# Patient Record
Sex: FEMALE | Race: WHITE | Employment: UNEMPLOYED | URBAN - METROPOLITAN AREA
[De-identification: names, ages, dates, MRNs, and addresses within clinical notes are randomized per-mention and may not be internally consistent; named-entity substitution may affect disease eponyms.]

---

## 2017-05-19 ENCOUNTER — OFFICE VISIT (OUTPATIENT)
Dept: INTERNAL MEDICINE CLINIC | Facility: CLINIC | Age: 57
End: 2017-05-19

## 2017-05-19 VITALS
SYSTOLIC BLOOD PRESSURE: 112 MMHG | WEIGHT: 95.38 LBS | BODY MASS INDEX: 19.23 KG/M2 | TEMPERATURE: 97 F | HEIGHT: 59 IN | DIASTOLIC BLOOD PRESSURE: 70 MMHG | RESPIRATION RATE: 18 BRPM | HEART RATE: 96 BPM

## 2017-05-19 DIAGNOSIS — Z79.4 TYPE 2 DIABETES MELLITUS WITH OTHER OPHTHALMIC COMPLICATION, WITH LONG-TERM CURRENT USE OF INSULIN (HCC): Primary | ICD-10-CM

## 2017-05-19 DIAGNOSIS — M79.602 LEFT ARM PAIN: ICD-10-CM

## 2017-05-19 DIAGNOSIS — E11.39 TYPE 2 DIABETES MELLITUS WITH OTHER OPHTHALMIC COMPLICATION, WITH LONG-TERM CURRENT USE OF INSULIN (HCC): Primary | ICD-10-CM

## 2017-05-19 PROCEDURE — 99212 OFFICE O/P EST SF 10 MIN: CPT | Performed by: INTERNAL MEDICINE

## 2017-05-19 PROCEDURE — 99203 OFFICE O/P NEW LOW 30 MIN: CPT | Performed by: INTERNAL MEDICINE

## 2017-05-19 RX ORDER — MONTELUKAST SODIUM 10 MG/1
10 TABLET ORAL NIGHTLY
COMMUNITY
Start: 2015-03-31 | End: 2018-02-02

## 2017-05-19 RX ORDER — VENLAFAXINE HYDROCHLORIDE 75 MG/1
CAPSULE, EXTENDED RELEASE ORAL
Refills: 3 | COMMUNITY
Start: 2017-03-24 | End: 2018-08-24

## 2017-05-19 RX ORDER — METFORMIN HYDROCHLORIDE 500 MG/1
TABLET, EXTENDED RELEASE ORAL
COMMUNITY
End: 2017-05-19 | Stop reason: ALTCHOICE

## 2017-05-19 RX ORDER — INSULIN GLARGINE 100 [IU]/ML
62 INJECTION, SOLUTION SUBCUTANEOUS DAILY
Refills: 0 | COMMUNITY
Start: 2017-05-01 | End: 2017-08-15

## 2017-05-19 RX ORDER — ERGOCALCIFEROL 1.25 MG/1
50000 CAPSULE ORAL
COMMUNITY
Start: 2016-05-26 | End: 2017-05-19

## 2017-05-19 RX ORDER — PANTOPRAZOLE SODIUM 40 MG/1
40 TABLET, DELAYED RELEASE ORAL
COMMUNITY
End: 2018-02-02

## 2017-05-19 RX ORDER — ALPRAZOLAM 0.5 MG/1
0.5 TABLET ORAL NIGHTLY PRN
COMMUNITY
End: 2017-10-14

## 2017-05-19 RX ORDER — DESVENLAFAXINE 50 MG/1
50 TABLET, EXTENDED RELEASE ORAL
COMMUNITY
End: 2017-05-19

## 2017-05-19 RX ORDER — LISINOPRIL 10 MG/1
TABLET ORAL
COMMUNITY
End: 2017-05-19 | Stop reason: ALTCHOICE

## 2017-05-19 RX ORDER — INSULIN ASPART 100 [IU]/ML
INJECTION, SOLUTION INTRAVENOUS; SUBCUTANEOUS
Refills: 11 | COMMUNITY
Start: 2017-05-03 | End: 2017-08-18

## 2017-05-19 RX ORDER — LIRAGLUTIDE 6 MG/ML
INJECTION SUBCUTANEOUS
Refills: 1 | COMMUNITY
Start: 2017-04-24 | End: 2017-05-19

## 2017-05-19 RX ORDER — PEN NEEDLE, DIABETIC 31 GX5/16"
NEEDLE, DISPOSABLE MISCELLANEOUS
COMMUNITY
Start: 2017-05-13 | End: 2018-04-09

## 2017-05-19 RX ORDER — LISINOPRIL 2.5 MG/1
2.5 TABLET ORAL
COMMUNITY
End: 2017-05-19 | Stop reason: ALTCHOICE

## 2017-05-19 RX ORDER — ALPRAZOLAM 0.5 MG/1
TABLET ORAL
Refills: 5 | COMMUNITY
Start: 2017-05-06 | End: 2017-05-19

## 2017-05-19 RX ORDER — LOSARTAN POTASSIUM 25 MG/1
TABLET ORAL
Refills: 2 | COMMUNITY
Start: 2017-05-02 | End: 2018-02-20

## 2017-05-19 RX ORDER — ESTERIFIED ESTROGEN AND METHYLTESTOSTERONE .625; 1.25 MG/1; MG/1
TABLET ORAL
COMMUNITY
End: 2017-05-19 | Stop reason: ALTCHOICE

## 2017-05-19 NOTE — PROGRESS NOTES
HPI:    Patient ID: Carlos Larose is a 55year old female. HPI  Patient here for her first office visit. Recently changed insurance.   Initially the visit was to be a physical.  However given is the first visit and other things that we need to talk about Negative for cough and shortness of breath. Cardiovascular: Negative for chest pain and palpitations. Gastrointestinal: Positive for diarrhea and constipation. Negative for nausea, vomiting and abdominal pain.    Genitourinary: Negative for dysuria, he breath sounds normal. She has no wheezes. She has no rales. Abdominal: Soft. Bowel sounds are normal. She exhibits no mass. There is no tenderness. Musculoskeletal: She exhibits no tenderness. Lymphadenopathy:     She has no cervical adenopathy.    Ne

## 2017-05-29 PROBLEM — Z79.4 TYPE 2 DIABETES MELLITUS WITH OPHTHALMIC COMPLICATION, WITH LONG-TERM CURRENT USE OF INSULIN (HCC): Status: ACTIVE | Noted: 2017-05-29

## 2017-05-29 PROBLEM — E11.39 TYPE 2 DIABETES MELLITUS WITH OPHTHALMIC COMPLICATION, WITH LONG-TERM CURRENT USE OF INSULIN (HCC): Status: ACTIVE | Noted: 2017-05-29

## 2017-06-01 ENCOUNTER — APPOINTMENT (OUTPATIENT)
Dept: PHYSICAL THERAPY | Age: 57
End: 2017-06-01
Attending: INTERNAL MEDICINE
Payer: COMMERCIAL

## 2017-06-06 ENCOUNTER — APPOINTMENT (OUTPATIENT)
Dept: PHYSICAL THERAPY | Age: 57
End: 2017-06-06
Attending: INTERNAL MEDICINE
Payer: COMMERCIAL

## 2017-06-08 ENCOUNTER — APPOINTMENT (OUTPATIENT)
Dept: PHYSICAL THERAPY | Age: 57
End: 2017-06-08
Attending: INTERNAL MEDICINE
Payer: COMMERCIAL

## 2017-06-15 ENCOUNTER — APPOINTMENT (OUTPATIENT)
Dept: PHYSICAL THERAPY | Age: 57
End: 2017-06-15
Attending: INTERNAL MEDICINE
Payer: COMMERCIAL

## 2017-06-17 ENCOUNTER — APPOINTMENT (OUTPATIENT)
Dept: LAB | Age: 57
End: 2017-06-17
Attending: INTERNAL MEDICINE
Payer: COMMERCIAL

## 2017-06-17 DIAGNOSIS — Z79.4 TYPE 2 DIABETES MELLITUS WITH OTHER OPHTHALMIC COMPLICATION, WITH LONG-TERM CURRENT USE OF INSULIN (HCC): ICD-10-CM

## 2017-06-17 DIAGNOSIS — E11.39 TYPE 2 DIABETES MELLITUS WITH OTHER OPHTHALMIC COMPLICATION, WITH LONG-TERM CURRENT USE OF INSULIN (HCC): ICD-10-CM

## 2017-06-17 PROCEDURE — 80053 COMPREHEN METABOLIC PANEL: CPT

## 2017-06-17 PROCEDURE — 83036 HEMOGLOBIN GLYCOSYLATED A1C: CPT

## 2017-06-17 PROCEDURE — 80061 LIPID PANEL: CPT

## 2017-06-17 PROCEDURE — 36415 COLL VENOUS BLD VENIPUNCTURE: CPT

## 2017-06-17 PROCEDURE — 84443 ASSAY THYROID STIM HORMONE: CPT

## 2017-06-20 ENCOUNTER — APPOINTMENT (OUTPATIENT)
Dept: PHYSICAL THERAPY | Age: 57
End: 2017-06-20
Attending: INTERNAL MEDICINE
Payer: COMMERCIAL

## 2017-06-21 ENCOUNTER — TELEPHONE (OUTPATIENT)
Dept: INTERNAL MEDICINE CLINIC | Facility: CLINIC | Age: 57
End: 2017-06-21

## 2017-06-22 ENCOUNTER — APPOINTMENT (OUTPATIENT)
Dept: PHYSICAL THERAPY | Age: 57
End: 2017-06-22
Attending: INTERNAL MEDICINE
Payer: COMMERCIAL

## 2017-06-24 NOTE — TELEPHONE ENCOUNTER
Results sent out to patient this morning via my chart with my interpretation. Okay to call and go over the results.

## 2017-06-24 NOTE — TELEPHONE ENCOUNTER
Please note. Thank you. Pt contacted (Name and  verified) and MD result below relayed to pt.   Pt instructed to drink at least 6-8 8 oz glasses of water daily, increase fiber intake with vegetables, oatmeal and beans, avoid fatty, fried foods, and try t are somewhat elevated.  Please continue the same medications and try to work a little harder on diet and exercise. The TSH thyroid blood level is normal. The thyroid gland is functioning normally.      Please contact the office if you wish to discuss th

## 2017-06-27 ENCOUNTER — APPOINTMENT (OUTPATIENT)
Dept: PHYSICAL THERAPY | Age: 57
End: 2017-06-27
Attending: INTERNAL MEDICINE
Payer: COMMERCIAL

## 2017-07-21 NOTE — TELEPHONE ENCOUNTER
Please advise on refill request, failed protocol due to A1C>7.5      Diabetes Medications  Protocol Criteria:  · Appointment scheduled in the past 6 months or the next 3 months  · A1C < 7.5 in the past 6 months  · Creatinine in the past 12 months  · Creati

## 2017-07-21 NOTE — TELEPHONE ENCOUNTER
Pt requesting refill. Liraglutide (VICTOZA) 18 MG/3ML Subcutaneous Solution Pen-injector Inject 1.8 mg into the skin daily.   Disp:  Rfl:

## 2017-08-07 ENCOUNTER — TELEPHONE (OUTPATIENT)
Dept: INTERNAL MEDICINE CLINIC | Facility: CLINIC | Age: 57
End: 2017-08-07

## 2017-08-07 DIAGNOSIS — Z12.31 ENCOUNTER FOR SCREENING MAMMOGRAM FOR BREAST CANCER: Primary | ICD-10-CM

## 2017-08-07 NOTE — TELEPHONE ENCOUNTER
Contact patient. Order generated for mammogram.  I am not sure where these blood sugar readings are at this time.

## 2017-08-07 NOTE — TELEPHONE ENCOUNTER
From: Meliton Mccall   Sent: 8/2/2017   1:55 PM   To: [facility] Customer Response Pool   Subject: Sent Dr. Bryson Shah BS numbers yesterday 8/1          Topic: Selection      Dear Dr. Bryson Shah,       Please send me a message about my BS numbers if you got it. Marija Mccollum

## 2017-08-09 NOTE — TELEPHONE ENCOUNTER
Pt calling and stts she faxed over her BS readings. I gave the pt the correct fax number. Pt would like to speak with a nurse.

## 2017-08-09 NOTE — TELEPHONE ENCOUNTER
PATIENT CONTACTED STATES WILL BRING IN NEW BLOOD SUGAR READINGS NEXT OFFICE VISIT.  PATIENT DOES NOT RECALL WHAT FAX NUMBER SHE FAXED PREVIOUS READINGS TO.

## 2017-08-11 NOTE — TELEPHONE ENCOUNTER
Please contact the patient. I have reviewed the home blood sugar report. In general, the numbers look very good. Morning readings are typically between 111 and 154 with most of them being under 140.   Readings later in the day are typically well controll

## 2017-08-14 NOTE — TELEPHONE ENCOUNTER
Patient called and stated need a new request for the following medication  Patient is currently out of medication    Requesting a call back from nurse-    Current Outpatient Prescriptions:                          LANTUS SOLOSTAR 100 UNIT/ML Subcutaneous S

## 2017-08-15 RX ORDER — INSULIN GLARGINE 100 [IU]/ML
62 INJECTION, SOLUTION SUBCUTANEOUS DAILY
Qty: 5 PEN | Refills: 5 | Status: SHIPPED | OUTPATIENT
Start: 2017-08-15 | End: 2017-08-18

## 2017-08-15 NOTE — TELEPHONE ENCOUNTER
Insulin never refilled by provider. Routed for review. Per pt out of medication.  Please advise    Diabetes Medications  Protocol Criteria:  · Appointment scheduled in the past 6 months or the next 3 months  · A1C < 7.5 in the past 6 months  · Creatinine in

## 2017-08-17 NOTE — TELEPHONE ENCOUNTER
PATIENT CONTACTED AND INFORMED OF MD NOTE REGARDING BLOOD GLUCOSE RESULTS STATES HAS SOME ADDITIONAL QUESTIONS AND WOULD LIKE TO DISCUSS AT TOMORROW OFFICE VISIT WITH MD.

## 2017-08-18 ENCOUNTER — OFFICE VISIT (OUTPATIENT)
Dept: INTERNAL MEDICINE CLINIC | Facility: CLINIC | Age: 57
End: 2017-08-18

## 2017-08-18 VITALS
HEIGHT: 59 IN | DIASTOLIC BLOOD PRESSURE: 72 MMHG | WEIGHT: 97 LBS | SYSTOLIC BLOOD PRESSURE: 120 MMHG | HEART RATE: 100 BPM | TEMPERATURE: 97 F | BODY MASS INDEX: 19.56 KG/M2 | RESPIRATION RATE: 18 BRPM

## 2017-08-18 DIAGNOSIS — Z79.4 TYPE 2 DIABETES MELLITUS WITH OTHER OPHTHALMIC COMPLICATION, WITH LONG-TERM CURRENT USE OF INSULIN (HCC): Primary | ICD-10-CM

## 2017-08-18 DIAGNOSIS — E11.39 TYPE 2 DIABETES MELLITUS WITH OTHER OPHTHALMIC COMPLICATION, WITH LONG-TERM CURRENT USE OF INSULIN (HCC): Primary | ICD-10-CM

## 2017-08-18 DIAGNOSIS — H91.92 DECREASED HEARING OF LEFT EAR: ICD-10-CM

## 2017-08-18 DIAGNOSIS — R21 RASH: ICD-10-CM

## 2017-08-18 PROCEDURE — 99212 OFFICE O/P EST SF 10 MIN: CPT | Performed by: INTERNAL MEDICINE

## 2017-08-18 PROCEDURE — 99213 OFFICE O/P EST LOW 20 MIN: CPT | Performed by: INTERNAL MEDICINE

## 2017-08-18 RX ORDER — PANTOPRAZOLE SODIUM 40 MG/1
40 TABLET, DELAYED RELEASE ORAL
COMMUNITY
End: 2017-08-18

## 2017-08-18 RX ORDER — INSULIN ASPART 100 [IU]/ML
INJECTION, SOLUTION INTRAVENOUS; SUBCUTANEOUS
Qty: 5 PEN | Refills: 5 | Status: SHIPPED | OUTPATIENT
Start: 2017-08-18 | End: 2017-08-23

## 2017-08-18 RX ORDER — DESVENLAFAXINE 50 MG/1
50 TABLET, EXTENDED RELEASE ORAL
COMMUNITY
End: 2018-02-02

## 2017-08-18 RX ORDER — INSULIN ASPART 100 [IU]/ML
INJECTION, SOLUTION INTRAVENOUS; SUBCUTANEOUS
Qty: 5 PEN | Refills: 5 | Status: SHIPPED | OUTPATIENT
Start: 2017-08-18 | End: 2017-08-18

## 2017-08-18 RX ORDER — BLOOD-GLUCOSE METER
KIT MISCELLANEOUS
Refills: 0 | COMMUNITY
Start: 2017-06-02

## 2017-08-18 RX ORDER — BLOOD-GLUCOSE METER
KIT MISCELLANEOUS
Refills: 12 | COMMUNITY
Start: 2017-08-07 | End: 2017-08-18

## 2017-08-18 RX ORDER — LANCETS 28 GAUGE
EACH MISCELLANEOUS
Refills: 12 | COMMUNITY
Start: 2017-06-25

## 2017-08-18 RX ORDER — INSULIN GLARGINE 100 [IU]/ML
62 INJECTION, SOLUTION SUBCUTANEOUS DAILY
Qty: 5 PEN | Refills: 5 | Status: SHIPPED | OUTPATIENT
Start: 2017-08-18 | End: 2017-08-23

## 2017-08-18 RX ORDER — ERGOCALCIFEROL 1.25 MG/1
50000 CAPSULE ORAL
COMMUNITY
Start: 2016-05-26 | End: 2018-02-02

## 2017-08-18 RX ORDER — ALPRAZOLAM 0.5 MG/1
0.5 TABLET ORAL
COMMUNITY
End: 2017-08-18

## 2017-08-18 RX ORDER — TRETINOIN 0.2 MG/G
CREAM TOPICAL
Refills: 2 | COMMUNITY
Start: 2017-08-08 | End: 2020-07-19

## 2017-08-18 RX ORDER — BLOOD-GLUCOSE METER
KIT MISCELLANEOUS
Qty: 100 STRIP | Refills: 12 | Status: SHIPPED | OUTPATIENT
Start: 2017-08-18 | End: 2018-08-22

## 2017-08-18 RX ORDER — INSULIN GLARGINE 100 [IU]/ML
62 INJECTION, SOLUTION SUBCUTANEOUS DAILY
Qty: 5 PEN | Refills: 5 | Status: SHIPPED | OUTPATIENT
Start: 2017-08-18 | End: 2017-08-18

## 2017-08-18 RX ORDER — BLOOD-GLUCOSE METER
KIT MISCELLANEOUS
Qty: 100 STRIP | Refills: 12 | Status: SHIPPED | OUTPATIENT
Start: 2017-08-18 | End: 2017-08-18

## 2017-08-18 NOTE — PROGRESS NOTES
HPI:    Patient ID: Aston Krishnamurthy is a 55year old female. HPI  Patient is here for follow-up on issues as below. Last seen here in May. She had left arm pain at that time. It is better now. Blood work showed A1c of 7.7. Otherwise unremarkable.   She menstrual problem and sexual dysfunction. Musculoskeletal: Negative for joint pain. Skin: Positive for rash. Neurological: Negative for weakness, numbness and headaches. Hematological: Does not bruise/bleed easily.    Psychiatric/Behavioral: Priscilla Scottish ophthalmic complication, with long-term current use of insulin ([facility] [address]  (primary encounter diagnosis)  Plan: ENDOCRINOLOGY - INTERNAL         Patient with ongoing type 2 diabetes with borderline control.   This is despite being on very high doses of insulin a

## 2017-08-23 ENCOUNTER — OFFICE VISIT (OUTPATIENT)
Dept: ENDOCRINOLOGY CLINIC | Facility: CLINIC | Age: 57
End: 2017-08-23

## 2017-08-23 VITALS
HEART RATE: 89 BPM | DIASTOLIC BLOOD PRESSURE: 68 MMHG | SYSTOLIC BLOOD PRESSURE: 112 MMHG | WEIGHT: 98.81 LBS | BODY MASS INDEX: 20.74 KG/M2 | HEIGHT: 58 IN

## 2017-08-23 DIAGNOSIS — E11.65 UNCONTROLLED TYPE 2 DIABETES MELLITUS WITH HYPERGLYCEMIA, WITH LONG-TERM CURRENT USE OF INSULIN (HCC): Primary | ICD-10-CM

## 2017-08-23 DIAGNOSIS — Z79.4 UNCONTROLLED TYPE 2 DIABETES MELLITUS WITH HYPERGLYCEMIA, WITH LONG-TERM CURRENT USE OF INSULIN (HCC): Primary | ICD-10-CM

## 2017-08-23 LAB
CARTRIDGE LOT#: ABNORMAL NUMERIC
GLUCOSE BLOOD: 139
HEMOGLOBIN A1C: 7.8 % (ref 4.3–5.6)
TEST STRIP LOT #: NORMAL NUMERIC

## 2017-08-23 PROCEDURE — 36416 COLLJ CAPILLARY BLOOD SPEC: CPT | Performed by: INTERNAL MEDICINE

## 2017-08-23 PROCEDURE — 99212 OFFICE O/P EST SF 10 MIN: CPT | Performed by: INTERNAL MEDICINE

## 2017-08-23 PROCEDURE — 82962 GLUCOSE BLOOD TEST: CPT | Performed by: INTERNAL MEDICINE

## 2017-08-23 PROCEDURE — 83036 HEMOGLOBIN GLYCOSYLATED A1C: CPT | Performed by: INTERNAL MEDICINE

## 2017-08-23 PROCEDURE — 99244 OFF/OP CNSLTJ NEW/EST MOD 40: CPT | Performed by: INTERNAL MEDICINE

## 2017-08-23 NOTE — H&P
New Patient Visit - Diabetes    CONSULT - Reason for Visit:  Diabetes management. Requesting Physician: Farzad Lara MD    CHIEF COMPLAINT:  Patient presents with:  Diabetes: type 2, dx 20 years ago, checking BG 6 times per day. LDEE 4 months ago.  I skin daily.  (Patient taking differently: Inject 68 Units into the skin daily.  ) Disp: 5 pen Rfl: 5   FREESTYLE LITE TEST In Vitro Strip TEST BLOOD SUGAR QID Disp: 100 strip Rfl: 12   Venlafaxine HCl ER 75 MG Oral Capsule SR 24 Hr TK ONE C PO  D Disp:  Rfl Onset   • Diabetes Father 54     Type 1/Type 2       ASSESSMENTS:        REVIEW OF SYSTEMS:  Constitutional: Negative for: weight change, fever, fatigue, cold/heat intolerance  Eyes: Negative for:  Visual changes, proptosis, blurring, floaters, poor night interested in insulin pump. She thinks that her insulin needs have gone up over time. She thinks lantus is not working for her. We had a long discussion about her insulin regimen.  She states she is very tired of doing multiple shots and multiple accuc

## 2017-08-25 ENCOUNTER — TELEPHONE (OUTPATIENT)
Dept: ENDOCRINOLOGY CLINIC | Facility: CLINIC | Age: 57
End: 2017-08-25

## 2017-08-25 NOTE — TELEPHONE ENCOUNTER
Returned call to Markel. Understands to decrease night time dose to 45 units and continue with 50 units for Breakfast and lunch. She will call in 1 week with BG readings or sooner if any BG readings <70.

## 2017-08-25 NOTE — TELEPHONE ENCOUNTER
Pt calling with blood sugar results, no need to call.   Fasting  Date Breakfast        After Breakfast Before Dinner Night  8/24 198  276  198  396  8/25 95  445  250 (after lunch)

## 2017-08-26 ENCOUNTER — HOSPITAL ENCOUNTER (OUTPATIENT)
Age: 57
Discharge: HOME | End: 2017-08-26
Attending: EMERGENCY MEDICINE
Payer: COMMERCIAL

## 2017-08-26 ENCOUNTER — HOSPITAL ENCOUNTER (OUTPATIENT)
Dept: MAMMOGRAPHY | Age: 57
Discharge: HOME | End: 2017-08-26
Attending: INTERNAL MEDICINE
Payer: COMMERCIAL

## 2017-08-26 VITALS
HEART RATE: 103 BPM | HEIGHT: 58 IN | DIASTOLIC BLOOD PRESSURE: 59 MMHG | BODY MASS INDEX: 20.36 KG/M2 | WEIGHT: 97 LBS | RESPIRATION RATE: 20 BRPM | OXYGEN SATURATION: 96 % | TEMPERATURE: 97 F | SYSTOLIC BLOOD PRESSURE: 106 MMHG

## 2017-08-26 DIAGNOSIS — L03.116 CELLULITIS OF LEFT LOWER EXTREMITY: Primary | ICD-10-CM

## 2017-08-26 DIAGNOSIS — Z12.31 ENCOUNTER FOR SCREENING MAMMOGRAM FOR BREAST CANCER: ICD-10-CM

## 2017-08-26 PROCEDURE — 99203 OFFICE O/P NEW LOW 30 MIN: CPT

## 2017-08-26 PROCEDURE — 77067 SCR MAMMO BI INCL CAD: CPT | Performed by: INTERNAL MEDICINE

## 2017-08-26 PROCEDURE — 99213 OFFICE O/P EST LOW 20 MIN: CPT

## 2017-08-26 RX ORDER — CEPHALEXIN 500 MG/1
500 CAPSULE ORAL 3 TIMES DAILY
Qty: 30 CAPSULE | Refills: 0 | Status: SHIPPED | OUTPATIENT
Start: 2017-08-26 | End: 2017-09-05

## 2017-08-26 NOTE — ED PROVIDER NOTES
Patient Seen in: [address]    History   Patient presents with:  Rash Skin Problem (integumentary)    Stated Complaint: Rash on left leg    HPI    The patient is a 78-year-old female who has a history of type 1 diabetes Liraglutide (VICTOZA) 18 MG/3ML Subcutaneous Solution Pen-injector,  Inject 1.8 mg into the skin daily.    FREESTYLE LITE TEST In Vitro Strip,  TEST BLOOD SUGAR QID   Venlafaxine HCl ER 75 MG Oral Capsule SR 24 Hr,  TK ONE C PO  D   Losartan Potassium 25 MG Reviewed - No data to display    ============================================================  ED Course  ------------------------------------------------------------  MDM     What initially looks like an inflammatory reaction is not responding to a suffic

## 2017-08-26 NOTE — ED INITIAL ASSESSMENT (HPI)
Pt here with complaints of a skin rash that developed on her left lower leg for about 2 weeks , pt states that she went to the dr and was prescribed a cream and has been taken it for 5 days and no relief , left lower leg is reddened, warm to touch and swol

## 2017-08-31 ENCOUNTER — TELEPHONE (OUTPATIENT)
Dept: ENDOCRINOLOGY CLINIC | Facility: CLINIC | Age: 57
End: 2017-08-31

## 2017-08-31 ENCOUNTER — PATIENT MESSAGE (OUTPATIENT)
Dept: ENDOCRINOLOGY CLINIC | Facility: CLINIC | Age: 57
End: 2017-08-31

## 2017-08-31 NOTE — TELEPHONE ENCOUNTER
Patient will need refill for her U-500 before her next appt. She sent sugars in separate email, she will need refill with updated dosing instructions.

## 2017-08-31 NOTE — TELEPHONE ENCOUNTER
From: Cristina Sellers  To: Poncho Gallardo MD  Sent: 8/31/2017 3:22 PM CDT  Subject: Prescription Question    Hi Dr. Montserrat Eric,    So raj said I would need another coupon for 3 weeks.  I am short a month and i will be out of medicine that you gave me

## 2017-09-01 NOTE — TELEPHONE ENCOUNTER
Sorry, I think there were two emails  1. Can give her insulin order to last till 10/2017 apt. 2. Also, she should check before BF/ lunch and dinner and one some days at bedtime.    Should increase insulin as follows  50/50/45 ( please confirm) --> 50 / 55/

## 2017-09-01 NOTE — TELEPHONE ENCOUNTER
Regarding: Prescription Question  Contact: [phone]  ----- Message from Dottie Pederson RN sent at 8/31/2017 12:45 PM CDT -----       ----- Message from Annette Thompson to Alvaro Stuart MD sent at 8/31/2017 12:15 PM -----   Hello Dr. [facility],  [facility]

## 2017-09-01 NOTE — TELEPHONE ENCOUNTER
Spoke with Raya Giron provided dosing instructions below. Understands to make changes. Discussed she is unable to continue to use one time free voucher for U500 as it can only be used once.  She says vials are covered she would really prefer not to use them tho

## 2017-09-01 NOTE — TELEPHONE ENCOUNTER
We can set up a prescription to last till next apt  How are her BG doing.  I think she is on 50/50/45 Thx.

## 2017-09-01 NOTE — TELEPHONE ENCOUNTER
Regarding: RE: Prescription Question  Contact: [phone]  ----- Message from Maddie Beard RN sent at 8/31/2017  3:28 PM CDT -----       ----- Message sent from Ruthy Colin RN to Lis Jamison at 8/31/2017  3:27 PM -----   Mary Ellen Ramon,    [facility] fo

## 2017-09-01 NOTE — TELEPHONE ENCOUNTER
Darberry. Informed her that Dr. Vinicio Osei would like to switch her to U-500 vials which are covered by her insurance. Booked RN appt in [place] for [employer] Rx will be given at the appt. She will call to book sooner appt if needed.      She will emai

## 2017-09-01 NOTE — TELEPHONE ENCOUNTER
Called patient. LMTCB. Patient also mentioned that she needs a coupon for U-500. Will ask if she has already used the U-500 voucher, if so she cannot use it again. Also she said that the U-500 is not covered. Will ask her if a PA is needed.  If so will star

## 2017-09-06 NOTE — TELEPHONE ENCOUNTER
See TE from 8/31/17. Patient will be coming in next week for U-500 vial and syringe training and will receive a refill at that time. She indicated that she does not need a refill until then.

## 2017-09-07 ENCOUNTER — PATIENT MESSAGE (OUTPATIENT)
Dept: INTERNAL MEDICINE CLINIC | Facility: CLINIC | Age: 57
End: 2017-09-07

## 2017-09-11 ENCOUNTER — TELEPHONE (OUTPATIENT)
Dept: ENDOCRINOLOGY CLINIC | Facility: CLINIC | Age: 57
End: 2017-09-11

## 2017-09-11 NOTE — TELEPHONE ENCOUNTER
Pharmacy calling wants to confirm rx indicate pen vs a vile, pharm wants to make sure this is correct? Pls call at:[phone],thanks.     Current Outpatient Prescriptions:   •  Insulin Regular Human, Conc, (HUMULIN R U-500, CONCENTRATED,) 500 UNIT/ML Subc

## 2017-09-11 NOTE — TELEPHONE ENCOUNTER
Returned call to the pharmacy. Informed them that Rx is correct. Called patient. Informed her that Dr. Buster Montoya would like her to keep her nurse appt scheduled on Wednesday.  She verbalized her understanding

## 2017-09-11 NOTE — TELEPHONE ENCOUNTER
OK I can let the patient know. Orders pending for U-500 vials and the specific U-500 insulin syringes. Please review dosing instructions.

## 2017-09-11 NOTE — TELEPHONE ENCOUNTER
LOV 8/23/17. F/U 10/4/17. Called patient. We had done a PA for the U-500 kwikpens but the insurnace will only cover vials. She has a nurse visit scheduled this Wednesday in [place] to train on U-500 vials and syringes.  Patient states that she acutually ran

## 2017-09-11 NOTE — TELEPHONE ENCOUNTER
Pt requesting refills for Humalin U-500 vials & Syringes. Pls call - states pt has not had medication since last night. Thank you.

## 2017-09-11 NOTE — TELEPHONE ENCOUNTER
That should be fine. May be she can train with the pharmacy and then come for review of technique.    Thx.

## 2017-09-12 ENCOUNTER — PATIENT MESSAGE (OUTPATIENT)
Dept: ENDOCRINOLOGY CLINIC | Facility: CLINIC | Age: 57
End: 2017-09-12

## 2017-09-12 NOTE — TELEPHONE ENCOUNTER
From: Murali Aviles  To: David Ferrara MD  Sent: 9/7/2017 2:33 PM CDT  Subject: Prescription Question    Dr. Shiva Proctor,      Would you be able to send a prescription of Latisse to my pharmacy?     Let me know thank you  Yue Bravo

## 2017-09-12 NOTE — TELEPHONE ENCOUNTER
Called [facility] pharmacy help desk. They do not accept PAs over the phone. They will fax a PA form to the office. Will await form.

## 2017-09-12 NOTE — TELEPHONE ENCOUNTER
From: Cy Santillan  To: Matt Dobson MD  Sent: 9/12/2017 8:25 AM CDT  Subject: Prescription Question    Dr. Isacc Nix,    So they had the vial's (medicine) ready yesterday with no syringes. They ordered them yesterday should be here today.  I don't u

## 2017-09-13 ENCOUNTER — NURSE ONLY (OUTPATIENT)
Dept: ENDOCRINOLOGY CLINIC | Facility: CLINIC | Age: 57
End: 2017-09-13

## 2017-09-13 DIAGNOSIS — Z79.4 UNCONTROLLED TYPE 2 DIABETES MELLITUS WITH HYPERGLYCEMIA, WITH LONG-TERM CURRENT USE OF INSULIN (HCC): Primary | ICD-10-CM

## 2017-09-13 DIAGNOSIS — E11.65 UNCONTROLLED TYPE 2 DIABETES MELLITUS WITH HYPERGLYCEMIA, WITH LONG-TERM CURRENT USE OF INSULIN (HCC): Primary | ICD-10-CM

## 2017-09-13 PROCEDURE — 99212 OFFICE O/P EST SF 10 MIN: CPT | Performed by: INTERNAL MEDICINE

## 2017-09-13 NOTE — PROGRESS NOTES
Patient presented to the clinic for Humulin U-500 vial and syringe training. Patient has picked up U-500 insulin and cash paid for U-500 insulin syringes.  Instructed patient how to store insulin vials in the fridge or that it is good at room temperature fo

## 2017-09-14 NOTE — TELEPHONE ENCOUNTER
JSK please advise, pt seeking a new Rx for Latisse,  Called pt and she stts    Eyelashes are short, thinned out especially with her getting older. Pt denies having taken this Rx in the past and also denies a medical dx for Rx.  Stts her friend is taking it

## 2017-09-19 ENCOUNTER — PATIENT MESSAGE (OUTPATIENT)
Dept: INTERNAL MEDICINE CLINIC | Facility: CLINIC | Age: 57
End: 2017-09-19

## 2017-09-19 ENCOUNTER — TELEPHONE (OUTPATIENT)
Dept: OTHER | Age: 57
End: 2017-09-19

## 2017-09-19 DIAGNOSIS — R21 RASH: Primary | ICD-10-CM

## 2017-09-19 NOTE — TELEPHONE ENCOUNTER
lmtcb [facility] message sent.   Needs further triage:      ----- Message -----     From: Lang Vargas Sent: 9/19/2017 11:00 AM CDT       To: Avani Reese MD  Subject: Non-Urgent Medical Question    I saw Dr. James Cancino about a month ago and I had a ski

## 2017-09-19 NOTE — TELEPHONE ENCOUNTER
From: Glenn Alvarez  To: Moncho Fung MD  Sent: 9/19/2017 11:00 AM CDT  Subject: Non-Urgent Medical Question    I saw Dr. Christo Patrick about a month ago and I had a skin condition called cellulitis.  He gave me some cream. I went to the clinic [facility]

## 2017-09-20 ENCOUNTER — HOSPITAL ENCOUNTER (OUTPATIENT)
Age: 57
Discharge: HOME | End: 2017-09-20
Attending: FAMILY MEDICINE
Payer: COMMERCIAL

## 2017-09-20 ENCOUNTER — PATIENT MESSAGE (OUTPATIENT)
Dept: INTERNAL MEDICINE CLINIC | Facility: CLINIC | Age: 57
End: 2017-09-20

## 2017-09-20 VITALS
SYSTOLIC BLOOD PRESSURE: 105 MMHG | RESPIRATION RATE: 18 BRPM | OXYGEN SATURATION: 99 % | TEMPERATURE: 97 F | HEART RATE: 84 BPM | DIASTOLIC BLOOD PRESSURE: 58 MMHG

## 2017-09-20 DIAGNOSIS — L03.116 CELLULITIS OF LEFT LOWER EXTREMITY: Primary | ICD-10-CM

## 2017-09-20 PROCEDURE — 99214 OFFICE O/P EST MOD 30 MIN: CPT

## 2017-09-20 PROCEDURE — 99213 OFFICE O/P EST LOW 20 MIN: CPT

## 2017-09-20 RX ORDER — CEPHALEXIN 500 MG/1
500 CAPSULE ORAL 3 TIMES DAILY
Qty: 30 CAPSULE | Refills: 0 | Status: SHIPPED | OUTPATIENT
Start: 2017-09-20 | End: 2017-09-30

## 2017-09-20 RX ORDER — INSULIN HUMAN 500 [IU]/ML
INJECTION, SOLUTION SUBCUTANEOUS
Qty: 6 ML | Refills: 0 | Status: SHIPPED | OUTPATIENT
Start: 2017-09-20 | End: 2018-02-02

## 2017-09-20 NOTE — TELEPHONE ENCOUNTER
This prescription cannot be sent via email. Please call it in or fax it into the pharmacy. Then let the patient know that we have sent it to the pharmacy so she can pick it up.

## 2017-09-20 NOTE — TELEPHONE ENCOUNTER
Patient going to [facility] today. Patient stating she completed the Keflex antibiotic one week ago and her cellulitis was better for a while but states it started coming back 2 days ago.  Patient states her left ankle up to mid calf has again bec

## 2017-09-20 NOTE — ED INITIAL ASSESSMENT (HPI)
PT here to IC with c/o left leg \"cellulitis\" , pt states she was here several weeks ago and was diagnosed with cellulitis and got better, but now pt states it has come back. Resp easy and regular.  Now with slight redness to left lower leg, c/o itching,

## 2017-09-20 NOTE — ED PROVIDER NOTES
Patient Seen in: [address]    History   No chief complaint on file.     Stated Complaint: lt leg pain    HPI    64-year-old female with past medical history of diabetes presents with left lower extremity ankle pain and s Cardiovascular: Normal rate, regular rhythm, normal heart sounds and intact distal pulses. Pulmonary/Chest: Effort normal and breath sounds normal.   Abdominal: Soft.  Bowel sounds are normal.   Musculoskeletal:        Left ankle: Normal.   Neurologica

## 2017-09-21 RX ORDER — BIMATOPROST 3 UG/ML
SOLUTION TOPICAL
Qty: 1 BOTTLE | Refills: 5 | Status: SHIPPED | OUTPATIENT
Start: 2017-09-21 | End: 2018-07-10

## 2017-09-21 NOTE — TELEPHONE ENCOUNTER
From: Julieth Brar  To: Alexia Muller MD  Sent: 9/20/2017 9:03 PM CDT  Subject: Visit Follow-up Question    To Dr. Shirley Rai,    I called this morning for a follow-up for skin condition. When I saw you last you gave me cream to put on my leg.  Then I kori

## 2017-09-21 NOTE — TELEPHONE ENCOUNTER
From: Liborio Sousa  To: Tami Knox MD  Sent: 9/20/2017  9:03 PM CDT  Subject: Visit Follow-up Question    To Dr. Fani Holbrook,    I called this morning for a follow-up for skin condition.  When I saw you last you gave me cream to put on my leg. Fer Anderson I

## 2017-09-22 NOTE — TELEPHONE ENCOUNTER
Call pt. Referral signed and sent to [facility].  She can make appt with Derm doctor

## 2017-09-23 NOTE — TELEPHONE ENCOUNTER
Managed Care, please advise. JSK signed referral, is there anything else that needs to be done before referral can be authorized? It says pending review right now. Pt requesting a call back with a status on Monday.

## 2017-09-25 NOTE — TELEPHONE ENCOUNTER
Pt's health plan does not require referrals for in network providers. If pt is being seen at [address] no referrals are needed. Thank you, [facility].

## 2017-10-04 ENCOUNTER — OFFICE VISIT (OUTPATIENT)
Dept: ENDOCRINOLOGY CLINIC | Facility: CLINIC | Age: 57
End: 2017-10-04

## 2017-10-04 VITALS
SYSTOLIC BLOOD PRESSURE: 115 MMHG | HEIGHT: 58 IN | DIASTOLIC BLOOD PRESSURE: 80 MMHG | HEART RATE: 93 BPM | BODY MASS INDEX: 20.57 KG/M2 | WEIGHT: 98 LBS

## 2017-10-04 DIAGNOSIS — E78.5 DYSLIPIDEMIA: ICD-10-CM

## 2017-10-04 DIAGNOSIS — E11.8 TYPE 2 DIABETES MELLITUS WITH COMPLICATION, WITHOUT LONG-TERM CURRENT USE OF INSULIN (HCC): Primary | ICD-10-CM

## 2017-10-04 PROCEDURE — 99214 OFFICE O/P EST MOD 30 MIN: CPT | Performed by: INTERNAL MEDICINE

## 2017-10-04 PROCEDURE — 99212 OFFICE O/P EST SF 10 MIN: CPT | Performed by: INTERNAL MEDICINE

## 2017-10-04 NOTE — PROGRESS NOTES
Return Office Visit     CHIEF COMPLAINT:  Patient presents with:  Diabetes       HISTORY OF PRESENT ILLNESS:  Demetrius Reyez is a 65year old female who presents for follow up for for DM.      DM HISTORY  Diagnosed: Around age 29        HISTORY OF DIABETES g Rfl: 2   Liraglutide (VICTOZA) 18 MG/3ML Subcutaneous Solution Pen-injector Inject 1.8 mg into the skin daily.  Disp: 3 mL Rfl: 6   FREESTYLE LITE TEST In Vitro Strip TEST BLOOD SUGAR QID Disp: 100 strip Rfl: 12   Venlafaxine HCl ER 75 MG Oral Capsule SR Negative for: polyuria, polydypsia  Skin: Negative for: rash, blister, cellulitis,       PHYSICAL EXAM:    10/04/17  0930   BP: 115/80   Pulse: 93   Weight: 98 lb (44.5 kg)   Height: 4' 10\" (1.473 m)     BMI: Body mass index is 20.48 kg/m².      Sheron Kerns style changes: Diet: low carbohydrate diet discussed, Exercise: should target a weight loss of 7% and increase exercise to at least 150min a week.  g). Hypoglycemia recognition and management discussed     2. Patient’s BP is normal today  3. Dyslipidemia.

## 2017-10-14 RX ORDER — ALPRAZOLAM 0.5 MG/1
TABLET ORAL
Qty: 60 TABLET | Refills: 0 | OUTPATIENT
Start: 2017-10-14

## 2017-10-14 RX ORDER — ALPRAZOLAM 0.5 MG/1
0.5 TABLET ORAL NIGHTLY PRN
Qty: 30 TABLET | Refills: 2 | OUTPATIENT
Start: 2017-10-14 | End: 2018-01-03

## 2017-10-14 NOTE — TELEPHONE ENCOUNTER
LMTCB please transfer to triage. Thanks  CSS if pt calls back she needs a appt. Who has been prescribing this medication?

## 2017-10-14 NOTE — TELEPHONE ENCOUNTER
Pt returning call and states previous PCP was prescribing Alprazolam 0.5 mg, one tablet nightly as needed. Was being prescribed for anxiety and sleep. States discussed with JK in the past and she had refills from previous PCP until now.      Please advise (

## 2017-10-16 ENCOUNTER — TELEPHONE (OUTPATIENT)
Dept: ENDOCRINOLOGY CLINIC | Facility: CLINIC | Age: 57
End: 2017-10-16

## 2017-10-16 ENCOUNTER — PATIENT MESSAGE (OUTPATIENT)
Dept: INTERNAL MEDICINE CLINIC | Facility: CLINIC | Age: 57
End: 2017-10-16

## 2017-10-16 NOTE — TELEPHONE ENCOUNTER
Medication is refilled by PCP not Dr. Nena Corea but looks like refill was sent recently on 10/14. Routed to PCP staff to address.

## 2017-10-18 NOTE — TELEPHONE ENCOUNTER
From: Gokul Mcnair  To: Maulik Akins MD  Sent: 10/16/2017 2:43 PM CDT  Subject: Prescription Question    I have a prescription Xanax from my last Dr. for a very long time. Please renew the aprazalame. I got a message that you renewed then denied it.

## 2017-10-29 ENCOUNTER — HOSPITAL ENCOUNTER (OUTPATIENT)
Age: 57
Discharge: HOME | End: 2017-10-29
Attending: EMERGENCY MEDICINE
Payer: COMMERCIAL

## 2017-10-29 VITALS
BODY MASS INDEX: 19.94 KG/M2 | SYSTOLIC BLOOD PRESSURE: 109 MMHG | WEIGHT: 95 LBS | HEART RATE: 102 BPM | TEMPERATURE: 98 F | DIASTOLIC BLOOD PRESSURE: 55 MMHG | HEIGHT: 58 IN | OXYGEN SATURATION: 99 % | RESPIRATION RATE: 20 BRPM

## 2017-10-29 DIAGNOSIS — J06.9 VIRAL UPPER RESPIRATORY TRACT INFECTION WITH COUGH: ICD-10-CM

## 2017-10-29 DIAGNOSIS — J20.8 ACUTE VIRAL BRONCHITIS: Primary | ICD-10-CM

## 2017-10-29 PROCEDURE — 99212 OFFICE O/P EST SF 10 MIN: CPT

## 2017-10-29 NOTE — ED INITIAL ASSESSMENT (HPI)
Per pt having cough congestion and intermittent chills for 3 days denies fevers.  Pt denies any pain

## 2017-10-29 NOTE — ED PROVIDER NOTES
Patient Seen in: [address]    History   Patient presents with:  Cough/URI    Stated Complaint: cough/congestion    HPI    The patient is a 60-year-old female with a history of type 2 diabetes who presents with complaint Conjunctiva noninjected, no exudate  Ears: Tympanic membranes clear bilaterally, pinnae nontender, no swelling or discharge in the canals  Nose: No significant mucoid or purulent discharge  Sinuses: Nontender  Pharynx: No erythema or exudate, uvula midline

## 2017-11-07 ENCOUNTER — TELEPHONE (OUTPATIENT)
Dept: ENDOCRINOLOGY CLINIC | Facility: CLINIC | Age: 57
End: 2017-11-07

## 2017-11-07 NOTE — TELEPHONE ENCOUNTER
Fax received for [facility] pharmacy. PA has been approved for 365 days. Scanned to chart.

## 2017-11-07 NOTE — TELEPHONE ENCOUNTER
Fax received from [facility] that as of 1/1/18 insurance will not cover Humulin R U-500. Will submit PA. PA submitted to [facility] solutions requesting continued coverage. Will await response.

## 2017-11-10 ENCOUNTER — OFFICE VISIT (OUTPATIENT)
Dept: INTERNAL MEDICINE CLINIC | Facility: CLINIC | Age: 57
End: 2017-11-10

## 2017-11-10 VITALS
HEIGHT: 58 IN | WEIGHT: 101 LBS | SYSTOLIC BLOOD PRESSURE: 111 MMHG | BODY MASS INDEX: 21.2 KG/M2 | TEMPERATURE: 97 F | HEART RATE: 83 BPM | DIASTOLIC BLOOD PRESSURE: 71 MMHG

## 2017-11-10 DIAGNOSIS — J20.9 ACUTE BRONCHITIS, UNSPECIFIED ORGANISM: Primary | ICD-10-CM

## 2017-11-10 PROCEDURE — 99213 OFFICE O/P EST LOW 20 MIN: CPT | Performed by: INTERNAL MEDICINE

## 2017-11-10 RX ORDER — AMOXICILLIN 500 MG/1
500 CAPSULE ORAL 3 TIMES DAILY
Qty: 30 CAPSULE | Refills: 0 | Status: SHIPPED | OUTPATIENT
Start: 2017-11-10 | End: 2017-11-20

## 2017-11-10 RX ORDER — HYDROCODONE POLISTIREX AND CHLORPHENIRAMINE POLISTIREX 10; 8 MG/5ML; MG/5ML
SUSPENSION, EXTENDED RELEASE ORAL
Refills: 0 | COMMUNITY
Start: 2017-10-29 | End: 2018-02-02 | Stop reason: ALTCHOICE

## 2017-11-10 NOTE — PATIENT INSTRUCTIONS
What Is Acute Bronchitis? Acute bronchitis is when the airways in your lungs (bronchial tubes) become red and swollen (inflamed). It is usually caused by a viral infection. But it can also occur because of a bacteria or allergen.  Symptoms include a coug · A chest X-ray. This is done if your healthcare provider thinks you have pneumonia. · Tests to check for an underlying condition. Other tests may be done to check for things such as allergies, asthma, or COPD (chronic obstructive pulmonary disease).  You · Take the medicines as directed. For instance, some medicines should be taken with food. · Ask about side effects. Ask your provider or pharmacist what side effects are common, and what to do about them.   Follow-up care  You should see your provider douglas

## 2017-11-10 NOTE — PROGRESS NOTES
Lis Jamison is a 65year old female. Patient presents with:  URI: f/u URI       HPI:   Here for follow up of URI , she was seen IC 2 weeks ago. She is having running nose, nasal congestion , feels chest is tight, cough with thick brown phlegm.  Denies c Disp:  Rfl:    Desvenlafaxine Succinate ER 50 MG Oral Tablet 24 Hr Take 50 mg by mouth. Disp:  Rfl:    triamcinolone acetonide 0.1 % External Cream Apply topically 2 (two) times daily as needed.  Disp: 45 g Rfl: 2   Liraglutide (VICTOZA) 18 MG/3ML Subcutane developed, well nourished, NAD. SKIN: no rashes, no suspicious lesions. HEENT: AT/NC, ear canal clear bilaterally, tympanic membrane clear bilaterally, nasal mucosa normal, posterior pharynx erythematous and red. Tonsils not enlarged. No exudates.   NECK

## 2017-11-13 ENCOUNTER — PATIENT MESSAGE (OUTPATIENT)
Dept: INTERNAL MEDICINE CLINIC | Facility: CLINIC | Age: 57
End: 2017-11-13

## 2017-11-14 NOTE — TELEPHONE ENCOUNTER
Bhavani Cage RN 11/13/2017 1:03 PM CST        ----- Message -----  From: Charley Stone  Sent: 11/13/2017 8:14 AM  To: Mira Byrne Lpn/Cma  Subject: Visit Follow-up Question     Hi Dr. Jennifer Coleman,    I saw you on Saturday and I mentioned the couph syrup I ne

## 2017-11-20 ENCOUNTER — PATIENT MESSAGE (OUTPATIENT)
Dept: INTERNAL MEDICINE CLINIC | Facility: CLINIC | Age: 57
End: 2017-11-20

## 2017-11-20 NOTE — TELEPHONE ENCOUNTER
From: Charley Stone  To: Janine Lao MD  Sent: 11/20/2017 8:08 AM CST  Subject: Lary Coleman    One of the nurses called me on Tuesday and asked if I wanted some couph sryrup. She said she will give it to my pharmacy.  She nev

## 2017-11-20 NOTE — TELEPHONE ENCOUNTER
Chlorpheniramine-Codeine 2-8 MG/5ML Oral Liquid called into the [address] [place] [address] in [place].

## 2017-12-06 RX ORDER — INSULIN HUMAN 500 [IU]/ML
INJECTION, SOLUTION SUBCUTANEOUS
Qty: 20 ML | Refills: 2 | Status: SHIPPED | OUTPATIENT
Start: 2017-12-06 | End: 2018-03-06

## 2017-12-08 ENCOUNTER — OFFICE VISIT (OUTPATIENT)
Dept: ENDOCRINOLOGY CLINIC | Facility: CLINIC | Age: 57
End: 2017-12-08

## 2017-12-08 VITALS
HEIGHT: 58 IN | SYSTOLIC BLOOD PRESSURE: 115 MMHG | WEIGHT: 100.38 LBS | DIASTOLIC BLOOD PRESSURE: 67 MMHG | HEART RATE: 91 BPM | BODY MASS INDEX: 21.07 KG/M2

## 2017-12-08 DIAGNOSIS — Z79.4 TYPE 2 DIABETES MELLITUS WITH HYPERGLYCEMIA, WITH LONG-TERM CURRENT USE OF INSULIN (HCC): Primary | ICD-10-CM

## 2017-12-08 DIAGNOSIS — E11.65 TYPE 2 DIABETES MELLITUS WITH HYPERGLYCEMIA, WITH LONG-TERM CURRENT USE OF INSULIN (HCC): Primary | ICD-10-CM

## 2017-12-08 DIAGNOSIS — E78.5 DYSLIPIDEMIA: ICD-10-CM

## 2017-12-08 PROCEDURE — 83036 HEMOGLOBIN GLYCOSYLATED A1C: CPT | Performed by: INTERNAL MEDICINE

## 2017-12-08 PROCEDURE — 36416 COLLJ CAPILLARY BLOOD SPEC: CPT | Performed by: INTERNAL MEDICINE

## 2017-12-08 PROCEDURE — 82962 GLUCOSE BLOOD TEST: CPT | Performed by: INTERNAL MEDICINE

## 2017-12-08 PROCEDURE — 99214 OFFICE O/P EST MOD 30 MIN: CPT | Performed by: INTERNAL MEDICINE

## 2017-12-08 PROCEDURE — 99212 OFFICE O/P EST SF 10 MIN: CPT | Performed by: INTERNAL MEDICINE

## 2017-12-08 NOTE — PROGRESS NOTES
Return Office Visit     CHIEF COMPLAINT:  Patient presents with:  Diabetes       HISTORY OF PRESENT ILLNESS:  Jyothi Santacruz is a 65year old female who presents for follow up for for DM.      DM HISTORY  Diagnosed: Around age 29        HISTORY OF DIABETES 1 TAB PO  DAILY Disp:  Rfl: 2   BD PEN NEEDLE MINI U/F 31G X 5 MM Does not apply Misc  Disp:  Rfl:    Pantoprazole Sodium (PROTONIX) 40 MG Oral Tab EC Take 40 mg by mouth.  Disp:  Rfl:    Insulin Pen Needle (BD PEN NEEDLE LION U/F) 32G X 4 MM Does not apply extremity edema  Endocrine: Negative for: polyuria, polydypsia  Skin: Negative for: rash, blister, cellulitis,       PHYSICAL EXAM:    12/08/17  1121   BP: 115/67   Pulse: 91   Weight: 100 lb 6.4 oz (45.5 kg)   Height: 4' 10\" (1.473 m)     BMI: Body mass e). BG log maintainence explained in great detail, to get log and glucometer on next visit. f). Life style changes: Diet: low carbohydrate diet discussed, Exercise: should target a weight loss of 7% and increase exercise to at least 150min a week. g).

## 2017-12-16 RX ORDER — SYRINGE,INSUL U-500,NDL,0.5ML 31GX15/64"
SYRINGE, EMPTY DISPOSABLE MISCELLANEOUS
Qty: 300 EACH | Refills: 0 | Status: SHIPPED | OUTPATIENT
Start: 2017-12-16 | End: 2018-03-12

## 2017-12-18 ENCOUNTER — PATIENT MESSAGE (OUTPATIENT)
Dept: ENDOCRINOLOGY CLINIC | Facility: CLINIC | Age: 57
End: 2017-12-18

## 2017-12-18 NOTE — TELEPHONE ENCOUNTER
From: Sung oBnner  To: Lee Brito MD  Sent: 12/18/2017 3:48 PM CST  Subject: Non-Urgent Miguel [address]fon[address] Dr.    The number is not posted on any of the sites on My Chart. They must of forgot. There is no A1C listed. Just about 6 numbers.

## 2018-01-04 ENCOUNTER — PATIENT MESSAGE (OUTPATIENT)
Dept: ENDOCRINOLOGY CLINIC | Facility: CLINIC | Age: 58
End: 2018-01-04

## 2018-01-04 NOTE — TELEPHONE ENCOUNTER
From: Demetrius Reyez  To: Mable Gipson MD  Sent: 1/4/2018 9:47 AM CST  Subject: Non-Urgent Medical Question    Would I be able to book the apt in [place] [address] and if I can what days and times are you available?     Thank you  Maulik Russ

## 2018-01-05 ENCOUNTER — TELEPHONE (OUTPATIENT)
Dept: INTERNAL MEDICINE CLINIC | Facility: CLINIC | Age: 58
End: 2018-01-05

## 2018-01-05 RX ORDER — ALPRAZOLAM 0.5 MG/1
TABLET ORAL
Qty: 30 TABLET | Refills: 0 | OUTPATIENT
Start: 2018-01-05 | End: 2018-01-30

## 2018-01-05 NOTE — TELEPHONE ENCOUNTER
LOV: 11/10/17  Last Rx:10/14/17      Please advise in regards to refill request. Thank You    Please respond to pool: [facility] L[facility]/SILVIANO

## 2018-01-06 ENCOUNTER — TELEPHONE (OUTPATIENT)
Dept: ENDOCRINOLOGY CLINIC | Facility: CLINIC | Age: 58
End: 2018-01-06

## 2018-01-06 NOTE — TELEPHONE ENCOUNTER
Received call from patient who is requesting an update on her alprazolam. Call placed to pharmacy and spoke with Zenaida Salas the pharmacist and rx for alprazolam dated 1/5/18 was called into the pharmacy. Patient made aware.

## 2018-01-08 NOTE — TELEPHONE ENCOUNTER
[facility] [place] calling for mutual pt regarding rx:Victoza was denied, will forward a courtesy fax with indications why, thanks.

## 2018-01-09 NOTE — TELEPHONE ENCOUNTER
PA for Victoza 18 mg/3 ml subcutaneous solution pen injectors completed with EPS via CMM response time 24-72 hours KEY [id number].

## 2018-01-10 NOTE — TELEPHONE ENCOUNTER
Fax received from [address] [place], [place] [address] Pa for victoza has been denied. They are requesting an A1C result for further review. A1C faxed. Will await response.

## 2018-01-12 NOTE — TELEPHONE ENCOUNTER
[facility]. PA has been approved 1/9/18-1/9/19. Approval number: [id number]. Sent email to [address] her PA approved.

## 2018-01-16 NOTE — TELEPHONE ENCOUNTER
[facility] stating Lulla Barthel is approved for 365 days starting 1/10/18

## 2018-01-17 ENCOUNTER — NURSE ONLY (OUTPATIENT)
Dept: ENDOCRINOLOGY CLINIC | Facility: CLINIC | Age: 58
End: 2018-01-17

## 2018-01-17 ENCOUNTER — APPOINTMENT (OUTPATIENT)
Dept: LAB | Age: 58
End: 2018-01-17
Attending: INTERNAL MEDICINE
Payer: COMMERCIAL

## 2018-01-17 DIAGNOSIS — E11.65 UNCONTROLLED TYPE 2 DIABETES MELLITUS WITH HYPERGLYCEMIA, WITH LONG-TERM CURRENT USE OF INSULIN (HCC): Primary | ICD-10-CM

## 2018-01-17 DIAGNOSIS — E11.8 TYPE 2 DIABETES MELLITUS WITH COMPLICATION, WITHOUT LONG-TERM CURRENT USE OF INSULIN (HCC): ICD-10-CM

## 2018-01-17 DIAGNOSIS — Z79.4 UNCONTROLLED TYPE 2 DIABETES MELLITUS WITH HYPERGLYCEMIA, WITH LONG-TERM CURRENT USE OF INSULIN (HCC): Primary | ICD-10-CM

## 2018-01-17 LAB
CREAT UR-MCNC: 119.8 MG/DL
MICROALBUMIN UR-MCNC: 0.4 MG/DL (ref 0–1.8)
MICROALBUMIN/CREAT UR: 3.3 MG/G{CREAT} (ref 0–20)

## 2018-01-17 PROCEDURE — 95250 CONT GLUC MNTR PHYS/QHP EQP: CPT | Performed by: INTERNAL MEDICINE

## 2018-01-17 PROCEDURE — 82043 UR ALBUMIN QUANTITATIVE: CPT

## 2018-01-17 PROCEDURE — 82570 ASSAY OF URINE CREATININE: CPT

## 2018-01-17 PROCEDURE — 99212 OFFICE O/P EST SF 10 MIN: CPT | Performed by: INTERNAL MEDICINE

## 2018-01-17 PROCEDURE — 99211 OFF/OP EST MAY X REQ PHY/QHP: CPT | Performed by: INTERNAL MEDICINE

## 2018-01-17 NOTE — PROGRESS NOTES
Patient presents to clinic today for Ipro placement.  Consent form signed and process explained to patient including care of Ipro and need to check BG levels four times a day including 1 hour after placement and 2 hours post placement and then 4 times per d

## 2018-01-24 ENCOUNTER — OFFICE VISIT (OUTPATIENT)
Dept: ENDOCRINOLOGY CLINIC | Facility: CLINIC | Age: 58
End: 2018-01-24

## 2018-01-24 VITALS — SYSTOLIC BLOOD PRESSURE: 106 MMHG | HEART RATE: 103 BPM | DIASTOLIC BLOOD PRESSURE: 58 MMHG

## 2018-01-24 DIAGNOSIS — Z79.4 TYPE 2 DIABETES MELLITUS WITH HYPERGLYCEMIA, WITH LONG-TERM CURRENT USE OF INSULIN (HCC): Primary | ICD-10-CM

## 2018-01-24 DIAGNOSIS — E78.5 DYSLIPIDEMIA: ICD-10-CM

## 2018-01-24 DIAGNOSIS — E11.65 TYPE 2 DIABETES MELLITUS WITH HYPERGLYCEMIA, WITH LONG-TERM CURRENT USE OF INSULIN (HCC): Primary | ICD-10-CM

## 2018-01-24 PROCEDURE — 95251 CONT GLUC MNTR ANALYSIS I&R: CPT | Performed by: INTERNAL MEDICINE

## 2018-01-24 PROCEDURE — 99212 OFFICE O/P EST SF 10 MIN: CPT | Performed by: INTERNAL MEDICINE

## 2018-01-24 PROCEDURE — 99214 OFFICE O/P EST MOD 30 MIN: CPT | Performed by: INTERNAL MEDICINE

## 2018-01-24 NOTE — PROGRESS NOTES
Return Office Visit     CHIEF COMPLAINT:    DM  Dyslipidemia     HISTORY OF PRESENT ILLNESS:  Marisa Morales is a 65year old female who presents for follow up for for DM.      DM HISTORY  Diagnosed: Around age 29        HISTORY OF DIABETES COMPLICATIONS: units Oral Cap Take 50,000 Units by mouth. Disp:  Rfl:    Desvenlafaxine Succinate ER 50 MG Oral Tablet 24 Hr Take 50 mg by mouth. Disp:  Rfl:    triamcinolone acetonide 0.1 % External Cream Apply topically 2 (two) times daily as needed.  Disp: 45 g Rfl: 2 cellulitis,       PHYSICAL EXAM:    01/24/18  0921 01/24/18  1013   BP: 155/81 106/58   BP Location: Right arm    Patient Position: Sitting    Cuff Size: adult    Pulse: 103           CONSTITUTIONAL:  awake, alert, cooperative, no apparent distress, and ap victoza 1.8 mg daily  No personal or family history of MEN syndrome  Renal function is normal  Patient counselled regarding side effects including injection site reactions, nausea, vomiting, diarrhea, pancreatitis, gastroparesis and rare side effect savita

## 2018-01-29 NOTE — TELEPHONE ENCOUNTER
From: Radhika Macario  Sent: 1/29/2018 8:23 AM CST  Subject: Medication Renewal Request    Radhika Macario would like a refill of the following medications:     ALPRAZOLAM 0.5 MG Oral Tab Will MD Ophelia]    Preferred pharmacy: [address], [phone], [phone]

## 2018-01-31 RX ORDER — ALPRAZOLAM 0.5 MG/1
TABLET ORAL
Qty: 30 TABLET | Refills: 0 | OUTPATIENT
Start: 2018-01-31

## 2018-01-31 RX ORDER — ALPRAZOLAM 0.5 MG/1
0.5 TABLET ORAL
Qty: 30 TABLET | Refills: 0 | OUTPATIENT
Start: 2018-01-31

## 2018-02-01 RX ORDER — ALPRAZOLAM 0.5 MG/1
0.5 TABLET ORAL
Qty: 30 TABLET | Refills: 0 | Status: CANCELLED
Start: 2018-02-01

## 2018-02-01 RX ORDER — ALPRAZOLAM 0.5 MG/1
TABLET ORAL
Qty: 30 TABLET | Refills: 0
Start: 2018-02-01 | End: 2018-02-26

## 2018-02-01 NOTE — TELEPHONE ENCOUNTER
Refill Protocol Appointment Criteria  LR 1/5/18 please advise  · Appointment scheduled in the past 6 months or in the next 3 months  Recent Outpatient Visits            1 week ago Type 2 diabetes mellitus with hyperglycemia, with long-term current use of i

## 2018-02-02 ENCOUNTER — OFFICE VISIT (OUTPATIENT)
Dept: INTERNAL MEDICINE CLINIC | Facility: CLINIC | Age: 58
End: 2018-02-02

## 2018-02-02 ENCOUNTER — HOSPITAL ENCOUNTER (OUTPATIENT)
Dept: GENERAL RADIOLOGY | Age: 58
Discharge: HOME | End: 2018-02-02
Attending: INTERNAL MEDICINE
Payer: COMMERCIAL

## 2018-02-02 VITALS
BODY MASS INDEX: 21.41 KG/M2 | HEART RATE: 108 BPM | TEMPERATURE: 98 F | WEIGHT: 102 LBS | SYSTOLIC BLOOD PRESSURE: 140 MMHG | DIASTOLIC BLOOD PRESSURE: 68 MMHG | HEIGHT: 58 IN

## 2018-02-02 DIAGNOSIS — Z76.89 ENCOUNTER TO ESTABLISH CARE: Primary | ICD-10-CM

## 2018-02-02 DIAGNOSIS — M54.41 ACUTE RIGHT-SIDED LOW BACK PAIN WITH RIGHT-SIDED SCIATICA: ICD-10-CM

## 2018-02-02 DIAGNOSIS — E11.39 TYPE 2 DIABETES MELLITUS WITH OTHER OPHTHALMIC COMPLICATION, WITH LONG-TERM CURRENT USE OF INSULIN (HCC): ICD-10-CM

## 2018-02-02 DIAGNOSIS — Z79.4 TYPE 2 DIABETES MELLITUS WITH OTHER OPHTHALMIC COMPLICATION, WITH LONG-TERM CURRENT USE OF INSULIN (HCC): ICD-10-CM

## 2018-02-02 PROBLEM — E11.9 DIABETES MELLITUS (HCC): Status: ACTIVE | Noted: 2018-02-02

## 2018-02-02 PROCEDURE — 72110 X-RAY EXAM L-2 SPINE 4/>VWS: CPT | Performed by: INTERNAL MEDICINE

## 2018-02-02 PROCEDURE — 99214 OFFICE O/P EST MOD 30 MIN: CPT | Performed by: INTERNAL MEDICINE

## 2018-02-02 PROCEDURE — 99212 OFFICE O/P EST SF 10 MIN: CPT | Performed by: INTERNAL MEDICINE

## 2018-02-02 RX ORDER — ALPRAZOLAM 0.5 MG/1
0.5 TABLET ORAL
Qty: 30 TABLET | Refills: 0 | OUTPATIENT
Start: 2018-02-02

## 2018-02-02 RX ORDER — TRAMADOL HYDROCHLORIDE 50 MG/1
50 TABLET ORAL EVERY 6 HOURS PRN
Qty: 100 TABLET | Refills: 1 | Status: SHIPPED
Start: 2018-02-02 | End: 2018-07-04

## 2018-02-02 NOTE — PROGRESS NOTES
HPI:    Patient ID: Sung Bonner is a 65year old female. Pain   This is a new problem. The current episode started 1 to 4 weeks ago. The problem occurs 2 to 4 times per day. The problem has been unchanged. Associated symptoms include myalgias.  Pertin constipation, diarrhea, nausea and vomiting. Endocrine: Negative for cold intolerance and heat intolerance. Genitourinary: Negative for difficulty urinating, dysuria, flank pain, frequency, genital sores, hematuria and urgency.    Musculoskeletal: Posit 12   BD PEN NEEDLE MINI U/F 31G X 5 MM Does not apply Misc  Disp:  Rfl:    Insulin Pen Needle (BD PEN NEEDLE LION U/F) 32G X 4 MM Does not apply Misc  Disp:  Rfl:      Allergies:No Known Allergies   PHYSICAL EXAM:   Physical Exam   Constitutional: She is o

## 2018-02-22 RX ORDER — LOSARTAN POTASSIUM 25 MG/1
TABLET ORAL
Qty: 90 TABLET | Refills: 0 | Status: SHIPPED | OUTPATIENT
Start: 2018-02-22 | End: 2018-06-05

## 2018-02-27 RX ORDER — ALPRAZOLAM 0.5 MG/1
TABLET ORAL
Qty: 30 TABLET | Refills: 5 | Status: SHIPPED
Start: 2018-02-27 | End: 2018-08-05

## 2018-02-27 NOTE — TELEPHONE ENCOUNTER
From: Lizz Vick  Sent: 2/26/2018 2:25 PM CST  Subject: Medication Renewal Request    Lizz Vick would like a refill of the following medications:     ALPRAZolam 0.5 MG Oral Tab Gian Miranda MD]   Patient Comment: New DrCharu is Dr. Leatha Stone

## 2018-02-27 NOTE — TELEPHONE ENCOUNTER
rx needs to be phoned if approved. Last refilled on 2/1/18 #30 0RF  Cole Griffin is not PCP pt established care with Dr. Navneet Best.    ·   Recent Outpatient Visits            3 weeks ago Encounter to establish care    [address] [place]

## 2018-03-06 RX ORDER — INSULIN HUMAN 500 [IU]/ML
INJECTION, SOLUTION SUBCUTANEOUS
Qty: 20 ML | Refills: 1 | Status: SHIPPED | OUTPATIENT
Start: 2018-03-06 | End: 2018-07-26

## 2018-03-12 ENCOUNTER — TELEPHONE (OUTPATIENT)
Dept: ENDOCRINOLOGY CLINIC | Facility: CLINIC | Age: 58
End: 2018-03-12

## 2018-03-13 RX ORDER — SYRINGE,INSUL U-500,NDL,0.5ML 31GX15/64"
SYRINGE, EMPTY DISPOSABLE MISCELLANEOUS
Qty: 100 EACH | Refills: 3 | Status: SHIPPED | OUTPATIENT
Start: 2018-03-13 | End: 2018-07-05

## 2018-03-13 NOTE — TELEPHONE ENCOUNTER
Called patient and informed her of below. Attempted to book appt in [facility] office in April but currently schedule is closed. I informed patient we will contact her once it opens up again.

## 2018-03-23 ENCOUNTER — TELEPHONE (OUTPATIENT)
Dept: INTERNAL MEDICINE CLINIC | Facility: CLINIC | Age: 58
End: 2018-03-23

## 2018-03-23 RX ORDER — METHYLPREDNISOLONE 4 MG/1
TABLET ORAL
Qty: 1 KIT | Refills: 0 | Status: SHIPPED | OUTPATIENT
Start: 2018-03-23 | End: 2018-03-30 | Stop reason: ALTCHOICE

## 2018-03-23 NOTE — TELEPHONE ENCOUNTER
Patient states that she is having a lot of pain in right leg, thought about going to the ER last night, what should she do? Would you prescribe something stronger than she currently takes for pain? She has an appointment to see you 3/30. Please call.

## 2018-03-30 ENCOUNTER — OFFICE VISIT (OUTPATIENT)
Dept: INTERNAL MEDICINE CLINIC | Facility: CLINIC | Age: 58
End: 2018-03-30

## 2018-03-30 VITALS
SYSTOLIC BLOOD PRESSURE: 120 MMHG | DIASTOLIC BLOOD PRESSURE: 60 MMHG | WEIGHT: 103 LBS | HEART RATE: 100 BPM | HEIGHT: 58 IN | BODY MASS INDEX: 21.62 KG/M2 | TEMPERATURE: 98 F

## 2018-03-30 DIAGNOSIS — M54.41 ACUTE RIGHT-SIDED LOW BACK PAIN WITH RIGHT-SIDED SCIATICA: Primary | ICD-10-CM

## 2018-03-30 DIAGNOSIS — K64.1 GRADE II HEMORRHOIDS: ICD-10-CM

## 2018-03-30 PROCEDURE — 99212 OFFICE O/P EST SF 10 MIN: CPT | Performed by: INTERNAL MEDICINE

## 2018-03-30 PROCEDURE — 99214 OFFICE O/P EST MOD 30 MIN: CPT | Performed by: INTERNAL MEDICINE

## 2018-03-30 NOTE — PROGRESS NOTES
HPI:    Patient ID: Jamie Shah is a 65year old female. Back Pain   This is a chronic problem. The current episode started more than 1 month ago. The problem occurs constantly. The problem is unchanged. The pain is present in the lumbar spine.  Ingrid Becerril diarrhea and nausea. Endocrine: Negative for cold intolerance and heat intolerance. Genitourinary: Negative for bladder incontinence, difficulty urinating, dysuria, flank pain, frequency, genital sores, hematuria and urgency.    Musculoskeletal: Positiv Monitoring Suppl (FREESTYLE LITE) Does not apply Device TEST QID  UTD Disp:  Rfl: 0   triamcinolone acetonide 0.1 % External Cream Apply topically 2 (two) times daily as needed.  Disp: 45 g Rfl: 2   FREESTYLE LITE TEST In Vitro Strip TEST BLOOD SUGAR QID [facility] hemorrhoids  Hemorrhoids , proctocream, high fiber diet. Sitz bathes. No orders of the defined types were placed in this encounter.       Meds This Visit:  Signed Prescriptions Disp Refills    hydrocortisone (PROCTOSOL HC) 2.5 % Rectal Cream 28.35 g

## 2018-03-30 NOTE — ASSESSMENT & PLAN NOTE
Right sided low back pain and right leg pain for about 4 months now, medrol helped, but now it is back again , xray showed dextroscoliosis. No lesions. On Tramadol and tylenol, will send for physical therapy , if no better MRI.

## 2018-04-08 ENCOUNTER — PATIENT MESSAGE (OUTPATIENT)
Dept: ENDOCRINOLOGY CLINIC | Facility: CLINIC | Age: 58
End: 2018-04-08

## 2018-04-09 RX ORDER — PEN NEEDLE, DIABETIC 31 GX5/16"
NEEDLE, DISPOSABLE MISCELLANEOUS
Qty: 100 EACH | Refills: 0 | Status: SHIPPED | OUTPATIENT
Start: 2018-04-09 | End: 2020-06-02

## 2018-04-09 NOTE — TELEPHONE ENCOUNTER
From: Fina Sethi  To: Eliecer Eric MD  Sent: 4/8/2018 8:51 PM CDT  Subject: Prescription Question    Hi     [employer] sent you a refill request on short pen needles needed for my victoza. I had a large amount and didn't need any.  So I will tell

## 2018-04-11 ENCOUNTER — OFFICE VISIT (OUTPATIENT)
Dept: ENDOCRINOLOGY CLINIC | Facility: CLINIC | Age: 58
End: 2018-04-11

## 2018-04-11 VITALS
WEIGHT: 103 LBS | HEIGHT: 58 IN | BODY MASS INDEX: 21.62 KG/M2 | HEART RATE: 98 BPM | SYSTOLIC BLOOD PRESSURE: 122 MMHG | DIASTOLIC BLOOD PRESSURE: 75 MMHG

## 2018-04-11 DIAGNOSIS — E11.65 UNCONTROLLED TYPE 2 DIABETES MELLITUS WITH HYPERGLYCEMIA, WITH LONG-TERM CURRENT USE OF INSULIN (HCC): Primary | ICD-10-CM

## 2018-04-11 DIAGNOSIS — E78.5 DYSLIPIDEMIA: ICD-10-CM

## 2018-04-11 DIAGNOSIS — Z79.4 UNCONTROLLED TYPE 2 DIABETES MELLITUS WITH HYPERGLYCEMIA, WITH LONG-TERM CURRENT USE OF INSULIN (HCC): Primary | ICD-10-CM

## 2018-04-11 PROCEDURE — 99212 OFFICE O/P EST SF 10 MIN: CPT | Performed by: INTERNAL MEDICINE

## 2018-04-11 PROCEDURE — 83036 HEMOGLOBIN GLYCOSYLATED A1C: CPT | Performed by: INTERNAL MEDICINE

## 2018-04-11 PROCEDURE — 99214 OFFICE O/P EST MOD 30 MIN: CPT | Performed by: INTERNAL MEDICINE

## 2018-04-11 PROCEDURE — 36416 COLLJ CAPILLARY BLOOD SPEC: CPT | Performed by: INTERNAL MEDICINE

## 2018-04-11 PROCEDURE — 82962 GLUCOSE BLOOD TEST: CPT | Performed by: INTERNAL MEDICINE

## 2018-04-11 NOTE — PROGRESS NOTES
Return Office Visit     CHIEF COMPLAINT:    DM  Dyslipidemia     HISTORY OF PRESENT ILLNESS:  Cristina Gaspar is a 65year old female who presents for follow up for for DM.      DM HISTORY  Diagnosed: Around age 29        HISTORY OF DIABETES COMPLICATIONS: Bimatoprost (LATISSE) 0.03 % External Solution Place one drop on applicator/ apply evenly along the skin of the upper eyelid at base of eyelashes once daily at bedtime Disp: 1 Bottle Rfl: 5   RENOVA 0.02 % External Cream ZAHRAA 1 APPLICATION AA ON FACE IN T Patient Position: Sitting   Cuff Size: adult   Pulse: 98   Weight: 103 lb (46.7 kg)   Height: 4' 10\" (1.473 m)          CONSTITUTIONAL:  awake, alert, cooperative, no apparent distress, and appears stated age  PSYCH: normal affect  EYES:  No proptosis, importance of annual eye exams   d). Foot exam: Daily feet exam explained, Monofilament sensation normal.   e). BG log maintainence explained in great detail, to get log and glucometer on next visit. f).  Life style changes: Diet: low carbohydrate diet dis

## 2018-05-11 ENCOUNTER — APPOINTMENT (OUTPATIENT)
Dept: LAB | Age: 58
End: 2018-05-11
Attending: INTERNAL MEDICINE
Payer: COMMERCIAL

## 2018-05-11 DIAGNOSIS — E11.65 UNCONTROLLED TYPE 2 DIABETES MELLITUS WITH HYPERGLYCEMIA, WITH LONG-TERM CURRENT USE OF INSULIN (HCC): ICD-10-CM

## 2018-05-11 DIAGNOSIS — E78.5 DYSLIPIDEMIA: ICD-10-CM

## 2018-05-11 DIAGNOSIS — E11.8 TYPE 2 DIABETES MELLITUS WITH COMPLICATION, WITHOUT LONG-TERM CURRENT USE OF INSULIN (HCC): ICD-10-CM

## 2018-05-11 DIAGNOSIS — Z79.4 UNCONTROLLED TYPE 2 DIABETES MELLITUS WITH HYPERGLYCEMIA, WITH LONG-TERM CURRENT USE OF INSULIN (HCC): ICD-10-CM

## 2018-05-11 PROCEDURE — 82570 ASSAY OF URINE CREATININE: CPT

## 2018-05-11 PROCEDURE — 36415 COLL VENOUS BLD VENIPUNCTURE: CPT

## 2018-05-11 PROCEDURE — 82043 UR ALBUMIN QUANTITATIVE: CPT

## 2018-05-11 PROCEDURE — 80053 COMPREHEN METABOLIC PANEL: CPT

## 2018-05-11 PROCEDURE — 80061 LIPID PANEL: CPT

## 2018-05-13 ENCOUNTER — TELEPHONE (OUTPATIENT)
Dept: ENDOCRINOLOGY CLINIC | Facility: CLINIC | Age: 58
End: 2018-05-13

## 2018-05-13 DIAGNOSIS — E78.5 HYPERLIPIDEMIA, UNSPECIFIED HYPERLIPIDEMIA TYPE: Primary | ICD-10-CM

## 2018-05-13 NOTE — TELEPHONE ENCOUNTER
Kidney function normal  LDL cholesterol ( the one that effects the heart) is normal. However, TG cholesterol levels are higher than before. They were and are 350 now. Once they go over 500, it can cause pancreatitis, inflammation of the pancreas.  Please d

## 2018-05-14 NOTE — TELEPHONE ENCOUNTER
Spoke with Carol Sanchez. She understands to repeat Lipid test now and she understands to fast for the test. Lab test already in system.  Pt states she will go have lab test and she will fast.

## 2018-05-14 NOTE — TELEPHONE ENCOUNTER
Spoke with Melo Page and informed her of results below. FYI She did state she was not fasting for labs and went in the afternoon. She will work on [employer], handout mailed. Understands to repeat in 3 months.

## 2018-05-18 ENCOUNTER — APPOINTMENT (OUTPATIENT)
Dept: LAB | Age: 58
End: 2018-05-18
Attending: INTERNAL MEDICINE
Payer: COMMERCIAL

## 2018-05-18 ENCOUNTER — TELEPHONE (OUTPATIENT)
Dept: ENDOCRINOLOGY CLINIC | Facility: CLINIC | Age: 58
End: 2018-05-18

## 2018-05-18 DIAGNOSIS — E78.5 HYPERLIPIDEMIA, UNSPECIFIED HYPERLIPIDEMIA TYPE: ICD-10-CM

## 2018-05-18 PROCEDURE — 80061 LIPID PANEL: CPT

## 2018-05-18 PROCEDURE — 36415 COLL VENOUS BLD VENIPUNCTURE: CPT

## 2018-06-07 RX ORDER — LOSARTAN POTASSIUM 25 MG/1
TABLET ORAL
Qty: 90 TABLET | Refills: 0 | Status: SHIPPED | OUTPATIENT
Start: 2018-06-07 | End: 2018-09-01

## 2018-07-04 ENCOUNTER — TELEPHONE (OUTPATIENT)
Dept: ENDOCRINOLOGY CLINIC | Facility: CLINIC | Age: 58
End: 2018-07-04

## 2018-07-05 RX ORDER — SYRINGE,INSUL U-500,NDL,0.5ML 31GX15/64"
SYRINGE, EMPTY DISPOSABLE MISCELLANEOUS
Qty: 100 EACH | Refills: 0 | Status: SHIPPED | OUTPATIENT
Start: 2018-07-05 | End: 2018-11-28

## 2018-07-05 NOTE — TELEPHONE ENCOUNTER
From: Tawnya Cortes  Sent: 7/4/2018 12:05 PM CDT  Subject: Medication Renewal Request    Tawnya Cortes would like a refill of the following medications:     TraMADol HCl 50 MG Oral Tab Elena Ribera MD]    Preferred pharmacy: [place] [phone]

## 2018-07-05 NOTE — TELEPHONE ENCOUNTER
From: Cy Santillan  Sent: 7/4/2018 12:05 PM CDT  Subject: Medication Renewal Request    Cy Santillan would like a refill of the following medications:     BD INSULIN SYRINGE U-500 31G X 6MM 0.5 ML Does not apply Misc Jing Minor MD]    Preferred

## 2018-07-06 RX ORDER — TRAMADOL HYDROCHLORIDE 50 MG/1
50 TABLET ORAL EVERY 6 HOURS PRN
Qty: 100 TABLET | Refills: 1 | Status: SHIPPED
Start: 2018-07-06 | End: 2018-12-29

## 2018-07-11 RX ORDER — BIMATOPROST 3 UG/ML
SOLUTION TOPICAL
Qty: 5 ML | Refills: 0 | Status: SHIPPED | OUTPATIENT
Start: 2018-07-11 | End: 2019-04-26

## 2018-07-13 ENCOUNTER — OFFICE VISIT (OUTPATIENT)
Dept: INTERNAL MEDICINE CLINIC | Facility: CLINIC | Age: 58
End: 2018-07-13

## 2018-07-13 VITALS
HEART RATE: 104 BPM | WEIGHT: 102.81 LBS | DIASTOLIC BLOOD PRESSURE: 60 MMHG | TEMPERATURE: 98 F | SYSTOLIC BLOOD PRESSURE: 132 MMHG | HEIGHT: 58 IN | BODY MASS INDEX: 21.58 KG/M2

## 2018-07-13 DIAGNOSIS — Z12.39 BREAST SCREENING: ICD-10-CM

## 2018-07-13 DIAGNOSIS — G62.9 NEUROPATHY: ICD-10-CM

## 2018-07-13 DIAGNOSIS — E11.39 TYPE 2 DIABETES MELLITUS WITH OTHER OPHTHALMIC COMPLICATION, WITH LONG-TERM CURRENT USE OF INSULIN (HCC): Primary | ICD-10-CM

## 2018-07-13 DIAGNOSIS — Z79.4 TYPE 2 DIABETES MELLITUS WITH OTHER OPHTHALMIC COMPLICATION, WITH LONG-TERM CURRENT USE OF INSULIN (HCC): Primary | ICD-10-CM

## 2018-07-13 DIAGNOSIS — M54.41 ACUTE RIGHT-SIDED LOW BACK PAIN WITH RIGHT-SIDED SCIATICA: ICD-10-CM

## 2018-07-13 PROCEDURE — 99214 OFFICE O/P EST MOD 30 MIN: CPT | Performed by: INTERNAL MEDICINE

## 2018-07-13 PROCEDURE — 99212 OFFICE O/P EST SF 10 MIN: CPT | Performed by: INTERNAL MEDICINE

## 2018-07-13 RX ORDER — IBUPROFEN 600 MG/1
600 TABLET ORAL EVERY 6 HOURS PRN
Qty: 60 TABLET | Refills: 11 | Status: SHIPPED | OUTPATIENT
Start: 2018-07-13 | End: 2021-03-21

## 2018-07-13 RX ORDER — IBUPROFEN 200 MG
200 TABLET ORAL EVERY 6 HOURS PRN
COMMUNITY
End: 2019-01-11

## 2018-07-13 NOTE — PROGRESS NOTES
HPI:    Patient ID: Charley Stone is a 65year old female. Back Pain   This is a chronic problem. The current episode started more than 1 month ago. The problem occurs constantly. The problem is unchanged. The pain is present in the lumbar spine.  The q immunocompromised state. Neurological: Positive for numbness. Negative for dizziness, syncope, facial asymmetry, weakness, light-headedness and headaches.             Current Outpatient Prescriptions:  ibuprofen (ADVIL) 200 MG Oral Tab Take 200 mg by mout apply Misc TEST BLOOD SUGAR QID Disp:  Rfl: 12   Blood Glucose Monitoring Suppl (FREESTYLE LITE) Does not apply Device TEST QID  UTD Disp:  Rfl: 0   triamcinolone acetonide 0.1 % External Cream Apply topically 2 (two) times daily as needed.  Disp: 45 g Rfl: with right-sided sciatica  MRI, LS spine,  Pain management. No orders of the defined types were placed in this encounter.       Meds This Visit:  Signed Prescriptions Disp Refills    ibuprofen 600 MG Oral Tab 60 tablet 11      Sig: Take 1 tablet (60

## 2018-07-13 NOTE — ASSESSMENT & PLAN NOTE
Patient has right sided sciatica, numbness in the toes, MRI ordered, Pain management ordered  She also complains about numbness of the 3rd middle finger for the last 6 months. She wishes to pursue a diagnosis.

## 2018-07-16 ENCOUNTER — TELEPHONE (OUTPATIENT)
Dept: FAMILY MEDICINE CLINIC | Facility: CLINIC | Age: 58
End: 2018-07-16

## 2018-07-16 NOTE — TELEPHONE ENCOUNTER
Emanuel Vargas    Despite the all the clinical information concerning patient's back pain. Her insurance is requesting a peer review for the MRI Cervical spine.  Please see the list below for the peer request. To conduct the peer review, please call OZZIE at

## 2018-07-26 RX ORDER — INSULIN HUMAN 500 [IU]/ML
INJECTION, SOLUTION SUBCUTANEOUS
Qty: 20 ML | Refills: 0 | Status: SHIPPED | OUTPATIENT
Start: 2018-07-26 | End: 2018-09-04

## 2018-07-30 ENCOUNTER — HOSPITAL ENCOUNTER (OUTPATIENT)
Dept: MRI IMAGING | Facility: HOSPITAL | Age: 58
Discharge: HOME | End: 2018-07-30
Attending: INTERNAL MEDICINE
Payer: COMMERCIAL

## 2018-07-30 DIAGNOSIS — M54.41 ACUTE RIGHT-SIDED LOW BACK PAIN WITH RIGHT-SIDED SCIATICA: ICD-10-CM

## 2018-07-30 PROCEDURE — 72148 MRI LUMBAR SPINE W/O DYE: CPT | Performed by: INTERNAL MEDICINE

## 2018-08-06 RX ORDER — ALPRAZOLAM 0.5 MG/1
TABLET ORAL
Qty: 30 TABLET | Refills: 5 | Status: SHIPPED
Start: 2018-08-06 | End: 2019-02-03

## 2018-08-08 ENCOUNTER — OFFICE VISIT (OUTPATIENT)
Dept: ENDOCRINOLOGY CLINIC | Facility: CLINIC | Age: 58
End: 2018-08-08
Payer: COMMERCIAL

## 2018-08-08 VITALS
DIASTOLIC BLOOD PRESSURE: 58 MMHG | BODY MASS INDEX: 20 KG/M2 | HEART RATE: 81 BPM | SYSTOLIC BLOOD PRESSURE: 98 MMHG | WEIGHT: 98 LBS

## 2018-08-08 DIAGNOSIS — E11.65 TYPE 2 DIABETES MELLITUS WITH HYPERGLYCEMIA, UNSPECIFIED WHETHER LONG TERM INSULIN USE (HCC): Primary | ICD-10-CM

## 2018-08-08 DIAGNOSIS — E78.5 DYSLIPIDEMIA: ICD-10-CM

## 2018-08-08 LAB
CARTRIDGE LOT#: ABNORMAL NUMERIC
GLUCOSE BLOOD: 180
HEMOGLOBIN A1C: 7.6 % (ref 4.3–5.6)
TEST STRIP LOT #: NORMAL NUMERIC

## 2018-08-08 PROCEDURE — 82962 GLUCOSE BLOOD TEST: CPT | Performed by: INTERNAL MEDICINE

## 2018-08-08 PROCEDURE — 36416 COLLJ CAPILLARY BLOOD SPEC: CPT | Performed by: INTERNAL MEDICINE

## 2018-08-08 PROCEDURE — 83036 HEMOGLOBIN GLYCOSYLATED A1C: CPT | Performed by: INTERNAL MEDICINE

## 2018-08-08 PROCEDURE — 99212 OFFICE O/P EST SF 10 MIN: CPT | Performed by: INTERNAL MEDICINE

## 2018-08-08 PROCEDURE — 99214 OFFICE O/P EST MOD 30 MIN: CPT | Performed by: INTERNAL MEDICINE

## 2018-08-08 RX ORDER — ACARBOSE 50 MG/1
50 TABLET ORAL
Qty: 30 TABLET | Refills: 0 | Status: SHIPPED | OUTPATIENT
Start: 2018-08-08 | End: 2018-09-02

## 2018-08-08 RX ORDER — PANTOPRAZOLE SODIUM 40 MG/1
40 TABLET, DELAYED RELEASE ORAL
COMMUNITY
End: 2018-09-04

## 2018-08-08 RX ORDER — LISINOPRIL 2.5 MG/1
2.5 TABLET ORAL DAILY
COMMUNITY
End: 2019-03-16 | Stop reason: ALTCHOICE

## 2018-08-08 NOTE — PROGRESS NOTES
Return Office Visit     CHIEF COMPLAINT:    DM  Dyslipidemia     HISTORY OF PRESENT ILLNESS:  Amna Jasmine is a 65year old female who presents for follow up for for DM.      DM HISTORY  Diagnosed: Around age 29        HISTORY OF DIABETES COMPLICATIONS: every 6 (six) hours as needed for Pain.  Disp: 100 tablet Rfl: 1   LOSARTAN POTASSIUM 25 MG Oral Tab TAKE 1 TABLET BY MOUTH EVERY DAY Disp: 90 tablet Rfl: 0   RENOVA 0.02 % External Cream ZAHRAA 1 APPLICATION AA ON FACE IN THE EVENING Disp:  Rfl: 2   Blood Glu cough  Cardiovascular: Negative for:  chest pain, palpitations, orthopnea  GI: Negative for:  abdominal pain, nausea, vomiting, diarrhea, constipation, bleeding  Neurology: Negative for: headache, numbness, weakness  Genito-Urinary: Negative for: dysuria, discussed. Discussed decreasing the carb content for dinner. We have tried going up on her dinner time insulin. When we do that her sugars drop in the middle of the night. Hence, discussed that she should work on a low carb dinner.  We will try acarbos

## 2018-08-09 ENCOUNTER — TELEPHONE (OUTPATIENT)
Dept: ENDOCRINOLOGY CLINIC | Facility: CLINIC | Age: 58
End: 2018-08-09

## 2018-08-22 ENCOUNTER — TELEPHONE (OUTPATIENT)
Dept: ENDOCRINOLOGY CLINIC | Facility: CLINIC | Age: 58
End: 2018-08-22

## 2018-08-22 RX ORDER — BLOOD-GLUCOSE METER
KIT MISCELLANEOUS
Qty: 400 STRIP | Refills: 0 | Status: SHIPPED | OUTPATIENT
Start: 2018-08-22 | End: 2018-12-01

## 2018-08-22 NOTE — TELEPHONE ENCOUNTER
Current Outpatient Prescriptions:  FREESTYLE LITE TEST In Vitro Strip TEST BLOOD SUGAR QID Disp: 100 strip Rfl: 12     Refill

## 2018-08-24 RX ORDER — VENLAFAXINE HYDROCHLORIDE 75 MG/1
CAPSULE, EXTENDED RELEASE ORAL
Qty: 90 CAPSULE | Refills: 3 | Status: SHIPPED | OUTPATIENT
Start: 2018-08-24 | End: 2019-09-05

## 2018-08-28 ENCOUNTER — HOSPITAL ENCOUNTER (OUTPATIENT)
Dept: MAMMOGRAPHY | Age: 58
Discharge: HOME | End: 2018-08-28
Attending: INTERNAL MEDICINE
Payer: COMMERCIAL

## 2018-08-28 DIAGNOSIS — Z12.39 BREAST SCREENING: ICD-10-CM

## 2018-08-28 PROCEDURE — 77067 SCR MAMMO BI INCL CAD: CPT | Performed by: INTERNAL MEDICINE

## 2018-08-28 PROCEDURE — 77063 BREAST TOMOSYNTHESIS BI: CPT | Performed by: INTERNAL MEDICINE

## 2018-08-31 ENCOUNTER — OFFICE VISIT (OUTPATIENT)
Dept: INTERNAL MEDICINE CLINIC | Facility: CLINIC | Age: 58
End: 2018-08-31
Payer: COMMERCIAL

## 2018-08-31 VITALS
HEIGHT: 58 IN | DIASTOLIC BLOOD PRESSURE: 50 MMHG | BODY MASS INDEX: 20.78 KG/M2 | TEMPERATURE: 98 F | HEART RATE: 96 BPM | RESPIRATION RATE: 12 BRPM | SYSTOLIC BLOOD PRESSURE: 98 MMHG | WEIGHT: 99 LBS

## 2018-08-31 DIAGNOSIS — Z12.39 BREAST SCREENING: ICD-10-CM

## 2018-08-31 DIAGNOSIS — Z78.0 MENOPAUSE: ICD-10-CM

## 2018-08-31 DIAGNOSIS — Z00.00 ANNUAL PHYSICAL EXAM: Primary | ICD-10-CM

## 2018-08-31 PROCEDURE — 99396 PREV VISIT EST AGE 40-64: CPT | Performed by: INTERNAL MEDICINE

## 2018-08-31 NOTE — ASSESSMENT & PLAN NOTE
Labs up to date,   Sees javier  Had physical therapy for her back, improved , needs home exercises, printed. She still has pain but it is better  Complains of fatigue, will consider increasing effexor when she needs her next fill and decreasing xanax dose.

## 2018-08-31 NOTE — PROGRESS NOTES
HPI:    Patient ID: Lis Jamison is a 65year old female. Back Pain   This is a chronic problem. The current episode started more than 1 year ago. The problem occurs 2 to 4 times per day. The problem has been gradually improving since onset.  The pain intolerance. Genitourinary: Negative for bladder incontinence, difficulty urinating, dysuria, flank pain, frequency, genital sores, hematuria and urgency. Musculoskeletal: Positive for back pain. Negative for neck pain and neck stiffness.    Allergic/Im not apply Misc USE THREE TIMES DAILY AS DIRECTED Disp: 100 each Rfl: 0   LOSARTAN POTASSIUM 25 MG Oral Tab TAKE 1 TABLET BY MOUTH EVERY DAY Disp: 90 tablet Rfl: 0   BD PEN NEEDLE MINI U/F 31G X 5 MM Does not apply Misc Use pen needles to inject victoza onc Judgment and thought content normal.              ASSESSMENT/PLAN:   Menopause  Bone density ordered    Breast screening  Had mammo , benign, repeat one year.      Annual physical exam  Labs up to date,   Sees javier  Had physical therapy for her back, improv

## 2018-09-01 NOTE — TELEPHONE ENCOUNTER
Hypertensive Medications  Protocol Criteria:  · Appointment scheduled in the past 6 months or in the next 3 months  · BMP or CMP in the past 12 months  · Creatinine result < 2  Recent Outpatient Visits            Yesterday Annual physical exam    [place]

## 2018-09-02 DIAGNOSIS — E11.65 TYPE 2 DIABETES MELLITUS WITH HYPERGLYCEMIA, UNSPECIFIED WHETHER LONG TERM INSULIN USE (HCC): ICD-10-CM

## 2018-09-04 RX ORDER — ACARBOSE 50 MG/1
TABLET ORAL
Qty: 90 TABLET | Refills: 0 | Status: SHIPPED | OUTPATIENT
Start: 2018-09-04 | End: 2018-11-14

## 2018-09-04 RX ORDER — PANTOPRAZOLE SODIUM 40 MG/1
40 TABLET, DELAYED RELEASE ORAL
Qty: 90 TABLET | Refills: 1 | Status: SHIPPED | OUTPATIENT
Start: 2018-09-04 | End: 2019-03-02

## 2018-09-04 RX ORDER — INSULIN HUMAN 500 [IU]/ML
INJECTION, SOLUTION SUBCUTANEOUS
Qty: 20 ML | Refills: 2 | Status: SHIPPED | OUTPATIENT
Start: 2018-09-04 | End: 2018-10-22

## 2018-09-04 RX ORDER — LOSARTAN POTASSIUM 25 MG/1
TABLET ORAL
Qty: 90 TABLET | Refills: 0 | Status: SHIPPED | OUTPATIENT
Start: 2018-09-04 | End: 2018-11-28

## 2018-09-11 ENCOUNTER — PATIENT MESSAGE (OUTPATIENT)
Dept: INTERNAL MEDICINE CLINIC | Facility: CLINIC | Age: 58
End: 2018-09-11

## 2018-09-12 ENCOUNTER — HOSPITAL (OUTPATIENT)
Dept: OTHER | Age: 58
End: 2018-09-12
Attending: EMERGENCY MEDICINE

## 2018-09-13 NOTE — TELEPHONE ENCOUNTER
From: Aminta Thompson  To: Marcelo Patel MD  Sent: 9/11/2018 9:04 PM CDT  Subject: Other    Hi Dr. Riley Gallegos,    I have severe pain in the buttocks shooting down my leg. I cannot even walk. I don't know what to do.  Is there any way you can give me something

## 2018-09-14 RX ORDER — HYDROCODONE BITARTRATE AND ACETAMINOPHEN 5; 325 MG/1; MG/1
1 TABLET ORAL EVERY 6 HOURS PRN
Qty: 60 TABLET | Refills: 0 | Status: SHIPPED | OUTPATIENT
Start: 2018-09-14 | End: 2018-12-26

## 2018-09-17 ENCOUNTER — TELEPHONE (OUTPATIENT)
Dept: INTERNAL MEDICINE CLINIC | Facility: CLINIC | Age: 58
End: 2018-09-17

## 2018-09-17 NOTE — TELEPHONE ENCOUNTER
Per Ashley, Hydrocodone/Acetaminophen 5-325 tab needs a prior auth. Pls go to key. [employer] and click \"Enter a Key\".  Enter the pt's last name, date of birth and the key:    Key: [id number]  Patient Last Name: Earl Stallworth  : 1960    Complete the fo

## 2018-09-20 RX ORDER — INSULIN HUMAN 500 [IU]/ML
INJECTION, SOLUTION SUBCUTANEOUS
Qty: 40 ML | Refills: 0 | Status: SHIPPED | OUTPATIENT
Start: 2018-09-20 | End: 2018-10-10

## 2018-09-27 ENCOUNTER — TELEPHONE (OUTPATIENT)
Dept: INTERNAL MEDICINE CLINIC | Facility: CLINIC | Age: 58
End: 2018-09-27

## 2018-09-27 DIAGNOSIS — R20.0 NUMBNESS OF FINGER: Primary | ICD-10-CM

## 2018-10-09 ENCOUNTER — PATIENT MESSAGE (OUTPATIENT)
Dept: ENDOCRINOLOGY CLINIC | Facility: CLINIC | Age: 58
End: 2018-10-09

## 2018-10-09 NOTE — TELEPHONE ENCOUNTER
We can definitely do th flu shot  I will have to check about the pneumonia shot tmrw .  We can check with the OP family doctors tmrw   Thanks

## 2018-10-09 NOTE — TELEPHONE ENCOUNTER
From: Bear Leo  To: Subhash Garcia MD  Sent: 10/9/2018 10:39 AM CDT  Subject: Prescription Question    Hi Dr. Rachel Bradley,    I need a pre-authorization for the syringes. [facility] should of faxed it to you. Please check or Dr. Blake Montelongo.  Also I will

## 2018-10-10 ENCOUNTER — LAB ENCOUNTER (OUTPATIENT)
Dept: LAB | Age: 58
End: 2018-10-10
Attending: INTERNAL MEDICINE
Payer: COMMERCIAL

## 2018-10-10 ENCOUNTER — OFFICE VISIT (OUTPATIENT)
Dept: ENDOCRINOLOGY CLINIC | Facility: CLINIC | Age: 58
End: 2018-10-10
Payer: COMMERCIAL

## 2018-10-10 VITALS
SYSTOLIC BLOOD PRESSURE: 121 MMHG | WEIGHT: 102.81 LBS | HEIGHT: 58 IN | BODY MASS INDEX: 21.58 KG/M2 | DIASTOLIC BLOOD PRESSURE: 72 MMHG | HEART RATE: 98 BPM

## 2018-10-10 DIAGNOSIS — E78.5 DYSLIPIDEMIA: ICD-10-CM

## 2018-10-10 DIAGNOSIS — E55.9 VITAMIN D DEFICIENCY: ICD-10-CM

## 2018-10-10 DIAGNOSIS — E11.65 UNCONTROLLED TYPE 2 DIABETES MELLITUS WITH HYPERGLYCEMIA (HCC): Primary | ICD-10-CM

## 2018-10-10 PROCEDURE — 36415 COLL VENOUS BLD VENIPUNCTURE: CPT

## 2018-10-10 PROCEDURE — 90670 PCV13 VACCINE IM: CPT | Performed by: INTERNAL MEDICINE

## 2018-10-10 PROCEDURE — 90686 IIV4 VACC NO PRSV 0.5 ML IM: CPT | Performed by: INTERNAL MEDICINE

## 2018-10-10 PROCEDURE — 99212 OFFICE O/P EST SF 10 MIN: CPT | Performed by: INTERNAL MEDICINE

## 2018-10-10 PROCEDURE — 99214 OFFICE O/P EST MOD 30 MIN: CPT | Performed by: INTERNAL MEDICINE

## 2018-10-10 PROCEDURE — 83036 HEMOGLOBIN GLYCOSYLATED A1C: CPT | Performed by: INTERNAL MEDICINE

## 2018-10-10 PROCEDURE — 36416 COLLJ CAPILLARY BLOOD SPEC: CPT | Performed by: INTERNAL MEDICINE

## 2018-10-10 PROCEDURE — 82962 GLUCOSE BLOOD TEST: CPT | Performed by: INTERNAL MEDICINE

## 2018-10-10 PROCEDURE — 90472 IMMUNIZATION ADMIN EACH ADD: CPT | Performed by: INTERNAL MEDICINE

## 2018-10-10 PROCEDURE — 82306 VITAMIN D 25 HYDROXY: CPT

## 2018-10-10 PROCEDURE — 90471 IMMUNIZATION ADMIN: CPT | Performed by: INTERNAL MEDICINE

## 2018-10-10 NOTE — PROGRESS NOTES
Return Office Visit     CHIEF COMPLAINT:    DM  Dyslipidemia     HISTORY OF PRESENT ILLNESS:  Teddy Ch is a 63year old female who presents for follow up for for DM.      DM HISTORY  Diagnosed: Around age 29        HISTORY OF DIABETES COMPLICATIONS: capsule Rfl: 3   FREESTYLE LITE TEST In Vitro Strip TEST BLOOD SUGAR QID Disp: 400 strip Rfl: 0   ALPRAZOLAM 0.5 MG Oral Tab TAKE 1 TABLET BY MOUTH EVERY EVENING Disp: 30 tablet Rfl: 5   ibuprofen 600 MG Oral Tab Take 1 tablet (600 mg total) by mouth every REVIEW OF SYSTEMS:  Constitutional: Negative for: weight change, fever, fatigue, cold/heat intolerance  Eyes: Negative for:  Visual changes, proptosis, blurring  ENT: Negative for:  dysphagia, neck swelling, dysphonia  Respiratory: Negative for:  dys her variability is mostly due to erratic eating and snacking. I had along discussion about regular eating. Discussed that is she has to snack, it should be consistent snacking. Discussed effect of steroids on BG.  She should call if she gets them aga

## 2018-10-11 NOTE — PROGRESS NOTES
Vitamin D Lab Results low normal. Patient notified by my chart instructed to take otc supplements to maintain vitamin D levels.

## 2018-10-17 ENCOUNTER — PROCEDURE VISIT (OUTPATIENT)
Dept: NEUROLOGY | Facility: CLINIC | Age: 58
End: 2018-10-17
Payer: COMMERCIAL

## 2018-10-17 VITALS — BODY MASS INDEX: 21 KG/M2 | HEIGHT: 58 IN

## 2018-10-17 DIAGNOSIS — R20.0 BILATERAL HAND NUMBNESS: Primary | ICD-10-CM

## 2018-10-17 PROCEDURE — 95886 MUSC TEST DONE W/N TEST COMP: CPT | Performed by: PHYSICAL MEDICINE & REHABILITATION

## 2018-10-17 PROCEDURE — 95910 NRV CNDJ TEST 7-8 STUDIES: CPT | Performed by: PHYSICAL MEDICINE & REHABILITATION

## 2018-10-17 NOTE — PROCEDURES
[place] [address] [address]  [facility]  Phone: [phone]  Fax: [phone]    ELECTRODIAGNOSTIC REPORT          Patient: Noe Fleming Hand Dominance: Right handed  Patient ID: [id number] Referring Dr: Dr. Sabino Villa Triceps brachii, L. Pronator teres. The study was abnormal in 1 muscle(s), with the following distribution:  • The L. Abductor pollicis brevis had abnormal spontaneous activity. Conclusion:     1) This is an abnormal electrodiagnostic study.   2) The 18.0 36.2 Wrist - Dig V 14 51   L Ulnar - Digit  V      Wrist Dig V 3.18 4.06 8.8 20.6 Wrist - Dig V 14 44       EMG Summary Table     Spontaneous MUAP Recruitment   Muscle Nerve Roots IA Fib PSW Fasc H.F. Amp Dur. PPP Pattern   L.  Deltoid Axillary C5-C6 N

## 2018-10-22 RX ORDER — INSULIN HUMAN 500 [IU]/ML
INJECTION, SOLUTION SUBCUTANEOUS
Qty: 40 ML | Refills: 0 | Status: SHIPPED | OUTPATIENT
Start: 2018-10-22 | End: 2018-12-31

## 2018-10-23 RX ORDER — LIRAGLUTIDE 6 MG/ML
INJECTION SUBCUTANEOUS
Qty: 18 ML | Refills: 5 | Status: SHIPPED | OUTPATIENT
Start: 2018-10-23 | End: 2019-03-16

## 2018-10-25 ENCOUNTER — OFFICE VISIT (OUTPATIENT)
Dept: NEUROLOGY | Facility: CLINIC | Age: 58
End: 2018-10-25
Payer: COMMERCIAL

## 2018-10-25 VITALS
HEART RATE: 106 BPM | HEIGHT: 58 IN | SYSTOLIC BLOOD PRESSURE: 108 MMHG | WEIGHT: 100 LBS | DIASTOLIC BLOOD PRESSURE: 58 MMHG | BODY MASS INDEX: 20.99 KG/M2

## 2018-10-25 DIAGNOSIS — M79.18 RIGHT BUTTOCK PAIN: Primary | ICD-10-CM

## 2018-10-25 PROCEDURE — 99213 OFFICE O/P EST LOW 20 MIN: CPT | Performed by: PHYSICAL MEDICINE & REHABILITATION

## 2018-10-25 RX ORDER — CYCLOBENZAPRINE HCL 10 MG
5 TABLET ORAL NIGHTLY PRN
Qty: 30 TABLET | Refills: 0 | Status: SHIPPED | OUTPATIENT
Start: 2018-10-25 | End: 2019-05-18 | Stop reason: ALTCHOICE

## 2018-10-25 NOTE — PATIENT INSTRUCTIONS
Order placed for physical therapy; I will give you information for physical therapy in [facility]; if they do not accept your insurance you can come here.      If physical therapy is not accepted by your insurance for some reason we will proceed with right pi

## 2018-10-25 NOTE — PROGRESS NOTES
HPI:    Patient ID: Teddy Ch is a 63year old female. This 28-year-old female with history of diabetes was initially seen by me for EMG of the bilateral upper extremities.   Today she presents with right buttock pain radiating down the right leg wi Oral Tab EC Take 1 tablet (40 mg total) by mouth every morning before breakfast. Disp: 90 tablet Rfl: 1   Venlafaxine HCl ER 75 MG Oral Capsule SR 24 Hr TK ONE C PO  D Disp: 90 capsule Rfl: 3   FREESTYLE LITE TEST In Vitro Strip TEST BLOOD SUGAR QID Disp: palpation of the bilateral lumbar paraspinals were the sacroiliac joints. Provocative tests: Negative supine SLR bilaterally. Negative seated slump test. Negative JUAN and FADIR bilaterally.    Neurological:   5/5 lower extremities bilaterally    1/4 qu

## 2018-11-06 ENCOUNTER — TELEPHONE (OUTPATIENT)
Dept: PHYSICAL THERAPY | Facility: HOSPITAL | Age: 58
End: 2018-11-06

## 2018-11-07 ENCOUNTER — APPOINTMENT (OUTPATIENT)
Dept: PHYSICAL THERAPY | Facility: HOSPITAL | Age: 58
End: 2018-11-07
Attending: PHYSICAL MEDICINE & REHABILITATION
Payer: COMMERCIAL

## 2018-11-14 ENCOUNTER — APPOINTMENT (OUTPATIENT)
Dept: PHYSICAL THERAPY | Facility: HOSPITAL | Age: 58
End: 2018-11-14
Attending: PHYSICAL MEDICINE & REHABILITATION
Payer: COMMERCIAL

## 2018-11-14 DIAGNOSIS — E11.65 TYPE 2 DIABETES MELLITUS WITH HYPERGLYCEMIA, UNSPECIFIED WHETHER LONG TERM INSULIN USE (HCC): ICD-10-CM

## 2018-11-15 RX ORDER — ACARBOSE 50 MG/1
TABLET ORAL
Qty: 90 TABLET | Refills: 0 | Status: SHIPPED | OUTPATIENT
Start: 2018-11-15 | End: 2019-03-16

## 2018-11-16 ENCOUNTER — PATIENT MESSAGE (OUTPATIENT)
Dept: NEUROLOGY | Facility: CLINIC | Age: 58
End: 2018-11-16

## 2018-11-19 ENCOUNTER — APPOINTMENT (OUTPATIENT)
Dept: PHYSICAL THERAPY | Facility: HOSPITAL | Age: 58
End: 2018-11-19
Attending: PHYSICAL MEDICINE & REHABILITATION
Payer: COMMERCIAL

## 2018-11-19 NOTE — TELEPHONE ENCOUNTER
Responded to pt My Chart message  We need more information re the character,location duration of her pain as well as what she has tried to alleviate her pain.  In previous notes she states that the \"muscle relaxer\" making her sick we need to further quest

## 2018-11-19 NOTE — TELEPHONE ENCOUNTER
Spoke to patient Pt states that she now is having Left buttock pain can radiate to front of left knee no further  Denies any numbness or tingling Denies any bowel or bladder changes Pain was 10/10 one day now 3/10 and stable.  Can not start PT until ec 1 wh

## 2018-11-19 NOTE — TELEPHONE ENCOUNTER
From: Tawnya Cortes  To: Kendra Hollingsworth DO  Sent: 11/16/2018 2:03 PM CST  Subject: Other    Dear Dr. Arpit Trinidad    I am getting pain in the left leg now. The right is better.  I cannot do PT yet because my insurance is changing dec It gets extremely painful

## 2018-11-21 ENCOUNTER — APPOINTMENT (OUTPATIENT)
Dept: PHYSICAL THERAPY | Facility: HOSPITAL | Age: 58
End: 2018-11-21
Attending: PHYSICAL MEDICINE & REHABILITATION
Payer: COMMERCIAL

## 2018-11-25 DIAGNOSIS — E11.39 TYPE 2 DIABETES MELLITUS WITH OTHER OPHTHALMIC COMPLICATION, WITH LONG-TERM CURRENT USE OF INSULIN (HCC): Primary | ICD-10-CM

## 2018-11-25 DIAGNOSIS — Z79.4 TYPE 2 DIABETES MELLITUS WITH OTHER OPHTHALMIC COMPLICATION, WITH LONG-TERM CURRENT USE OF INSULIN (HCC): Primary | ICD-10-CM

## 2018-11-26 ENCOUNTER — APPOINTMENT (OUTPATIENT)
Dept: PHYSICAL THERAPY | Facility: HOSPITAL | Age: 58
End: 2018-11-26
Attending: PHYSICAL MEDICINE & REHABILITATION
Payer: COMMERCIAL

## 2018-11-26 RX ORDER — INSULIN HUMAN 500 [IU]/ML
INJECTION, SOLUTION SUBCUTANEOUS
Qty: 50 ML | Refills: 0 | Status: SHIPPED | OUTPATIENT
Start: 2018-11-26 | End: 2018-12-29

## 2018-11-27 NOTE — TELEPHONE ENCOUNTER
Left detailed message per verbal release in chart. Informed her of dose change. Patient to call back if any further questions/concerns.

## 2018-11-27 NOTE — TELEPHONE ENCOUNTER
Insulin refilled. 125 units tid of U 500 = current regimen. Recommend taking 5 units less ( I.e. 120 units with lunch since before dinner BG are on the lower side). Call if under 70 ( BG) . I will see her on 1/9  Thanks.

## 2018-11-28 ENCOUNTER — APPOINTMENT (OUTPATIENT)
Dept: PHYSICAL THERAPY | Facility: HOSPITAL | Age: 58
End: 2018-11-28
Attending: PHYSICAL MEDICINE & REHABILITATION
Payer: COMMERCIAL

## 2018-11-28 RX ORDER — SYRINGE,INSUL U-500,NDL,0.5ML 31GX15/64"
SYRINGE, EMPTY DISPOSABLE MISCELLANEOUS
Qty: 100 EACH | Refills: 3 | Status: SHIPPED | OUTPATIENT
Start: 2018-11-28 | End: 2019-07-16

## 2018-11-29 RX ORDER — LOSARTAN POTASSIUM 25 MG/1
TABLET ORAL
Qty: 90 TABLET | Refills: 3 | Status: SHIPPED | OUTPATIENT
Start: 2018-11-29 | End: 2019-10-12

## 2018-12-03 ENCOUNTER — APPOINTMENT (OUTPATIENT)
Dept: PHYSICAL THERAPY | Facility: HOSPITAL | Age: 58
End: 2018-12-03
Attending: PHYSICAL MEDICINE & REHABILITATION
Payer: COMMERCIAL

## 2018-12-03 RX ORDER — BLOOD-GLUCOSE METER
KIT MISCELLANEOUS
Qty: 400 STRIP | Refills: 0 | Status: SHIPPED | OUTPATIENT
Start: 2018-12-03 | End: 2019-08-27

## 2018-12-05 ENCOUNTER — TELEPHONE (OUTPATIENT)
Dept: ENDOCRINOLOGY CLINIC | Facility: CLINIC | Age: 58
End: 2018-12-05

## 2018-12-05 ENCOUNTER — TELEPHONE (OUTPATIENT)
Dept: NEUROLOGY | Facility: CLINIC | Age: 58
End: 2018-12-05

## 2018-12-05 DIAGNOSIS — G57.01 PIRIFORMIS SYNDROME, RIGHT: Primary | ICD-10-CM

## 2018-12-05 NOTE — TELEPHONE ENCOUNTER
Spoke to patient who states she is experiencing left piriformis muscle pain that is sore to the touch aggravating the left knee.  Patient states she has not started physical therapy yet due to insurance reasons but is asking if she can get injection in the

## 2018-12-05 NOTE — TELEPHONE ENCOUNTER
Regarding: Prescription Question  Contact: [phone]  ----- Message from [employer], [employer] sent at 12/3/2018 12:58 PM CST -----    Dear Dr. Angelito Olsen,    I have  BCBS option [address] with [facility] for the provider of Scipts.   Ashley escalante

## 2018-12-05 NOTE — TELEPHONE ENCOUNTER
OK to get her set up for right piriformis injection under fluoroscopy, local. Hold NSAIDs prior to procedure. Injection ordered.

## 2018-12-05 NOTE — TELEPHONE ENCOUNTER
Patient informed of Dr. Mely Simental response and informed once we obtain insurance approval our office will contact her to schedule appt.    Patient also requesting updated physical therapy order as order has  to be mailed to her.-reviewed with Dr. Nagi Rothman

## 2018-12-06 ENCOUNTER — TELEPHONE (OUTPATIENT)
Dept: NEUROLOGY | Facility: CLINIC | Age: 58
End: 2018-12-06

## 2018-12-06 ENCOUNTER — APPOINTMENT (OUTPATIENT)
Dept: PHYSICAL THERAPY | Facility: HOSPITAL | Age: 58
End: 2018-12-06
Attending: PHYSICAL MEDICINE & REHABILITATION
Payer: COMMERCIAL

## 2018-12-06 NOTE — TELEPHONE ENCOUNTER
Called [facility] BS for authorization of approval of  right piriformis injection under fluoroscopy cpt code 83190,95273. Talked to [address] [facility] who states no authorization is required. Reference # [id number]. Will  inform Nursing.

## 2018-12-06 NOTE — TELEPHONE ENCOUNTER
LMTCB to schedule    right piriformis injection under fluoroscopy, local     @ [facility]

## 2018-12-07 NOTE — TELEPHONE ENCOUNTER
Patient has been scheduled for a right piriformis injection under fluoroscopy, local        on *12/12/2018* at the [facility] . Medications and allergies reviewed.  Patient informed to hold aspirins, nsaids, blood thinners, vitamins and fish oils 3-7 days prior t

## 2018-12-12 ENCOUNTER — OFFICE VISIT (OUTPATIENT)
Dept: SURGERY | Facility: CLINIC | Age: 58
End: 2018-12-12
Payer: COMMERCIAL

## 2018-12-12 DIAGNOSIS — G57.02 PIRIFORMIS SYNDROME OF LEFT SIDE: Primary | ICD-10-CM

## 2018-12-12 PROCEDURE — 20552 NJX 1/MLT TRIGGER POINT 1/2: CPT | Performed by: PHYSICAL MEDICINE & REHABILITATION

## 2018-12-12 PROCEDURE — 77002 NEEDLE LOCALIZATION BY XRAY: CPT | Performed by: PHYSICAL MEDICINE & REHABILITATION

## 2018-12-12 NOTE — PROCEDURES
Piriformis injection procedure note     Preoperative diagnosis: piriformis syndrome, left     Postoperative diagnosis: same     Anesthesia: Local     Indications: left buttock pain radiating down the left lower extremity.     Description: After discussion

## 2018-12-13 ENCOUNTER — TELEPHONE (OUTPATIENT)
Dept: ENDOCRINOLOGY CLINIC | Facility: CLINIC | Age: 58
End: 2018-12-13

## 2018-12-13 NOTE — TELEPHONE ENCOUNTER
Forwarded to doctor on call - [facility] - please advise if pt should take temp insulin dose adjustment    Pt had cortisone shot in left thigh yesterday 12/12/18.     Pt taking Humulin U-500  297=751  units TID    Last night - 12/12 BG \"HIGH\" before bed - took 75

## 2018-12-13 NOTE — TELEPHONE ENCOUNTER
Spoke with patient - she verbalized understanding of instructions from [facility].

## 2018-12-13 NOTE — TELEPHONE ENCOUNTER
Pt states she had a Cortizone shot and was instructed to call the office when she takes any type of steroid .

## 2018-12-27 RX ORDER — HYDROCODONE BITARTRATE AND ACETAMINOPHEN 5; 325 MG/1; MG/1
1 TABLET ORAL EVERY 6 HOURS PRN
Qty: 60 TABLET | Refills: 0 | Status: SHIPPED | OUTPATIENT
Start: 2018-12-27 | End: 2019-01-04

## 2018-12-29 DIAGNOSIS — E11.39 TYPE 2 DIABETES MELLITUS WITH OTHER OPHTHALMIC COMPLICATION, WITH LONG-TERM CURRENT USE OF INSULIN (HCC): ICD-10-CM

## 2018-12-29 DIAGNOSIS — Z79.4 TYPE 2 DIABETES MELLITUS WITH OTHER OPHTHALMIC COMPLICATION, WITH LONG-TERM CURRENT USE OF INSULIN (HCC): ICD-10-CM

## 2018-12-31 RX ORDER — INSULIN HUMAN 500 [IU]/ML
INJECTION, SOLUTION SUBCUTANEOUS
Qty: 40 ML | Refills: 0 | Status: SHIPPED | OUTPATIENT
Start: 2018-12-31 | End: 2019-01-08

## 2018-12-31 RX ORDER — TRAMADOL HYDROCHLORIDE 50 MG/1
TABLET ORAL
Qty: 100 TABLET | Refills: 2 | Status: SHIPPED
Start: 2018-12-31 | End: 2019-06-05

## 2019-01-01 ENCOUNTER — PATIENT MESSAGE (OUTPATIENT)
Dept: INTERNAL MEDICINE CLINIC | Facility: CLINIC | Age: 59
End: 2019-01-01

## 2019-01-03 ENCOUNTER — TELEPHONE (OUTPATIENT)
Dept: NEUROLOGY | Facility: CLINIC | Age: 59
End: 2019-01-03

## 2019-01-03 NOTE — TELEPHONE ENCOUNTER
Pt was called stated that she continues to have left buttock pain. Pt stated that injection that was given 12/12/18 did not help at all. Pt stated that she is having difficulty walking, left leg weak causing near falls.  Tramodol and OTC ibuprofen did not h

## 2019-01-04 RX ORDER — HYDROCODONE BITARTRATE AND ACETAMINOPHEN 5; 325 MG/1; MG/1
1 TABLET ORAL EVERY 6 HOURS PRN
Qty: 60 TABLET | Refills: 0 | Status: SHIPPED | OUTPATIENT
Start: 2019-01-07 | End: 2019-05-02

## 2019-01-04 NOTE — TELEPHONE ENCOUNTER
Pt was called VM left. Pt was instructed to start PT. Told that Dr Davidson Barron needs to evaluate her. Pt does have appt 01/11/19 is on wait list for sooner appt.

## 2019-01-04 NOTE — TELEPHONE ENCOUNTER
Needs to be seen for follow up to re-evaluate. Start physical therapy if she has not already done so.

## 2019-01-04 NOTE — TELEPHONE ENCOUNTER
I called Ashley, she  #28 tablets on 12/27/18 and they do not know why it was a partial fill. I told them the patient said it was because she needs a PA but they did not see the information and said to give her a new prescription.   Refill entere

## 2019-01-05 ENCOUNTER — TELEPHONE (OUTPATIENT)
Dept: ENDOCRINOLOGY CLINIC | Facility: CLINIC | Age: 59
End: 2019-01-05

## 2019-01-05 DIAGNOSIS — Z79.4 TYPE 2 DIABETES MELLITUS WITH OTHER OPHTHALMIC COMPLICATION, WITH LONG-TERM CURRENT USE OF INSULIN (HCC): ICD-10-CM

## 2019-01-05 DIAGNOSIS — E11.39 TYPE 2 DIABETES MELLITUS WITH OTHER OPHTHALMIC COMPLICATION, WITH LONG-TERM CURRENT USE OF INSULIN (HCC): ICD-10-CM

## 2019-01-05 NOTE — TELEPHONE ENCOUNTER
Current Outpatient Medications:  HUMULIN R U-500, CONCENTRATED, 500 UNIT/ML Subcutaneous Solution INJECT 125 UNITS SUBCUTANEOUS THREE TIMES DAILY Disp: 40 mL Rfl: 0     Cover[employer] PA Key [id number]

## 2019-01-08 RX ORDER — INSULIN HUMAN 500 [IU]/ML
INJECTION, SOLUTION SUBCUTANEOUS
Qty: 20 ML | Refills: 0 | Status: SHIPPED | OUTPATIENT
Start: 2019-01-08 | End: 2019-04-02

## 2019-01-08 NOTE — TELEPHONE ENCOUNTER
Received note from covermymeds stating PA has been resolved and no PA required. Contacted pharmacy  But they state prescription still needing PA. Contacted plan directly at [phone].    They state no PA required but pharmacy is currently processing

## 2019-01-11 ENCOUNTER — OFFICE VISIT (OUTPATIENT)
Dept: NEUROLOGY | Facility: CLINIC | Age: 59
End: 2019-01-11
Payer: COMMERCIAL

## 2019-01-11 VITALS
SYSTOLIC BLOOD PRESSURE: 118 MMHG | HEART RATE: 112 BPM | BODY MASS INDEX: 20.99 KG/M2 | DIASTOLIC BLOOD PRESSURE: 60 MMHG | WEIGHT: 100 LBS | HEIGHT: 58 IN

## 2019-01-11 DIAGNOSIS — R29.898 LEFT LEG WEAKNESS: Primary | ICD-10-CM

## 2019-01-11 PROCEDURE — 99213 OFFICE O/P EST LOW 20 MIN: CPT | Performed by: PHYSICAL MEDICINE & REHABILITATION

## 2019-01-11 NOTE — PROGRESS NOTES
HPI:    Patient ID: Priyank Flor is a 63year old female. Patient presents for follow-up status post left piriformis injection under fluoroscopy. Reports 0% relief.   She continues to experience primarily left buttock as well as whole leg pain radiati tablet Rfl: 0   VICTOZA 18 MG/3ML Subcutaneous Solution Pen-injector ADMINISTER 1.8 MG UNDER THE SKIN DAILY Disp: 18 mL Rfl: 5   Pantoprazole Sodium (PROTONIX) 40 MG Oral Tab EC Take 1 tablet (40 mg total) by mouth every morning before breakfast. Disp: 90 bilateral lower extremities    Sensation: intact to light touch bilaterally    Reflexes: 2/4 quad and gastroc reflexes b/l   Skin: Skin is warm and dry. No rash noted. Nursing note and vitals reviewed.          ASSESSMENT/PLAN:   Left leg weakness  (prima

## 2019-01-15 ENCOUNTER — TELEPHONE (OUTPATIENT)
Dept: NEUROLOGY | Facility: CLINIC | Age: 59
End: 2019-01-15

## 2019-01-15 DIAGNOSIS — R29.898 WEAKNESS OF BOTH LOWER EXTREMITIES: Primary | ICD-10-CM

## 2019-01-17 ENCOUNTER — MED REC SCAN ONLY (OUTPATIENT)
Dept: NEUROLOGY | Facility: CLINIC | Age: 59
End: 2019-01-17

## 2019-02-04 RX ORDER — ALPRAZOLAM 0.5 MG/1
TABLET ORAL
Qty: 30 TABLET | Refills: 5 | Status: SHIPPED
Start: 2019-02-04 | End: 2019-08-05

## 2019-02-27 ENCOUNTER — PROCEDURE VISIT (OUTPATIENT)
Dept: NEUROLOGY | Facility: CLINIC | Age: 59
End: 2019-02-27
Payer: COMMERCIAL

## 2019-02-27 DIAGNOSIS — E11.42 DIABETIC POLYNEUROPATHY ASSOCIATED WITH TYPE 2 DIABETES MELLITUS (HCC): ICD-10-CM

## 2019-02-27 DIAGNOSIS — E11.44 DIABETIC AMYOTROPHY ASSOCIATED WITH TYPE 2 DIABETES MELLITUS (HCC): Primary | ICD-10-CM

## 2019-02-27 PROCEDURE — 95909 NRV CNDJ TST 5-6 STUDIES: CPT | Performed by: PHYSICAL MEDICINE & REHABILITATION

## 2019-02-27 PROCEDURE — 95886 MUSC TEST DONE W/N TEST COMP: CPT | Performed by: PHYSICAL MEDICINE & REHABILITATION

## 2019-02-27 RX ORDER — GABAPENTIN 100 MG/1
100 CAPSULE ORAL 3 TIMES DAILY
Qty: 75 CAPSULE | Refills: 0 | Status: SHIPPED | OUTPATIENT
Start: 2019-02-27 | End: 2019-04-08

## 2019-02-27 NOTE — PROCEDURES
[address] [place] [address]  Phone: [phone]  Fax: [phone]    ELECTRODIAGNOSTIC REPORT          Patient: Zandra Robbins Hand Dominance: right handed  Patient ID: [id number] Referring Dr: Dr. Vonnie Calderon The needle EMG study was normal in all 5 tested muscles: L. Vastus lateralis, L. Vastus medialis, L. Tibialis anterior, L. Peroneus longus, L. Gastrocnemius (Lateral head). Conclusion:     1) This is an abnormal electrodiagnostic study.   2) There is L. Peroneus longus Peroneal L5-S1 N None None None None N N N N   L.  Gastrocnemius (Lateral head) Tibial S1-S2 N None None None None N N N N

## 2019-02-27 NOTE — PATIENT INSTRUCTIONS
Contact the clinic in 1 month and let me know how you are doing. May send message via Fl[employer] or call the clinic.

## 2019-02-28 NOTE — PROGRESS NOTES
Abnormal EMG results discussed with patient, likely due to longstanding history of DM. There is no evidence of diabetic amyotrophy. She will start gabapentin and continue physical therapy.

## 2019-03-04 RX ORDER — PANTOPRAZOLE SODIUM 40 MG/1
TABLET, DELAYED RELEASE ORAL
Qty: 90 TABLET | Refills: 0 | Status: SHIPPED | OUTPATIENT
Start: 2019-03-04 | End: 2019-06-20

## 2019-03-13 ENCOUNTER — MED REC SCAN ONLY (OUTPATIENT)
Dept: NEUROLOGY | Facility: CLINIC | Age: 59
End: 2019-03-13

## 2019-03-16 ENCOUNTER — OFFICE VISIT (OUTPATIENT)
Dept: ENDOCRINOLOGY CLINIC | Facility: CLINIC | Age: 59
End: 2019-03-16
Payer: COMMERCIAL

## 2019-03-16 VITALS
HEIGHT: 58 IN | WEIGHT: 105 LBS | SYSTOLIC BLOOD PRESSURE: 132 MMHG | BODY MASS INDEX: 22.04 KG/M2 | HEART RATE: 92 BPM | DIASTOLIC BLOOD PRESSURE: 64 MMHG

## 2019-03-16 DIAGNOSIS — E78.5 DYSLIPIDEMIA: ICD-10-CM

## 2019-03-16 DIAGNOSIS — E11.39 TYPE 2 DIABETES MELLITUS WITH OTHER OPHTHALMIC COMPLICATION, WITH LONG-TERM CURRENT USE OF INSULIN (HCC): Primary | ICD-10-CM

## 2019-03-16 DIAGNOSIS — E11.65 TYPE 2 DIABETES MELLITUS WITH HYPERGLYCEMIA, UNSPECIFIED WHETHER LONG TERM INSULIN USE (HCC): ICD-10-CM

## 2019-03-16 DIAGNOSIS — Z79.4 TYPE 2 DIABETES MELLITUS WITH OTHER OPHTHALMIC COMPLICATION, WITH LONG-TERM CURRENT USE OF INSULIN (HCC): Primary | ICD-10-CM

## 2019-03-16 LAB
CARTRIDGE LOT#: ABNORMAL NUMERIC
GLUCOSE BLOOD: 158
HEMOGLOBIN A1C: 7 % (ref 4.3–5.6)
TEST STRIP LOT #: NORMAL NUMERIC

## 2019-03-16 PROCEDURE — 82962 GLUCOSE BLOOD TEST: CPT | Performed by: INTERNAL MEDICINE

## 2019-03-16 PROCEDURE — 99214 OFFICE O/P EST MOD 30 MIN: CPT | Performed by: INTERNAL MEDICINE

## 2019-03-16 PROCEDURE — 36416 COLLJ CAPILLARY BLOOD SPEC: CPT | Performed by: INTERNAL MEDICINE

## 2019-03-16 PROCEDURE — 83036 HEMOGLOBIN GLYCOSYLATED A1C: CPT | Performed by: INTERNAL MEDICINE

## 2019-03-16 RX ORDER — ACARBOSE 50 MG/1
TABLET ORAL
Qty: 90 TABLET | Refills: 0 | Status: SHIPPED | OUTPATIENT
Start: 2019-03-16 | End: 2019-06-18

## 2019-03-16 NOTE — PROGRESS NOTES
Return Office Visit     CHIEF COMPLAINT:    DM  Dyslipidemia     HISTORY OF PRESENT ILLNESS:  Glenn Alvarez is a 63year old female who presents for follow up for for DM.      DM HISTORY  Diagnosed: Around age 29        HISTORY OF DIABETES COMPLICATIONS: 60 tablet Rfl: 0   TRAMADOL HCL 50 MG Oral Tab TAKE 1 TABLET BY MOUTH EVERY 6 HOURS AS NEEDED FOR PAIN Disp: 100 tablet Rfl: 2   FREESTYLE LITE TEST In Vitro Strip TEST BLOOD SUGAR FOUR TIMES DAILY Disp: 400 strip Rfl: 0   LOSARTAN POTASSIUM 25 MG Oral Tab dysphonia  Respiratory: Negative for:  dyspnea, cough  Cardiovascular: Negative for:  chest pain, palpitations, orthopnea  GI: Negative for:  abdominal pain, nausea, vomiting, diarrhea, constipation, bleeding  Neurology: Negative for: headache, numbness, w in detail. She will check BG as instructed and send them as discussed. She will call before if sugars are low.         B). No nephropathy. c). Instructed on importance of annual eye exams   d). Foot exam: Daily feet exam explained  e).  BG log weston

## 2019-03-25 DIAGNOSIS — M54.41 ACUTE RIGHT-SIDED LOW BACK PAIN WITH RIGHT-SIDED SCIATICA: ICD-10-CM

## 2019-03-25 RX ORDER — HYDROCODONE BITARTRATE AND ACETAMINOPHEN 10; 325 MG/1; MG/1
1 TABLET ORAL NIGHTLY PRN
Qty: 30 TABLET | Refills: 0 | Status: SHIPPED | OUTPATIENT
Start: 2019-03-25 | End: 2019-05-02

## 2019-03-25 NOTE — TELEPHONE ENCOUNTER
Medication request: Norco  mg, Take 1 tablet by mouth nightly prn for pain.  Qt 30 Refills 0    LOV: 1/11/19  NOV: None    [place]

## 2019-03-27 ENCOUNTER — TELEPHONE (OUTPATIENT)
Dept: NEUROLOGY | Facility: CLINIC | Age: 59
End: 2019-03-27

## 2019-03-27 NOTE — TELEPHONE ENCOUNTER
Talked with patient she had expressed that the gabapentin was not helping with her pain, at nighttime 7-8/10. She is taking gabapentin 100 mg  twice a day. Currently taking Advil for pain. She is also doing physical therapy and is helping her.      Per

## 2019-04-02 DIAGNOSIS — E11.39 TYPE 2 DIABETES MELLITUS WITH OTHER OPHTHALMIC COMPLICATION, WITH LONG-TERM CURRENT USE OF INSULIN (HCC): ICD-10-CM

## 2019-04-02 DIAGNOSIS — Z79.4 TYPE 2 DIABETES MELLITUS WITH OTHER OPHTHALMIC COMPLICATION, WITH LONG-TERM CURRENT USE OF INSULIN (HCC): ICD-10-CM

## 2019-04-02 RX ORDER — INSULIN HUMAN 500 [IU]/ML
INJECTION, SOLUTION SUBCUTANEOUS
Qty: 20 ML | Refills: 0 | Status: SHIPPED | OUTPATIENT
Start: 2019-04-02 | End: 2019-06-24

## 2019-04-02 NOTE — TELEPHONE ENCOUNTER
Refill request    Current Outpatient Medications:   •  HUMULIN R U-500, CONCENTRATED, 500 UNIT/ML Subcutaneous Solution, INJECT 125 UNITS SUBCUTANEOUS THREE TIMES DAILY, Disp: 20 mL, Rfl: 0

## 2019-04-08 DIAGNOSIS — E11.44 DIABETIC AMYOTROPHY ASSOCIATED WITH TYPE 2 DIABETES MELLITUS (HCC): ICD-10-CM

## 2019-04-08 NOTE — TELEPHONE ENCOUNTER
Medication request: gabapentin 300mg Oral Cap, #180, no refills  Take 1 capsule (300mg total) by mouth nightly. LOV: 1/11/19  NOV: Not yet scheduled - is to have EMG done. Per last refill, pt was titrated up to 300mg qHs.  Pt states she is taking 300

## 2019-04-09 RX ORDER — GABAPENTIN 300 MG/1
300 CAPSULE ORAL NIGHTLY
Qty: 180 CAPSULE | Refills: 0 | Status: SHIPPED | OUTPATIENT
Start: 2019-04-09 | End: 2019-05-18 | Stop reason: ALTCHOICE

## 2019-04-12 ENCOUNTER — TELEPHONE (OUTPATIENT)
Dept: NEUROLOGY | Facility: CLINIC | Age: 59
End: 2019-04-12

## 2019-04-12 NOTE — TELEPHONE ENCOUNTER
Left detailed message for patient notifying her of Dr. Sarah Becker message below. Provided offices and phone numbers for both Dr. Marci Flynn and Dr. Phoebe Tang. Asked her to call the office if she had questions.

## 2019-04-27 RX ORDER — BIMATOPROST 3 UG/ML
SOLUTION TOPICAL
Qty: 5 ML | Refills: 2 | Status: SHIPPED | OUTPATIENT
Start: 2019-04-27 | End: 2020-05-04

## 2019-05-02 ENCOUNTER — OFFICE VISIT (OUTPATIENT)
Dept: SURGERY | Facility: CLINIC | Age: 59
End: 2019-05-02
Payer: COMMERCIAL

## 2019-05-02 ENCOUNTER — OFFICE VISIT (OUTPATIENT)
Dept: NEUROLOGY | Facility: CLINIC | Age: 59
End: 2019-05-02
Payer: COMMERCIAL

## 2019-05-02 VITALS
RESPIRATION RATE: 16 BRPM | HEIGHT: 58 IN | SYSTOLIC BLOOD PRESSURE: 140 MMHG | DIASTOLIC BLOOD PRESSURE: 60 MMHG | HEART RATE: 112 BPM | BODY MASS INDEX: 23.09 KG/M2 | WEIGHT: 110 LBS

## 2019-05-02 DIAGNOSIS — R29.898 LEFT LEG WEAKNESS: ICD-10-CM

## 2019-05-02 DIAGNOSIS — G62.9 NEUROPATHY: ICD-10-CM

## 2019-05-02 DIAGNOSIS — M77.11 RIGHT TENNIS ELBOW: Primary | ICD-10-CM

## 2019-05-02 DIAGNOSIS — G56.03 BILATERAL CARPAL TUNNEL SYNDROME: Primary | ICD-10-CM

## 2019-05-02 PROCEDURE — 99243 OFF/OP CNSLTJ NEW/EST LOW 30: CPT | Performed by: PLASTIC SURGERY

## 2019-05-02 PROCEDURE — 99214 OFFICE O/P EST MOD 30 MIN: CPT | Performed by: PHYSICAL MEDICINE & REHABILITATION

## 2019-05-02 PROCEDURE — 99212 OFFICE O/P EST SF 10 MIN: CPT | Performed by: PLASTIC SURGERY

## 2019-05-02 RX ORDER — HYDROCODONE BITARTRATE AND ACETAMINOPHEN 10; 325 MG/1; MG/1
1 TABLET ORAL NIGHTLY PRN
Qty: 20 TABLET | Refills: 0 | Status: SHIPPED | OUTPATIENT
Start: 2019-05-02 | End: 2019-09-27

## 2019-05-02 NOTE — PATIENT INSTRUCTIONS
Increase the gabapentin to 600mg at night time for the pain in the left leg. Physical therapy for the tennis elbow.

## 2019-05-02 NOTE — PROGRESS NOTES
HPI:    Patient ID: Maulik Ferrer is a 63year old female. She has a history of DM and presents for follow up of left leg pain, and new onset right elbow and forearm pain.  Completed physical therapy which has improved her strength, but she continues to TIMES DAILY Disp: 20 mL Rfl: 0   HYDROcodone-acetaminophen  MG Oral Tab Take 1 tablet by mouth nightly as needed for Pain.  Disp: 30 tablet Rfl: 0   Liraglutide (VICTOZA) 18 MG/3ML Subcutaneous Solution Pen-injector ADMINISTER 1.8 MG UNDER THE SKIN DA well-nourished. HENT:   Head: Normocephalic and atraumatic.    Eyes: Conjunctivae and EOM are normal.   Musculoskeletal:   Pain with resisted right wrist extension    Ttp over the lateral epicondyle   Neurological:   5/5 UE bilaterally except for give-way

## 2019-05-02 NOTE — H&P
Anup Thorpe is a 63year old female that presents with Patient presents with:  Carpal Tunnel Syndrome: bilateral  .    REFERRED BY:  Neela Chang     Pacemaker: No  Latex Allergy: no  Coumadin: No  Plavix: No  Other anticoagulants: No  Cardiac stents: helpful    EMG done 10/17/18          Current Outpatient Medications:  HYDROcodone-acetaminophen  MG Oral Tab Take 1 tablet by mouth nightly as needed for Pain.  Disp: 20 tablet Rfl: 0   Insulin Lispro (HUMALOG) 100 UNIT/ML Subcutaneous Solution Cartr (PROCTOSOL HC) 2.5 % Rectal Cream Apply bid Disp: 28.35 g Rfl: 3   RENOVA 0.02 % External Cream ZAHRAA 1 APPLICATION AA ON FACE IN THE EVENING Disp:  Rfl: 2   FREESTYLE LANCETS Does not apply Misc TEST BLOOD SUGAR QID Disp:  Rfl: 12   Blood Glucose Monitoring file        Attends [employer] service: Not on file        Active member of club or organization: Not on file        Attends meetings of clubs or organizations: Not on file        Relationship status: Not on file      Intimate partner violence:        Fear bilateral none   Sensation: bilateral grossly intact  2 point discrimination 5 all digits    Phalan: bilateral negative,  Tinel Median at Wrist: bilateral negative   Median Nerve Compression: bilateral negative            ASSESSMENT/PLAN:       CARPAL TUNN healing, the hand/digit may not regain normal ROM or normal function. PLAN    LECTR    She has a right lateral epicondylitis under treatment.   Once this has resolved, she will call to have her L ECTR          5/2/2019  Luzma Aiken MD

## 2019-05-02 NOTE — PROGRESS NOTES
Pt request for surgery signed by pt and witnessed and signed by RN. Pt has Norco prescription at home for post-op pain, narcotic hand-out instruction sheet given to and reviewed w/patient.   Pre-Surgical Instruction Handout, Hand Elevation Handout and Post

## 2019-05-06 ENCOUNTER — TELEPHONE (OUTPATIENT)
Dept: NEUROLOGY | Facility: CLINIC | Age: 59
End: 2019-05-06

## 2019-05-06 NOTE — TELEPHONE ENCOUNTER
Spoke to patient. She states that she has swelling in left calf and foot. She noticed some swelling before she saw Dr. Yani Roberto on 5/2/19 but it has increased in the past 2 days.  She states that she has some redness but that from skin irritation/scratching

## 2019-05-08 ENCOUNTER — MED REC SCAN ONLY (OUTPATIENT)
Dept: NEUROLOGY | Facility: CLINIC | Age: 59
End: 2019-05-08

## 2019-05-09 ENCOUNTER — HOSPITAL ENCOUNTER (OUTPATIENT)
Age: 59
Discharge: ANOTHER HOSPITAL | End: 2019-05-09
Attending: EMERGENCY MEDICINE
Payer: COMMERCIAL

## 2019-05-09 ENCOUNTER — APPOINTMENT (OUTPATIENT)
Dept: ULTRASOUND IMAGING | Facility: HOSPITAL | Age: 59
End: 2019-05-09
Attending: EMERGENCY MEDICINE
Payer: COMMERCIAL

## 2019-05-09 ENCOUNTER — HOSPITAL ENCOUNTER (EMERGENCY)
Facility: HOSPITAL | Age: 59
Discharge: HOME | End: 2019-05-09
Attending: EMERGENCY MEDICINE
Payer: COMMERCIAL

## 2019-05-09 VITALS
TEMPERATURE: 98 F | HEART RATE: 99 BPM | DIASTOLIC BLOOD PRESSURE: 81 MMHG | RESPIRATION RATE: 16 BRPM | OXYGEN SATURATION: 100 % | SYSTOLIC BLOOD PRESSURE: 122 MMHG

## 2019-05-09 VITALS
RESPIRATION RATE: 20 BRPM | HEIGHT: 58 IN | DIASTOLIC BLOOD PRESSURE: 65 MMHG | OXYGEN SATURATION: 97 % | BODY MASS INDEX: 23.09 KG/M2 | TEMPERATURE: 99 F | HEART RATE: 100 BPM | WEIGHT: 110 LBS | SYSTOLIC BLOOD PRESSURE: 138 MMHG

## 2019-05-09 DIAGNOSIS — M79.89 PAIN AND SWELLING OF LEFT LOWER LEG: Primary | ICD-10-CM

## 2019-05-09 DIAGNOSIS — L03.116 CELLULITIS OF LEFT LOWER EXTREMITY: Primary | ICD-10-CM

## 2019-05-09 DIAGNOSIS — R60.9 EDEMA, UNSPECIFIED TYPE: ICD-10-CM

## 2019-05-09 DIAGNOSIS — M79.662 PAIN AND SWELLING OF LEFT LOWER LEG: Primary | ICD-10-CM

## 2019-05-09 PROCEDURE — 99212 OFFICE O/P EST SF 10 MIN: CPT

## 2019-05-09 PROCEDURE — 99213 OFFICE O/P EST LOW 20 MIN: CPT

## 2019-05-09 PROCEDURE — 80076 HEPATIC FUNCTION PANEL: CPT | Performed by: EMERGENCY MEDICINE

## 2019-05-09 PROCEDURE — 36415 COLL VENOUS BLD VENIPUNCTURE: CPT

## 2019-05-09 PROCEDURE — 93970 EXTREMITY STUDY: CPT | Performed by: EMERGENCY MEDICINE

## 2019-05-09 PROCEDURE — 85025 COMPLETE CBC W/AUTO DIFF WBC: CPT | Performed by: EMERGENCY MEDICINE

## 2019-05-09 PROCEDURE — 81003 URINALYSIS AUTO W/O SCOPE: CPT | Performed by: EMERGENCY MEDICINE

## 2019-05-09 PROCEDURE — 80048 BASIC METABOLIC PNL TOTAL CA: CPT | Performed by: EMERGENCY MEDICINE

## 2019-05-09 PROCEDURE — 99284 EMERGENCY DEPT VISIT MOD MDM: CPT

## 2019-05-09 RX ORDER — DOXYCYCLINE HYCLATE 100 MG/1
100 CAPSULE ORAL ONCE
Status: COMPLETED | OUTPATIENT
Start: 2019-05-09 | End: 2019-05-09

## 2019-05-09 RX ORDER — FUROSEMIDE 20 MG/1
20 TABLET ORAL DAILY
Qty: 3 TABLET | Refills: 0 | Status: SHIPPED | OUTPATIENT
Start: 2019-05-09 | End: 2019-05-12

## 2019-05-09 RX ORDER — DOXYCYCLINE HYCLATE 100 MG/1
100 CAPSULE ORAL 2 TIMES DAILY
Qty: 20 CAPSULE | Refills: 0 | Status: SHIPPED | OUTPATIENT
Start: 2019-05-09 | End: 2019-05-19

## 2019-05-09 NOTE — TELEPHONE ENCOUNTER
Informed patient to go to urgent care to have leg examined and rule out a blood clot per Dr. Jazmin Zheng recommendation. Instructed patient to stop taking gabapentin if there is no blood clot per Dr. Jacqueline Yoo. Patient will head to [facility].  Patient will f/u

## 2019-05-09 NOTE — TELEPHONE ENCOUNTER
S/w patient who is still having left leg redness and pain. States she cannot bend her leg. Her skin feels warm to touch. The pain is making her unable to walk. She states she does not have any cramping but it is getting worse. Please advise.

## 2019-05-09 NOTE — TELEPHONE ENCOUNTER
Patient calling to say her leg is worse, the swelling is greater more now than before and she cannot bend the leg; she cannot walk on the leg.

## 2019-05-09 NOTE — ED PROVIDER NOTES
Patient Seen in: [address]    History   Patient presents with:  Leg Pain    Stated Complaint: Cant bend left leg and tight feeling left leg warm to the touch     HPI    The patient is a 80-year-old female with a history pulses distally  Sensation intact light touch  Skin is intact  Faint erythema of the skin compared to the right  There is 2+ edema both lower extremities to the knee  No calf tenderness, negative Homans  Skin: No acute rash        ED Course   Labs Reviewed

## 2019-05-09 NOTE — ED NOTES
Pt to be reassessed in [address] [place] [address] ED, per MD recommendation. Pt confirmed understanding. Pt leaving IC stable and ambulatory, will be transferred to [address] [place] [address] by friend in [place].

## 2019-05-09 NOTE — ED INITIAL ASSESSMENT (HPI)
Pt states has history of weakness and neuropathy. Pt states feel swelling and tightness that starts above the knee and goes down to her foot. Pt states having some pain in right leg as well. Pt denies hx of blood clots.

## 2019-05-10 NOTE — ED PROVIDER NOTES
Patient Seen in: [facility] Emergency Department    History   Patient presents with:  Swelling Edema (cardiovascular, metabolic)    Stated Complaint: left leg r/o dvt    HPI    81-year-old female with history of insulin dependent diabetes as well Head: Normocephalic and atraumatic. Eyes: Conjunctivae are normal. Pupils are equal, round, and reactive to light. Neck: Normal range of motion. Neck supple. No JVD. Cardiovascular: Normal rate, regular rhythm and intact distal pulses.     Pulmonary ---------                               -----------         ------                     CBC W/ DIFFERENTIAL[[id number]]          Abnormal            Final result                 Please view results for these tests on the individual orders.    URIN times daily for 10 days. Qty: 20 capsule Refills: 0    furosemide 20 MG Oral Tab  Take 1 tablet (20 mg total) by mouth daily for 3 days.   Qty: 3 tablet Refills: 0

## 2019-05-12 ENCOUNTER — PATIENT MESSAGE (OUTPATIENT)
Dept: ENDOCRINOLOGY CLINIC | Facility: CLINIC | Age: 59
End: 2019-05-12

## 2019-05-13 NOTE — TELEPHONE ENCOUNTER
From: Jamey Leigh  To:  Margarette Arevalo MD  Sent: 5/12/2019 8:46 PM CDT  Subject: Non-Urgent Medical Question    [facility] just [facility] ago [facility] and at [place] [address] as well

## 2019-05-14 NOTE — TELEPHONE ENCOUNTER
Patient called to say she went to the ED on 5/9/19 and there was no blood clot but her leg is still swollen.

## 2019-05-16 ENCOUNTER — TELEPHONE (OUTPATIENT)
Dept: NEUROLOGY | Facility: CLINIC | Age: 59
End: 2019-05-16

## 2019-05-16 NOTE — TELEPHONE ENCOUNTER
Left detailed message informing patient that cellulitis is a bacterial infection that requires antibiotics and patient should see her PCP for further management.

## 2019-05-18 ENCOUNTER — OFFICE VISIT (OUTPATIENT)
Dept: INTERNAL MEDICINE CLINIC | Facility: CLINIC | Age: 59
End: 2019-05-18
Payer: COMMERCIAL

## 2019-05-18 VITALS
BODY MASS INDEX: 22 KG/M2 | SYSTOLIC BLOOD PRESSURE: 136 MMHG | DIASTOLIC BLOOD PRESSURE: 70 MMHG | TEMPERATURE: 98 F | HEART RATE: 109 BPM | RESPIRATION RATE: 18 BRPM | WEIGHT: 104.19 LBS

## 2019-05-18 DIAGNOSIS — L23.9 ALLERGIC DERMATITIS: Primary | ICD-10-CM

## 2019-05-18 DIAGNOSIS — Z12.39 BREAST CANCER SCREENING: ICD-10-CM

## 2019-05-18 PROCEDURE — 99212 OFFICE O/P EST SF 10 MIN: CPT | Performed by: INTERNAL MEDICINE

## 2019-05-18 PROCEDURE — 99214 OFFICE O/P EST MOD 30 MIN: CPT | Performed by: INTERNAL MEDICINE

## 2019-05-18 RX ORDER — AMMONIUM LACTATE 12 G/100G
CREAM TOPICAL
Refills: 11 | COMMUNITY
Start: 2019-04-30 | End: 2019-09-27

## 2019-05-18 RX ORDER — METHYLPREDNISOLONE 4 MG/1
TABLET ORAL
Qty: 1 KIT | Refills: 0 | Status: SHIPPED | OUTPATIENT
Start: 2019-05-18 | End: 2019-06-05

## 2019-05-18 RX ORDER — FUROSEMIDE 20 MG/1
20 TABLET ORAL DAILY
Qty: 7 TABLET | Refills: 0 | Status: SHIPPED | OUTPATIENT
Start: 2019-05-18 | End: 2019-06-05

## 2019-05-18 NOTE — PROGRESS NOTES
HPI:    Patient ID: Charley Stone is a 63year old female. Rash   This is a new problem. The current episode started 1 to 4 weeks ago. The problem has been gradually improving since onset.  The affected locations include the right lower leg and left low Rfl: 0   Doxycycline Hyclate 100 MG Oral Cap Take 1 capsule (100 mg total) by mouth 2 (two) times daily for 10 days. Disp: 20 capsule Rfl: 0   HYDROcodone-acetaminophen  MG Oral Tab Take 1 tablet by mouth nightly as needed for Pain.  Disp: 20 tablet R TEST In Vitro Strip TEST BLOOD SUGAR FOUR TIMES DAILY Disp: 400 strip Rfl: 0   BD INSULIN SYRINGE U-500 31G X 6MM 0.5 ML Does not apply Misc USE THREE TIMES DAILY AS NEEDED Disp: 100 each Rfl: 3   BD PEN NEEDLE MINI U/F 31G X 5 MM Does not apply Misc Use p adjust.     Breast screening  Patient had mammo at [facility], was benign. No orders of the defined types were placed in this encounter.       Meds This Visit:  Requested Prescriptions     Signed Prescriptions Disp Refills   • methylPREDNISolone (ME

## 2019-05-18 NOTE — ASSESSMENT & PLAN NOTE
Stop Lac Hydrin  Medrol  Lasix 20 daily  Prn 7 days. More insulin due to steroids.  Patient will adjust.

## 2019-05-21 NOTE — TELEPHONE ENCOUNTER
Patient states [facility] labs were ordered by AM and [employer] were ordered by another provider. Ok to release?

## 2019-05-21 NOTE — TELEPHONE ENCOUNTER
[facility] msg sent to patient informing her that [facility] labs needs to get released by ordering provider.

## 2019-05-22 NOTE — TELEPHONE ENCOUNTER
Yes okay to release lab results for labs that were ordered by me.  Although not sure how to since they are not in [address]

## 2019-05-22 NOTE — TELEPHONE ENCOUNTER
Received paperwork pt's job [employer] requesting  The need for a chair at her job because of prolonged standing. Please advise.

## 2019-05-24 NOTE — TELEPHONE ENCOUNTER
Please complete the rest of the paperwork on the last page, send a copy to the patient and fax a copy to the employer.

## 2019-06-03 ENCOUNTER — TELEPHONE (OUTPATIENT)
Dept: ENDOCRINOLOGY CLINIC | Facility: CLINIC | Age: 59
End: 2019-06-03

## 2019-06-03 NOTE — TELEPHONE ENCOUNTER
Current Outpatient Medications:  Liraglutide (VICTOZA) 18 MG/3ML Subcutaneous Solution Pen-injector ADMINISTER 1.8 MG UNDER THE SKIN DAILY Disp: 27 mL Rfl: 0     Refill

## 2019-06-03 NOTE — TELEPHONE ENCOUNTER
LOV 3/16/19  - RTC 3 mos    Filled per AM protocol.  Sent pt [facility] message reminding her to book f/u apt

## 2019-06-05 ENCOUNTER — TELEPHONE (OUTPATIENT)
Dept: NEUROLOGY | Facility: CLINIC | Age: 59
End: 2019-06-05

## 2019-06-05 ENCOUNTER — OFFICE VISIT (OUTPATIENT)
Dept: NEUROLOGY | Facility: CLINIC | Age: 59
End: 2019-06-05
Payer: COMMERCIAL

## 2019-06-05 ENCOUNTER — APPOINTMENT (OUTPATIENT)
Dept: LAB | Facility: HOSPITAL | Age: 59
End: 2019-06-05
Attending: Other
Payer: COMMERCIAL

## 2019-06-05 VITALS
HEART RATE: 96 BPM | WEIGHT: 100 LBS | SYSTOLIC BLOOD PRESSURE: 128 MMHG | BODY MASS INDEX: 20.99 KG/M2 | DIASTOLIC BLOOD PRESSURE: 68 MMHG | HEIGHT: 58 IN

## 2019-06-05 DIAGNOSIS — G72.9 MYOPATHY: Primary | ICD-10-CM

## 2019-06-05 DIAGNOSIS — G62.9 NEUROPATHY: ICD-10-CM

## 2019-06-05 DIAGNOSIS — R29.898 LEFT LEG WEAKNESS: ICD-10-CM

## 2019-06-05 DIAGNOSIS — G72.9 MYOPATHY: ICD-10-CM

## 2019-06-05 PROCEDURE — 86038 ANTINUCLEAR ANTIBODIES: CPT

## 2019-06-05 PROCEDURE — 85652 RBC SED RATE AUTOMATED: CPT | Performed by: OTHER

## 2019-06-05 PROCEDURE — 82550 ASSAY OF CK (CPK): CPT

## 2019-06-05 PROCEDURE — 86255 FLUORESCENT ANTIBODY SCREEN: CPT

## 2019-06-05 PROCEDURE — 83516 IMMUNOASSAY NONANTIBODY: CPT

## 2019-06-05 PROCEDURE — 86141 C-REACTIVE PROTEIN HS: CPT

## 2019-06-05 PROCEDURE — 83876 ASSAY MYELOPEROXIDASE: CPT

## 2019-06-05 PROCEDURE — 82607 VITAMIN B-12: CPT | Performed by: OTHER

## 2019-06-05 PROCEDURE — 86334 IMMUNOFIX E-PHORESIS SERUM: CPT

## 2019-06-05 PROCEDURE — 36415 COLL VENOUS BLD VENIPUNCTURE: CPT

## 2019-06-05 PROCEDURE — 99204 OFFICE O/P NEW MOD 45 MIN: CPT | Performed by: OTHER

## 2019-06-05 PROCEDURE — 82085 ASSAY OF ALDOLASE: CPT

## 2019-06-05 NOTE — PROGRESS NOTES
Neurology Initial Visit     Referred By: Dr. Molina ref. provider found    Chief Complaint: Patient presents with:  Leg Pain: Patient presents today c/o pain on medial side of left leg, started a few months ago.  She describes it as an aching pain from inside Pen-injector, ADMINISTER 1.8 MG UNDER THE SKIN DAILY, Disp: 27 mL, Rfl: 0  •  Ammonium Lactate 12 % External Cream, APPLY TO AFFECTED AREA TWICE A DAY, Disp: , Rfl: 11  •  HYDROcodone-acetaminophen  MG Oral Tab, Take 1 tablet by mouth nightly as need Misc, TEST BLOOD SUGAR QID, Disp: , Rfl: 12  •  Blood Glucose Monitoring Suppl (FREESTYLE LITE) Does not apply Device, TEST QID  UTD, Disp: , Rfl: 0    No Known Allergies    ROS:   As in HPI, the rest of the 14 system review was done and was negative sign    Coordination:  Finger to nose intact  Heel to shin intact  Rapid alternating movements intact    Gait:  Normal posture  Normal physiologic       Labs:    Lab Results   Component Value Date    TSH 2.54 06/17/2017     Lab Results   Component Value Da file.    Camila Giraldo MD

## 2019-06-05 NOTE — TELEPHONE ENCOUNTER
----- Message from Alcira Penny MD sent at 6/5/2019 10:29 AM CDT -----  Please let the patient know that there are signs of inflammation as we have discussed, but will still await the other blood work results and she definitely need to follow-up wi

## 2019-06-10 ENCOUNTER — PATIENT MESSAGE (OUTPATIENT)
Dept: ENDOCRINOLOGY CLINIC | Facility: CLINIC | Age: 59
End: 2019-06-10

## 2019-06-10 ENCOUNTER — TELEPHONE (OUTPATIENT)
Dept: NEUROLOGY | Facility: CLINIC | Age: 59
End: 2019-06-10

## 2019-06-10 NOTE — TELEPHONE ENCOUNTER
----- Message from Armenta Canavan, MD sent at 6/7/2019  1:49 PM CDT -----  Please let the patient know that results of these particular lab tests so far were normal.    Thank you

## 2019-06-11 ENCOUNTER — TELEPHONE (OUTPATIENT)
Dept: ENDOCRINOLOGY CLINIC | Facility: CLINIC | Age: 59
End: 2019-06-11

## 2019-06-11 NOTE — TELEPHONE ENCOUNTER
From: Fina Sethi  To: Eliecer Eric MD  Sent: 6/10/2019 9:33 PM CDT  Subject: Test Results Question     AND MELISSA    YES PLEASE SEND MY TEST RESULTS FROM [facility] TO MY HOUSE. THANK YOU VERY MUCH.      Genaro Colon

## 2019-06-11 NOTE — TELEPHONE ENCOUNTER
Rec'd fax from [facility] for clarification for pen needl order.  Resent order per protocol    LOV 3/16/19 F/U 7/15/19

## 2019-06-11 NOTE — TELEPHONE ENCOUNTER
Per TE dated 5/22, ok to mail labs that were ordered by Dr Buster Montoya:  Makenzie Resides likely sent to scanning department. When available in Media, will print and mail.

## 2019-06-15 NOTE — TELEPHONE ENCOUNTER
From: Marisa Morales  Sent: 2/1/2018 8:26 AM CST  Subject: Medication Renewal Request    Marisa Morales would like a refill of the following medications:     ALPRAZOLAM 0.5 MG Oral Tab Emely Rae MD]   Patient Comment: Please send scrip to [facility]
Pt contacted  that Rx was refilled and is ready for  at pharmacy.
No

## 2019-06-17 ENCOUNTER — TELEPHONE (OUTPATIENT)
Dept: ENDOCRINOLOGY CLINIC | Facility: CLINIC | Age: 59
End: 2019-06-17

## 2019-06-17 DIAGNOSIS — E11.65 TYPE 2 DIABETES MELLITUS WITH HYPERGLYCEMIA, UNSPECIFIED WHETHER LONG TERM INSULIN USE (HCC): ICD-10-CM

## 2019-06-17 NOTE — TELEPHONE ENCOUNTER
Current Outpatient Medications:  acarbose 50 MG Oral Tab TAKE 1 TABLET BY MOUTH DAILY WITH DINNER Disp: 90 tablet Rfl: 0     Refill

## 2019-06-18 RX ORDER — ACARBOSE 50 MG/1
TABLET ORAL
Qty: 90 TABLET | Refills: 0 | Status: SHIPPED | OUTPATIENT
Start: 2019-06-18 | End: 2019-09-17

## 2019-06-19 ENCOUNTER — TELEPHONE (OUTPATIENT)
Dept: ENDOCRINOLOGY CLINIC | Facility: CLINIC | Age: 59
End: 2019-06-19

## 2019-06-21 RX ORDER — PANTOPRAZOLE SODIUM 40 MG/1
TABLET, DELAYED RELEASE ORAL
Qty: 90 TABLET | Refills: 1 | Status: SHIPPED | OUTPATIENT
Start: 2019-06-21 | End: 2020-01-02

## 2019-06-24 ENCOUNTER — OFFICE VISIT (OUTPATIENT)
Dept: RHEUMATOLOGY | Facility: CLINIC | Age: 59
End: 2019-06-24
Payer: COMMERCIAL

## 2019-06-24 ENCOUNTER — APPOINTMENT (OUTPATIENT)
Dept: LAB | Facility: HOSPITAL | Age: 59
End: 2019-06-24
Attending: INTERNAL MEDICINE
Payer: COMMERCIAL

## 2019-06-24 VITALS
HEIGHT: 58 IN | BODY MASS INDEX: 21.83 KG/M2 | WEIGHT: 104 LBS | DIASTOLIC BLOOD PRESSURE: 77 MMHG | HEART RATE: 91 BPM | SYSTOLIC BLOOD PRESSURE: 146 MMHG

## 2019-06-24 DIAGNOSIS — R70.0 ELEVATED SED RATE: Primary | ICD-10-CM

## 2019-06-24 DIAGNOSIS — E11.39 TYPE 2 DIABETES MELLITUS WITH OTHER OPHTHALMIC COMPLICATION, WITH LONG-TERM CURRENT USE OF INSULIN (HCC): ICD-10-CM

## 2019-06-24 DIAGNOSIS — R70.0 ELEVATED SED RATE: ICD-10-CM

## 2019-06-24 DIAGNOSIS — Z79.4 TYPE 2 DIABETES MELLITUS WITH OTHER OPHTHALMIC COMPLICATION, WITH LONG-TERM CURRENT USE OF INSULIN (HCC): ICD-10-CM

## 2019-06-24 DIAGNOSIS — G62.9 NEUROPATHY: ICD-10-CM

## 2019-06-24 DIAGNOSIS — R22.43 LOCALIZED SWELLING OF BOTH LOWER LEGS: ICD-10-CM

## 2019-06-24 PROCEDURE — 83516 IMMUNOASSAY NONANTIBODY: CPT

## 2019-06-24 PROCEDURE — 86803 HEPATITIS C AB TEST: CPT

## 2019-06-24 PROCEDURE — 86431 RHEUMATOID FACTOR QUANT: CPT

## 2019-06-24 PROCEDURE — 86200 CCP ANTIBODY: CPT

## 2019-06-24 PROCEDURE — 36415 COLL VENOUS BLD VENIPUNCTURE: CPT

## 2019-06-24 PROCEDURE — 99244 OFF/OP CNSLTJ NEW/EST MOD 40: CPT | Performed by: INTERNAL MEDICINE

## 2019-06-24 PROCEDURE — 86256 FLUORESCENT ANTIBODY TITER: CPT

## 2019-06-24 PROCEDURE — 82595 ASSAY OF CRYOGLOBULIN: CPT

## 2019-06-24 RX ORDER — INSULIN HUMAN 500 [IU]/ML
INJECTION, SOLUTION SUBCUTANEOUS
Qty: 20 ML | Refills: 1 | Status: SHIPPED | OUTPATIENT
Start: 2019-06-24 | End: 2019-12-12

## 2019-06-24 NOTE — TELEPHONE ENCOUNTER
Current Outpatient Medications:  HUMULIN R U-500, CONCENTRATED, 500 UNIT/ML Subcutaneous Solution INJECT 125 UNITS SUBCUTANEOUS THREE TIMES DAILY Disp: 20 mL Rfl: 0     Refill

## 2019-06-24 NOTE — PROGRESS NOTES
Amna Jasmine is a 63year old female who presents for Patient presents with:  Consult: Per neurologist for myopathy   Other: Pt stts she has bilateral leg swelling; onset 3 mo ago. Has trouble walking, feels numbness  .    HPI:     I had the pleasure of s from Last 2 Encounters:  06/24/19 : 104 lb (47.2 kg)  06/05/19 : 100 lb (45.4 kg)    Body mass index is 21.74 kg/m².         Current Outpatient Medications:  PANTOPRAZOLE SODIUM 40 MG Oral Tab EC TAKE 1 TABLET BY MOUTH EVERY MORNING Disp: 90 tablet Rfl: 1 Does not apply Misc TEST BLOOD SUGAR QID Disp:  Rfl: 12   Blood Glucose Monitoring Suppl (FREESTYLE LITE) Does not apply Device TEST QID  UTD Disp:  Rfl: 0   HUMULIN R U-500, CONCENTRATED, 500 UNIT/ML Subcutaneous Solution INJECT 125 UNITS SUBCUTANEOUS THR : no dysuria, no hx of miscarriages, no DVT Hx, no hx of OCP, 2 c sections , 2 childrne, - both 4-6 weeks - gestational diabetics -   Neuro: No numbness or tingling, no headache, no hx of seizures,   Psych: no hx of anxiety or depression  ENDO: no hx o x10(3) uL 7.5   RBC      3.80 - 5.30 x10(6)uL 3.88   Hemoglobin      12.0 - 16.0 g/dL 11.2 (L)   Hematocrit      35.0 - 48.0 % 35.8   MCV      80.0 - 100.0 fL 92.3   MCH      26.0 - 34.0 pg 28.9   MCHC      31.0 - 37.0 g/dL 31.3   RDW-SD      35.1 - 46.3 f -American      >=60 119   AST (SGOT)      15 - 37 U/L 39 (H)   ALT (SGPT)      13 - 56 U/L 51   ALKALINE PHOSPHATASE      46 - 118 U/L 125 (H)   Total Bilirubin      0.1 - 2.0 mg/dL 0.3   Bilirubin, Direct      0.0 - 0.2 mg/dL 0.1   TOTAL PROTEIN presents with:  Consult: Per neurologist for myopathy   Other: Pt stts she has bilateral leg swelling; onset 3 mo ago. Has trouble walking, feels numbness      1.  B/l leg tightness and diffuse swelling- not much edema - with elevated sed rate and crp   - w

## 2019-06-24 NOTE — PATIENT INSTRUCTIONS
1. Check 2decho   2. Check labs  3. Return to clinic in 3 weeks after echo is done. 4. Dermatologist - Lee Mcnair - dr. Felecia Andrade and dr. Camarillo Better - set up appt.

## 2019-07-15 ENCOUNTER — TELEPHONE (OUTPATIENT)
Dept: FAMILY MEDICINE CLINIC | Facility: CLINIC | Age: 59
End: 2019-07-15

## 2019-07-15 NOTE — TELEPHONE ENCOUNTER
ROBERTI;;    Just wanted to let you know that she was taken to [facility] by ambulance, admitted to the icu unit--she had fainted at home.

## 2019-07-16 ENCOUNTER — PATIENT MESSAGE (OUTPATIENT)
Dept: ENDOCRINOLOGY CLINIC | Facility: CLINIC | Age: 59
End: 2019-07-16

## 2019-07-16 ENCOUNTER — TELEPHONE (OUTPATIENT)
Dept: ENDOCRINOLOGY CLINIC | Facility: CLINIC | Age: 59
End: 2019-07-16

## 2019-07-16 NOTE — TELEPHONE ENCOUNTER
Current Outpatient Medications:  BD INSULIN SYRINGE U-500 31G X 6MM 0.5 ML Does not apply Misc USE THREE TIMES DAILY AS NEEDED Disp: 100 each Rfl: 3     Refill

## 2019-07-16 NOTE — TELEPHONE ENCOUNTER
LEEANNA - Pt called to let Dr. Teja Horvath know that she has been in DR CHAPITO ARREDONDO [facility] since 7/13/19 due to bacterial infection. She had an appt on 7/15/19 but wanted to let Dr. Teja Horvath know why she wasn't there for appt. Call if any further questions.

## 2019-07-16 NOTE — TELEPHONE ENCOUNTER
Sorry to hear  [facility] she feels better  Can call to reschedule ( usually sees me in OP)  Thanks

## 2019-07-31 ENCOUNTER — OFFICE VISIT (OUTPATIENT)
Dept: ENDOCRINOLOGY CLINIC | Facility: CLINIC | Age: 59
End: 2019-07-31
Payer: COMMERCIAL

## 2019-07-31 VITALS
DIASTOLIC BLOOD PRESSURE: 64 MMHG | HEART RATE: 84 BPM | SYSTOLIC BLOOD PRESSURE: 126 MMHG | BODY MASS INDEX: 22 KG/M2 | WEIGHT: 104.19 LBS

## 2019-07-31 DIAGNOSIS — E11.69 TYPE 2 DIABETES MELLITUS WITH OTHER SPECIFIED COMPLICATION, WITH LONG-TERM CURRENT USE OF INSULIN (HCC): Primary | ICD-10-CM

## 2019-07-31 DIAGNOSIS — Z79.4 TYPE 2 DIABETES MELLITUS WITH OTHER SPECIFIED COMPLICATION, WITH LONG-TERM CURRENT USE OF INSULIN (HCC): Primary | ICD-10-CM

## 2019-07-31 DIAGNOSIS — E78.5 DYSLIPIDEMIA: ICD-10-CM

## 2019-07-31 LAB
CARTRIDGE LOT#: ABNORMAL NUMERIC
GLUCOSE BLOOD: 165
HEMOGLOBIN A1C: 7 % (ref 4.3–5.6)
TEST STRIP LOT #: NORMAL NUMERIC

## 2019-07-31 PROCEDURE — 36416 COLLJ CAPILLARY BLOOD SPEC: CPT | Performed by: INTERNAL MEDICINE

## 2019-07-31 PROCEDURE — 82962 GLUCOSE BLOOD TEST: CPT | Performed by: INTERNAL MEDICINE

## 2019-07-31 PROCEDURE — 83036 HEMOGLOBIN GLYCOSYLATED A1C: CPT | Performed by: INTERNAL MEDICINE

## 2019-07-31 PROCEDURE — 99214 OFFICE O/P EST MOD 30 MIN: CPT | Performed by: INTERNAL MEDICINE

## 2019-07-31 NOTE — PROGRESS NOTES
Return Office Visit     CHIEF COMPLAINT:    DM  Dyslipidemia     HISTORY OF PRESENT ILLNESS:  Paula Becerra is a 63year old female who presents for follow up for DM.      DM HISTORY  Diagnosed: Around age 29        HISTORY OF DIABETES COMPLICATIONS: :  His tablet Rfl: 0   Bimatoprost 0.03 % External Solution PLACE 1 DROP ON APPLICATOR/ APPLY EVENLY ALONG THE SKIN OF THE UPPER EYELID AT BASE OF EYELASHES ONCE DAILY AT BEDTIME Disp: 5 mL Rfl: 2   ALPRAZOLAM 0.5 MG Oral Tab TAKE 1 TABLET BY MOUTH EVERY EVENING dysuria, frequency  Psychiatric: Negative for:  depression, anxiety  Hematology/Lymphatics: Negative for: bruising, lower extremity edema  Endocrine: Negative for: polyuria, polydypsia  Skin: Negative for: rash, blister, cellulitis,   + right LE muscular p discussed     2. Patient’s BP is normal today  3. Dyslipidemia. A) Discussed lifestyle modifications including reductions in dietary total and saturated fat, weight loss, aerobic exercise, and eating a diet rich in fruits and vegetables.   B) She is not o

## 2019-08-05 ENCOUNTER — HOSPITAL ENCOUNTER (OUTPATIENT)
Dept: CV DIAGNOSTICS | Age: 59
Discharge: HOME | End: 2019-08-05
Attending: INTERNAL MEDICINE
Payer: COMMERCIAL

## 2019-08-05 DIAGNOSIS — R22.43 LOCALIZED SWELLING OF BOTH LOWER LEGS: ICD-10-CM

## 2019-08-05 PROCEDURE — 93306 TTE W/DOPPLER COMPLETE: CPT | Performed by: INTERNAL MEDICINE

## 2019-08-06 RX ORDER — ALPRAZOLAM 0.5 MG/1
TABLET ORAL
Qty: 30 TABLET | Refills: 5 | Status: SHIPPED
Start: 2019-08-06 | End: 2019-11-25

## 2019-08-08 ENCOUNTER — TELEPHONE (OUTPATIENT)
Dept: NEUROLOGY | Facility: CLINIC | Age: 59
End: 2019-08-08

## 2019-08-08 NOTE — TELEPHONE ENCOUNTER
Medication request: Norco 10-325mg  1 TAB QHS PRN PAIN    LOV: 05/02/19  NOV: none ( return around 07/02/19)    [place]: [place]02/19     Will have patient see neurology for evaluation of the improving left lower extremity weakness for second opinion

## 2019-08-12 ENCOUNTER — TELEPHONE (OUTPATIENT)
Dept: RHEUMATOLOGY | Facility: CLINIC | Age: 59
End: 2019-08-12

## 2019-08-12 ENCOUNTER — OFFICE VISIT (OUTPATIENT)
Dept: RHEUMATOLOGY | Facility: CLINIC | Age: 59
End: 2019-08-12
Payer: COMMERCIAL

## 2019-08-12 VITALS
BODY MASS INDEX: 21.83 KG/M2 | WEIGHT: 104 LBS | HEIGHT: 58 IN | SYSTOLIC BLOOD PRESSURE: 127 MMHG | DIASTOLIC BLOOD PRESSURE: 68 MMHG | HEART RATE: 88 BPM

## 2019-08-12 DIAGNOSIS — M79.10 MYALGIA: ICD-10-CM

## 2019-08-12 DIAGNOSIS — R70.0 ELEVATED SED RATE: Primary | ICD-10-CM

## 2019-08-12 PROCEDURE — 99214 OFFICE O/P EST MOD 30 MIN: CPT | Performed by: INTERNAL MEDICINE

## 2019-08-12 RX ORDER — PREGABALIN 50 MG/1
50 CAPSULE ORAL 2 TIMES DAILY
Qty: 60 CAPSULE | Refills: 0 | Status: SHIPPED
Start: 2019-08-12 | End: 2020-10-02 | Stop reason: ALTCHOICE

## 2019-08-12 NOTE — PROGRESS NOTES
Rosales Mackenzie is a 63year old female who presents for Patient presents with: Follow - Up: Labs and Echo dicussion  . HPI:     I had the pleasure of seeing Rosales Mackenzie on 6/24/2019 for evaluation. She is Dr. Pollard Nurse. She was referred by DR. Sophia Gonzalez was told her hb was around 7.0. Her bp was low. She was there 1 week in the hospital.   She was thought possibly to have a blood infection. She was given abx. She was told maybe it came from her leg.    Her legs still hurt and she has weakness in her left l ALONG THE SKIN OF THE UPPER EYELID AT BASE OF EYELASHES ONCE DAILY AT BEDTIME Disp: 5 mL Rfl: 2   FREESTYLE LITE TEST In Vitro Strip TEST BLOOD SUGAR FOUR TIMES DAILY Disp: 400 strip Rfl: 0   LOSARTAN POTASSIUM 25 MG Oral Tab TAKE 1 TABLET BY MOUTH EVERY D and 27.       REVIEW OF SYSTEMS:   Review Of Systems:  Fatigue  Constitutional:No fever, no change in weight or appetitie, gaining weight - 104 pounds -   Derm: leg  rashes, no oral ulcers, no alopecia, no photosensitivity, no psoriasis  HEENT: No dry eyes, 5/5 ue and le strength    Component      Latest Ref Rng & Units 6/5/2019   MYELOPEROX ANTIBODIES, IGG      0 - 19 AU/mL 0   SERINE PROTEASE3, IGG      0 - 19 AU/mL 1   Anti-Neutrophil Cyto Ab, IgG      <1:20 <1:20   IMMUNOFIXATION       Essentially Normal. Esterase Urine      Negative Negative   ASCORBIC ACID      Negative mg/dL Negative   Microscopic       Microscopic not indicated   Glucose      70 - 99 mg/dL 84   Sodium      136 - 145 mmol/L 140   Potassium      3.5 - 5.1 mmol/L 4.0   Chloride      98 - 1 Systolic function was normal. The estimated ejection     fraction was 65%. Wall motion was normal; there were no regional     wall motion abnormalities.  Features are consistent with a     pseudonormal left ventricular filling pattern, with concomitant     Most Likely Diabetic Polyneuropathy. Tests from [facility]. :   ASSESSMENT AND PLAN:   Rosales Mackenzie is a 63year old female who presents for Patient presents with: Follow - Up: Labs and Echo dicussion      1.  B/l leg tightness and diffuse swelling- not m

## 2019-08-12 NOTE — TELEPHONE ENCOUNTER
Per Dr. Karol Roberto, Rx: pregabalin (LYRICAt) 50 MG Oral Cap 60 capsule with 0 refill was faxed today to patients proffered pharmacy [facility].

## 2019-08-19 ENCOUNTER — APPOINTMENT (OUTPATIENT)
Dept: LAB | Age: 59
End: 2019-08-19
Attending: INTERNAL MEDICINE
Payer: COMMERCIAL

## 2019-08-19 ENCOUNTER — OFFICE VISIT (OUTPATIENT)
Dept: INTERNAL MEDICINE CLINIC | Facility: CLINIC | Age: 59
End: 2019-08-19
Payer: COMMERCIAL

## 2019-08-19 VITALS
HEART RATE: 92 BPM | TEMPERATURE: 99 F | SYSTOLIC BLOOD PRESSURE: 110 MMHG | DIASTOLIC BLOOD PRESSURE: 50 MMHG | BODY MASS INDEX: 21.62 KG/M2 | WEIGHT: 103 LBS | HEIGHT: 58 IN

## 2019-08-19 DIAGNOSIS — D50.8 OTHER IRON DEFICIENCY ANEMIA: ICD-10-CM

## 2019-08-19 DIAGNOSIS — E11.39 TYPE 2 DIABETES MELLITUS WITH OTHER OPHTHALMIC COMPLICATION, WITH LONG-TERM CURRENT USE OF INSULIN (HCC): Primary | ICD-10-CM

## 2019-08-19 DIAGNOSIS — B99.9 INFECTION: ICD-10-CM

## 2019-08-19 DIAGNOSIS — G62.9 NEUROPATHY: ICD-10-CM

## 2019-08-19 DIAGNOSIS — Z79.4 TYPE 2 DIABETES MELLITUS WITH OTHER OPHTHALMIC COMPLICATION, WITH LONG-TERM CURRENT USE OF INSULIN (HCC): Primary | ICD-10-CM

## 2019-08-19 PROBLEM — D64.9 ABSOLUTE ANEMIA: Status: ACTIVE | Noted: 2019-08-19

## 2019-08-19 PROCEDURE — 99214 OFFICE O/P EST MOD 30 MIN: CPT | Performed by: INTERNAL MEDICINE

## 2019-08-19 PROCEDURE — 1111F DSCHRG MED/CURRENT MED MERGE: CPT | Performed by: INTERNAL MEDICINE

## 2019-08-19 NOTE — PROGRESS NOTES
HPI:    Patient ID: Henna Starks is a 63year old female. HPIpatient was in [place] [address] a month ago , she was at work and passed out , she was driven  ( no ambulance) to [facility] Where she had a wbc of 13.4 and hgb of 7.3.    She was sent to the ICU , flu acarbose 50 MG Oral Tab TAKE 1 TABLET BY MOUTH DAILY WITH DINNER Disp: 90 tablet Rfl: 0   Liraglutide (VICTOZA) 18 MG/3ML Subcutaneous Solution Pen-injector ADMINISTER 1.8 MG UNDER THE SKIN DAILY Disp: 27 mL Rfl: 0   Bimatoprost 0.03 % External Solution Glucose Monitoring Suppl (FREESTYLE LITE) Does not apply Device TEST QID  UTD Disp:  Rfl: 0     Allergies:No Known Allergies   PHYSICAL EXAM:   /50   Pulse 92   Temp 98.9 °F (37.2 °C)   Ht 4' 10\" (1.473 m)   Wt 103 lb (46.7 kg)   BMI 21.53 kg/m² CBC W Differential W Platelet [E]      Comp Metabolic Panel (14) [E]      Meds This Visit:  Requested Prescriptions      No prescriptions requested or ordered in this encounter       Imaging & Referrals:  None       LV#[id number]

## 2019-08-19 NOTE — ASSESSMENT & PLAN NOTE
Patient had hgb of 7.3, was thought to be due to bone marrow suppression from chronic left leg infection . No obvious signs of GI bleeding, was transfused, will recheck labs today .

## 2019-08-19 NOTE — ASSESSMENT & PLAN NOTE
Left leg, pain , swelling, better since she was in the [facility]. For a weeks and received IV antibiotics. Check cbc today .

## 2019-08-20 ENCOUNTER — TELEPHONE (OUTPATIENT)
Dept: ENDOCRINOLOGY CLINIC | Facility: CLINIC | Age: 59
End: 2019-08-20

## 2019-08-20 NOTE — TELEPHONE ENCOUNTER
Pt states she not feeling well after being discharged and was eating less. Pt was taking too little insulin because she was eating less. She is eating more now and back on her regular routine.      , 105, 113 in mornings   before bed    Pt patricia

## 2019-08-20 NOTE — TELEPHONE ENCOUNTER
----- Message from Suzanna Antoine MD sent at 8/20/2019  9:04 AM CDT -----  Glucose was 244, not fasting, liver and kidney functions were normal, wbc is normal, hgb is normal and improved since hospitalization .

## 2019-08-27 RX ORDER — BLOOD-GLUCOSE METER
KIT MISCELLANEOUS
Qty: 400 STRIP | Refills: 0 | Status: SHIPPED | OUTPATIENT
Start: 2019-08-27 | End: 2019-11-21

## 2019-08-27 NOTE — TELEPHONE ENCOUNTER
Current Outpatient Medications:  FREESTYLE LITE TEST In Vitro Strip TEST BLOOD SUGAR FOUR TIMES DAILY Disp: 400 strip Rfl: 0     refill

## 2019-09-05 RX ORDER — VENLAFAXINE HYDROCHLORIDE 75 MG/1
CAPSULE, EXTENDED RELEASE ORAL
Qty: 90 CAPSULE | Refills: 1 | Status: SHIPPED | OUTPATIENT
Start: 2019-09-05 | End: 2020-01-02

## 2019-09-16 ENCOUNTER — PATIENT MESSAGE (OUTPATIENT)
Dept: INTERNAL MEDICINE CLINIC | Facility: CLINIC | Age: 59
End: 2019-09-16

## 2019-09-16 DIAGNOSIS — Z12.39 BREAST SCREENING: Primary | ICD-10-CM

## 2019-09-16 NOTE — TELEPHONE ENCOUNTER
From: Tin Patel  To:  Jaskaran Sarmiento MD  Sent: 9/16/2019 4:00 PM CDT  Subject: Non-Urgent Medical Question    Dr. Yanet Cooney,  I would like if you can send me a script for a mamogram. Also, I need to know if I ever had a TB shot through you or someone else

## 2019-09-17 DIAGNOSIS — E11.65 TYPE 2 DIABETES MELLITUS WITH HYPERGLYCEMIA, UNSPECIFIED WHETHER LONG TERM INSULIN USE (HCC): ICD-10-CM

## 2019-09-17 RX ORDER — ACARBOSE 50 MG/1
TABLET ORAL
Qty: 90 TABLET | Refills: 0 | Status: SHIPPED | OUTPATIENT
Start: 2019-09-17 | End: 2020-10-20

## 2019-09-18 ENCOUNTER — TELEPHONE (OUTPATIENT)
Dept: SURGERY | Facility: CLINIC | Age: 59
End: 2019-09-18

## 2019-09-18 NOTE — H&P
Romeo Mahajan is a 63year old female that presents with Patient presents with:  Carpal Tunnel Syndrome: bilateral  .    REFERRED BY:  Tulio Morales     NO RECTR    Pacemaker: No  Latex Allergy: no  Coumadin: No  Plavix: No  Other anticoagulants: No  Cardia helpful    EMG done 10/17/18          Current Outpatient Medications:  HYDROcodone-acetaminophen  MG Oral Tab Take 1 tablet by mouth nightly as needed for Pain.  Disp: 20 tablet Rfl: 0   Bimatoprost 0.03 % External Solution PLACE 1 DROP ON APPLICATOR/ Does not apply Misc Inject 3 each into the skin daily.  Disp: 300 each Rfl: 0   HUMULIN R U-500, CONCENTRATED, 500 UNIT/ML Subcutaneous Solution INJECT 125 UNITS SUBCUTANEOUS THREE TIMES DAILY Disp: 20 mL Rfl: 1   PANTOPRAZOLE SODIUM 40 MG Oral Tab EC TAKE service: Not on file        Active member of club or organization: Not on file        Attends meetings of clubs or organizations: Not on file        Relationship status: Not on file      Intimate partner violence:        Fear of current or ex partner: Not bilateral grossly intact  2 point discrimination 5 all digits    Phalan: bilateral negative,  Tinel Median at Wrist: bilateral negative   Median Nerve Compression: bilateral negative            ASSESSMENT/PLAN:       CARPAL TUNNEL SYNDROME  left        SEV regain normal ROM or normal function. PLAN    LECTR    She has a right lateral epicondylitis under treatment.   Once this has resolved, she will call to have her L ECTR          5/2/2019  Faith Dawn MD

## 2019-09-18 NOTE — TELEPHONE ENCOUNTER
Spoke with patient. All changes to medications and allergies, per patient report, have been documented in the medical record. Pt has been hospitalized in [facility] for Cellulitis of bilateral leg for one week on 5/9/19.  Pt is currently rec

## 2019-09-23 ENCOUNTER — OFFICE VISIT (OUTPATIENT)
Dept: SURGERY | Facility: CLINIC | Age: 59
End: 2019-09-23
Payer: COMMERCIAL

## 2019-09-23 DIAGNOSIS — M62.81 DISTAL MUSCLE WEAKNESS: ICD-10-CM

## 2019-09-23 DIAGNOSIS — M25.641 JOINT STIFFNESS OF HAND, RIGHT: Primary | ICD-10-CM

## 2019-09-23 NOTE — PROGRESS NOTES
Pre-op Carpal Tunnel :    Subjective:  I am having my surgery on 10. 1.2019      Objective:    1) Occupational Therapy provided written hand out and demonstrated home exercise program to be initiated the day of surgery.     A)   Tendon gliding exercises x 10

## 2019-09-24 ENCOUNTER — TELEPHONE (OUTPATIENT)
Dept: SURGERY | Facility: CLINIC | Age: 59
End: 2019-09-24

## 2019-09-24 DIAGNOSIS — G56.02 CARPAL TUNNEL SYNDROME, LEFT: Primary | ICD-10-CM

## 2019-10-01 ENCOUNTER — HOSPITAL ENCOUNTER (OUTPATIENT)
Facility: HOSPITAL | Age: 59
Setting detail: HOSPITAL OUTPATIENT SURGERY
Discharge: HOME | End: 2019-10-01
Attending: PLASTIC SURGERY | Admitting: PLASTIC SURGERY
Payer: COMMERCIAL

## 2019-10-01 ENCOUNTER — HOSPITAL DOCUMENTATION (OUTPATIENT)
Dept: SURGERY | Facility: CLINIC | Age: 59
End: 2019-10-01

## 2019-10-01 ENCOUNTER — ANESTHESIA EVENT (OUTPATIENT)
Dept: SURGERY | Facility: HOSPITAL | Age: 59
End: 2019-10-01
Payer: COMMERCIAL

## 2019-10-01 ENCOUNTER — ANESTHESIA (OUTPATIENT)
Dept: SURGERY | Facility: HOSPITAL | Age: 59
End: 2019-10-01
Payer: COMMERCIAL

## 2019-10-01 ENCOUNTER — TELEPHONE (OUTPATIENT)
Dept: SURGERY | Facility: CLINIC | Age: 59
End: 2019-10-01

## 2019-10-01 VITALS
DIASTOLIC BLOOD PRESSURE: 49 MMHG | TEMPERATURE: 98 F | RESPIRATION RATE: 17 BRPM | HEART RATE: 72 BPM | SYSTOLIC BLOOD PRESSURE: 126 MMHG | HEIGHT: 58 IN | OXYGEN SATURATION: 93 % | WEIGHT: 102.5 LBS | BODY MASS INDEX: 21.51 KG/M2

## 2019-10-01 DIAGNOSIS — G56.02 CARPAL TUNNEL SYNDROME, LEFT: Primary | ICD-10-CM

## 2019-10-01 PROCEDURE — 29848 WRIST ENDOSCOPY/SURGERY: CPT | Performed by: PLASTIC SURGERY

## 2019-10-01 PROCEDURE — 01N54ZZ RELEASE MEDIAN NERVE, PERCUTANEOUS ENDOSCOPIC APPROACH: ICD-10-PCS | Performed by: PLASTIC SURGERY

## 2019-10-01 RX ORDER — MORPHINE SULFATE 2 MG/ML
2 INJECTION, SOLUTION INTRAMUSCULAR; INTRAVENOUS EVERY 10 MIN PRN
Status: DISCONTINUED | OUTPATIENT
Start: 2019-10-01 | End: 2019-10-01

## 2019-10-01 RX ORDER — HYDROMORPHONE HYDROCHLORIDE 1 MG/ML
0.4 INJECTION, SOLUTION INTRAMUSCULAR; INTRAVENOUS; SUBCUTANEOUS EVERY 5 MIN PRN
Status: DISCONTINUED | OUTPATIENT
Start: 2019-10-01 | End: 2019-10-01

## 2019-10-01 RX ORDER — ACETAMINOPHEN 500 MG
1000 TABLET ORAL ONCE
Status: COMPLETED | OUTPATIENT
Start: 2019-10-01 | End: 2019-10-01

## 2019-10-01 RX ORDER — DEXTROSE MONOHYDRATE 25 G/50ML
50 INJECTION, SOLUTION INTRAVENOUS
Status: DISCONTINUED | OUTPATIENT
Start: 2019-10-01 | End: 2019-10-01

## 2019-10-01 RX ORDER — ONDANSETRON 2 MG/ML
4 INJECTION INTRAMUSCULAR; INTRAVENOUS ONCE AS NEEDED
Status: DISCONTINUED | OUTPATIENT
Start: 2019-10-01 | End: 2019-10-01

## 2019-10-01 RX ORDER — MORPHINE SULFATE 4 MG/ML
4 INJECTION, SOLUTION INTRAMUSCULAR; INTRAVENOUS EVERY 10 MIN PRN
Status: DISCONTINUED | OUTPATIENT
Start: 2019-10-01 | End: 2019-10-01

## 2019-10-01 RX ORDER — LIDOCAINE HYDROCHLORIDE 10 MG/ML
INJECTION, SOLUTION EPIDURAL; INFILTRATION; INTRACAUDAL; PERINEURAL AS NEEDED
Status: DISCONTINUED | OUTPATIENT
Start: 2019-10-01 | End: 2019-10-01 | Stop reason: SURG

## 2019-10-01 RX ORDER — PROCHLORPERAZINE EDISYLATE 5 MG/ML
5 INJECTION INTRAMUSCULAR; INTRAVENOUS ONCE AS NEEDED
Status: DISCONTINUED | OUTPATIENT
Start: 2019-10-01 | End: 2019-10-01

## 2019-10-01 RX ORDER — HYDROMORPHONE HYDROCHLORIDE 1 MG/ML
0.6 INJECTION, SOLUTION INTRAMUSCULAR; INTRAVENOUS; SUBCUTANEOUS EVERY 5 MIN PRN
Status: DISCONTINUED | OUTPATIENT
Start: 2019-10-01 | End: 2019-10-01

## 2019-10-01 RX ORDER — HALOPERIDOL 5 MG/ML
0.25 INJECTION INTRAMUSCULAR ONCE AS NEEDED
Status: DISCONTINUED | OUTPATIENT
Start: 2019-10-01 | End: 2019-10-01

## 2019-10-01 RX ORDER — FAMOTIDINE 20 MG/1
20 TABLET ORAL ONCE
Status: DISCONTINUED | OUTPATIENT
Start: 2019-10-01 | End: 2019-10-01 | Stop reason: HOSPADM

## 2019-10-01 RX ORDER — SODIUM CHLORIDE, SODIUM LACTATE, POTASSIUM CHLORIDE, CALCIUM CHLORIDE 600; 310; 30; 20 MG/100ML; MG/100ML; MG/100ML; MG/100ML
INJECTION, SOLUTION INTRAVENOUS CONTINUOUS
Status: DISCONTINUED | OUTPATIENT
Start: 2019-10-01 | End: 2019-10-01

## 2019-10-01 RX ORDER — HYDROCODONE BITARTRATE AND ACETAMINOPHEN 5; 325 MG/1; MG/1
2 TABLET ORAL AS NEEDED
Status: COMPLETED | OUTPATIENT
Start: 2019-10-01 | End: 2019-10-01

## 2019-10-01 RX ORDER — HYDROMORPHONE HYDROCHLORIDE 1 MG/ML
0.2 INJECTION, SOLUTION INTRAMUSCULAR; INTRAVENOUS; SUBCUTANEOUS EVERY 5 MIN PRN
Status: DISCONTINUED | OUTPATIENT
Start: 2019-10-01 | End: 2019-10-01

## 2019-10-01 RX ORDER — NALOXONE HYDROCHLORIDE 0.4 MG/ML
80 INJECTION, SOLUTION INTRAMUSCULAR; INTRAVENOUS; SUBCUTANEOUS AS NEEDED
Status: DISCONTINUED | OUTPATIENT
Start: 2019-10-01 | End: 2019-10-01

## 2019-10-01 RX ORDER — MORPHINE SULFATE 10 MG/ML
6 INJECTION, SOLUTION INTRAMUSCULAR; INTRAVENOUS EVERY 10 MIN PRN
Status: DISCONTINUED | OUTPATIENT
Start: 2019-10-01 | End: 2019-10-01

## 2019-10-01 RX ORDER — HYDROCODONE BITARTRATE AND ACETAMINOPHEN 10; 325 MG/1; MG/1
1 TABLET ORAL EVERY 4 HOURS PRN
Status: DISCONTINUED | OUTPATIENT
Start: 2019-10-01 | End: 2019-10-01

## 2019-10-01 RX ORDER — HYDROCODONE BITARTRATE AND ACETAMINOPHEN 5; 325 MG/1; MG/1
1 TABLET ORAL AS NEEDED
Status: COMPLETED | OUTPATIENT
Start: 2019-10-01 | End: 2019-10-01

## 2019-10-01 RX ORDER — MIDAZOLAM HYDROCHLORIDE 1 MG/ML
INJECTION INTRAMUSCULAR; INTRAVENOUS AS NEEDED
Status: DISCONTINUED | OUTPATIENT
Start: 2019-10-01 | End: 2019-10-01 | Stop reason: SURG

## 2019-10-01 RX ADMIN — SODIUM CHLORIDE, SODIUM LACTATE, POTASSIUM CHLORIDE, CALCIUM CHLORIDE: 600; 310; 30; 20 INJECTION, SOLUTION INTRAVENOUS at 11:41:00

## 2019-10-01 RX ADMIN — SODIUM CHLORIDE, SODIUM LACTATE, POTASSIUM CHLORIDE, CALCIUM CHLORIDE: 600; 310; 30; 20 INJECTION, SOLUTION INTRAVENOUS at 12:19:00

## 2019-10-01 RX ADMIN — MIDAZOLAM HYDROCHLORIDE 2 MG: 1 INJECTION INTRAMUSCULAR; INTRAVENOUS at 11:42:00

## 2019-10-01 RX ADMIN — LIDOCAINE HYDROCHLORIDE 50 MG: 10 INJECTION, SOLUTION EPIDURAL; INFILTRATION; INTRACAUDAL; PERINEURAL at 11:53:00

## 2019-10-01 NOTE — ANESTHESIA POSTPROCEDURE EVALUATION
Patient: Cristina Gaspar    Procedure Summary     Date:  10/01/19 Room / Location:  [address] [place] [address] MAIN OR 01 / 300 [facility] MAIN OR    Anesthesia Start:  9271 Anesthesia Stop:      Procedure:  ENDOSCOPIC CARPAL TUNNEL RELEASE (Left ) Diagnosis:  (carpal tunnel syndrome)    [place]

## 2019-10-01 NOTE — OPERATIVE REPORT
[facility]    PATIENT'S NAME: Huan Nielson   ATTENDING PHYSICIAN: Raúl Abebe MD   OPERATING PHYSICIAN: Raúl Abebe MD   PATIENT ACCOUNT#:   [id number]    LOCATION:  [facility] PACU 7 [facility] 10  MEDICAL RECORD #:   [id number] placed. We had excellent visualization of the dorsal surface of the transverse carpal ligament. Complete release of the transverse carpal ligament was accomplished with a push knife, a triangle knife, and a pull knife.   We had excellent herniation of

## 2019-10-01 NOTE — ANESTHESIA PREPROCEDURE EVALUATION
Anesthesia PreOp Note    HPI:     Jac Pack is a 63year old female who presents for preoperative consultation requested by: Francisco Alvarez MD    Date of Surgery: 10/1/2019    Procedure(s):  ENDOSCOPIC CARPAL TUNNEL RELEASE  Indication: carpal 1 TABLET BY MOUTH EVERY DAY WITH DINNER Disp: 90 tablet Rfl: 0 9/30/2019 at 2100   Venlafaxine HCl ER 75 MG Oral Capsule SR 24 Hr TAKE ONE CAPSULE BY MOUTH EVERY DAY Disp: 90 capsule Rfl: 1 9/30/2019 at 2200   Liraglutide (VICTOZA) 18 MG/3ML Subcutaneous S EVERY DAY Disp: 90 tablet Rfl: 3 9/29/2019   ibuprofen 600 MG Oral Tab Take 1 tablet (600 mg total) by mouth every 6 (six) hours as needed for Pain.  Disp: 60 tablet Rfl: 11 9/26/2019   BD PEN NEEDLE MINI U/F 31G X 5 MM Does not apply Misc Use pen needles t Quit date: 2017        Years since quittin.1      Smokeless tobacco: Never Used    Substance and Sexual Activity      Alcohol use: No        Alcohol/week: 0.0 standard drinks      Drug use: Yes        Types: Cannabis        Comment: daily at night 08/19/2019    CREATSERUM 0.81 08/19/2019     (H) 08/19/2019    PGLU 129 (H) 10/01/2019    CA 9.4 08/19/2019          Vital Signs: Body mass index is 21.42 kg/m². height is 1.473 m (4' 10\") and weight is 46.5 kg (102 lb 8 oz).  Her oral temperatur

## 2019-10-01 NOTE — INTERVAL H&P NOTE
Pre-op Diagnosis: carpal tunnel syndrome    The above referenced H&P was reviewed by Shalini Desai MD on 10/1/2019, the patient was examined and no significant changes have occurred in the patient's condition since the H&P was performed.   I discus

## 2019-10-01 NOTE — ANESTHESIA PROCEDURE NOTES
Airway  Date/Time: 10/1/2019 11:48 AM  Urgency: elective    Airway not difficult    General Information and Staff    Patient location during procedure: OR  Anesthesiologist: Uriel Avendano MD  Resident/CRNA: Shruthi Vargas CRNA  Performed: To Gill

## 2019-10-01 NOTE — BRIEF OP NOTE
Pre-Operative Diagnosis: carpal tunnel syndrome     Post-Operative Diagnosis:CTS   Procedure Performed:   Procedure(s):  left endoscopic carpal tunnel release    Surgeon(s) and Role:     * Shreya Bryant MD - Primary    Assistant(s):        Chano Box

## 2019-10-01 NOTE — H&P
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  Disp: 20 tablet Rfl: 0   Insulin Lispro (HUMALOG) 100 UNIT/ML [place] 0   hydrocortisone (PROCTOSOL HC) 2.5 % Rectal Cream Apply bid Disp: 28.35 g Rfl: 3   RENOVA 0.02 % External Cream ZAHRAA 1 APPLICATION AA ON FACE IN THE EVENING Disp:  Rfl: 2   FREESTYLE LANCETS Does not apply Misc TEST BLOOD SUGAR QID Disp:  Rfl: 12   Blood Not on file        Gets together: Not on file        Attends Anabaptist service: Not on file        Active member of club or organization: Not on file        Attends meetings of clubs or organizations: Not on file        Relationship status: Not on file Intrinsics: bilateral intact/symmetric               Ulnar Intrinsics: bilateral intact/symmetric               Wrist Tenderness: bilateral none               Sensation: bilateral grossly intact  2 point discrimination 5 all digits     Phalan: bilateral ne with post-operative instructions, including therapy, will have significant impact on the final result, and could lead to permanent pain, stiffness, weakness, and loss of function.     Even with satisfactory healing, the hand/digit may not regain normal ROM

## 2019-10-01 NOTE — TELEPHONE ENCOUNTER
Spoke with pt. She states she is still at hospital and RN's there took care of her post-op medication prescription order. Instructed her to call with any other questions and/or concerns.

## 2019-10-01 NOTE — OR PREOP
Patient here in phase 2 ready for discharge home. Patient educated on post op instructions and patient states she has not received any medications for pain meds.  Spoke face to face with  and he said patient should have had it filled at her pha

## 2019-10-01 NOTE — TELEPHONE ENCOUNTER
Patient had surgery today and states she does not have any pain medication that was prescribed to her? Pt requesting it please be sent to her [facility] pharmacy ASAP. Pt is being discharged now, informed [facility] that we will forward a message to get this done.   Pt

## 2019-10-02 ENCOUNTER — TELEPHONE (OUTPATIENT)
Dept: SURGERY | Facility: CLINIC | Age: 59
End: 2019-10-02

## 2019-10-02 ENCOUNTER — OFFICE VISIT (OUTPATIENT)
Dept: SURGERY | Facility: CLINIC | Age: 59
End: 2019-10-02
Payer: COMMERCIAL

## 2019-10-02 DIAGNOSIS — M25.642 STIFFNESS OF JOINT, HAND, LEFT: Primary | ICD-10-CM

## 2019-10-02 DIAGNOSIS — M62.81 DISTAL MUSCLE WEAKNESS: ICD-10-CM

## 2019-10-02 RX ORDER — HYDROCODONE BITARTRATE AND ACETAMINOPHEN 10; 325 MG/1; MG/1
TABLET ORAL
Qty: 25 TABLET | Refills: 0 | Status: SHIPPED | OUTPATIENT
Start: 2019-10-02 | End: 2019-11-25

## 2019-10-02 NOTE — PROGRESS NOTES
Carpal Tunnel Post - Op Note:    Subjective: I did not jeny my pain medication.       Objective:  Occupational Therapy completed the following educational areas status post elective carpal tunnel procedure:    1)  Dressing was removed and handwashing techniq

## 2019-10-02 NOTE — TELEPHONE ENCOUNTER
Spoke with pt she is having pain to [facility] rates 5/10, intermittent throbbing sensation, not taking analgesics. Pt states dressing to surgical site is clean and dry. Complains of LH edematous and ecchymosis, denies numbness or tingling.   Pt is requesting pres

## 2019-10-02 NOTE — TELEPHONE ENCOUNTER
Returned call to pt and made aware prescription has been sent to pharmacy on file.    Pt verbalized understanding

## 2019-10-02 NOTE — TELEPHONE ENCOUNTER
Pt brought FMLA Forms to  [facility] for Dr Gisselle Mercer to complete. Pt signed HIPPA and pd $25 fee. Scanned to C[facility] and brought originals to CAND[facility]O @ [facility].

## 2019-10-12 RX ORDER — LOSARTAN POTASSIUM 25 MG/1
TABLET ORAL
Qty: 90 TABLET | Refills: 2 | Status: SHIPPED | OUTPATIENT
Start: 2019-10-12 | End: 2020-04-23

## 2019-10-14 NOTE — TELEPHONE ENCOUNTER
Dr. Julian Santos,    Please sign off on form: Short term disability 4-5 weeks    -Highlight the patient and hit \"Chart\" button. -In Chart Review, w/in the Encounter tab - click 1 time on the Telephone call encounter for 10/2/19.  Scroll down the telephone

## 2019-10-15 ENCOUNTER — OFFICE VISIT (OUTPATIENT)
Dept: SURGERY | Facility: CLINIC | Age: 59
End: 2019-10-15
Payer: COMMERCIAL

## 2019-10-15 DIAGNOSIS — M62.81 DISTAL MUSCLE WEAKNESS: ICD-10-CM

## 2019-10-15 DIAGNOSIS — M25.642 STIFFNESS OF JOINT, HAND, LEFT: Primary | ICD-10-CM

## 2019-10-15 PROCEDURE — 97166 OT EVAL MOD COMPLEX 45 MIN: CPT | Performed by: OCCUPATIONAL THERAPIST

## 2019-10-15 PROCEDURE — 97110 THERAPEUTIC EXERCISES: CPT | Performed by: OCCUPATIONAL THERAPIST

## 2019-10-15 NOTE — TELEPHONE ENCOUNTER
Disability completed and faxed to [facility] [phone], pt is aware mailing pt a copy

## 2019-10-21 ENCOUNTER — OFFICE VISIT (OUTPATIENT)
Dept: SURGERY | Facility: CLINIC | Age: 59
End: 2019-10-21
Payer: COMMERCIAL

## 2019-10-21 DIAGNOSIS — M62.81 DISTAL MUSCLE WEAKNESS: ICD-10-CM

## 2019-10-21 DIAGNOSIS — G56.02 CARPAL TUNNEL SYNDROME, LEFT: Primary | ICD-10-CM

## 2019-10-21 DIAGNOSIS — M25.642 STIFFNESS OF JOINT, HAND, LEFT: Primary | ICD-10-CM

## 2019-10-21 PROCEDURE — 99024 POSTOP FOLLOW-UP VISIT: CPT | Performed by: PLASTIC SURGERY

## 2019-10-21 PROCEDURE — 97110 THERAPEUTIC EXERCISES: CPT | Performed by: OCCUPATIONAL THERAPIST

## 2019-10-21 NOTE — PROGRESS NOTES
Surgery 1: LECTR  - Date: 10/01/19  - Days Since: 20  \"Doing fine\"  Intermittent throbbing. Rates pain 5/10. Taking advil prn,helpful. Constant numbness LMF and LSF  Scars healing well without s/s of infection, skin peeling present.   Doing Eucerin mas

## 2019-10-21 NOTE — PROGRESS NOTES
Subjective:  I still feel a little weak. Objective:     Current level of performance:  ADL: Independent  Work: Remains on leave from work  Leisure: Not addressed.     Measurements/Tests:  ROM:  Testing By: jessica   Strength Right: 25 #      Streng

## 2019-10-22 ENCOUNTER — OFFICE VISIT (OUTPATIENT)
Dept: INTERNAL MEDICINE CLINIC | Facility: CLINIC | Age: 59
End: 2019-10-22
Payer: COMMERCIAL

## 2019-10-22 VITALS
SYSTOLIC BLOOD PRESSURE: 118 MMHG | HEIGHT: 58 IN | HEART RATE: 80 BPM | DIASTOLIC BLOOD PRESSURE: 60 MMHG | BODY MASS INDEX: 21.62 KG/M2 | TEMPERATURE: 98 F | WEIGHT: 103 LBS

## 2019-10-22 DIAGNOSIS — Z01.419 ROUTINE GYNECOLOGICAL EXAMINATION: ICD-10-CM

## 2019-10-22 DIAGNOSIS — Z00.00 ANNUAL PHYSICAL EXAM: Primary | ICD-10-CM

## 2019-10-22 PROCEDURE — 86580 TB INTRADERMAL TEST: CPT | Performed by: INTERNAL MEDICINE

## 2019-10-22 PROCEDURE — 90732 PPSV23 VACC 2 YRS+ SUBQ/IM: CPT | Performed by: INTERNAL MEDICINE

## 2019-10-22 PROCEDURE — 90472 IMMUNIZATION ADMIN EACH ADD: CPT | Performed by: INTERNAL MEDICINE

## 2019-10-22 PROCEDURE — 99396 PREV VISIT EST AGE 40-64: CPT | Performed by: INTERNAL MEDICINE

## 2019-10-22 PROCEDURE — 90686 IIV4 VACC NO PRSV 0.5 ML IM: CPT | Performed by: INTERNAL MEDICINE

## 2019-10-22 PROCEDURE — 90471 IMMUNIZATION ADMIN: CPT | Performed by: INTERNAL MEDICINE

## 2019-10-22 NOTE — ASSESSMENT & PLAN NOTE
Annual done today , labs were done in July , vaccines today , Tb skin test, had mammo, sees ophtho and retinal specialist elsewhere, had colonoscopy in 2016. Has not seen gyne in 8 years, had a hysterectomy . Has one ovary in situ .

## 2019-10-22 NOTE — PROGRESS NOTES
HPI:    Patient ID: Bear Leo is a 63year old female. HPIpatient is here for her annual physical .  She sees endocrinology and ophthamology , she has an appointment with vascular surgery , and has seen the hand surgeon.    Labs in July and mammo re 1  Liraglutide (VICTOZA) 18 MG/3ML Subcutaneous Solution Pen-injector, INJECT 1.8 MG UNDER THE SKIN ONCE DAILY (Patient taking differently: Inject 1.8 mg into the skin nightly.  INJECT 1.8 MG UNDER THE SKIN ONCE DAILY ), Disp: 27 mL, Rfl: 0  triamcinolone a 3  FREESTYLE LANCETS Does not apply Misc, TEST BLOOD SUGAR QID, Disp: , Rfl: 12  Blood Glucose Monitoring Suppl (FREESTYLE LITE) Does not apply Device, TEST QID  UTD, Disp: , Rfl: 0      Allergies:  Caviar                  SWELLING    Comment:throat  Latex TEST      Flulaval 6 months and older 0.5 ml Quad PF [81073]      PNEUMOCOCCAL IMM, 23      Meds This Visit:  Requested Prescriptions      No prescriptions requested or ordered in this encounter       Imaging & Referrals:  FLULAVAL INFLUENZA VACCINE QUAD P

## 2019-10-24 ENCOUNTER — OFFICE VISIT (OUTPATIENT)
Dept: SURGERY | Facility: CLINIC | Age: 59
End: 2019-10-24
Payer: COMMERCIAL

## 2019-10-24 DIAGNOSIS — M62.81 DISTAL MUSCLE WEAKNESS: ICD-10-CM

## 2019-10-24 DIAGNOSIS — M25.642 STIFFNESS OF JOINT, HAND, LEFT: Primary | ICD-10-CM

## 2019-10-24 PROCEDURE — 97035 APP MDLTY 1+ULTRASOUND EA 15: CPT | Performed by: OCCUPATIONAL THERAPIST

## 2019-10-24 PROCEDURE — 97022 WHIRLPOOL THERAPY: CPT | Performed by: OCCUPATIONAL THERAPIST

## 2019-10-24 NOTE — PROGRESS NOTES
Subjective: I think I will be fine to do my job.       Objective:     Current level of performance:  ADL: Independent  Work: Returning to full work duty 10.28.2019  Leisure: Not addressed    Measurements/Tests:  ROM:         Full left hand range of motion,

## 2019-10-25 ENCOUNTER — NURSE ONLY (OUTPATIENT)
Dept: FAMILY MEDICINE CLINIC | Facility: CLINIC | Age: 59
End: 2019-10-25
Payer: COMMERCIAL

## 2019-11-04 RX ORDER — SYRINGE,INSUL U-500,NDL,0.5ML 31GX15/64"
SYRINGE, EMPTY DISPOSABLE MISCELLANEOUS
Qty: 300 EACH | Refills: 0 | Status: SHIPPED | OUTPATIENT
Start: 2019-11-04 | End: 2020-01-02

## 2019-11-21 RX ORDER — BLOOD-GLUCOSE METER
KIT MISCELLANEOUS
Qty: 400 STRIP | Refills: 0 | Status: SHIPPED | OUTPATIENT
Start: 2019-11-21

## 2019-11-25 ENCOUNTER — OFFICE VISIT (OUTPATIENT)
Dept: ENDOCRINOLOGY CLINIC | Facility: CLINIC | Age: 59
End: 2019-11-25
Payer: COMMERCIAL

## 2019-11-25 ENCOUNTER — OFFICE VISIT (OUTPATIENT)
Dept: INTERNAL MEDICINE CLINIC | Facility: CLINIC | Age: 59
End: 2019-11-25
Payer: COMMERCIAL

## 2019-11-25 VITALS
BODY MASS INDEX: 22 KG/M2 | HEART RATE: 88 BPM | WEIGHT: 104.19 LBS | DIASTOLIC BLOOD PRESSURE: 57 MMHG | SYSTOLIC BLOOD PRESSURE: 127 MMHG

## 2019-11-25 VITALS
DIASTOLIC BLOOD PRESSURE: 56 MMHG | WEIGHT: 103 LBS | HEIGHT: 58 IN | HEART RATE: 88 BPM | TEMPERATURE: 98 F | BODY MASS INDEX: 21.62 KG/M2 | SYSTOLIC BLOOD PRESSURE: 112 MMHG

## 2019-11-25 DIAGNOSIS — Z79.4 TYPE 2 DIABETES MELLITUS WITH OTHER SPECIFIED COMPLICATION, WITH LONG-TERM CURRENT USE OF INSULIN (HCC): Primary | ICD-10-CM

## 2019-11-25 DIAGNOSIS — S46.112A STRAIN OF MUSCLE, FASCIA AND TENDON OF LONG HEAD OF BICEPS, LEFT ARM, INITIAL ENCOUNTER: Primary | ICD-10-CM

## 2019-11-25 DIAGNOSIS — E11.69 TYPE 2 DIABETES MELLITUS WITH OTHER SPECIFIED COMPLICATION, WITH LONG-TERM CURRENT USE OF INSULIN (HCC): Primary | ICD-10-CM

## 2019-11-25 DIAGNOSIS — E78.5 DYSLIPIDEMIA: ICD-10-CM

## 2019-11-25 PROCEDURE — 36416 COLLJ CAPILLARY BLOOD SPEC: CPT | Performed by: INTERNAL MEDICINE

## 2019-11-25 PROCEDURE — 99214 OFFICE O/P EST MOD 30 MIN: CPT | Performed by: INTERNAL MEDICINE

## 2019-11-25 PROCEDURE — 82962 GLUCOSE BLOOD TEST: CPT | Performed by: INTERNAL MEDICINE

## 2019-11-25 PROCEDURE — 99213 OFFICE O/P EST LOW 20 MIN: CPT | Performed by: INTERNAL MEDICINE

## 2019-11-25 PROCEDURE — 83036 HEMOGLOBIN GLYCOSYLATED A1C: CPT | Performed by: INTERNAL MEDICINE

## 2019-11-25 RX ORDER — HYDROCODONE BITARTRATE AND ACETAMINOPHEN 10; 325 MG/1; MG/1
TABLET ORAL
Qty: 25 TABLET | Refills: 0 | Status: SHIPPED | OUTPATIENT
Start: 2019-11-25 | End: 2021-04-26

## 2019-11-25 RX ORDER — ALPRAZOLAM 0.5 MG/1
TABLET ORAL
Qty: 31 TABLET | Refills: 5 | Status: SHIPPED | OUTPATIENT
Start: 2019-11-25 | End: 2020-04-02

## 2019-11-25 NOTE — PROGRESS NOTES
Return Office Visit     CHIEF COMPLAINT:    DM  Dyslipidemia     HISTORY OF PRESENT ILLNESS:  Sung Bonner is a 63year old female who presents for follow up for DM.      DM HISTORY  Diagnosed: Around age 29        HISTORY OF DIABETES COMPLICATIONS: :  H UNDER THE SKIN ONCE DAILY (Patient taking differently: Inject 1.8 mg into the skin nightly.  INJECT 1.8 MG UNDER THE SKIN ONCE DAILY ) 27 mL 0   • triamcinolone acetonide 0.1 % External Cream APPLY 1 LIBERALLY TO SKIN TWICE A DAY FOR 2 WEEKS AND TAKE A WEEK change, fever, fatigue, cold/heat intolerance  Eyes: Negative for:  Visual changes, proptosis, blurring  ENT: Negative for:  dysphagia, neck swelling, dysphonia  Respiratory: Negative for:  dyspnea, cough  Cardiovascular: Negative for:  chest pain, palpita detail. She will check BG as instructed and send them as discussed. She will call before if sugars are low.         B). No nephropathy. c). Instructed on importance of annual eye exams   d). Foot exam: Daily feet exam explained  e).  BG log maintainen

## 2019-11-25 NOTE — PROGRESS NOTES
HPI:    Patient ID: Sung Bonner is a 63year old female. Pain   This is a new problem. The current episode started 1 to 4 weeks ago. The problem occurs intermittently. The problem has been unchanged. Associated symptoms include myalgias.  Pertinent ne Tab TAKE 1 TABLET BY MOUTH EVERY DAY WITH DINNER 90 tablet 0   • Venlafaxine HCl ER 75 MG Oral Capsule SR 24 Hr TAKE ONE CAPSULE BY MOUTH EVERY DAY 90 capsule 1   • Liraglutide (VICTOZA) 18 MG/3ML Subcutaneous Solution Pen-injector INJECT 1.8 MG UNDER THE apply Device TEST QID  UTD  0     Allergies:  Caviar                  SWELLING    Comment:throat  Latex                   RASH   PHYSICAL EXAM:   /56   Pulse 88   Temp 98.3 °F (36.8 °C)   Ht 4' 10\" (1.473 m)   Wt 103 lb (46.7 kg)   BMI 21.53 kg/m² BY MOUTH EVERY EVENING *FILLED AT [facility] 3/12*       Imaging & Referrals:  None       GY#[id number]

## 2019-12-12 DIAGNOSIS — E11.39 TYPE 2 DIABETES MELLITUS WITH OTHER OPHTHALMIC COMPLICATION, WITH LONG-TERM CURRENT USE OF INSULIN (HCC): ICD-10-CM

## 2019-12-12 DIAGNOSIS — Z79.4 TYPE 2 DIABETES MELLITUS WITH OTHER OPHTHALMIC COMPLICATION, WITH LONG-TERM CURRENT USE OF INSULIN (HCC): ICD-10-CM

## 2019-12-12 RX ORDER — INSULIN HUMAN 500 [IU]/ML
INJECTION, SOLUTION SUBCUTANEOUS
Qty: 60 ML | Refills: 1 | Status: SHIPPED | OUTPATIENT
Start: 2019-12-12 | End: 2020-06-25

## 2019-12-15 DIAGNOSIS — E11.65 TYPE 2 DIABETES MELLITUS WITH HYPERGLYCEMIA, UNSPECIFIED WHETHER LONG TERM INSULIN USE (HCC): ICD-10-CM

## 2019-12-16 RX ORDER — ACARBOSE 50 MG/1
TABLET ORAL
Qty: 90 TABLET | Refills: 0 | OUTPATIENT
Start: 2019-12-16

## 2020-01-02 ENCOUNTER — TELEPHONE (OUTPATIENT)
Dept: ENDOCRINOLOGY CLINIC | Facility: CLINIC | Age: 60
End: 2020-01-02

## 2020-01-02 RX ORDER — PANTOPRAZOLE SODIUM 40 MG/1
TABLET, DELAYED RELEASE ORAL
Qty: 90 TABLET | Refills: 1 | Status: SHIPPED | OUTPATIENT
Start: 2020-01-02 | End: 2020-07-21

## 2020-01-02 RX ORDER — VENLAFAXINE HYDROCHLORIDE 75 MG/1
CAPSULE, EXTENDED RELEASE ORAL
Qty: 90 CAPSULE | Refills: 1 | Status: SHIPPED | OUTPATIENT
Start: 2020-01-02 | End: 2020-11-30

## 2020-01-02 RX ORDER — SYRINGE,INSUL U-500,NDL,0.5ML 31GX15/64"
SYRINGE, EMPTY DISPOSABLE MISCELLANEOUS
Qty: 100 EACH | Refills: 0 | Status: SHIPPED | OUTPATIENT
Start: 2020-01-02 | End: 2020-01-02

## 2020-01-02 NOTE — TELEPHONE ENCOUNTER
Current Outpatient Medications   Medication Sig Dispense Refill   • BD INSULIN SYRINGE U-500 31G X 6MM 0.5 ML Does not apply Misc INJECT 3 EACH INTO THE SKIN DAILY.  100 each 0     Per pharmacy insurance requires 90 day

## 2020-02-20 ENCOUNTER — OFFICE VISIT (OUTPATIENT)
Dept: INTERNAL MEDICINE CLINIC | Facility: CLINIC | Age: 60
End: 2020-02-20

## 2020-02-20 VITALS
HEART RATE: 84 BPM | HEIGHT: 58 IN | SYSTOLIC BLOOD PRESSURE: 110 MMHG | DIASTOLIC BLOOD PRESSURE: 50 MMHG | WEIGHT: 103 LBS | TEMPERATURE: 98 F | BODY MASS INDEX: 21.62 KG/M2

## 2020-02-20 DIAGNOSIS — J01.00 SUBACUTE MAXILLARY SINUSITIS: Primary | ICD-10-CM

## 2020-02-20 PROCEDURE — 99213 OFFICE O/P EST LOW 20 MIN: CPT | Performed by: INTERNAL MEDICINE

## 2020-02-20 RX ORDER — AMOXICILLIN AND CLAVULANATE POTASSIUM 875; 125 MG/1; MG/1
1 TABLET, FILM COATED ORAL 2 TIMES DAILY
Qty: 20 TABLET | Refills: 0 | Status: SHIPPED | OUTPATIENT
Start: 2020-02-20 | End: 2020-03-01

## 2020-02-20 RX ORDER — PROMETHAZINE HYDROCHLORIDE AND CODEINE PHOSPHATE 6.25; 1 MG/5ML; MG/5ML
2.5 SYRUP ORAL EVERY 4 HOURS PRN
Qty: 240 ML | Refills: 0 | Status: SHIPPED | OUTPATIENT
Start: 2020-02-20 | End: 2021-01-04 | Stop reason: ALTCHOICE

## 2020-02-20 NOTE — PROGRESS NOTES
HPI:    Patient ID: Lizz Vick is a 63year old female. Cough   This is a new problem. The current episode started in the past 7 days. The problem has been unchanged. The problem occurs constantly. The cough is productive of purulent sputum.  [place] mouth 2 (two) times daily for 10 days. 20 tablet 0   • promethazine-codeine 6.25-10 MG/5ML Oral Syrup Take 2.5 mL by mouth every 4 (four) hours as needed for cough.  240 mL 0   • PANTOPRAZOLE SODIUM 40 MG Oral Tab EC TAKE 1 TABLET BY MOUTH EVERY MORNING 90 MG Oral Tab Take 1 tablet (600 mg total) by mouth every 6 (six) hours as needed for Pain. (Patient not taking: Reported on 2/20/2020 ) 60 tablet 11   • BD PEN NEEDLE MINI U/F 31G X 5 MM Does not apply Misc Use pen needles to inject victoza once per day.  10 steroids. No orders of the defined types were placed in this encounter.       Meds This Visit:  Requested Prescriptions     Signed Prescriptions Disp Refills   • Amoxicillin-Pot Clavulanate (AUGMENTIN) 875-125 MG Oral Tab 20 tablet 0     Sig: Take 1 t

## 2020-02-21 ENCOUNTER — TELEPHONE (OUTPATIENT)
Dept: INTERNAL MEDICINE CLINIC | Facility: CLINIC | Age: 60
End: 2020-02-21

## 2020-02-24 RX ORDER — CODEINE PHOSPHATE AND GUAIFENESIN 10; 100 MG/5ML; MG/5ML
5 SOLUTION ORAL EVERY 6 HOURS PRN
Qty: 236 ML | Refills: 0 | Status: SHIPPED | OUTPATIENT
Start: 2020-02-24 | End: 2021-01-04 | Stop reason: ALTCHOICE

## 2020-02-25 ENCOUNTER — TELEPHONE (OUTPATIENT)
Dept: ENDOCRINOLOGY CLINIC | Facility: CLINIC | Age: 60
End: 2020-02-25

## 2020-02-27 ENCOUNTER — TELEPHONE (OUTPATIENT)
Dept: ENDOCRINOLOGY CLINIC | Facility: CLINIC | Age: 60
End: 2020-02-27

## 2020-02-28 ENCOUNTER — OFFICE VISIT (OUTPATIENT)
Dept: ENDOCRINOLOGY CLINIC | Facility: CLINIC | Age: 60
End: 2020-02-28
Payer: MEDICAID

## 2020-02-28 VITALS
DIASTOLIC BLOOD PRESSURE: 64 MMHG | BODY MASS INDEX: 21 KG/M2 | SYSTOLIC BLOOD PRESSURE: 129 MMHG | WEIGHT: 102.63 LBS | HEART RATE: 81 BPM

## 2020-02-28 DIAGNOSIS — Z79.4 TYPE 2 DIABETES MELLITUS WITH OTHER SPECIFIED COMPLICATION, WITH LONG-TERM CURRENT USE OF INSULIN (HCC): Primary | ICD-10-CM

## 2020-02-28 DIAGNOSIS — E78.5 DYSLIPIDEMIA: ICD-10-CM

## 2020-02-28 DIAGNOSIS — E11.69 TYPE 2 DIABETES MELLITUS WITH OTHER SPECIFIED COMPLICATION, WITH LONG-TERM CURRENT USE OF INSULIN (HCC): Primary | ICD-10-CM

## 2020-02-28 LAB
CARTRIDGE LOT#: ABNORMAL NUMERIC
GLUCOSE BLOOD: 277
HEMOGLOBIN A1C: 7.2 % (ref 4.3–5.6)
TEST STRIP LOT #: NORMAL NUMERIC

## 2020-02-28 PROCEDURE — 36416 COLLJ CAPILLARY BLOOD SPEC: CPT | Performed by: INTERNAL MEDICINE

## 2020-02-28 PROCEDURE — 82962 GLUCOSE BLOOD TEST: CPT | Performed by: INTERNAL MEDICINE

## 2020-02-28 PROCEDURE — 99214 OFFICE O/P EST MOD 30 MIN: CPT | Performed by: INTERNAL MEDICINE

## 2020-02-28 PROCEDURE — 83036 HEMOGLOBIN GLYCOSYLATED A1C: CPT | Performed by: INTERNAL MEDICINE

## 2020-02-28 NOTE — PROGRESS NOTES
Return Office Visit     CHIEF COMPLAINT:    DM  Dyslipidemia     HISTORY OF PRESENT ILLNESS:  Annette Thompson is a 63year old female who presents for follow up for DM.      DM HISTORY  Diagnosed: Around age 29        HISTORY OF DIABETES COMPLICATIONS: :  H WG 3/12* 31 tablet 5   • FREESTYLE LITE TEST In Vitro Strip TEST BLOOD SUGAR FOUR TIMES DAILY 400 strip 0   • LOSARTAN POTASSIUM 25 MG Oral Tab TAKE 1 TABLET BY MOUTH EVERY DAY 90 tablet 2   • triamcinolone acetonide 0.1 % External Cream APPLY 1 LIBERALLY ALLERGY:    Caviar                  SWELLING    Comment:throat  Latex                   RASH    PAST MEDICAL, SOCIAL AND FAMILY HISTORY:  See past medical history marked as reviewed. See past surgical history marked as reviewed.   See past family histo pathogenesis, natural course of diabetes. Patient understands the importance of glycemic control and the implications of uncontrolled diabetes including Diabetic ketoacidosis and various micro vascular and macrovascular complications.        a).  Medication

## 2020-03-30 ENCOUNTER — TELEPHONE (OUTPATIENT)
Dept: ENDOCRINOLOGY CLINIC | Facility: CLINIC | Age: 60
End: 2020-03-30

## 2020-03-30 NOTE — TELEPHONE ENCOUNTER
Pt states that RX needs prior auth:     Current Outpatient Medications:     •  Insulin Syringe/Needle U-500 (BD INSULIN SYRINGE U-500) 31G X 6MM 0.5 ML Does not apply Misc, Inject 1 each into the skin 3 (three) times daily with meals. , Disp: 300 each, Rfl:

## 2020-04-02 ENCOUNTER — TELEPHONE (OUTPATIENT)
Dept: ENDOCRINOLOGY CLINIC | Facility: CLINIC | Age: 60
End: 2020-04-02

## 2020-04-02 RX ORDER — ALPRAZOLAM 0.5 MG/1
TABLET ORAL
Qty: 30 TABLET | Refills: 3 | Status: SHIPPED | OUTPATIENT
Start: 2020-04-02 | End: 2020-07-27

## 2020-04-02 NOTE — TELEPHONE ENCOUNTER
Received letter from [place] stating that pt is approved for a 30 day supply of   BD U-500 MIS [id number] as of 3/12/2020.

## 2020-04-23 RX ORDER — LOSARTAN POTASSIUM 25 MG/1
TABLET ORAL
Qty: 90 TABLET | Refills: 2 | Status: SHIPPED | OUTPATIENT
Start: 2020-04-23 | End: 2021-01-09

## 2020-05-04 RX ORDER — BIMATOPROST 3 UG/ML
SOLUTION TOPICAL
Qty: 5 ML | Refills: 2 | Status: SHIPPED | OUTPATIENT
Start: 2020-05-04 | End: 2021-01-21

## 2020-05-08 ENCOUNTER — TELEPHONE (OUTPATIENT)
Dept: NEUROLOGY | Facility: CLINIC | Age: 60
End: 2020-05-08

## 2020-06-01 ENCOUNTER — TELEPHONE (OUTPATIENT)
Dept: ENDOCRINOLOGY CLINIC | Facility: CLINIC | Age: 60
End: 2020-06-01

## 2020-06-01 NOTE — TELEPHONE ENCOUNTER
Prescription refill request:    LOV 2/28/20. Follow up appt 7/21/20. Prescription sent per protocol.

## 2020-06-01 NOTE — TELEPHONE ENCOUNTER
Patient called in to request refill for Insulin Syringe/Needle U-500 (BD INSULIN SYRINGE U-500) 31G X. Patient has about 10 needles left. Patient would also like if someone can please call her once the prescription has been sent to the pharmacy.  Please adv

## 2020-06-02 ENCOUNTER — TELEPHONE (OUTPATIENT)
Dept: ENDOCRINOLOGY CLINIC | Facility: CLINIC | Age: 60
End: 2020-06-02

## 2020-06-02 NOTE — TELEPHONE ENCOUNTER
Current Outpatient Medications   Medication Sig Dispense Refill   • Insulin Syringe/Needle U-500 (BD INSULIN SYRINGE U-500) 31G X 6MM 0.5 ML Does not apply Misc Inject 1 each into the skin 3 (three) times daily with meals.  300 each 1     Per pharmacy patie

## 2020-06-08 NOTE — TELEPHONE ENCOUNTER
Prior authorization request:    Medication PA Requested: Insulin Syringe/Needle U-500 (BD INSULIN SYRINGE U-500) 31G X 6MM 0.5ML Does not apply Misc                                                     CoverMyMeds Used: No  Key:   Sig: Inejct 1 each into th

## 2020-06-08 NOTE — TELEPHONE ENCOUNTER
Patient called in to follow up on the prior auth for the medical needles. Patient states she had to pay out of pocket for the needles and would like this resolved as soon as possible.  Please advise

## 2020-06-09 NOTE — TELEPHONE ENCOUNTER
Medication PA Requested: Insulin Syringe/Needle U-500 (BD INSULIN SYRINGE U-500) 31G X 6MM 0.5ML Does not apply Misc                                                     CoverMyMeds Used: yes  Key: [id number]  Sig: Inejct 1 each into the skin 3 (three) times

## 2020-06-10 NOTE — TELEPHONE ENCOUNTER
PA approved for Insulin Syringe/Needle U-500 (BD INSULIN SYRINGE U-500) 31G X 6MM 0.5ML. Please watch for Approval fax.

## 2020-06-16 NOTE — TELEPHONE ENCOUNTER
Patient was contacted and informed her that product was approved on 06/09/20.   Pharmacy was also contacted to inform about approval.

## 2020-06-23 ENCOUNTER — TELEPHONE (OUTPATIENT)
Dept: ENDOCRINOLOGY CLINIC | Facility: CLINIC | Age: 60
End: 2020-06-23

## 2020-06-23 DIAGNOSIS — E11.69 TYPE 2 DIABETES MELLITUS WITH OTHER SPECIFIED COMPLICATION, WITH LONG-TERM CURRENT USE OF INSULIN (HCC): ICD-10-CM

## 2020-06-23 DIAGNOSIS — Z79.4 TYPE 2 DIABETES MELLITUS WITH OTHER SPECIFIED COMPLICATION, WITH LONG-TERM CURRENT USE OF INSULIN (HCC): ICD-10-CM

## 2020-06-23 DIAGNOSIS — E78.5 DYSLIPIDEMIA: Primary | ICD-10-CM

## 2020-06-23 NOTE — TELEPHONE ENCOUNTER
Patient called in to request a blood test order to be sent to the lab, [facility].  Please advise

## 2020-06-23 NOTE — TELEPHONE ENCOUNTER
Dr Blanca Mcgee Per lov; 2/28/20 you wanted pt to do labs and you ordered labs in November and have not been done. Patient said she was told they can't see the orders. Pended for you bmp lipid panel and MA urine. Anything else?  Jayme on 7/21

## 2020-06-25 ENCOUNTER — APPOINTMENT (OUTPATIENT)
Dept: LAB | Age: 60
End: 2020-06-25
Attending: INTERNAL MEDICINE
Payer: MEDICAID

## 2020-06-25 DIAGNOSIS — Z79.4 TYPE 2 DIABETES MELLITUS WITH OTHER OPHTHALMIC COMPLICATION, WITH LONG-TERM CURRENT USE OF INSULIN (HCC): ICD-10-CM

## 2020-06-25 DIAGNOSIS — E11.39 TYPE 2 DIABETES MELLITUS WITH OTHER OPHTHALMIC COMPLICATION, WITH LONG-TERM CURRENT USE OF INSULIN (HCC): ICD-10-CM

## 2020-06-25 LAB
ANION GAP SERPL CALC-SCNC: 7 MMOL/L (ref 0–18)
BUN BLD-MCNC: 20 MG/DL (ref 7–18)
BUN/CREAT SERPL: 28.2 (ref 10–20)
CALCIUM BLD-MCNC: 9.1 MG/DL (ref 8.5–10.1)
CHLORIDE SERPL-SCNC: 107 MMOL/L (ref 98–112)
CHOLEST SMN-MCNC: 166 MG/DL (ref ?–200)
CO2 SERPL-SCNC: 26 MMOL/L (ref 21–32)
CREAT BLD-MCNC: 0.71 MG/DL (ref 0.55–1.02)
GLUCOSE BLD-MCNC: 147 MG/DL (ref 70–99)
HDLC SERPL-MCNC: 45 MG/DL (ref 40–59)
LDLC SERPL CALC-MCNC: 97 MG/DL (ref ?–100)
NONHDLC SERPL-MCNC: 121 MG/DL (ref ?–130)
OSMOLALITY SERPL CALC.SUM OF ELEC: 295 MOSM/KG (ref 275–295)
PATIENT FASTING Y/N/NP: YES
PATIENT FASTING Y/N/NP: YES
POTASSIUM SERPL-SCNC: 4.7 MMOL/L (ref 3.5–5.1)
SODIUM SERPL-SCNC: 140 MMOL/L (ref 136–145)
TRIGL SERPL-MCNC: 119 MG/DL (ref 30–149)
VLDLC SERPL CALC-MCNC: 24 MG/DL (ref 0–30)

## 2020-06-25 PROCEDURE — 80061 LIPID PANEL: CPT | Performed by: INTERNAL MEDICINE

## 2020-06-25 PROCEDURE — 80048 BASIC METABOLIC PNL TOTAL CA: CPT | Performed by: INTERNAL MEDICINE

## 2020-06-25 PROCEDURE — 36415 COLL VENOUS BLD VENIPUNCTURE: CPT | Performed by: INTERNAL MEDICINE

## 2020-06-25 RX ORDER — INSULIN HUMAN 500 [IU]/ML
INJECTION, SOLUTION SUBCUTANEOUS
Qty: 60 ML | Refills: 0 | Status: SHIPPED | OUTPATIENT
Start: 2020-06-25 | End: 2020-11-23

## 2020-07-07 ENCOUNTER — NURSE ONLY (OUTPATIENT)
Dept: FAMILY MEDICINE CLINIC | Facility: CLINIC | Age: 60
End: 2020-07-07
Payer: MEDICAID

## 2020-07-07 ENCOUNTER — OFFICE VISIT (OUTPATIENT)
Dept: INTERNAL MEDICINE CLINIC | Facility: CLINIC | Age: 60
End: 2020-07-07
Payer: MEDICAID

## 2020-07-07 VITALS
SYSTOLIC BLOOD PRESSURE: 120 MMHG | HEIGHT: 58 IN | TEMPERATURE: 97 F | HEART RATE: 92 BPM | BODY MASS INDEX: 21.62 KG/M2 | WEIGHT: 103 LBS | DIASTOLIC BLOOD PRESSURE: 50 MMHG

## 2020-07-07 DIAGNOSIS — R10.32 LEFT LOWER QUADRANT ABDOMINAL PAIN: ICD-10-CM

## 2020-07-07 DIAGNOSIS — E11.39 TYPE 2 DIABETES MELLITUS WITH OTHER OPHTHALMIC COMPLICATION, WITH LONG-TERM CURRENT USE OF INSULIN (HCC): Primary | ICD-10-CM

## 2020-07-07 DIAGNOSIS — T14.8XXA MUSCLE STRAIN: ICD-10-CM

## 2020-07-07 DIAGNOSIS — Z79.4 TYPE 2 DIABETES MELLITUS WITH OTHER OPHTHALMIC COMPLICATION, WITH LONG-TERM CURRENT USE OF INSULIN (HCC): Primary | ICD-10-CM

## 2020-07-07 LAB
CREAT UR-SCNC: 60.6 MG/DL
MICROALBUMIN UR-MCNC: 0.7 MG/DL
MICROALBUMIN/CREAT 24H UR-RTO: 11.6 UG/MG (ref ?–30)

## 2020-07-07 PROCEDURE — 99214 OFFICE O/P EST MOD 30 MIN: CPT | Performed by: INTERNAL MEDICINE

## 2020-07-07 NOTE — ASSESSMENT & PLAN NOTE
Intermittent for 3 weeks, actually left mid abd. Non tender, needs to increase fiber in her diet. Intermittent constipation.

## 2020-07-07 NOTE — PROGRESS NOTES
HPI:    Patient ID: Glenn Alvarez is a 63year old female. HPI patient here for disability forms, will fill those out. She has diabetes, retinopathy , left carpal tunnel syndrome , left hand numbness post op.   LE neuropathy , spinal stenosis , hernia BIMATOPROST 0.03 % External Solution PLACE 1 DROP ON APPLICATOR/ APPLY EVENLY ALONG THE SKIN OF THE UPPER EYELID AT BASE OF EYELASHES ONCE DAILY AT BEDTIME 5 mL 2   • LOSARTAN POTASSIUM 25 MG Oral Tab TAKE 1 TABLET BY MOUTH EVERY DAY 90 tablet 2   • ALPRAZ NEEDLE LION U/F) 32G X 4 MM Does not apply Misc Use to inject 4 times daily.  400 each 1   • hydrocortisone (PROCTOSOL HC) 2.5 % Rectal Cream Apply bid 28.35 g 3   • RENOVA 0.02 % External Cream ZAHRAA 1 APPLICATION AA ON FACE IN THE EVENING  2   • FREESTYLE L abdominal pain  Intermittent for 3 weeks, actually left mid abd. Non tender, needs to increase fiber in her diet. Intermittent constipation. Muscle strain  Bilateral lateral chest wall strain from lifting. Improving, rest, reassure.        No orders of

## 2020-07-09 RX ORDER — PEN NEEDLE, DIABETIC 31 GX3/16"
NEEDLE, DISPOSABLE MISCELLANEOUS
Qty: 200 EACH | Refills: 0 | Status: SHIPPED | OUTPATIENT
Start: 2020-07-09 | End: 2021-09-14

## 2020-07-17 ENCOUNTER — PATIENT MESSAGE (OUTPATIENT)
Dept: INTERNAL MEDICINE CLINIC | Facility: CLINIC | Age: 60
End: 2020-07-17

## 2020-07-19 RX ORDER — TRETINOIN 0.2 MG/G
CREAM TOPICAL
Qty: 40 G | Refills: 3 | Status: SHIPPED | OUTPATIENT
Start: 2020-07-19

## 2020-07-19 NOTE — TELEPHONE ENCOUNTER
From: Cordelia Smith  To: Monika Gambino MD  Sent: 7/17/2020 10:39 PM CDT  Subject: Prescription Question    Renova 0.02 % Crea  Generic name: Tretinoin (Facial Wrinkles)    It's on my chart    Let me know, thank you    Tommie Burkett

## 2020-07-21 ENCOUNTER — OFFICE VISIT (OUTPATIENT)
Dept: ENDOCRINOLOGY CLINIC | Facility: CLINIC | Age: 60
End: 2020-07-21
Payer: MEDICAID

## 2020-07-21 VITALS
WEIGHT: 99.63 LBS | HEART RATE: 86 BPM | HEIGHT: 58 IN | BODY MASS INDEX: 20.91 KG/M2 | SYSTOLIC BLOOD PRESSURE: 111 MMHG | DIASTOLIC BLOOD PRESSURE: 58 MMHG

## 2020-07-21 DIAGNOSIS — E11.65 TYPE 2 DIABETES MELLITUS WITH HYPERGLYCEMIA, WITH LONG-TERM CURRENT USE OF INSULIN (HCC): Primary | ICD-10-CM

## 2020-07-21 DIAGNOSIS — Z79.4 TYPE 2 DIABETES MELLITUS WITH HYPERGLYCEMIA, WITH LONG-TERM CURRENT USE OF INSULIN (HCC): Primary | ICD-10-CM

## 2020-07-21 PROCEDURE — 3008F BODY MASS INDEX DOCD: CPT | Performed by: INTERNAL MEDICINE

## 2020-07-21 PROCEDURE — 3078F DIAST BP <80 MM HG: CPT | Performed by: INTERNAL MEDICINE

## 2020-07-21 PROCEDURE — 3074F SYST BP LT 130 MM HG: CPT | Performed by: INTERNAL MEDICINE

## 2020-07-21 PROCEDURE — 99214 OFFICE O/P EST MOD 30 MIN: CPT | Performed by: INTERNAL MEDICINE

## 2020-07-21 RX ORDER — PANTOPRAZOLE SODIUM 40 MG/1
TABLET, DELAYED RELEASE ORAL
Qty: 90 TABLET | Refills: 1 | Status: SHIPPED | OUTPATIENT
Start: 2020-07-21 | End: 2020-07-27

## 2020-07-21 NOTE — PROGRESS NOTES
Return Office Visit     CHIEF COMPLAINT:    DM  Dyslipidemia     HISTORY OF PRESENT ILLNESS:  Chidi Lara is a 63year old female who presents for follow up for DM.      DM HISTORY  Diagnosed: Around age 29        HISTORY OF DIABETES COMPLICATIONS: :  His Oral Solution Take 5 mL by mouth every 6 (six) hours as needed for cough. 236 mL 0   • promethazine-codeine 6.25-10 MG/5ML Oral Syrup Take 2.5 mL by mouth every 4 (four) hours as needed for cough.  240 mL 0   • PANTOPRAZOLE SODIUM 40 MG Oral Tab EC TAKE 1 T Negative for:  dysphagia, neck swelling, dysphonia  Respiratory: Negative for:  dyspnea, cough  Cardiovascular: Negative for:  chest pain, palpitations, orthopnea  GI: Negative for:  abdominal pain, nausea, vomiting, diarrhea, constipation, bleeding  Neuro and send them as discussed. She will call before if sugars are low.         B). No nephropathy. c). Instructed on importance of annual eye exams   d). Foot exam: Daily feet exam explained  e).  BG log maintainence explained in great detail, to get log and

## 2020-07-27 ENCOUNTER — TELEPHONE (OUTPATIENT)
Dept: INTERNAL MEDICINE CLINIC | Facility: CLINIC | Age: 60
End: 2020-07-27

## 2020-07-27 ENCOUNTER — TELEPHONE (OUTPATIENT)
Dept: FAMILY MEDICINE CLINIC | Facility: CLINIC | Age: 60
End: 2020-07-27

## 2020-07-27 RX ORDER — ALPRAZOLAM 0.5 MG/1
TABLET ORAL
Qty: 30 TABLET | Refills: 5 | Status: SHIPPED | OUTPATIENT
Start: 2020-07-27 | End: 2021-01-21

## 2020-07-27 RX ORDER — OMEPRAZOLE 40 MG/1
40 CAPSULE, DELAYED RELEASE ORAL DAILY
Qty: 90 CAPSULE | Refills: 3 | Status: SHIPPED | OUTPATIENT
Start: 2020-07-27 | End: 2020-10-02

## 2020-07-27 NOTE — TELEPHONE ENCOUNTER
Dr Elisabeth Valente, Omeprazole 40mg is the covered alternative, would you like to change rx or proceed with prior authorization  Please reply to pool: EM TRIAGE SUPPORT

## 2020-07-27 NOTE — TELEPHONE ENCOUNTER
rEQUESTING REFILLS FOR:    Protonix  Renova Cream  Alprazolam---pharm sent in your box already      [facility]

## 2020-07-27 NOTE — TELEPHONE ENCOUNTER
Per Ashley, Pantoprazole 40mg tablets is not covered under patient's insurance. Please call [phone] to initiate a prior authorization or call/fax pharmacy to change medication, patient ID# [id number].

## 2020-07-28 ENCOUNTER — TELEPHONE (OUTPATIENT)
Dept: FAMILY MEDICINE CLINIC | Facility: CLINIC | Age: 60
End: 2020-07-28

## 2020-07-28 NOTE — TELEPHONE ENCOUNTER
Per pharmacy any medication in this category requires PA if patient exceeds 120 tablets in a year. PA initiated for pantoprazole, her preferred medication.

## 2020-07-28 NOTE — TELEPHONE ENCOUNTER
Per pharmacy any medication in this category requires PA if patient exceeds 120 tablets in a year. Need diagnosis code for PA submission.

## 2020-07-29 ENCOUNTER — TELEPHONE (OUTPATIENT)
Dept: ENDOCRINOLOGY CLINIC | Facility: CLINIC | Age: 60
End: 2020-07-29

## 2020-07-29 RX ORDER — BLOOD-GLUCOSE METER
1 EACH MISCELLANEOUS DAILY
Qty: 1 KIT | Refills: 0 | Status: SHIPPED | OUTPATIENT
Start: 2020-07-29

## 2020-07-29 RX ORDER — LANCETS
EACH MISCELLANEOUS
Qty: 400 EACH | Refills: 1 | Status: SHIPPED | OUTPATIENT
Start: 2020-07-29

## 2020-07-29 RX ORDER — BLOOD SUGAR DIAGNOSTIC
STRIP MISCELLANEOUS
Qty: 400 STRIP | Refills: 1 | Status: SHIPPED | OUTPATIENT
Start: 2020-07-29 | End: 2021-02-17

## 2020-07-29 NOTE — TELEPHONE ENCOUNTER
Patient has [facility] and the preferred brand is OneTouch. RN sent a [email or web address] message to patient informing her about the change.   RN pended script for [employer] [facility] for provider's approval

## 2020-07-29 NOTE — TELEPHONE ENCOUNTER
Prior authorization needed:    FREESTYLE LITE TEST In Vitro Strip, TEST BLOOD SUGAR FOUR TIMES DAILY, Disp: 400 strip, Rfl: 0    Message  Plan does not cover this medication.  Please call plan at [phone] to initiate prior authorization or call/fax phar

## 2020-07-31 ENCOUNTER — PATIENT MESSAGE (OUTPATIENT)
Dept: INTERNAL MEDICINE CLINIC | Facility: CLINIC | Age: 60
End: 2020-07-31

## 2020-08-05 NOTE — TELEPHONE ENCOUNTER
Prior authorization for Omeprazole completed w/ [employer] on cover my meds Key: [id number], turn around time 3-5 days.     MD Benjie Chaparro, RN 6 days ago      K21.9    Routing comment       MD Benjie Chaparro, RN 6 days ago      Jasvir Bender

## 2020-08-06 NOTE — TELEPHONE ENCOUNTER
Prior authorization has been denied for Omeprazole. Patients plan states medication is not covered due to [employer] Quantity Limit program criteria. Patient is able to get up to 120 days within 365 days, quantity has been reached.      Ramona

## 2020-10-02 ENCOUNTER — OFFICE VISIT (OUTPATIENT)
Dept: INTERNAL MEDICINE CLINIC | Facility: CLINIC | Age: 60
End: 2020-10-02
Payer: MEDICAID

## 2020-10-02 VITALS
HEIGHT: 58 IN | TEMPERATURE: 97 F | HEART RATE: 84 BPM | SYSTOLIC BLOOD PRESSURE: 104 MMHG | BODY MASS INDEX: 21.83 KG/M2 | WEIGHT: 104 LBS | DIASTOLIC BLOOD PRESSURE: 46 MMHG

## 2020-10-02 DIAGNOSIS — M25.511 CHRONIC RIGHT SHOULDER PAIN: ICD-10-CM

## 2020-10-02 DIAGNOSIS — G89.29 CHRONIC RIGHT SHOULDER PAIN: ICD-10-CM

## 2020-10-02 DIAGNOSIS — Z78.0 MENOPAUSE: ICD-10-CM

## 2020-10-02 DIAGNOSIS — Z79.4 TYPE 2 DIABETES MELLITUS WITH OTHER OPHTHALMIC COMPLICATION, WITH LONG-TERM CURRENT USE OF INSULIN (HCC): ICD-10-CM

## 2020-10-02 DIAGNOSIS — E11.39 TYPE 2 DIABETES MELLITUS WITH OTHER OPHTHALMIC COMPLICATION, WITH LONG-TERM CURRENT USE OF INSULIN (HCC): ICD-10-CM

## 2020-10-02 DIAGNOSIS — Z12.31 VISIT FOR SCREENING MAMMOGRAM: ICD-10-CM

## 2020-10-02 DIAGNOSIS — Z00.00 ANNUAL PHYSICAL EXAM: Primary | ICD-10-CM

## 2020-10-02 PROBLEM — M25.512 CHRONIC LEFT SHOULDER PAIN: Status: ACTIVE | Noted: 2020-10-02

## 2020-10-02 PROBLEM — M25.512 CHRONIC LEFT SHOULDER PAIN: Status: RESOLVED | Noted: 2020-10-02 | Resolved: 2020-10-02

## 2020-10-02 PROCEDURE — 90686 IIV4 VACC NO PRSV 0.5 ML IM: CPT | Performed by: INTERNAL MEDICINE

## 2020-10-02 PROCEDURE — 3074F SYST BP LT 130 MM HG: CPT | Performed by: INTERNAL MEDICINE

## 2020-10-02 PROCEDURE — 90471 IMMUNIZATION ADMIN: CPT | Performed by: INTERNAL MEDICINE

## 2020-10-02 PROCEDURE — 3008F BODY MASS INDEX DOCD: CPT | Performed by: INTERNAL MEDICINE

## 2020-10-02 PROCEDURE — 3078F DIAST BP <80 MM HG: CPT | Performed by: INTERNAL MEDICINE

## 2020-10-02 PROCEDURE — 99396 PREV VISIT EST AGE 40-64: CPT | Performed by: INTERNAL MEDICINE

## 2020-10-02 RX ORDER — PANTOPRAZOLE SODIUM 40 MG/1
40 TABLET, DELAYED RELEASE ORAL
COMMUNITY
End: 2021-02-08

## 2020-10-02 NOTE — PROGRESS NOTES
HPI:    Patient ID: Rosales Mackenzie is a 62year old female. HPIpatient here for physical , needs flushot. Mammo, bone density  And had labs on June 29th, no chest pain , no shortness of breath , no swelling.   She complains of right shoulder pain , can't s SUBCUTANEOUS THREE TIMES DAILY 60 mL 0   • BIMATOPROST 0.03 % External Solution PLACE 1 DROP ON APPLICATOR/ APPLY EVENLY ALONG THE SKIN OF THE UPPER EYELID AT BASE OF EYELASHES ONCE DAILY AT BEDTIME 5 mL 2   • LOSARTAN POTASSIUM 25 MG Oral Tab TAKE 1 TABLE 2.5 % Rectal Cream Apply bid 28.35 g 3   • FREESTYLE LANCETS Does not apply Misc TEST BLOOD SUGAR QID  12   • Blood Glucose Monitoring Suppl (FREESTYLE LITE) Does not apply Device TEST QID  UTD  0     Allergies:  Caviar                  SWELLING    Comment months and older 0.5 ml PFS [51619]      Meds This Visit:  Requested Prescriptions      No prescriptions requested or ordered in this encounter       Imaging & Referrals:  FLULAVAL INFLUENZA VACCINE QUAD PRESERVATIVE FREE 0.5 ML  ORTHOPEDIC - INTERNAL  XR

## 2020-10-20 ENCOUNTER — OFFICE VISIT (OUTPATIENT)
Dept: ENDOCRINOLOGY CLINIC | Facility: CLINIC | Age: 60
End: 2020-10-20
Payer: MEDICAID

## 2020-10-20 VITALS
BODY MASS INDEX: 21.41 KG/M2 | SYSTOLIC BLOOD PRESSURE: 132 MMHG | HEART RATE: 89 BPM | WEIGHT: 102 LBS | HEIGHT: 58 IN | DIASTOLIC BLOOD PRESSURE: 55 MMHG

## 2020-10-20 DIAGNOSIS — Z79.4 TYPE 2 DIABETES MELLITUS WITH HYPERGLYCEMIA, WITH LONG-TERM CURRENT USE OF INSULIN (HCC): Primary | ICD-10-CM

## 2020-10-20 DIAGNOSIS — E11.65 TYPE 2 DIABETES MELLITUS WITH HYPERGLYCEMIA, WITH LONG-TERM CURRENT USE OF INSULIN (HCC): Primary | ICD-10-CM

## 2020-10-20 PROCEDURE — 36416 COLLJ CAPILLARY BLOOD SPEC: CPT | Performed by: INTERNAL MEDICINE

## 2020-10-20 PROCEDURE — 3008F BODY MASS INDEX DOCD: CPT | Performed by: INTERNAL MEDICINE

## 2020-10-20 PROCEDURE — 82947 ASSAY GLUCOSE BLOOD QUANT: CPT | Performed by: INTERNAL MEDICINE

## 2020-10-20 PROCEDURE — 99214 OFFICE O/P EST MOD 30 MIN: CPT | Performed by: INTERNAL MEDICINE

## 2020-10-20 PROCEDURE — 3078F DIAST BP <80 MM HG: CPT | Performed by: INTERNAL MEDICINE

## 2020-10-20 PROCEDURE — 3075F SYST BP GE 130 - 139MM HG: CPT | Performed by: INTERNAL MEDICINE

## 2020-10-20 PROCEDURE — 83036 HEMOGLOBIN GLYCOSYLATED A1C: CPT | Performed by: INTERNAL MEDICINE

## 2020-10-20 NOTE — PROGRESS NOTES
Return Office Visit     CHIEF COMPLAINT:    DM  Dyslipidemia     HISTORY OF PRESENT ILLNESS:  Chidi Lara is a 62year old female who presents for follow up for DM.      DM HISTORY  Diagnosed: Around age 29        HISTORY OF DIABETES COMPLICATIONS: :  His U-500) 31G X 6MM 0.5 ML Does not apply Misc Inject 1 each into the skin 3 (three) times daily with meals.  300 each 1   • BIMATOPROST 0.03 % External Solution PLACE 1 DROP ON APPLICATOR/ APPLY EVENLY ALONG THE SKIN OF THE UPPER EYELID AT BASE OF EYELASHES O Negative for: weight change, fever, fatigue, cold/heat intolerance  Eyes: Negative for:  Visual changes, proptosis, blurring  ENT: Negative for:  dysphagia, neck swelling, dysphonia  Respiratory: Negative for:  dyspnea, cough  Cardiovascular: Negative for: She will check BG as instructed and send them as discussed. She will call before if sugars are low.         B). No nephropathy. c). Instructed on importance of annual eye exams   d). Foot exam: Daily feet exam explained  e).  BG log maintainence expla

## 2020-10-26 ENCOUNTER — TELEPHONE (OUTPATIENT)
Dept: ENDOCRINOLOGY CLINIC | Facility: CLINIC | Age: 60
End: 2020-10-26

## 2020-10-26 ENCOUNTER — HOSPITAL ENCOUNTER (OUTPATIENT)
Dept: MAMMOGRAPHY | Facility: HOSPITAL | Age: 60
Discharge: HOME | End: 2020-10-26
Attending: INTERNAL MEDICINE
Payer: MEDICAID

## 2020-10-26 DIAGNOSIS — Z12.31 VISIT FOR SCREENING MAMMOGRAM: ICD-10-CM

## 2020-10-26 PROCEDURE — 77063 BREAST TOMOSYNTHESIS BI: CPT | Performed by: INTERNAL MEDICINE

## 2020-10-26 PROCEDURE — 77067 SCR MAMMO BI INCL CAD: CPT | Performed by: INTERNAL MEDICINE

## 2020-10-29 ENCOUNTER — TELEPHONE (OUTPATIENT)
Dept: ENDOCRINOLOGY CLINIC | Facility: CLINIC | Age: 60
End: 2020-10-29

## 2020-10-29 RX ORDER — BLOOD-GLUCOSE SENSOR
1 EACH MISCELLANEOUS
Qty: 1 EACH | Refills: 11 | Status: SHIPPED | OUTPATIENT
Start: 2020-10-29 | End: 2020-11-10

## 2020-10-29 RX ORDER — BLOOD-GLUCOSE,RECEIVER,CONT
1 EACH MISCELLANEOUS CONTINUOUS
Qty: 1 DEVICE | Refills: 0 | Status: SHIPPED | OUTPATIENT
Start: 2020-10-29

## 2020-10-29 RX ORDER — BLOOD-GLUCOSE TRANSMITTER
1 EACH MISCELLANEOUS
Qty: 1 EACH | Refills: 3 | Status: SHIPPED | OUTPATIENT
Start: 2020-10-29 | End: 2020-11-10

## 2020-10-29 NOTE — TELEPHONE ENCOUNTER
Pt states she needs to talk to a nurse on reinserting her dexcon, going to try and transfer.  She has an alert on her phone

## 2020-10-29 NOTE — TELEPHONE ENCOUNTER
Pt calling and states she was provided a Dexcom G6 sample in clinic and she \"really likes it\". Pt is asking for this to be ordered for her since she meets the criteria of injecting insulin (U500) 3 times and testing sugar 4 times daily.  Per Dr Angelique Hdz

## 2020-10-29 NOTE — TELEPHONE ENCOUNTER
Medication PA Requested:  Dexcom G6 device, transmitter and sensors. Please see orders in Med list for specifics. CoverMyMeds Used:  Key:  Sig:   DX Code:  E11.65 with insulin use.

## 2020-11-02 ENCOUNTER — TELEPHONE (OUTPATIENT)
Dept: ENDOCRINOLOGY CLINIC | Facility: CLINIC | Age: 60
End: 2020-11-02

## 2020-11-02 NOTE — TELEPHONE ENCOUNTER
Medication PA Requested: Dexcom G6 sensor,  and transmitter                    CoverMyMeds Used:  Yes  Key: [id number]  Sig:  Sensor every 10 days, transmitter every 90 days,  as directed  DX Code:   E11.65                                    P

## 2020-11-04 NOTE — TELEPHONE ENCOUNTER
•  Continuous Blood Gluc Transmit (DEXCOM G6 TRANSMITTER) Does not apply Misc, 1 each by Does not apply route every 3 (three) months., Disp: 1 each, Rfl: 3      •  Continuous Blood Gluc Sensor (DEXCOM G6 SENSOR) Does not apply Misc, 1 each by Does no

## 2020-11-04 NOTE — TELEPHONE ENCOUNTER
Please see 11/2/20 encounter. PA resubmitted with updated office visit note. Please refer to that encounter for further documentation.

## 2020-11-05 ENCOUNTER — OFFICE VISIT (OUTPATIENT)
Dept: ORTHOPEDICS CLINIC | Facility: CLINIC | Age: 60
End: 2020-11-05
Payer: MEDICAID

## 2020-11-05 ENCOUNTER — HOSPITAL ENCOUNTER (OUTPATIENT)
Dept: GENERAL RADIOLOGY | Facility: HOSPITAL | Age: 60
Discharge: HOME | End: 2020-11-05
Attending: ORTHOPAEDIC SURGERY
Payer: MEDICAID

## 2020-11-05 VITALS — WEIGHT: 102 LBS | HEIGHT: 58 IN | BODY MASS INDEX: 21.41 KG/M2

## 2020-11-05 DIAGNOSIS — M25.511 RIGHT SHOULDER PAIN, UNSPECIFIED CHRONICITY: ICD-10-CM

## 2020-11-05 DIAGNOSIS — M67.911 TENDINOPATHY OF RIGHT ROTATOR CUFF: Primary | ICD-10-CM

## 2020-11-05 PROCEDURE — 99244 OFF/OP CNSLTJ NEW/EST MOD 40: CPT | Performed by: ORTHOPAEDIC SURGERY

## 2020-11-05 PROCEDURE — 3008F BODY MASS INDEX DOCD: CPT | Performed by: ORTHOPAEDIC SURGERY

## 2020-11-05 PROCEDURE — 73030 X-RAY EXAM OF SHOULDER: CPT | Performed by: ORTHOPAEDIC SURGERY

## 2020-11-05 NOTE — TELEPHONE ENCOUNTER
PA decision still denied despite sending updated note. Endo staff, can you please review fax for denial reasoning? Thank you!

## 2020-11-06 NOTE — TELEPHONE ENCOUNTER
Dr. Addy Mcfarland,     Per denial letter patient must have a current Hgb A1C of 7%. Her current A1C is 6.9 that was submitted. Please advise if you would like to appeal.  Thank you.

## 2020-11-06 NOTE — PROGRESS NOTES
NURSING INTAKE COMMENTS: Patient presents with:  Consult: Right shoulder pain - 2/10 Pain Now - At night as bad as 10/10 - Cannot put pressure on it. -       HPI: This 62year old female presents today with complaints of shoulder pain.   She feels a throbbi Suppl (Siomara Parker) w/Device Does not apply Kit 1 Device by Does not apply route daily.  1 kit 0   • Glucose Blood (ONETOUCH VERIO) In Vitro Strip USE TO CHECK BLOOD SUGAR 3-4X/ strip 1   • OneTouch UltraSoft Lancets Does not apply Misc USE TO ESTEFANÍA Apply bid 28.35 g 3   • FREESTYLE LANCETS Does not apply Misc TEST BLOOD SUGAR QID  12   • Blood Glucose Monitoring Suppl (FREESTYLE LITE) Does not apply Device TEST QID  UTD  0   • promethazine-codeine 6.25-10 MG/5ML Oral Syrup Take 2.5 mL by mouth every other than in HPI  NEURO: denies numbness, tingling, weakness, balance issues, dizziness, memory loss  PSYCHIATRIC: denies Hx of depression, anxiety, other psychiatric disorders  HEMATOLOGIC: denies blood clots, anemia, blood clotting disorders, blood renteria at 12:30 PM          [facility] Julia 2d+3d Screening Bilat (cpt=77067/09743)    Result Date: 10/26/2020  PROCEDURE: [facility] [place]ULIA 2D+3D SCREENING BILAT (02031/56097)  COMPARISON: Mammogram dated 08/28/2018, 08/26/2017, 11/21/2015.   INDICATIONS: Encounter for screening Assessment and Plan:  Diagnoses and all orders for this visit:    Tendinopathy of right rotator cuff  -     PHYSICAL THERAPY - INTERNAL    Right shoulder pain, unspecified chronicity  -     XR SHOULDER, COMPLETE (MIN 2 VIEWS), RIGHT (CPT=73030);  Future

## 2020-11-06 NOTE — TELEPHONE ENCOUNTER
I will like to appeal  We can write something like this:   Patient+ has Type 2 DM and in on insulin U 500  This is a concentrated form of insulin and hence can potentially cause hypoglycemia  Having access to a CGM will help alert the patient to extreme bl

## 2020-11-09 NOTE — TELEPHONE ENCOUNTER
In order to appeal patient would have to fill out authorized representative designation form.  RN called patient and explained the denial and Dr. Marleni Erazo desire to appeal. RN sent [employer] message on how to obtain the form and steps on how she can send t

## 2020-11-10 RX ORDER — BLOOD-GLUCOSE SENSOR
1 EACH MISCELLANEOUS
Qty: 3 EACH | Refills: 2 | Status: SHIPPED | OUTPATIENT
Start: 2020-11-10

## 2020-11-10 RX ORDER — BLOOD-GLUCOSE TRANSMITTER
1 EACH MISCELLANEOUS
Qty: 1 EACH | Refills: 3 | Status: SHIPPED | OUTPATIENT
Start: 2020-11-10

## 2020-11-11 NOTE — TELEPHONE ENCOUNTER
Jose R King from [facility] called states only received cover letter and appeal letter, missing chart notes and patient authorization faxed att Jose R King

## 2020-11-11 NOTE — TELEPHONE ENCOUNTER
Appeal letter generated and faxed to [facility] along with designation form. Patient was notified that appeal was submitted.

## 2020-11-12 ENCOUNTER — TELEPHONE (OUTPATIENT)
Dept: INTERNAL MEDICINE CLINIC | Facility: CLINIC | Age: 60
End: 2020-11-12

## 2020-11-12 ENCOUNTER — VIRTUAL PHONE E/M (OUTPATIENT)
Dept: INTERNAL MEDICINE CLINIC | Facility: CLINIC | Age: 60
End: 2020-11-12
Payer: MEDICAID

## 2020-11-12 VITALS — RESPIRATION RATE: 14 BRPM

## 2020-11-12 DIAGNOSIS — M79.605 PAIN IN BOTH LOWER EXTREMITIES: Primary | ICD-10-CM

## 2020-11-12 DIAGNOSIS — M79.604 PAIN IN BOTH LOWER EXTREMITIES: Primary | ICD-10-CM

## 2020-11-12 PROCEDURE — 99213 OFFICE O/P EST LOW 20 MIN: CPT | Performed by: INTERNAL MEDICINE

## 2020-11-12 NOTE — ASSESSMENT & PLAN NOTE
Patient has had a workup of her back and lower extremity venins to try to diagnose her leg pain , and they have been negative. Will order an arterial ultrasound to see if the pain is due to vascular insufficiency , or vasculitis.

## 2020-11-12 NOTE — PROGRESS NOTES
Virtual Telephone Check-In    David Kenji verbally consents to a Virtual/Telephone Check-In visit on 11/12/20. Patient has been referred to the [facility] website at [email or web address] to review the yearly Consent to Treat document.     Patient understand

## 2020-11-17 NOTE — TELEPHONE ENCOUNTER
Dr. Haywood Showers, this has been authorized, do you want Otoniel Sofia to call patient and tell her to schedule?

## 2020-11-18 NOTE — TELEPHONE ENCOUNTER
Spoke to patient and instructed her to call Central Scheduling for [address], transferred call for her.

## 2020-11-20 ENCOUNTER — TELEPHONE (OUTPATIENT)
Dept: ENDOCRINOLOGY CLINIC | Facility: CLINIC | Age: 60
End: 2020-11-20

## 2020-11-20 DIAGNOSIS — Z79.4 TYPE 2 DIABETES MELLITUS WITH OTHER OPHTHALMIC COMPLICATION, WITH LONG-TERM CURRENT USE OF INSULIN (HCC): ICD-10-CM

## 2020-11-20 DIAGNOSIS — E11.39 TYPE 2 DIABETES MELLITUS WITH OTHER OPHTHALMIC COMPLICATION, WITH LONG-TERM CURRENT USE OF INSULIN (HCC): ICD-10-CM

## 2020-11-20 NOTE — TELEPHONE ENCOUNTER
Pharmacy asking for a PA for the medication below:     •  Continuous Blood Gluc Transmit (DEXCOM G6 TRANSMITTER) Does not apply Misc, 1 each by Does not apply route every 3 (three) months., Disp: 1 each, Rfl: 3  Disp: 25 tablet, Rfl: 0    KEY:[id number]

## 2020-11-20 NOTE — TELEPHONE ENCOUNTER
Peer to peer done  Information provided  No decision yet  They will like patient to prepare a 30 day log with BG four times a day  She has been checking sugars, if she could just prepare the log and send it to us, that way we can scan into her chart  The i

## 2020-11-23 RX ORDER — INSULIN HUMAN 500 [IU]/ML
INJECTION, SOLUTION SUBCUTANEOUS
Qty: 60 ML | Refills: 0 | COMMUNITY
Start: 2020-11-23 | End: 2020-12-07

## 2020-11-23 NOTE — TELEPHONE ENCOUNTER
Increase U 500 dose to 140 with dinner  If fasting BG stay high inspite of dose increase ( over 180), please call .  Send my chart message  Thanks

## 2020-11-23 NOTE — TELEPHONE ENCOUNTER
Dr. Rachel Bradley,     Patient was called and notified her of below message as outlined. She will start logging BG and send them via [employer].      She also mentioned that her sugars have been high at night 1 1/2 hours post dinner (250-275 highest) and also when

## 2020-11-23 NOTE — TELEPHONE ENCOUNTER
Patient was contacted and relayed below recommendation as outlined. She voiced understanding and denied further questions at this time.

## 2020-11-25 ENCOUNTER — HOSPITAL ENCOUNTER (OUTPATIENT)
Dept: ULTRASOUND IMAGING | Facility: HOSPITAL | Age: 60
Discharge: HOME | End: 2020-11-25
Attending: INTERNAL MEDICINE
Payer: MEDICAID

## 2020-11-25 DIAGNOSIS — M79.605 PAIN IN BOTH LOWER EXTREMITIES: ICD-10-CM

## 2020-11-25 DIAGNOSIS — M79.604 PAIN IN BOTH LOWER EXTREMITIES: ICD-10-CM

## 2020-11-25 PROCEDURE — 93923 UPR/LXTR ART STDY 3+ LVLS: CPT | Performed by: INTERNAL MEDICINE

## 2020-11-30 ENCOUNTER — TELEPHONE (OUTPATIENT)
Dept: ENDOCRINOLOGY CLINIC | Facility: CLINIC | Age: 60
End: 2020-11-30

## 2020-11-30 RX ORDER — VENLAFAXINE HYDROCHLORIDE 75 MG/1
CAPSULE, EXTENDED RELEASE ORAL
Qty: 90 CAPSULE | Refills: 1 | Status: SHIPPED | OUTPATIENT
Start: 2020-11-30 | End: 2021-03-03

## 2020-11-30 NOTE — TELEPHONE ENCOUNTER
Prior Authorization:    •  Continuous Blood Gluc Sensor (DEXCOM G6 SENSOR) Does not apply Misc, 1 each by Does not apply route Every 10 days. , Disp: 3 each, Rfl: 2    Message:  Plan does not cover this medication.  Please call plan at [phone] to lonnie

## 2020-12-01 NOTE — TELEPHONE ENCOUNTER
Please refer to telephone encounter 11/20/20. Peer to peer was already done and still waiting on final determination.

## 2020-12-05 DIAGNOSIS — E11.39 TYPE 2 DIABETES MELLITUS WITH OTHER OPHTHALMIC COMPLICATION, WITH LONG-TERM CURRENT USE OF INSULIN (HCC): ICD-10-CM

## 2020-12-05 DIAGNOSIS — Z79.4 TYPE 2 DIABETES MELLITUS WITH OTHER OPHTHALMIC COMPLICATION, WITH LONG-TERM CURRENT USE OF INSULIN (HCC): ICD-10-CM

## 2020-12-05 NOTE — TELEPHONE ENCOUNTER
Current Outpatient Medications   Medication Sig Dispense Refill   • HUMULIN R U-500, CONCENTRATED, 500 UNIT/ML Subcutaneous Solution INJECT 135 UNITS SUBCUTANEOUS BREAKFAST AND LUNCH, 140 UNITS WITH DINNER 60 mL 0

## 2020-12-07 RX ORDER — INSULIN HUMAN 500 [IU]/ML
INJECTION, SOLUTION SUBCUTANEOUS
Qty: 60 ML | Refills: 0 | Status: SHIPPED | OUTPATIENT
Start: 2020-12-07 | End: 2021-05-11

## 2021-01-04 ENCOUNTER — OFFICE VISIT (OUTPATIENT)
Dept: INTERNAL MEDICINE CLINIC | Facility: CLINIC | Age: 61
End: 2021-01-04
Payer: MEDICAID

## 2021-01-04 VITALS
SYSTOLIC BLOOD PRESSURE: 130 MMHG | WEIGHT: 107 LBS | DIASTOLIC BLOOD PRESSURE: 56 MMHG | BODY MASS INDEX: 22.46 KG/M2 | HEIGHT: 58 IN | TEMPERATURE: 97 F | HEART RATE: 100 BPM

## 2021-01-04 DIAGNOSIS — G62.9 NEUROPATHY: ICD-10-CM

## 2021-01-04 DIAGNOSIS — E11.39 TYPE 2 DIABETES MELLITUS WITH OTHER OPHTHALMIC COMPLICATION, WITH LONG-TERM CURRENT USE OF INSULIN (HCC): Primary | ICD-10-CM

## 2021-01-04 DIAGNOSIS — Z79.4 TYPE 2 DIABETES MELLITUS WITH OTHER OPHTHALMIC COMPLICATION, WITH LONG-TERM CURRENT USE OF INSULIN (HCC): Primary | ICD-10-CM

## 2021-01-04 PROCEDURE — 3075F SYST BP GE 130 - 139MM HG: CPT | Performed by: INTERNAL MEDICINE

## 2021-01-04 PROCEDURE — 3078F DIAST BP <80 MM HG: CPT | Performed by: INTERNAL MEDICINE

## 2021-01-04 PROCEDURE — 99214 OFFICE O/P EST MOD 30 MIN: CPT | Performed by: INTERNAL MEDICINE

## 2021-01-04 PROCEDURE — 3008F BODY MASS INDEX DOCD: CPT | Performed by: INTERNAL MEDICINE

## 2021-01-04 RX ORDER — GABAPENTIN 300 MG/1
300 CAPSULE ORAL 3 TIMES DAILY
Qty: 90 CAPSULE | Refills: 3 | Status: SHIPPED | OUTPATIENT
Start: 2021-01-04 | End: 2021-05-10

## 2021-01-04 NOTE — ASSESSMENT & PLAN NOTE
Patient sees ophtho for retinopathy, glucose is better controlled , she follows with Dr Angelique Hdz. She hasn't started physical therapy yet for the right shoulder, she did see Dr Gabriel Gunter.

## 2021-01-04 NOTE — PROGRESS NOTES
HPI:    Patient ID: Yomaira Friend is a 62year old female. HPIpatient is here mostly to follow up on leg pain . Her arterial US was negative.  She had an EMG in 2019 which showed the neuropathy and was on gabepentin up to 600 at night but then treatment TAKE 1 CAPSULE BY MOUTH EVERY DAY 90 capsule 1   • Pantoprazole Sodium 40 MG Oral Tab EC Take 40 mg by mouth every morning before breakfast.     • ALPRAZOLAM 0.5 MG Oral Tab TAKE 1 TABLET BY MOUTH EVERY EVENING 30 tablet 5   • RENOVA 0.02 % External Cream DAY FOR 2 WEEKS AND TAKE A WEEK BREAK, AS NEEDED ONLY FOR ITCH  1   • Insulin Pen Needle (BD PEN NEEDLE LION U/F) 32G X 4 MM Does not apply Misc Use to inject 4 times daily.  400 each 1   • ibuprofen 600 MG Oral Tab Take 1 tablet (600 mg total) by mouth pricilla ASSESSMENT/PLAN:   [facility]  Patient sees ophtho for retinopathy, glucose is better controlled , she follows with Dr Shiva Barron. She hasn't started physical therapy yet for the right shoulder, she did see Dr Julien Giron.      Neuropathy  S

## 2021-01-04 NOTE — ASSESSMENT & PLAN NOTE
She had an US of the lower extremities which showed that she doesn't have vascular disease. She has had an EMG before which shows the neuropathy .    She was on 600 of gabepentin a night , but then was admitted for other reasons and never had dose titrate

## 2021-01-09 RX ORDER — LOSARTAN POTASSIUM 25 MG/1
25 TABLET ORAL DAILY
Qty: 90 TABLET | Refills: 0 | Status: SHIPPED | OUTPATIENT
Start: 2021-01-09 | End: 2021-04-09

## 2021-01-18 ENCOUNTER — TELEPHONE (OUTPATIENT)
Dept: FAMILY MEDICINE CLINIC | Facility: CLINIC | Age: 61
End: 2021-01-18

## 2021-01-18 DIAGNOSIS — Z79.4 TYPE 2 DIABETES MELLITUS WITH OTHER OPHTHALMIC COMPLICATION, WITH LONG-TERM CURRENT USE OF INSULIN (HCC): Primary | ICD-10-CM

## 2021-01-18 DIAGNOSIS — E11.39 TYPE 2 DIABETES MELLITUS WITH OTHER OPHTHALMIC COMPLICATION, WITH LONG-TERM CURRENT USE OF INSULIN (HCC): Primary | ICD-10-CM

## 2021-01-18 RX ORDER — LIRAGLUTIDE 6 MG/ML
INJECTION SUBCUTANEOUS
Qty: 18 ML | Refills: 3 | Status: SHIPPED | OUTPATIENT
Start: 2021-01-18 | End: 2021-08-31

## 2021-01-20 ENCOUNTER — TELEMEDICINE (OUTPATIENT)
Dept: ENDOCRINOLOGY CLINIC | Facility: CLINIC | Age: 61
End: 2021-01-20
Payer: MEDICAID

## 2021-01-20 DIAGNOSIS — E11.65 TYPE 2 DIABETES MELLITUS WITH HYPERGLYCEMIA, WITH LONG-TERM CURRENT USE OF INSULIN (HCC): ICD-10-CM

## 2021-01-20 DIAGNOSIS — E78.5 DYSLIPIDEMIA: Primary | ICD-10-CM

## 2021-01-20 DIAGNOSIS — Z79.4 TYPE 2 DIABETES MELLITUS WITH HYPERGLYCEMIA, WITH LONG-TERM CURRENT USE OF INSULIN (HCC): ICD-10-CM

## 2021-01-20 PROCEDURE — 99214 OFFICE O/P EST MOD 30 MIN: CPT | Performed by: INTERNAL MEDICINE

## 2021-01-20 NOTE — PROGRESS NOTES
Telehealth outside of [address] Verbal Consent   I conducted a telehealth visit with Cande Uriarte today, 01/20/21, which was completed using two-way, real-time interactive audio and video communication.  This has been done in good mir to provide appointment in the next few weeks  History of Neuropathy: Yes  History of Nephropathy: No     ASSOCIATED COMPLICATIONS:   HTN: Yes  Hyperlipidemia: Yes  Coronary Artery Disease:  No  Cerebrovascular Disease: No        HOME GLUCOSE READINGS:   Checks 4  jl CHECK BLOOD SUGAR 3-4X/ strip 1   • OneTouch UltraSoft Lancets Does not apply Misc USE TO CHECK BLOOD SUGAR 3-4X/ each 1   • ALPRAZOLAM 0.5 MG Oral Tab TAKE 1 TABLET BY MOUTH EVERY EVENING 30 tablet 5   • RENOVA 0.02 % External Cream ZAHRAA 1 AP for: weight change, fever, fatigue, cold/heat intolerance  Eyes: Negative for:  Visual changes, proptosis, blurring  ENT: Negative for:  dysphagia, neck swelling, dysphonia  Respiratory: Negative for:  dyspnea, cough  Cardiovascular: Negative for:  chest p to 145 lunch and increase 155 with dinner  - c/w victoza 1.8 mg daily      SE and CI of all medications have been discussed in detail. She will check BG as instructed and send them as discussed.  Will adjust insulin doses as discussed  She will call bef

## 2021-01-21 ENCOUNTER — TELEPHONE (OUTPATIENT)
Dept: INTERNAL MEDICINE CLINIC | Facility: CLINIC | Age: 61
End: 2021-01-21

## 2021-01-21 RX ORDER — BIMATOPROST 3 UG/ML
1 SOLUTION TOPICAL DAILY
Qty: 5 ML | Refills: 2 | Status: SHIPPED | OUTPATIENT
Start: 2021-01-21 | End: 2021-08-19

## 2021-01-21 RX ORDER — ALPRAZOLAM 0.5 MG/1
TABLET ORAL
Qty: 30 TABLET | Refills: 5 | Status: SHIPPED | OUTPATIENT
Start: 2021-01-21 | End: 2021-07-26

## 2021-02-01 ENCOUNTER — OFFICE VISIT (OUTPATIENT)
Dept: INTERNAL MEDICINE CLINIC | Facility: CLINIC | Age: 61
End: 2021-02-01
Payer: MEDICAID

## 2021-02-01 VITALS
HEART RATE: 100 BPM | TEMPERATURE: 97 F | DIASTOLIC BLOOD PRESSURE: 50 MMHG | HEIGHT: 58 IN | BODY MASS INDEX: 22.46 KG/M2 | SYSTOLIC BLOOD PRESSURE: 104 MMHG | WEIGHT: 107 LBS

## 2021-02-01 DIAGNOSIS — E11.39 TYPE 2 DIABETES MELLITUS WITH OTHER OPHTHALMIC COMPLICATION, WITH LONG-TERM CURRENT USE OF INSULIN (HCC): ICD-10-CM

## 2021-02-01 DIAGNOSIS — Z79.4 TYPE 2 DIABETES MELLITUS WITH OTHER OPHTHALMIC COMPLICATION, WITH LONG-TERM CURRENT USE OF INSULIN (HCC): ICD-10-CM

## 2021-02-01 DIAGNOSIS — G62.9 NEUROPATHY: Primary | ICD-10-CM

## 2021-02-01 PROCEDURE — 3008F BODY MASS INDEX DOCD: CPT | Performed by: INTERNAL MEDICINE

## 2021-02-01 PROCEDURE — 99214 OFFICE O/P EST MOD 30 MIN: CPT | Performed by: INTERNAL MEDICINE

## 2021-02-01 PROCEDURE — 3074F SYST BP LT 130 MM HG: CPT | Performed by: INTERNAL MEDICINE

## 2021-02-01 PROCEDURE — 3078F DIAST BP <80 MM HG: CPT | Performed by: INTERNAL MEDICINE

## 2021-02-01 NOTE — PROGRESS NOTES
HPI:    Patient ID: Kiran Fishman is a 62year old female. HPI patient is on neurontin 600 mg bid, and pain is only slightly better, she is fatigued, but not moreso with this dose.    Her left leg pain is worse than right and by the end of the day it reac 1.8 MG UNDER THE SKIN DAILY 18 mL 3   • Losartan Potassium 25 MG Oral Tab Take 1 tablet (25 mg total) by mouth daily. 90 tablet 0   • gabapentin 300 MG Oral Cap Take 1 capsule (300 mg total) by mouth 3 (three) times daily.  90 capsule 3   • HUMULIN R U-500, meals.  300 each 1   • FREESTYLE LITE TEST In Vitro Strip TEST BLOOD SUGAR FOUR TIMES DAILY 400 strip 0   • triamcinolone acetonide 0.1 % External Cream APPLY 1 LIBERALLY TO SKIN TWICE A DAY FOR 2 WEEKS AND TAKE A WEEK BREAK, AS NEEDED ONLY FOR ITCH  1   • She has a normal mood and affect. Her behavior is normal. Judgment and thought content normal.   Vitals reviewed.              ASSESSMENT/PLAN:   Type 2 diabetes mellitus with ophthalmic complication, with long-term current use of insulin [facility])  Patient see

## 2021-02-01 NOTE — ASSESSMENT & PLAN NOTE
Patient has bilateral neuropathy , left greater than right , will go back to see physiatry and perhaps do a follow up EMG. . Increase neurontin to 600 mg tid. See me one month. Work form filled out, patient is disabled.

## 2021-02-08 RX ORDER — PANTOPRAZOLE SODIUM 40 MG/1
40 TABLET, DELAYED RELEASE ORAL EVERY MORNING
Qty: 90 TABLET | Refills: 3 | Status: SHIPPED | OUTPATIENT
Start: 2021-02-08 | End: 2021-10-20

## 2021-02-11 ENCOUNTER — TELEPHONE (OUTPATIENT)
Dept: ENDOCRINOLOGY CLINIC | Facility: CLINIC | Age: 61
End: 2021-02-11

## 2021-02-11 DIAGNOSIS — Z79.4 TYPE 2 DIABETES MELLITUS WITH HYPERGLYCEMIA, WITH LONG-TERM CURRENT USE OF INSULIN (HCC): Primary | ICD-10-CM

## 2021-02-11 DIAGNOSIS — E11.65 TYPE 2 DIABETES MELLITUS WITH HYPERGLYCEMIA, WITH LONG-TERM CURRENT USE OF INSULIN (HCC): Primary | ICD-10-CM

## 2021-02-11 NOTE — TELEPHONE ENCOUNTER
•  Glucose Blood (ONETOUCH VERIO) In Vitro Strip, USE TO CHECK BLOOD SUGAR 3-4X/DAY, Disp: 400 strip, Rfl: 1      •  OneTouch DELICA PLUS 67F Lancets Does not apply Misc, USE TO CHECK BLOOD SUGAR 3-4X/DAY, Disp: 400 each, Rfl: 1

## 2021-02-15 RX ORDER — PANTOPRAZOLE SODIUM 40 MG/1
TABLET, DELAYED RELEASE ORAL
Qty: 90 TABLET | Refills: 3 | OUTPATIENT
Start: 2021-02-15

## 2021-02-15 NOTE — TELEPHONE ENCOUNTER
LMTCB OFFICE VISIT      Patient: Azam Sierra Date of Service: 2020   : 1977 MRN: [id number]     SUBJECTIVE:     Chief Complaint   Patient presents with   â¢ Physical     refills       HISTORY OF PRESENT ILLNESS:  Azam Sierra is a 40year old female who presents today for a physical.     Health maintenance:  Has appt in Feb with gynecologist for PAP. Goes to [facility]. Got the flu shot. Needs labs. She is trying to start exercising. Just started a weight loss program at work. Met with a nutritionist several times last year. Has been considering trying the keto diet or weight watchers. Left meniscal tear:  Was in therapy and got a cortisone shot, but is hurting a lot again. Following up with ortho, Dr. Skyler Griffith,  at the end of the month. Knee rotation hurts. She can walk but can't do squats or lunges. She is doing her PT exercises at home. Hypothyroidism: She had been on the same dose of levothyroxine for a while but she ran out of her medication a month ago. Acid reflux: She takes omeprazole daily. Needs a refill. PAST MEDICAL HISTORY:  Past Medical History:   Diagnosis Date   â¢ Thyroid disease        MEDICATIONS:  Current Outpatient Medications   Medication Sig   â¢ levothyroxine (SYNTHROID, LEVOTHROID) 75 MCG tablet Take 1 tablet by mouth daily. â¢ omeprazole (PRILOSEC) 20 MG capsule Take 1 capsule by mouth daily. No current facility-administered medications for this visit.         ALLERGIES:  ALLERGIES:  No Known Allergies    PAST SURGICAL HISTORY:  Past Surgical History:   Procedure Laterality Date   â¢ Adenoidectomy     â¢ Cholecystectomy     â¢ Tonsillectomy         FAMILY HISTORY:  Family History   Problem Relation Age of Onset   â¢ Cancer Mother    â¢ Diabetes Maternal Aunt        SOCIAL HISTORY:  Social History     Tobacco Use   Smoking Status Never Smoker   Smokeless Tobacco Never Used     Social History     Substance and Sexual Activity   Alcohol Use Yes    Comment: social       Review "of Systems   Constitutional: Negative. HENT: Negative. Eyes: Negative. Respiratory: Negative. Cardiovascular: Negative. Gastrointestinal: Negative. Endocrine: Negative. Genitourinary: Negative. Musculoskeletal:        Left knee pain   Skin: Negative. Allergic/Immunologic: Negative. Neurological: Negative. Hematological: Negative. Psychiatric/Behavioral: Negative. Cognitive Assessment: no evidence of cognitive dysfunction by direct observation    Recent PHQ 2/9 Score    PHQ 2:  Date Adult PHQ 2 Score   7/25/2019 0       PHQ 9:       DEPRESSION ASSESSMENT/PLAN:  Depression screening is negative no further plan needed. OBJECTIVE:     Visit Vitals  /83 (BP Location: LUE - Left upper extremity)   Pulse 95   Temp 97.2 Â°F (36.2 Â°C) (Tympanic)   Resp 18   Ht 5' 7"" (1.702 m)   Wt 121.6 kg (268 lb)   LMP  (Within Weeks)   BMI 41.97 kg/mÂ²       Physical Exam   Constitutional: She is oriented to person, place, and time. She appears well-developed and well-nourished. HENT:   Head: Normocephalic and atraumatic. Right Ear: External ear normal.   Left Ear: External ear normal.   Nose: Nose normal.   Mouth/Throat: Oropharynx is clear and moist.   Eyes: Conjunctivae and EOM are normal. Left eye exhibits no discharge. Neck: Neck supple. No JVD present. No thyromegaly present. Cardiovascular: Normal rate, regular rhythm, normal heart sounds and intact distal pulses. No murmur heard. Pulmonary/Chest: Effort normal and breath sounds normal. No respiratory distress. Abdominal: Bowel sounds are normal. She exhibits no mass. There is no tenderness. There is no rebound and no guarding. Musculoskeletal: Normal range of motion. Lymphadenopathy:     She has no cervical adenopathy. Neurological: She is alert and oriented to person, place, and time. Skin: Skin is warm and dry. Psychiatric: She has a normal mood and affect.  Her behavior is normal. Judgment and " thought content normal.             DIAGNOSTIC STUDIES:   LAB RESULTS:    No visits with results within 1 Month(s) from this visit.    Latest known visit with results is:   Lab Services on 08/10/2018   Component Date Value Ref Range Status   â¢ WHITE BLOOD COUNT 08/10/2018 8.8  4.2 - 11.0 K/mcL Final   â¢ RED CELL COUNT 08/10/2018 4.65  4.00 - 5.20 mil/mcL Final   â¢ HEMOGLOBIN 08/10/2018 13.7  12.0 - 15.5 g/dl Final   â¢ HEMATOCRIT 08/10/2018 41.4  36.0 - 46.5 % Final   â¢ MEAN CORPUSCULAR VOLUME 08/10/2018 89.0  78.0 - 100.0 fL Final   â¢ MEAN CORPUSCULAR HEMOGLOBIN 08/10/2018 29.5  26.0 - 34.0 pg Final   â¢ MEAN CORPUSCULAR HGB CONC 08/10/2018 33.1  32.0 - 36.5 g/dl Final   â¢ RDW-CV 08/10/2018 13.5  11.0 - 15.0 % Final   â¢ PLATELET COUNT 87/24/5245 270  140 - 450 K/mcL Final   â¢ Neutrophil 08/10/2018 62  % Final   â¢ LYMPH 08/10/2018 28  % Final   â¢ MONO 08/10/2018 7  % Final   â¢ EOSIN 08/10/2018 1  % Final   â¢ BASO 08/10/2018 1  % Final   â¢ Absolute Neutrophil 08/10/2018 5.6  1.8 - 7.7 K/mcL Final   â¢ Absolute Lymph 08/10/2018 2.5  1.0 - 4.8 K/mcL Final   â¢ Absolute Mono 08/10/2018 0.6  0.3 - 0.9 K/mcL Final   â¢ Absolute Eos 08/10/2018 0.1  0.1 - 0.5 K/mcL Final   â¢ Absolute Baso 08/10/2018 0.1  0.0 - 0.3 K/mcL Final   â¢ Sodium 08/10/2018 140  135 - 145 mmol/L Final   â¢ Potassium 08/10/2018 4.2  3.4 - 5.1 mmol/L Final   â¢ Chloride 08/10/2018 107  98 - 107 mmol/L Final   â¢ Carbon Dioxide 08/10/2018 24  21 - 32 mmol/L Final   â¢ Anion Gap 08/10/2018 13  10 - 20 mmol/L Final   â¢ Glucose 08/10/2018 86  65 - 99 mg/dl Final   â¢ BUN 08/10/2018 13  6 - 20 mg/dl Final   â¢ Creatinine 08/10/2018 0.71  0.51 - 0.95 mg/dl Final   â¢ GFR Estimate,  08/10/2018 >90    Final   â¢ GFR Estimate, Non  08/10/2018 >90    Final   â¢ BUN/Creatinine Ratio 08/10/2018 18  7 - 25   Final   â¢ TOTAL BILIRUBIN 08/10/2018 0.4  0.2 - 1.0 mg/dl Final   â¢ AST/SGOT 08/10/2018 17  <38 Units/L Final   â¢ ALK PHOSPHATASE 08/10/2018 86  45 - 117 Units/L Final   â¢ Albumin 08/10/2018 4.0  3.6 - 5.1 g/dl Final   â¢ TOTAL PROTEIN 08/10/2018 7.6  6.4 - 8.2 g/dl Final   â¢ GLOBULIN 08/10/2018 3.6  2.0 - 4.0 g/dl Final   â¢ A/G Ratio, Serum 08/10/2018 1.1  1.0 - 2.4   Final   â¢ CALCIUM 08/10/2018 9.5  8.4 - 10.2 mg/dl Final   â¢ ALT/SGPT 08/10/2018 38  <79 Units/L Final   â¢ FASTING STATUS 08/10/2018 12  hrs Final   â¢ CHOLESTEROL 08/10/2018 149  <200 mg/dl Final   â¢ HDL 08/10/2018 36* >49 mg/dl Final   â¢ TRIGLYCERIDE 08/10/2018 173* <150 mg/dl Final   â¢ CALCULATED LDL 08/10/2018 78  <130 mg/dl Final   â¢ CALCULATED NON HDL 08/10/2018 113  mg/dl Final   â¢ CHOL/HDL 08/10/2018 4.1  <4.5   Final   â¢ TSH 08/10/2018 1.639  0.350 - 5.000 mcUnits/mL Final   â¢ DIFF TYPE 08/10/2018 AUTOMATED DIFFERENTIAL    Final   â¢ Fasting Status 08/10/2018 12  hrs Final   â¢ NRBC 08/10/2018 0  0 /100 WBC Final   â¢ Percent Immature Granuloctyes 08/10/2018 1  % Final   â¢ Absolute Immature Granulocytes 08/10/2018 0  0 - 0.2 K/mcL Final         ASSESSMENT AND PLAN:   This is a 40year old year-old female who presents with     1. Hypothyroidism, unspecified type    2. Health maintenance examination    3. Dyslipidemia    4. Obesity, morbid, BMI 40.0-49.9 (CMS/HCC)    5. Gastroesophageal reflux disease without esophagitis      1. Health maintenance: Check labs as ordered. Mammogram will be due in February. She has appointment with gynecology for Pap. Up-to-date on flu shot. 2.  Hypothyroidism: Restart levothyroxine 75 mcg daily. Check TSH in 1 month. 3.  Dyslipidemia: Not on meds. Check lipids. 4.  Obesity: She is working on weight loss and exercise. Reassess at follow-up. 5.  Acid reflux: Controlled on omeprazole. Renewed medication. Instructions provided as documented in the AVS.  Medication use,effects and side effects discussed in detail with patient. The patient indicated understanding of the diagnosis and agreed with the plan of care.   Medical compliance with plan discussed and risks of non-compliance reviewed. Patient education completed on disease process, etiology & prognosis. Patient expresses understanding of the plan. Proper usage and side effects of medications reviewed & discussed. Refer to orders. Return to clinic as clinically indicated as discussed with patient who verbalized understanding of & agreement with the plan.     Entered by Dr. Jonnie Jay MD acting as scribe for MD Jonnei Dietz MD

## 2021-02-16 NOTE — TELEPHONE ENCOUNTER
rn called patient states not sure why they  Pharmacy wont dispense , thinks too early  rn called pharmacy but on hold too long >8min ,   Please call and ask why rx wont be dispense this.

## 2021-02-17 RX ORDER — BLOOD SUGAR DIAGNOSTIC
STRIP MISCELLANEOUS
Qty: 400 STRIP | Refills: 1 | Status: SHIPPED | OUTPATIENT
Start: 2021-02-17 | End: 2021-09-02

## 2021-02-17 RX ORDER — LANCETS 33 GAUGE
1 EACH MISCELLANEOUS 4 TIMES DAILY
Qty: 400 EACH | Refills: 1 | Status: SHIPPED | OUTPATIENT
Start: 2021-02-17 | End: 2021-09-02

## 2021-02-18 ENCOUNTER — TELEPHONE (OUTPATIENT)
Dept: NEUROLOGY | Facility: CLINIC | Age: 61
End: 2021-02-18

## 2021-02-18 ENCOUNTER — OFFICE VISIT (OUTPATIENT)
Dept: NEUROLOGY | Facility: CLINIC | Age: 61
End: 2021-02-18
Payer: MEDICAID

## 2021-02-18 VITALS
BODY MASS INDEX: 22.46 KG/M2 | SYSTOLIC BLOOD PRESSURE: 122 MMHG | OXYGEN SATURATION: 96 % | HEART RATE: 101 BPM | DIASTOLIC BLOOD PRESSURE: 58 MMHG | HEIGHT: 58 IN | RESPIRATION RATE: 20 BRPM | WEIGHT: 107 LBS

## 2021-02-18 DIAGNOSIS — G62.9 NEUROPATHY: ICD-10-CM

## 2021-02-18 DIAGNOSIS — M79.661 PAIN IN BOTH LOWER LEGS: Primary | ICD-10-CM

## 2021-02-18 DIAGNOSIS — M79.662 PAIN IN BOTH LOWER LEGS: Primary | ICD-10-CM

## 2021-02-18 PROCEDURE — 99214 OFFICE O/P EST MOD 30 MIN: CPT | Performed by: PHYSICAL MEDICINE & REHABILITATION

## 2021-02-18 PROCEDURE — 3008F BODY MASS INDEX DOCD: CPT | Performed by: PHYSICAL MEDICINE & REHABILITATION

## 2021-02-18 PROCEDURE — 3074F SYST BP LT 130 MM HG: CPT | Performed by: PHYSICAL MEDICINE & REHABILITATION

## 2021-02-18 PROCEDURE — 3078F DIAST BP <80 MM HG: CPT | Performed by: PHYSICAL MEDICINE & REHABILITATION

## 2021-02-18 NOTE — PROGRESS NOTES
Progress note    C/C: Pain in both legs    HPI: 60-year-old female with past medical history of diabetes presents for follow-up. She was last seen by me a little over 2 years ago.   She continues to have persistent aching, constant pain throughout both legs lower extremities, possibly screening one of the upper extremities for polyneuropathy. MRI of the lumbar spine.   We discussed that it might be beneficial for the patient to be switched from venlafaxine to duloxetine, but the patient would rather have the

## 2021-02-18 NOTE — TELEPHONE ENCOUNTER
Nell MENDEZ at [address] [facility] Case/Reference # [id number] to initiate authorization for L-Spine MRI CPT 21569 dx:M79.661 to be done at [facility].   Kirk aguilar clinicals-clinicals faxed to [phone]  Status: pending review

## 2021-02-22 NOTE — TELEPHONE ENCOUNTER
Received notice of Approval from Elizabeth Chris for L-Spine MRI with authorization # [id number] effective 2/19/2021-8/18/2021 to be done at [facility]     Patient notified of the above and states she will schedule the EMG first then MRI after.

## 2021-03-03 ENCOUNTER — PROCEDURE VISIT (OUTPATIENT)
Dept: NEUROLOGY | Facility: CLINIC | Age: 61
End: 2021-03-03
Payer: MEDICAID

## 2021-03-03 DIAGNOSIS — E10.42 DIABETIC POLYNEUROPATHY ASSOCIATED WITH TYPE 1 DIABETES MELLITUS (HCC): Primary | ICD-10-CM

## 2021-03-03 PROCEDURE — 95886 MUSC TEST DONE W/N TEST COMP: CPT | Performed by: PHYSICAL MEDICINE & REHABILITATION

## 2021-03-03 PROCEDURE — 95911 NRV CNDJ TEST 9-10 STUDIES: CPT | Performed by: PHYSICAL MEDICINE & REHABILITATION

## 2021-03-03 RX ORDER — DULOXETIN HYDROCHLORIDE 30 MG/1
30 CAPSULE, DELAYED RELEASE ORAL DAILY
Qty: 30 CAPSULE | Refills: 0 | Status: SHIPPED | OUTPATIENT
Start: 2021-03-03 | End: 2021-05-26

## 2021-03-03 NOTE — PROCEDURES
[address] [place] [address]  Phone: [phone]  Fax: [phone]    ELECTRODIAGNOSTIC REPORT          Patient: Vonna Area Hand Dominance:  Right   Patient ID: [id number] Referring Dr:  Corinne Appl (Calf) study  o the response was considered absent for Calf stimulation  • In the L Sural - Ankle (Calf) study  o the response was considered absent for Calf stimulation  • In the R Superficial peroneal - Ankle study  o the response was considered absent f EDB 11.72 1.9 84.7 7.08 Pop fossa - Fib head 9.5 2.97 32   L Peroneal - EDB      Ankle EDB 4.11 1.2 100 4.95 Ankle - EDB 8        Fib head EDB 9.53 1.2 98.6 5.52 Fib head - Ankle 25 5.42 46      Pop fossa EDB 11.82 1.5 125 6.15 Pop fossa - Fib head 7 2.29 Normal N N N Reduced   L.  Gastrocnemius (Medial head) Tibial S1-S2 N None None None None Normal N N N N

## 2021-03-03 NOTE — PROGRESS NOTES
Findings discussed with patient upon conclusion of the visit. No relief with gabapentin. No relief with lyrica in the past either. Recommend stopping venlafaxine, start duloxetine 30mg tomorrow. F/u in 4 weeks.     Kumar Arteaga DO  Physical Medicine and Re

## 2021-03-22 RX ORDER — IBUPROFEN 600 MG/1
600 TABLET ORAL EVERY 6 HOURS PRN
Qty: 60 TABLET | Refills: 11 | Status: SHIPPED | OUTPATIENT
Start: 2021-03-22 | End: 2021-08-30

## 2021-04-09 ENCOUNTER — TELEPHONE (OUTPATIENT)
Dept: INTERNAL MEDICINE CLINIC | Facility: CLINIC | Age: 61
End: 2021-04-09

## 2021-04-09 RX ORDER — LOSARTAN POTASSIUM 25 MG/1
25 TABLET ORAL DAILY
Qty: 90 TABLET | Refills: 3 | Status: SHIPPED | OUTPATIENT
Start: 2021-04-09 | End: 2022-01-13

## 2021-04-26 ENCOUNTER — PATIENT MESSAGE (OUTPATIENT)
Dept: INTERNAL MEDICINE CLINIC | Facility: CLINIC | Age: 61
End: 2021-04-26

## 2021-04-26 RX ORDER — HYDROCODONE BITARTRATE AND ACETAMINOPHEN 10; 325 MG/1; MG/1
TABLET ORAL EVERY 8 HOURS PRN
Qty: 40 TABLET | Refills: 0 | Status: SHIPPED | OUTPATIENT
Start: 2021-04-26

## 2021-04-26 NOTE — TELEPHONE ENCOUNTER
From: Kam Enrique  To: Kun Hart MD  Sent: 4/26/2021 10:25 AM CDT  Subject: Prescription Question    HELLO DR. Connie Roach,    LAST MONTH I WENT TO SEE DR. Joseph Pittman FOR A EMG. IT WAS POLYNUROPATHY. HE GAVE ME CYMBALTA.  SO FAR VERNA DOVE, KAITLYNN D

## 2021-05-10 RX ORDER — GABAPENTIN 300 MG/1
CAPSULE ORAL
Qty: 90 CAPSULE | Refills: 3 | Status: SHIPPED | OUTPATIENT
Start: 2021-05-10 | End: 2021-08-12

## 2021-05-11 ENCOUNTER — PATIENT MESSAGE (OUTPATIENT)
Dept: ENDOCRINOLOGY CLINIC | Facility: CLINIC | Age: 61
End: 2021-05-11

## 2021-05-11 ENCOUNTER — TELEPHONE (OUTPATIENT)
Dept: ENDOCRINOLOGY CLINIC | Facility: CLINIC | Age: 61
End: 2021-05-11

## 2021-05-11 DIAGNOSIS — Z79.4 TYPE 2 DIABETES MELLITUS WITH OTHER OPHTHALMIC COMPLICATION, WITH LONG-TERM CURRENT USE OF INSULIN (HCC): ICD-10-CM

## 2021-05-11 DIAGNOSIS — E11.39 TYPE 2 DIABETES MELLITUS WITH OTHER OPHTHALMIC COMPLICATION, WITH LONG-TERM CURRENT USE OF INSULIN (HCC): ICD-10-CM

## 2021-05-11 RX ORDER — INSULIN HUMAN 500 [IU]/ML
INJECTION, SOLUTION SUBCUTANEOUS
Qty: 80 ML | Refills: 0 | Status: SHIPPED | OUTPATIENT
Start: 2021-05-11 | End: 2021-07-23

## 2021-05-11 NOTE — TELEPHONE ENCOUNTER
Dr. Matt Dennis,     RN spoke to the pharmacy [facility]) and was told even with the dose increase insurance is putting a limit of 60 ml per 78 days for coverage. We would have to do a PA to over-ride.   RN asked if they can just dispense 60 ml for now but t

## 2021-05-11 NOTE — TELEPHONE ENCOUNTER
Dr. Luis Fernando Lemus,     Per patient she received her cortisone injection yesterday noon. She only checked once yesterday and it was 350. This morning her BG was 330 and only ate cereal and injected 150 units of U-500.   She has not checked since then and could

## 2021-05-11 NOTE — TELEPHONE ENCOUNTER
Pt. States that her sugar level is at 400 right now, and she is out of her her Humulin R U 500. I transferred the call to Annette PIERCE.

## 2021-05-11 NOTE — TELEPHONE ENCOUNTER
Please call to triage  Diabetes regimen  Lat 2-3 days of BG  When did she get the cortisone injection    Also, please clarify her apt details  Thanks

## 2021-05-11 NOTE — TELEPHONE ENCOUNTER
Pt called in stating medication is not being covered by the insurance for another week but she is out of the medication and does not know how to proceed HUMULIN R U-500, CONCENTRATED, 500 UNIT/ML Subcutaneous Solution.  Please follow up

## 2021-05-11 NOTE — TELEPHONE ENCOUNTER
Called pharmacist again and she was able to run the free voucher. Voucher went through for 20 ml vial.  Per patient, she has the U-500 insulin syringe at home as she is currently using vial (not pen).   Advised her to contact the pharmacy in a few minutes

## 2021-05-11 NOTE — TELEPHONE ENCOUNTER
From: Nell Gage  To: Deon Anderson MD  Sent: 5/11/2021 10:10 AM CDT  Subject: Non-Urgent Medical Question    I went to foot  and she gave me a shot of cortizone. My sugar is high like over 300. I have been [id number] units.  Still high tell me what

## 2021-05-12 NOTE — TELEPHONE ENCOUNTER
PA team: This would be a qty limit over-ride/PA per pharmacy.   Please submit as an urgent request.  Thank you.      Medication PA Requested: Humulin R U-500 vial                                                      CoverMyMeds Used: No  Sig: Inject 160 uni

## 2021-05-13 NOTE — TELEPHONE ENCOUNTER
Received approval letter via fax. It is effective 02/12/21-05/12/22 (30 ml per 30 days). Called pharmacy and spoke to Benjamintan (pharm tech) and states it went through.     Called patient and informed her of the approval and instructed to call pharmacy later

## 2021-05-13 NOTE — TELEPHONE ENCOUNTER
Decrease night time dose of U 500 from 165 to 160 units  Continue to monitor Bg before meala and bedtime  Call if BG is under 80 or persistently over 250    Please keep upcoming apt    Thanks

## 2021-05-13 NOTE — TELEPHONE ENCOUNTER
rn called patient to relay message below states someone else already called her and discussed message with her.

## 2021-05-20 ENCOUNTER — TELEPHONE (OUTPATIENT)
Dept: FAMILY MEDICINE CLINIC | Facility: CLINIC | Age: 61
End: 2021-05-20

## 2021-05-24 ENCOUNTER — TELEPHONE (OUTPATIENT)
Dept: ENDOCRINOLOGY CLINIC | Facility: CLINIC | Age: 61
End: 2021-05-24

## 2021-05-24 DIAGNOSIS — E11.39 TYPE 2 DIABETES MELLITUS WITH OTHER OPHTHALMIC COMPLICATION, WITH LONG-TERM CURRENT USE OF INSULIN (HCC): Primary | ICD-10-CM

## 2021-05-24 DIAGNOSIS — Z79.4 TYPE 2 DIABETES MELLITUS WITH OTHER OPHTHALMIC COMPLICATION, WITH LONG-TERM CURRENT USE OF INSULIN (HCC): Primary | ICD-10-CM

## 2021-05-24 RX ORDER — PEN NEEDLE, DIABETIC 30 GX3/16"
1 NEEDLE, DISPOSABLE MISCELLANEOUS DAILY
Qty: 100 EACH | Refills: 0 | Status: SHIPPED | OUTPATIENT
Start: 2021-05-24 | End: 2021-08-03

## 2021-05-24 RX ORDER — SYRINGE,INSUL U-500,NDL,0.5ML 31GX15/64"
1 SYRINGE, EMPTY DISPOSABLE MISCELLANEOUS
Qty: 300 EACH | Refills: 1 | Status: SHIPPED | OUTPATIENT
Start: 2021-05-24 | End: 2021-09-13

## 2021-05-24 NOTE — TELEPHONE ENCOUNTER
LOV: 1/20/21  F/U: 5/26/21    Spoke to patient to clarify - per patient insulin syringe/needle needed    RX sent per protocol

## 2021-05-26 ENCOUNTER — TELEPHONE (OUTPATIENT)
Dept: ENDOCRINOLOGY CLINIC | Facility: CLINIC | Age: 61
End: 2021-05-26

## 2021-05-26 ENCOUNTER — OFFICE VISIT (OUTPATIENT)
Dept: ENDOCRINOLOGY CLINIC | Facility: CLINIC | Age: 61
End: 2021-05-26
Payer: MEDICAID

## 2021-05-26 VITALS
SYSTOLIC BLOOD PRESSURE: 133 MMHG | BODY MASS INDEX: 22 KG/M2 | HEART RATE: 92 BPM | DIASTOLIC BLOOD PRESSURE: 54 MMHG | WEIGHT: 107 LBS

## 2021-05-26 DIAGNOSIS — Z79.4 TYPE 2 DIABETES MELLITUS WITH OTHER OPHTHALMIC COMPLICATION, WITH LONG-TERM CURRENT USE OF INSULIN (HCC): Primary | ICD-10-CM

## 2021-05-26 DIAGNOSIS — E11.39 TYPE 2 DIABETES MELLITUS WITH OTHER OPHTHALMIC COMPLICATION, WITH LONG-TERM CURRENT USE OF INSULIN (HCC): Primary | ICD-10-CM

## 2021-05-26 PROCEDURE — 36416 COLLJ CAPILLARY BLOOD SPEC: CPT | Performed by: INTERNAL MEDICINE

## 2021-05-26 PROCEDURE — 3078F DIAST BP <80 MM HG: CPT | Performed by: INTERNAL MEDICINE

## 2021-05-26 PROCEDURE — 83036 HEMOGLOBIN GLYCOSYLATED A1C: CPT | Performed by: INTERNAL MEDICINE

## 2021-05-26 PROCEDURE — 3075F SYST BP GE 130 - 139MM HG: CPT | Performed by: INTERNAL MEDICINE

## 2021-05-26 PROCEDURE — 82947 ASSAY GLUCOSE BLOOD QUANT: CPT | Performed by: INTERNAL MEDICINE

## 2021-05-26 PROCEDURE — 99214 OFFICE O/P EST MOD 30 MIN: CPT | Performed by: INTERNAL MEDICINE

## 2021-05-26 NOTE — PROGRESS NOTES
Return Office Visit     CHIEF COMPLAINT:    DM  Dyslipidemia     HISTORY OF PRESENT ILLNESS:  Clark Bautista is a 62year old female who presents for follow up for DM and dyslipidemia.      DM HISTORY  Diagnosed: Around age 29        HISTORY OF DIABETES CO mouth daily. 90 tablet 3   • ibuprofen 600 MG Oral Tab Take 1 tablet (600 mg total) by mouth every 6 (six) hours as needed for Pain.  60 tablet 11   • Glucose Blood (ONETOUCH VERIO) In Vitro Strip USE TO CHECK BLOOD SUGAR 3-4X/ strip 1   • Lancets (O Rectal Cream Apply bid 28.35 g 3   • FREESTYLE LANCETS Does not apply Misc TEST BLOOD SUGAR QID  12   • Blood Glucose Monitoring Suppl (FREESTYLE LITE) Does not apply Device TEST QID  UTD  0         ALLERGY:    Caviar                  SWELLING    Comment:t difficulty  Psychiatric:  oriented to time, self, and place  Extremities: no obvious extremity swelling, no lesions      DATA:     Recent pertinent labs reviewed from June 2020        ASSESSMENT AND PLAN:     1.  Type 2 DM: a1c is 7.2 %      Plan:  Discusse exercise, and eating a diet rich in fruits and vegetables. Fasting lipid panel  3. BP is normal today  Call with BG as discussed    Complexity of the visit increased due to insulin U 500 use and steroid use  RTC in 3 months.   Fasting labs as below    [employer] [facility]

## 2021-05-26 NOTE — TELEPHONE ENCOUNTER
•  Insulin Syringe/Needle U-500 (BD INSULIN SYRINGE U-500) 31G X 6MM 0.5 ML Does not apply Misc, Inject 1 each into the skin 3 (three) times daily with meals. , Disp: 300 each, Rfl: 1

## 2021-05-26 NOTE — TELEPHONE ENCOUNTER
Please start paperwork for dexcom  It seems like she was denied since a1c was under 7 %  Her a1c has gone up and is 7.2% now  Please see my note from today  Thanks

## 2021-05-27 RX ORDER — VENLAFAXINE HYDROCHLORIDE 75 MG/1
CAPSULE, EXTENDED RELEASE ORAL
Qty: 90 CAPSULE | Refills: 1 | Status: SHIPPED | OUTPATIENT
Start: 2021-05-27 | End: 2021-08-30

## 2021-06-01 ENCOUNTER — PATIENT MESSAGE (OUTPATIENT)
Dept: ENDOCRINOLOGY CLINIC | Facility: CLINIC | Age: 61
End: 2021-06-01

## 2021-06-01 NOTE — TELEPHONE ENCOUNTER
From: Cyn Willingham  To: Stefania Lopez MD  Sent: 6/1/2021 10:35 AM CDT  Subject: Non-Urgent Medical Question    Hello i wanted to know where to find all my test results. It is not here on my chart.

## 2021-06-05 NOTE — TELEPHONE ENCOUNTER
"Ok to relay below if mom calls back. Thanks.           ----- Message from Lis Santos MD sent at 1/17/2020  1:17 PM CST -----  I called patient's mother and there was no answer. Please call patient's mother and let her know: \"Sultana's Vitamin D level was slightly low at 25, normal is 30. I recommend a daily vitamin D supplement. I will send some to the pharmacy. If insurance does not cover it, you can also get it over the counter.\"       " Left detailed message for patient notifying her of below. Asked her to call the office if she had questions or concerns.

## 2021-06-07 ENCOUNTER — OFFICE VISIT (OUTPATIENT)
Dept: INTERNAL MEDICINE CLINIC | Facility: CLINIC | Age: 61
End: 2021-06-07
Payer: MEDICAID

## 2021-06-07 VITALS
TEMPERATURE: 97 F | BODY MASS INDEX: 21.83 KG/M2 | WEIGHT: 104 LBS | DIASTOLIC BLOOD PRESSURE: 60 MMHG | HEART RATE: 84 BPM | HEIGHT: 58 IN | SYSTOLIC BLOOD PRESSURE: 110 MMHG

## 2021-06-07 DIAGNOSIS — A08.4 VIRAL GASTROENTERITIS: Primary | ICD-10-CM

## 2021-06-07 PROCEDURE — 3078F DIAST BP <80 MM HG: CPT | Performed by: INTERNAL MEDICINE

## 2021-06-07 PROCEDURE — 3008F BODY MASS INDEX DOCD: CPT | Performed by: INTERNAL MEDICINE

## 2021-06-07 PROCEDURE — 99214 OFFICE O/P EST MOD 30 MIN: CPT | Performed by: INTERNAL MEDICINE

## 2021-06-07 PROCEDURE — 3074F SYST BP LT 130 MM HG: CPT | Performed by: INTERNAL MEDICINE

## 2021-06-07 RX ORDER — DICYCLOMINE HCL 20 MG
TABLET ORAL
COMMUNITY
Start: 2021-06-03 | End: 2021-11-24

## 2021-06-07 RX ORDER — LOPERAMIDE HYDROCHLORIDE 2 MG/1
CAPSULE ORAL
COMMUNITY
Start: 2021-06-03 | End: 2021-11-01

## 2021-06-07 RX ORDER — SIMETHICONE 80 MG/1
TABLET, CHEWABLE ORAL
COMMUNITY
Start: 2021-06-03

## 2021-06-07 NOTE — PROGRESS NOTES
HPI:    Patient ID: Kiran Fishman is a 62year old female. HPI patient was at [facility] ER on the 3rd with gastroenteritis symptoms for 5 days. She was given 2 liters of fluids, lomotil , bentyl and an antacid.    No diarrhea today , it has gradually gotten MG Oral Tab Take 1 tablet (25 mg total) by mouth daily. 90 tablet 3   • ibuprofen 600 MG Oral Tab Take 1 tablet (600 mg total) by mouth every 6 (six) hours as needed for Pain.  60 tablet 11   • Pantoprazole Sodium 40 MG Oral Tab EC Take 1 tablet (40 mg tota daily. 1 kit 0   • OneTouch UltraSoft Lancets Does not apply Misc USE TO CHECK BLOOD SUGAR 3-4X/ each 1   • TRUEPLUS PEN NEEDLES 31G X 5 MM Does not apply Misc TEST 6 TIMES DAILY 200 each 0   • FREESTYLE LITE TEST In Vitro Strip TEST BLOOD SUGAR FOU Findings: No erythema or rash. Neurological:      Mental Status: She is alert and oriented to person, place, and time. Motor: No abnormal muscle tone.       Coordination: Coordination normal.   Psychiatric:         Behavior: Behavior normal.

## 2021-06-07 NOTE — ASSESSMENT & PLAN NOTE
Patient is feeling better, will increase fluids , start to advance diet tomorrow, hold on lomotil and bentyl for now . Message me if symptoms recur.

## 2021-07-08 ENCOUNTER — HOSPITAL ENCOUNTER (OUTPATIENT)
Age: 61
Discharge: HOME | End: 2021-07-08
Payer: MEDICAID

## 2021-07-08 VITALS
DIASTOLIC BLOOD PRESSURE: 50 MMHG | OXYGEN SATURATION: 100 % | RESPIRATION RATE: 20 BRPM | HEART RATE: 90 BPM | SYSTOLIC BLOOD PRESSURE: 158 MMHG | TEMPERATURE: 98 F

## 2021-07-08 DIAGNOSIS — R42 DIZZINESS: Primary | ICD-10-CM

## 2021-07-08 DIAGNOSIS — R11.0 NAUSEA: ICD-10-CM

## 2021-07-08 LAB
#MXD IC: 0.6 X10ˆ3/UL (ref 0.1–1)
BILIRUB UR QL STRIP: NEGATIVE
CLARITY UR: CLEAR
COLOR UR: YELLOW
CREAT BLD-MCNC: 0.6 MG/DL
GLUCOSE BLD-MCNC: 226 MG/DL (ref 70–99)
GLUCOSE UR STRIP-MCNC: 100 MG/DL
HCT VFR BLD AUTO: 35.4 %
HGB BLD-MCNC: 11 G/DL
HGB UR QL STRIP: NEGATIVE
ISTAT BUN: 13 MG/DL (ref 7–18)
ISTAT CHLORIDE: 105 MMOL/L (ref 98–112)
ISTAT HEMATOCRIT: 37 %
ISTAT IONIZED CALCIUM FOR CHEM 8: 1.2 MMOL/L (ref 1.12–1.32)
ISTAT POTASSIUM: 4.6 MMOL/L (ref 3.6–5.1)
ISTAT SODIUM: 143 MMOL/L (ref 136–145)
ISTAT TCO2: 24 MMOL/L (ref 21–32)
KETONES UR STRIP-MCNC: NEGATIVE MG/DL
LEUKOCYTE ESTERASE UR QL STRIP: NEGATIVE
LYMPHOCYTES # BLD AUTO: 2.5 X10ˆ3/UL (ref 1–4)
LYMPHOCYTES NFR BLD AUTO: 36.7 %
MCH RBC QN AUTO: 28.3 PG (ref 26–34)
MCHC RBC AUTO-ENTMCNC: 31.1 G/DL (ref 31–37)
MCV RBC AUTO: 91 FL (ref 80–100)
MIXED CELL %: 8.6 %
NEUTROPHILS # BLD AUTO: 3.8 X10ˆ3/UL (ref 1.5–7.7)
NEUTROPHILS NFR BLD AUTO: 54.7 %
NITRITE UR QL STRIP: NEGATIVE
PH UR STRIP: 5.5 [PH]
PLATELET # BLD AUTO: 192 X10ˆ3/UL (ref 150–450)
PROT UR STRIP-MCNC: NEGATIVE MG/DL
RBC # BLD AUTO: 3.89 X10ˆ6/UL
SP GR UR STRIP: 1.01
TROPONIN I BLD-MCNC: 0.02 NG/ML
UROBILINOGEN UR STRIP-ACNC: <2 MG/DL
WBC # BLD AUTO: 6.9 X10ˆ3/UL (ref 4–11)

## 2021-07-08 PROCEDURE — 80047 BASIC METABLC PNL IONIZED CA: CPT | Performed by: NURSE PRACTITIONER

## 2021-07-08 PROCEDURE — 84484 ASSAY OF TROPONIN QUANT: CPT | Performed by: NURSE PRACTITIONER

## 2021-07-08 PROCEDURE — 99204 OFFICE O/P NEW MOD 45 MIN: CPT | Performed by: NURSE PRACTITIONER

## 2021-07-08 PROCEDURE — 93000 ELECTROCARDIOGRAM COMPLETE: CPT | Performed by: NURSE PRACTITIONER

## 2021-07-08 PROCEDURE — 81002 URINALYSIS NONAUTO W/O SCOPE: CPT | Performed by: NURSE PRACTITIONER

## 2021-07-08 PROCEDURE — 36415 COLL VENOUS BLD VENIPUNCTURE: CPT | Performed by: NURSE PRACTITIONER

## 2021-07-08 PROCEDURE — 85025 COMPLETE CBC W/AUTO DIFF WBC: CPT | Performed by: NURSE PRACTITIONER

## 2021-07-08 RX ORDER — MECLIZINE HYDROCHLORIDE 25 MG/1
25 TABLET ORAL 3 TIMES DAILY PRN
Qty: 14 TABLET | Refills: 0 | Status: SHIPPED | OUTPATIENT
Start: 2021-07-08 | End: 2021-11-01

## 2021-07-08 RX ORDER — ONDANSETRON 4 MG/1
4 TABLET, ORALLY DISINTEGRATING ORAL EVERY 8 HOURS PRN
Qty: 10 TABLET | Refills: 0 | Status: SHIPPED | OUTPATIENT
Start: 2021-07-08 | End: 2021-08-12

## 2021-07-08 RX ORDER — ONDANSETRON 4 MG/1
4 TABLET, ORALLY DISINTEGRATING ORAL ONCE
Status: COMPLETED | OUTPATIENT
Start: 2021-07-08 | End: 2021-07-08

## 2021-07-08 NOTE — ED PROVIDER NOTES
Patient Seen in: [facility]      History   Patient presents with:  Dizziness    Stated Complaint: vertigo    HPI/Subjective:   HPI    This is a 64-year-old female presenting with dizziness and nausea.   Patient states, 2 days ago she develope All other systems reviewed and negative except as noted above.     Physical Exam     ED Triage Vitals [07/08/21 1223]   /50   Pulse 90   Resp 20   Temp 98 °F (36.7 °C)   Temp src Temporal   SpO2 100 %   O2 Device None (Room air)       Current:BP 1 Result Value    HGB IC 11.0 (*)     All other components within normal limits   [facility] POCT URINALYSIS DIPSTICK - Abnormal; Notable for the following components:    Glucose, Urine 100  (*)     All other components within normal limits   POCT ISTAT CHEM8 CARTR an appointment as soon as possible for a visit in 2 days            Medications Prescribed:  Discharge Medication List as of 7/8/2021  1:57 PM    START taking these medications    Meclizine HCl 25 MG Oral Tab  Take 1 tablet (25 mg total) by mouth 3 (three)

## 2021-07-13 ENCOUNTER — MED REC SCAN ONLY (OUTPATIENT)
Dept: NEUROLOGY | Facility: CLINIC | Age: 61
End: 2021-07-13

## 2021-07-19 ENCOUNTER — LAB ENCOUNTER (OUTPATIENT)
Dept: LAB | Facility: REFERENCE LAB | Age: 61
End: 2021-07-19
Attending: INTERNAL MEDICINE
Payer: MEDICAID

## 2021-07-19 DIAGNOSIS — E11.39 TYPE 2 DIABETES MELLITUS WITH OTHER OPHTHALMIC COMPLICATION, WITH LONG-TERM CURRENT USE OF INSULIN (HCC): ICD-10-CM

## 2021-07-19 DIAGNOSIS — Z79.4 TYPE 2 DIABETES MELLITUS WITH OTHER OPHTHALMIC COMPLICATION, WITH LONG-TERM CURRENT USE OF INSULIN (HCC): ICD-10-CM

## 2021-07-19 LAB
ANION GAP SERPL CALC-SCNC: 10 MMOL/L (ref 0–18)
BUN BLD-MCNC: 15 MG/DL (ref 7–18)
BUN/CREAT SERPL: 21.7 (ref 10–20)
CALCIUM BLD-MCNC: 9.2 MG/DL (ref 8.5–10.1)
CHLORIDE SERPL-SCNC: 108 MMOL/L (ref 98–112)
CHOLEST SMN-MCNC: 159 MG/DL (ref ?–200)
CO2 SERPL-SCNC: 23 MMOL/L (ref 21–32)
CREAT BLD-MCNC: 0.69 MG/DL
CREAT UR-SCNC: 97.7 MG/DL
GLUCOSE BLD-MCNC: 180 MG/DL (ref 70–99)
HDLC SERPL-MCNC: 42 MG/DL (ref 40–59)
LDLC SERPL CALC-MCNC: 99 MG/DL (ref ?–100)
MICROALBUMIN UR-MCNC: 0.75 MG/DL
MICROALBUMIN/CREAT 24H UR-RTO: 7.7 UG/MG (ref ?–30)
NONHDLC SERPL-MCNC: 117 MG/DL (ref ?–130)
OSMOLALITY SERPL CALC.SUM OF ELEC: 297 MOSM/KG (ref 275–295)
PATIENT FASTING Y/N/NP: YES
PATIENT FASTING Y/N/NP: YES
POTASSIUM SERPL-SCNC: 4.4 MMOL/L (ref 3.5–5.1)
SODIUM SERPL-SCNC: 141 MMOL/L (ref 136–145)
TRIGL SERPL-MCNC: 94 MG/DL (ref 30–149)
VLDLC SERPL CALC-MCNC: 16 MG/DL (ref 0–30)

## 2021-07-19 PROCEDURE — 80048 BASIC METABOLIC PNL TOTAL CA: CPT

## 2021-07-19 PROCEDURE — 3061F NEG MICROALBUMINURIA REV: CPT | Performed by: INTERNAL MEDICINE

## 2021-07-19 PROCEDURE — 80061 LIPID PANEL: CPT

## 2021-07-19 PROCEDURE — 36415 COLL VENOUS BLD VENIPUNCTURE: CPT

## 2021-07-19 PROCEDURE — 82043 UR ALBUMIN QUANTITATIVE: CPT

## 2021-07-19 PROCEDURE — 82570 ASSAY OF URINE CREATININE: CPT

## 2021-07-23 DIAGNOSIS — E11.39 TYPE 2 DIABETES MELLITUS WITH OTHER OPHTHALMIC COMPLICATION, WITH LONG-TERM CURRENT USE OF INSULIN (HCC): ICD-10-CM

## 2021-07-23 DIAGNOSIS — Z79.4 TYPE 2 DIABETES MELLITUS WITH OTHER OPHTHALMIC COMPLICATION, WITH LONG-TERM CURRENT USE OF INSULIN (HCC): ICD-10-CM

## 2021-07-23 RX ORDER — INSULIN HUMAN 500 [IU]/ML
INJECTION, SOLUTION SUBCUTANEOUS
Qty: 88.2 ML | Refills: 0 | Status: SHIPPED | OUTPATIENT
Start: 2021-07-23 | End: 2021-08-25

## 2021-07-26 RX ORDER — ALPRAZOLAM 0.5 MG/1
TABLET ORAL
Qty: 30 TABLET | Refills: 5 | Status: SHIPPED | OUTPATIENT
Start: 2021-07-26 | End: 2022-01-17

## 2021-07-26 RX ORDER — ALPRAZOLAM 0.5 MG/1
TABLET ORAL
Qty: 30 TABLET | Refills: 5 | OUTPATIENT
Start: 2021-07-26

## 2021-08-03 DIAGNOSIS — E11.39 TYPE 2 DIABETES MELLITUS WITH OTHER OPHTHALMIC COMPLICATION, WITH LONG-TERM CURRENT USE OF INSULIN (HCC): ICD-10-CM

## 2021-08-03 DIAGNOSIS — Z79.4 TYPE 2 DIABETES MELLITUS WITH OTHER OPHTHALMIC COMPLICATION, WITH LONG-TERM CURRENT USE OF INSULIN (HCC): ICD-10-CM

## 2021-08-04 RX ORDER — PEN NEEDLE, DIABETIC 30 GX3/16"
1 NEEDLE, DISPOSABLE MISCELLANEOUS DAILY
Qty: 100 EACH | Refills: 0 | Status: SHIPPED | OUTPATIENT
Start: 2021-08-04 | End: 2021-08-25

## 2021-08-12 ENCOUNTER — OFFICE VISIT (OUTPATIENT)
Dept: OBGYN CLINIC | Facility: CLINIC | Age: 61
End: 2021-08-12
Payer: MEDICAID

## 2021-08-12 VITALS
WEIGHT: 108 LBS | SYSTOLIC BLOOD PRESSURE: 122 MMHG | BODY MASS INDEX: 22.67 KG/M2 | HEIGHT: 58 IN | DIASTOLIC BLOOD PRESSURE: 58 MMHG

## 2021-08-12 DIAGNOSIS — Z12.11 COLON CANCER SCREENING: ICD-10-CM

## 2021-08-12 DIAGNOSIS — Z01.419 ENCOUNTER FOR GYNECOLOGICAL EXAMINATION WITHOUT ABNORMAL FINDING: Primary | ICD-10-CM

## 2021-08-12 DIAGNOSIS — Z12.31 ENCOUNTER FOR SCREENING MAMMOGRAM FOR MALIGNANT NEOPLASM OF BREAST: ICD-10-CM

## 2021-08-12 PROBLEM — M25.511 CHRONIC RIGHT SHOULDER PAIN: Status: RESOLVED | Noted: 2020-10-02 | Resolved: 2021-08-12

## 2021-08-12 PROBLEM — G89.29 CHRONIC RIGHT SHOULDER PAIN: Status: RESOLVED | Noted: 2020-10-02 | Resolved: 2021-08-12

## 2021-08-12 PROCEDURE — 99386 PREV VISIT NEW AGE 40-64: CPT | Performed by: OBSTETRICS & GYNECOLOGY

## 2021-08-12 PROCEDURE — 3078F DIAST BP <80 MM HG: CPT | Performed by: OBSTETRICS & GYNECOLOGY

## 2021-08-12 PROCEDURE — 3008F BODY MASS INDEX DOCD: CPT | Performed by: OBSTETRICS & GYNECOLOGY

## 2021-08-12 PROCEDURE — 3074F SYST BP LT 130 MM HG: CPT | Performed by: OBSTETRICS & GYNECOLOGY

## 2021-08-12 RX ORDER — INSULIN HUMAN 500 [IU]/ML
INJECTION, SOLUTION SUBCUTANEOUS
COMMUNITY
End: 2021-08-25

## 2021-08-12 NOTE — PROGRESS NOTES
GYN H&P     2021  11:10 AM    CC: Patient is here for annual.     HPI: Patient is a 62year old  for annual. Patient has had a hysterectomy in . She will see occasionally see a wet spot near left breast on bra   o  No vaginal bleeding. Highest education level: Not on file    Occupational History      Occupation: Retired    Tobacco Use      Smoking status: Former Smoker        Packs/day: 0.00        Types: Cigarettes        Quit date: 2017        Years since quittin.0      Smokel does not need gyne exam due to hysterectomy in the past.       Solange Fraser MD

## 2021-08-19 RX ORDER — BIMATOPROST 3 UG/ML
SOLUTION TOPICAL
Qty: 3 ML | Refills: 0 | Status: SHIPPED | OUTPATIENT
Start: 2021-08-19 | End: 2021-10-16

## 2021-08-24 ENCOUNTER — PATIENT MESSAGE (OUTPATIENT)
Dept: INTERNAL MEDICINE CLINIC | Facility: CLINIC | Age: 61
End: 2021-08-24

## 2021-08-25 ENCOUNTER — OFFICE VISIT (OUTPATIENT)
Dept: ENDOCRINOLOGY CLINIC | Facility: CLINIC | Age: 61
End: 2021-08-25
Payer: MEDICAID

## 2021-08-25 VITALS
HEART RATE: 81 BPM | DIASTOLIC BLOOD PRESSURE: 70 MMHG | WEIGHT: 106 LBS | BODY MASS INDEX: 22.25 KG/M2 | SYSTOLIC BLOOD PRESSURE: 135 MMHG | HEIGHT: 58 IN

## 2021-08-25 DIAGNOSIS — E78.5 DYSLIPIDEMIA: ICD-10-CM

## 2021-08-25 DIAGNOSIS — E11.39 TYPE 2 DIABETES MELLITUS WITH OTHER OPHTHALMIC COMPLICATION, WITH LONG-TERM CURRENT USE OF INSULIN (HCC): Primary | ICD-10-CM

## 2021-08-25 DIAGNOSIS — Z79.4 TYPE 2 DIABETES MELLITUS WITH OTHER OPHTHALMIC COMPLICATION, WITH LONG-TERM CURRENT USE OF INSULIN (HCC): Primary | ICD-10-CM

## 2021-08-25 LAB
CARTRIDGE LOT#: ABNORMAL NUMERIC
GLUCOSE BLOOD: 376
HEMOGLOBIN A1C: 7.9 % (ref 4.3–5.6)

## 2021-08-25 PROCEDURE — 99214 OFFICE O/P EST MOD 30 MIN: CPT | Performed by: INTERNAL MEDICINE

## 2021-08-25 PROCEDURE — 36416 COLLJ CAPILLARY BLOOD SPEC: CPT | Performed by: INTERNAL MEDICINE

## 2021-08-25 PROCEDURE — 3051F HG A1C>EQUAL 7.0%<8.0%: CPT | Performed by: INTERNAL MEDICINE

## 2021-08-25 PROCEDURE — 83036 HEMOGLOBIN GLYCOSYLATED A1C: CPT | Performed by: INTERNAL MEDICINE

## 2021-08-25 PROCEDURE — 3075F SYST BP GE 130 - 139MM HG: CPT | Performed by: INTERNAL MEDICINE

## 2021-08-25 PROCEDURE — 3078F DIAST BP <80 MM HG: CPT | Performed by: INTERNAL MEDICINE

## 2021-08-25 PROCEDURE — 82947 ASSAY GLUCOSE BLOOD QUANT: CPT | Performed by: INTERNAL MEDICINE

## 2021-08-25 PROCEDURE — 3008F BODY MASS INDEX DOCD: CPT | Performed by: INTERNAL MEDICINE

## 2021-08-25 RX ORDER — INSULIN HUMAN 500 [IU]/ML
INJECTION, SOLUTION SUBCUTANEOUS
Qty: 91.8 ML | Refills: 0 | Status: SHIPPED | OUTPATIENT
Start: 2021-08-25 | End: 2021-08-31

## 2021-08-25 NOTE — PROGRESS NOTES
Return Office Visit     CHIEF COMPLAINT:    DM  Dyslipidemia     HISTORY OF PRESENT ILLNESS:  Coco Dejesus is a 62year old female who presents for follow up for DM and dyslipidemia.      DM HISTORY  Diagnosed: Around age 29        HISTORY OF DIABETES CO EVERY DAY 90 capsule 1   • Insulin Syringe/Needle U-500 (BD INSULIN SYRINGE U-500) 31G X 6MM 0.5 ML Does not apply Misc Inject 1 each into the skin 3 (three) times daily with meals.  300 each 1   • HYDROcodone-acetaminophen  MG Oral Tab Take 0.5-1 tab A DAY FOR 2 WEEKS AND TAKE A WEEK BREAK, AS NEEDED ONLY FOR ITCH  1   • Insulin Pen Needle (BD PEN NEEDLE LION U/F) 32G X 4 MM Does not apply Misc Use to inject 4 times daily.  400 each 1   • FREESTYLE LANCETS Does not apply Misc TEST BLOOD SUGAR QID  12 moisture and skin texture, no visible lesions  Hair and nails: normal scalp hair  Hematologic:  no excessive bruising  Neuro: motor grossly intact, moving all extremities without difficulty  Psychiatric:  oriented to time, self, and place  Extremities: no glucose)          Namrata Vinson MD

## 2021-08-28 ENCOUNTER — PATIENT MESSAGE (OUTPATIENT)
Dept: ENDOCRINOLOGY CLINIC | Facility: CLINIC | Age: 61
End: 2021-08-28

## 2021-08-28 DIAGNOSIS — Z79.4 TYPE 2 DIABETES MELLITUS WITH OTHER OPHTHALMIC COMPLICATION, WITH LONG-TERM CURRENT USE OF INSULIN (HCC): ICD-10-CM

## 2021-08-28 DIAGNOSIS — E11.39 TYPE 2 DIABETES MELLITUS WITH OTHER OPHTHALMIC COMPLICATION, WITH LONG-TERM CURRENT USE OF INSULIN (HCC): ICD-10-CM

## 2021-08-30 RX ORDER — IBUPROFEN 600 MG/1
600 TABLET ORAL EVERY 6 HOURS PRN
Qty: 60 TABLET | Refills: 11 | Status: SHIPPED | OUTPATIENT
Start: 2021-08-30

## 2021-08-30 RX ORDER — VENLAFAXINE HYDROCHLORIDE 75 MG/1
75 CAPSULE, EXTENDED RELEASE ORAL DAILY
Qty: 90 CAPSULE | Refills: 1 | Status: SHIPPED | OUTPATIENT
Start: 2021-08-30

## 2021-08-31 DIAGNOSIS — Z79.4 TYPE 2 DIABETES MELLITUS WITH OTHER OPHTHALMIC COMPLICATION, WITH LONG-TERM CURRENT USE OF INSULIN (HCC): ICD-10-CM

## 2021-08-31 DIAGNOSIS — E11.39 TYPE 2 DIABETES MELLITUS WITH OTHER OPHTHALMIC COMPLICATION, WITH LONG-TERM CURRENT USE OF INSULIN (HCC): ICD-10-CM

## 2021-08-31 RX ORDER — INSULIN HUMAN 500 [IU]/ML
INJECTION, SOLUTION SUBCUTANEOUS
Qty: 105 ML | Refills: 0 | Status: SHIPPED | OUTPATIENT
Start: 2021-08-31 | End: 2021-11-01

## 2021-08-31 RX ORDER — LIRAGLUTIDE 6 MG/ML
INJECTION SUBCUTANEOUS
Qty: 18 ML | Refills: 3 | Status: SHIPPED | OUTPATIENT
Start: 2021-08-31

## 2021-08-31 NOTE — TELEPHONE ENCOUNTER
From: Tin Patel  Sent: 8/30/2021 1:11 PM CDT  To: Mira Bryant Clinical Staff  Subject: RE: Visit Follow-up Question    THANK YOU    PLEASE LET MY PHARMACY KNOW THAT I AM USING [address]

## 2021-09-01 NOTE — TELEPHONE ENCOUNTER
Called patient and provided # [id number], she will check with her insurance company and let me know if she needs anything additional, also provided my direct ext.

## 2021-09-02 DIAGNOSIS — Z79.4 TYPE 2 DIABETES MELLITUS WITH HYPERGLYCEMIA, WITH LONG-TERM CURRENT USE OF INSULIN (HCC): ICD-10-CM

## 2021-09-02 DIAGNOSIS — E11.65 TYPE 2 DIABETES MELLITUS WITH HYPERGLYCEMIA, WITH LONG-TERM CURRENT USE OF INSULIN (HCC): ICD-10-CM

## 2021-09-02 RX ORDER — LANCETS 33 GAUGE
1 EACH MISCELLANEOUS 4 TIMES DAILY
Qty: 400 EACH | Refills: 0 | Status: SHIPPED | OUTPATIENT
Start: 2021-09-02 | End: 2022-01-02

## 2021-09-02 RX ORDER — BLOOD SUGAR DIAGNOSTIC
STRIP MISCELLANEOUS
Qty: 400 STRIP | Refills: 0 | Status: SHIPPED | OUTPATIENT
Start: 2021-09-02 | End: 2022-01-10

## 2021-09-07 RX ORDER — VENLAFAXINE HYDROCHLORIDE 75 MG/1
CAPSULE, EXTENDED RELEASE ORAL
Qty: 90 CAPSULE | Refills: 1 | OUTPATIENT
Start: 2021-09-07

## 2021-09-10 ENCOUNTER — OFFICE VISIT (OUTPATIENT)
Dept: INTERNAL MEDICINE CLINIC | Facility: CLINIC | Age: 61
End: 2021-09-10
Payer: MEDICAID

## 2021-09-10 VITALS
TEMPERATURE: 98 F | HEART RATE: 108 BPM | BODY MASS INDEX: 22.67 KG/M2 | WEIGHT: 108 LBS | DIASTOLIC BLOOD PRESSURE: 50 MMHG | SYSTOLIC BLOOD PRESSURE: 150 MMHG | HEIGHT: 58 IN

## 2021-09-10 DIAGNOSIS — M79.621 AXILLARY PAIN, RIGHT: ICD-10-CM

## 2021-09-10 DIAGNOSIS — Z79.4 TYPE 2 DIABETES MELLITUS WITH OTHER OPHTHALMIC COMPLICATION, WITH LONG-TERM CURRENT USE OF INSULIN (HCC): Primary | ICD-10-CM

## 2021-09-10 DIAGNOSIS — E11.39 TYPE 2 DIABETES MELLITUS WITH OTHER OPHTHALMIC COMPLICATION, WITH LONG-TERM CURRENT USE OF INSULIN (HCC): Primary | ICD-10-CM

## 2021-09-10 PROCEDURE — 99214 OFFICE O/P EST MOD 30 MIN: CPT | Performed by: INTERNAL MEDICINE

## 2021-09-10 PROCEDURE — 3078F DIAST BP <80 MM HG: CPT | Performed by: INTERNAL MEDICINE

## 2021-09-10 PROCEDURE — 3077F SYST BP >= 140 MM HG: CPT | Performed by: INTERNAL MEDICINE

## 2021-09-10 PROCEDURE — 3008F BODY MASS INDEX DOCD: CPT | Performed by: INTERNAL MEDICINE

## 2021-09-10 NOTE — ASSESSMENT & PLAN NOTE
Strained muscle, has mammo pending. Ibuprofen tid for 2 weeks, no heavy lifting, greater than 15lbs.

## 2021-09-10 NOTE — PROGRESS NOTES
HPI:    Patient ID: Darian Myles is a 62year old female. Pain  This is a new problem. The current episode started 1 to 4 weeks ago. The problem occurs intermittently. The problem has been gradually improving.  Associated symptoms include fatigue and linda SKIN DAILY 18 mL 3   • ibuprofen 600 MG Oral Tab Take 1 tablet (600 mg total) by mouth every 6 (six) hours as needed for Pain. 60 tablet 11   • venlafaxine 75 MG Oral Capsule SR 24 Hr Take 1 capsule (75 mg total) by mouth daily.  90 capsule 1   • BIMATOPROS Continuous Blood Gluc Sensor (DEXCOM G6 SENSOR) Does not apply Misc 1 each by Does not apply route Every 10 days. 3 each 2   • Continuous Blood Gluc  (DEXCOM G6 ) Does not apply Device 1 each by Does not apply route continuous.  1 Device 0 mass.      Tenderness: There is no abdominal tenderness. There is no guarding or rebound. Musculoskeletal:         General: No tenderness. Cervical back: Normal range of motion.       Comments: No mass in the right axilla, pain is along the medial po

## 2021-09-10 NOTE — ASSESSMENT & PLAN NOTE
Sees Dr Angelito Olsen, hgba1c 7.9,   Patient isn't as active, complains of pain , discussed fibromyalgia and the need for more aerobic exercise, try walking shorter distances more often. Patient is fatigued probably deconditioned.

## 2021-09-13 DIAGNOSIS — E11.39 TYPE 2 DIABETES MELLITUS WITH OTHER OPHTHALMIC COMPLICATION, WITH LONG-TERM CURRENT USE OF INSULIN (HCC): ICD-10-CM

## 2021-09-13 DIAGNOSIS — Z79.4 TYPE 2 DIABETES MELLITUS WITH OTHER OPHTHALMIC COMPLICATION, WITH LONG-TERM CURRENT USE OF INSULIN (HCC): ICD-10-CM

## 2021-09-13 RX ORDER — PEN NEEDLE, DIABETIC 32GX 5/32"
NEEDLE, DISPOSABLE MISCELLANEOUS
Qty: 400 EACH | Refills: 1 | Status: CANCELLED | OUTPATIENT
Start: 2021-09-13

## 2021-09-13 RX ORDER — SYRINGE,INSUL U-500,NDL,0.5ML 31GX15/64"
1 SYRINGE, EMPTY DISPOSABLE MISCELLANEOUS
Qty: 300 EACH | Refills: 1 | Status: SHIPPED | OUTPATIENT
Start: 2021-09-13 | End: 2021-09-23

## 2021-09-13 NOTE — TELEPHONE ENCOUNTER
Pt states that she needs insulin needles for her Humulin. [facility] to this office also. Please call.

## 2021-09-14 ENCOUNTER — TELEPHONE (OUTPATIENT)
Dept: ENDOCRINOLOGY CLINIC | Facility: CLINIC | Age: 61
End: 2021-09-14

## 2021-09-14 DIAGNOSIS — E11.39 TYPE 2 DIABETES MELLITUS WITH OTHER OPHTHALMIC COMPLICATION, WITH LONG-TERM CURRENT USE OF INSULIN (HCC): ICD-10-CM

## 2021-09-14 DIAGNOSIS — Z79.4 TYPE 2 DIABETES MELLITUS WITH OTHER OPHTHALMIC COMPLICATION, WITH LONG-TERM CURRENT USE OF INSULIN (HCC): ICD-10-CM

## 2021-09-14 RX ORDER — PEN NEEDLE, DIABETIC 32GX 5/32"
NEEDLE, DISPOSABLE MISCELLANEOUS
Qty: 400 EACH | Refills: 1 | Status: CANCELLED | OUTPATIENT
Start: 2021-09-14

## 2021-09-14 RX ORDER — INSULIN HUMAN 500 [IU]/ML
INJECTION, SOLUTION SUBCUTANEOUS
Qty: 105 ML | Refills: 0 | Status: CANCELLED | OUTPATIENT
Start: 2021-09-14

## 2021-09-14 RX ORDER — PEN NEEDLE, DIABETIC 31 GX3/16"
1 NEEDLE, DISPOSABLE MISCELLANEOUS
Qty: 200 EACH | Refills: 0 | Status: SHIPPED | OUTPATIENT
Start: 2021-09-14 | End: 2021-11-21

## 2021-09-14 NOTE — TELEPHONE ENCOUNTER
LOV 8/25/21    Patient confirms taking 175 units of U500 TID with meals and she is close to needing refill. Rx was sent 8/31/21. She also says per Ashley, syringes are not covered. She has maybe 20 syringes left. She picked up Rx 8/25/21.

## 2021-09-21 RX ORDER — SYRINGE,INSUL U-500,NDL,0.5ML 31GX15/64"
3 SYRINGE, EMPTY DISPOSABLE MISCELLANEOUS
Qty: 300 EACH | Refills: 0 | Status: SHIPPED | OUTPATIENT
Start: 2021-09-21 | End: 2021-12-28

## 2021-09-21 NOTE — TELEPHONE ENCOUNTER
Resent in prescription for insulin needles and syringes with a note to pharmacy to cover what is preferred by medicaid. Awaiting response from pharmacy to see if a PA will be needed. Patient has been made aware.

## 2021-09-22 ENCOUNTER — TELEPHONE (OUTPATIENT)
Dept: ENDOCRINOLOGY CLINIC | Facility: CLINIC | Age: 61
End: 2021-09-22

## 2021-09-22 DIAGNOSIS — E11.39 TYPE 2 DIABETES MELLITUS WITH OTHER OPHTHALMIC COMPLICATION, WITH LONG-TERM CURRENT USE OF INSULIN (HCC): ICD-10-CM

## 2021-09-22 DIAGNOSIS — Z79.4 TYPE 2 DIABETES MELLITUS WITH OTHER OPHTHALMIC COMPLICATION, WITH LONG-TERM CURRENT USE OF INSULIN (HCC): ICD-10-CM

## 2021-09-22 NOTE — TELEPHONE ENCOUNTER
Pt states pharmacy did not receive pen needle rxs from yesterday - insurance is not covering - she is asking for RN to call pharmacy - please call pt she is out of needles

## 2021-09-22 NOTE — TELEPHONE ENCOUNTER
Medication PA Requested: Insulin Syringe/Needle U-500 (BD INSULIN SYRINGE U-500) 31G X 6MM 0.5 ML Does not apply Misc                                                         CoverMyMeds Used:  Key:  Quantity: 300  Day Supply: 90  Sig: 3 each by Does not ap

## 2021-09-22 NOTE — TELEPHONE ENCOUNTER
Spoke to pharmacist at [facility] who stated that U-500 syringes need a PA, and pharmacy will change brand of pen needles to what is covered by medicaid.

## 2021-09-23 RX ORDER — SYRINGE,INSUL U-500,NDL,0.5ML 31GX15/64"
1 SYRINGE, EMPTY DISPOSABLE MISCELLANEOUS
Qty: 30 EACH | Refills: 0 | Status: SHIPPED | OUTPATIENT
Start: 2021-09-23

## 2021-09-23 NOTE — TELEPHONE ENCOUNTER
RN Sent in supply of 30 syringes to hold patient over until we hear back from insurance company. RN called pharmacist yesterday who stated needles will be switched to [employer] which is covered by Medicaid.  Awaiting response from insurance company

## 2021-09-23 NOTE — TELEPHONE ENCOUNTER
Medication PA Requested: Insulin Syringe/Needle U-500 (BD INSULIN SYRINGE U-500) 31G X 6MM 0.5 ML Does not apply Misc                                                         CoverMyMeds Used:  Key:  [id number]  Quantity: 300  Day Supply: 90  Sig: 3 each by D

## 2021-09-23 NOTE — TELEPHONE ENCOUNTER
Hello,    Is there any way that the PA for the needles could be marked as urgent? Patient has run out. Please advise.

## 2021-09-24 NOTE — TELEPHONE ENCOUNTER
[facility] [phone], States request approved through Sept 23rd 2022. Auth #[id number]. Approval letter faxed to office [phone]. Terrance Celeste # [id number]   Call ref [facility], [phone], spoke with Shilo Varela.  She state

## 2021-10-16 NOTE — TELEPHONE ENCOUNTER
Please review refill protocol failed/ no protocol  Requested Prescriptions   Pending Prescriptions Disp Refills    BIMATOPROST 0.03 % External Solution [Pharmacy Med Name: BIMATOPROST 0.03% OPH SOL W/BRUSHES] 3 mL 0     Sig: PLACE 1 DROP ON APPLICATOR/APPL

## 2021-10-17 RX ORDER — BIMATOPROST 3 UG/ML
1 SOLUTION TOPICAL NIGHTLY
Qty: 3 ML | Refills: 1 | Status: SHIPPED | OUTPATIENT
Start: 2021-10-17

## 2021-10-20 RX ORDER — PANTOPRAZOLE SODIUM 40 MG/1
TABLET, DELAYED RELEASE ORAL
Qty: 90 TABLET | Refills: 3 | Status: SHIPPED | OUTPATIENT
Start: 2021-10-20

## 2021-10-27 ENCOUNTER — PATIENT MESSAGE (OUTPATIENT)
Dept: ENDOCRINOLOGY CLINIC | Facility: CLINIC | Age: 61
End: 2021-10-27

## 2021-10-27 NOTE — TELEPHONE ENCOUNTER
From: Fernando Merida  To: Jesusita Homans, MD  Sent: 10/27/2021 11:21 AM CDT  Subject: blood sugar to high    Charmaine Hsu    It seems my blood sugar is becoming a problem.  At night and the morning its close to 300 I am now going to take 180 -3 times a day to s

## 2021-10-28 ENCOUNTER — TELEPHONE (OUTPATIENT)
Dept: ENDOCRINOLOGY CLINIC | Facility: CLINIC | Age: 61
End: 2021-10-28

## 2021-10-28 DIAGNOSIS — Z79.4 TYPE 2 DIABETES MELLITUS WITH OTHER OPHTHALMIC COMPLICATION, WITH LONG-TERM CURRENT USE OF INSULIN (HCC): ICD-10-CM

## 2021-10-28 DIAGNOSIS — E11.39 TYPE 2 DIABETES MELLITUS WITH OTHER OPHTHALMIC COMPLICATION, WITH LONG-TERM CURRENT USE OF INSULIN (HCC): ICD-10-CM

## 2021-10-28 NOTE — TELEPHONE ENCOUNTER
Received Physician Consent Form from [address] [place] [address].    Attached A1c result from 08/25/21 and placed form on providers desk for review and signature.

## 2021-10-29 ENCOUNTER — PATIENT MESSAGE (OUTPATIENT)
Dept: ENDOCRINOLOGY CLINIC | Facility: CLINIC | Age: 61
End: 2021-10-29

## 2021-10-29 NOTE — TELEPHONE ENCOUNTER
Patient reported high BG  Please call about more info  We had sent my chart message no reply  I will like to know last few days of Bg and insulin regimen  Thanks

## 2021-10-31 NOTE — TELEPHONE ENCOUNTER
From: Iain Sampson  To: Jazmyn Bradford MD  Sent: 10/27/2021 11:21 AM CDT  Subject: blood sugar to high    Charmaine Hsu    It seems my blood sugar is becoming a problem.  At night and the morning its close to 300 I am now going to take 180 -3 times a day to s

## 2021-11-01 RX ORDER — INSULIN HUMAN 500 [IU]/ML
INJECTION, SOLUTION SUBCUTANEOUS
Qty: 105 ML | Refills: 0 | Status: SHIPPED | OUTPATIENT
Start: 2021-11-01 | End: 2022-01-10

## 2021-11-01 NOTE — TELEPHONE ENCOUNTER
Spoke to patient to relay message below - patient stated understanding and will work on not increasing 175 units U500 with meals and patient stated understanding to working on diet/carb counting  Patient stated understanding to send [facility] with BG readings in

## 2021-11-01 NOTE — TELEPHONE ENCOUNTER
Spoke to patient -she asked to call clinic later - stated she is watching her granddaughter and is getting her ready for school  RN provided call back # - patient stated she will call back

## 2021-11-01 NOTE — TELEPHONE ENCOUNTER
Noted  I sent for higher dose  But do not use the 180/185 unless she is eating more than usual since U 500 can cause hypoglycemia    Please continue to log Bg and send in one week on my chart  Dietary compliance    Thanks

## 2021-11-01 NOTE — TELEPHONE ENCOUNTER
Dr. Mary Bran    10/30: ,   10/31: , After dinner 125.  11/1: , after lunch 231. Patient stated she is doing 175 units of U-500 TID with meals. Sometimes patient gives herself a little bit extra is sugar was high, 180-185 units.

## 2021-11-12 ENCOUNTER — OFFICE VISIT (OUTPATIENT)
Dept: OTOLARYNGOLOGY | Facility: CLINIC | Age: 61
End: 2021-11-12
Payer: MEDICAID

## 2021-11-12 VITALS — BODY MASS INDEX: 22.67 KG/M2 | WEIGHT: 108 LBS | HEIGHT: 58 IN

## 2021-11-12 DIAGNOSIS — Z82.2 FAMILY HISTORY OF HEARING LOSS: ICD-10-CM

## 2021-11-12 DIAGNOSIS — J34.89 NASAL CRUSTING: ICD-10-CM

## 2021-11-12 DIAGNOSIS — R42 DYSEQUILIBRIUM: ICD-10-CM

## 2021-11-12 DIAGNOSIS — H90.12 CONDUCTIVE HEARING LOSS OF LEFT EAR, UNSPECIFIED HEARING STATUS ON CONTRALATERAL SIDE: Primary | ICD-10-CM

## 2021-11-12 PROCEDURE — 3008F BODY MASS INDEX DOCD: CPT | Performed by: SPECIALIST

## 2021-11-12 PROCEDURE — 99204 OFFICE O/P NEW MOD 45 MIN: CPT | Performed by: SPECIALIST

## 2021-11-12 NOTE — PATIENT INSTRUCTIONS
Ears to be a conductive hearing loss in the left ear. You also have a loss of balance. I have ordered an audiogram Racheal Anthony with [employer]. You can use nasal saline for your nasal crusting.

## 2021-11-12 NOTE — PROGRESS NOTES
Cordelia Smith is a 62year old female. Patient presents with:  Ear Wax: pt presents today for ear cleaning on both ears due to wax build up. HPI:   Increased difficulty hearing. Bother with hearing loss for many years.     Current Outpatient Medications tablet (600 mg total) by mouth every 6 (six) hours as needed for Pain. 60 tablet 11   • venlafaxine 75 MG Oral Capsule SR 24 Hr Take 1 capsule (75 mg total) by mouth daily.  90 capsule 1   • ALPRAZolam 0.5 MG Oral Tab TAKE 1 TABLET BY MOUTH EVERY EVENING 30 Diabetes ([facility].)     Type 1   • History of blood transfusion     July 2019 ; no reactions   • Neuropathy     BOTH LEGS AND FEET   • [facility]eps[facility] ([facility]    • Visual impairment     eyeglasses      Social History:  Social History    Tobacco Use      Smoking status:  Fo Appropriate mood and affect. Neurological Memory - Normal. Cranial nerves - Cranial nerves II through XII grossly intact. Very poor and unsteady gait abnormal with eyes open close tandem and Romberg. ASSESSMENT AND PLAN:   1.  Conductive hearing loss

## 2021-11-21 RX ORDER — PEN NEEDLE, DIABETIC 31 GX3/16"
NEEDLE, DISPOSABLE MISCELLANEOUS
Qty: 200 EACH | Refills: 0 | Status: SHIPPED | OUTPATIENT
Start: 2021-11-21

## 2021-11-24 ENCOUNTER — OFFICE VISIT (OUTPATIENT)
Dept: ENDOCRINOLOGY CLINIC | Facility: CLINIC | Age: 61
End: 2021-11-24
Payer: MEDICAID

## 2021-11-24 ENCOUNTER — TELEPHONE (OUTPATIENT)
Dept: ENDOCRINOLOGY CLINIC | Facility: CLINIC | Age: 61
End: 2021-11-24

## 2021-11-24 VITALS
DIASTOLIC BLOOD PRESSURE: 71 MMHG | HEART RATE: 67 BPM | SYSTOLIC BLOOD PRESSURE: 147 MMHG | BODY MASS INDEX: 23.09 KG/M2 | WEIGHT: 110 LBS | HEIGHT: 58 IN

## 2021-11-24 DIAGNOSIS — E11.69 TYPE 2 DIABETES MELLITUS WITH OTHER SPECIFIED COMPLICATION, WITH LONG-TERM CURRENT USE OF INSULIN (HCC): ICD-10-CM

## 2021-11-24 DIAGNOSIS — E78.5 DYSLIPIDEMIA: Primary | ICD-10-CM

## 2021-11-24 DIAGNOSIS — Z79.4 TYPE 2 DIABETES MELLITUS WITH OTHER SPECIFIED COMPLICATION, WITH LONG-TERM CURRENT USE OF INSULIN (HCC): ICD-10-CM

## 2021-11-24 PROCEDURE — 99214 OFFICE O/P EST MOD 30 MIN: CPT | Performed by: INTERNAL MEDICINE

## 2021-11-24 PROCEDURE — 3078F DIAST BP <80 MM HG: CPT | Performed by: INTERNAL MEDICINE

## 2021-11-24 PROCEDURE — 3077F SYST BP >= 140 MM HG: CPT | Performed by: INTERNAL MEDICINE

## 2021-11-24 PROCEDURE — 3008F BODY MASS INDEX DOCD: CPT | Performed by: INTERNAL MEDICINE

## 2021-11-24 RX ORDER — BLOOD-GLUCOSE TRANSMITTER
EACH MISCELLANEOUS
Qty: 1 EACH | Refills: 0 | Status: SHIPPED | OUTPATIENT
Start: 2021-11-24

## 2021-11-24 RX ORDER — BLOOD-GLUCOSE,RECEIVER,CONT
EACH MISCELLANEOUS
Qty: 1 EACH | Refills: 0 | Status: SHIPPED | OUTPATIENT
Start: 2021-11-24

## 2021-11-24 RX ORDER — BLOOD-GLUCOSE SENSOR
EACH MISCELLANEOUS
Qty: 9 EACH | Refills: 0 | Status: SHIPPED | OUTPATIENT
Start: 2021-11-24

## 2021-11-24 RX ORDER — EMPAGLIFLOZIN 25 MG/1
25 TABLET, FILM COATED ORAL DAILY
Qty: 90 TABLET | Refills: 0 | Status: SHIPPED | OUTPATIENT
Start: 2021-11-24 | End: 2021-12-07

## 2021-11-24 NOTE — TELEPHONE ENCOUNTER
PA denied recently for Dexcom due to HA1c being under 7.  It is now 7.9%    Medication PA Requested: CGM supplies: Dexcom G6, Sensors, , Transmitter                                                         CoverMyMeds Used:  Key:  Quantity: 9 sensors

## 2021-11-24 NOTE — PROGRESS NOTES
Return Office Visit     CHIEF COMPLAINT:    DM  Dyslipidemia     HISTORY OF PRESENT ILLNESS:  Joanie Medellin is a 62year old female who presents for follow up for DM and dyslipidemia.      DM HISTORY  Diagnosed: Around age 29        HISTORY OF DIABETES CO (three) times daily with meals.  30 each 0   • Insulin Pen Needle 31G X 5 MM Does not apply Misc Please use daily as directed 200 each 0   • Insulin Syringe/Needle U-500 (BD INSULIN SYRINGE U-500) 31G X 6MM 0.5 ML Does not apply Misc 3 each by Does not appl Suppl (Rossy Moreno) w/Device Does not apply Kit 1 Device by Does not apply route daily.  1 kit 0   • OneTouch UltraSoft Lancets Does not apply Misc USE TO CHECK BLOOD SUGAR 3-4X/ each 1   • RENOVA 0.02 % External Cream ZAHRAA 1 APPLICATION AA ON FACE bleeding  Neurology: Negative for: headache, numbness, weakness  Genito-Urinary: Negative for: dysuria, frequency  Psychiatric: Negative for:  depression, anxiety  Hematology/Lymphatics: Negative for: bruising, lower extremity edema  Endocrine: Negative fo a CGM  Since patient did much better in terms of glycemic variability when she was using the CGM       B). No nephropathy. c). Instructed on importance of annual eye exams   d). Foot exam: Daily feet exam explained. e).  BG log maintainence explained in

## 2021-11-24 NOTE — TELEPHONE ENCOUNTER
Patient saw me today  She has been experiencing significant glycemic variability.    She will benefit from the dexcom/ Annmarie Rajiv  She has had it before and states that Bg were much better and stable on it  I think insurance denied it  Is it possible to PA again

## 2021-11-30 ENCOUNTER — PATIENT MESSAGE (OUTPATIENT)
Dept: INTERNAL MEDICINE CLINIC | Facility: CLINIC | Age: 61
End: 2021-11-30

## 2021-11-30 ENCOUNTER — TELEPHONE (OUTPATIENT)
Dept: ENDOCRINOLOGY CLINIC | Facility: CLINIC | Age: 61
End: 2021-11-30

## 2021-11-30 NOTE — TELEPHONE ENCOUNTER
Current Outpatient Medications   Medication Sig Dispense Refill   • Continuous Blood Gluc Sensor (DEXCOM G6 SENSOR) Does not apply Misc Replace sensors every 10 days 9 each 0     Key:[id number]

## 2021-11-30 NOTE — TELEPHONE ENCOUNTER
From: Joanie Medellin  To: Luba Ahmadi MD  Sent: 11/30/2021 10:14 AM CST  Subject: COLORGUARD    I DID THE COLORGUARD TEST AND THEY SENT ME A LETTER THAT YOU HAD THE RESULTS. DID YOU GET THE RESULTS? [address] YOU LET ME KNOW.     Coty Jarrett

## 2021-11-30 NOTE — TELEPHONE ENCOUNTER
ALFONSO MONROE Requested:  Dexcom G6 sensor                                                         CoverMyMeds Used:  Key:  Quantity: 9  Day Supply: 90 days  Sig: Change sensor every 10 days  DX Code:  E11.69                                   CPT code (if applica

## 2021-12-01 NOTE — TELEPHONE ENCOUNTER
Medication PA Requested: CGM supplies: Dexcom G6, Sensors, , Transmitter                                                         CoverMyMeds Used:  Key:  Quantity: 9 sensors, 1 transmitter, 1   Day Supply: 90  Sig: Replace sensors every 9

## 2021-12-02 NOTE — TELEPHONE ENCOUNTER
Received approval fax from [employer] regarding Dexcom G6 supplies. Sensors, transmitters and  approved from 9/1/21-6/1/22. Patient has been notified via [employer]. See encounter 12/2. Approval faxes sent to scanning.

## 2021-12-07 RX ORDER — EMPAGLIFLOZIN 25 MG/1
TABLET, FILM COATED ORAL
Qty: 90 TABLET | Refills: 0 | Status: SHIPPED | OUTPATIENT
Start: 2021-12-07

## 2021-12-14 ENCOUNTER — MED REC SCAN ONLY (OUTPATIENT)
Dept: FAMILY MEDICINE CLINIC | Facility: CLINIC | Age: 61
End: 2021-12-14

## 2021-12-16 ENCOUNTER — TELEPHONE (OUTPATIENT)
Dept: ENDOCRINOLOGY CLINIC | Facility: CLINIC | Age: 61
End: 2021-12-16

## 2021-12-16 NOTE — TELEPHONE ENCOUNTER
Pt called to speak to RN about her Dexcom. Pt is asking for some assistance with using it. Please call.

## 2021-12-17 ENCOUNTER — NURSE ONLY (OUTPATIENT)
Dept: ENDOCRINOLOGY CLINIC | Facility: CLINIC | Age: 61
End: 2021-12-17
Payer: MEDICAID

## 2021-12-17 DIAGNOSIS — Z79.4 TYPE 2 DIABETES MELLITUS WITH HYPERGLYCEMIA, WITH LONG-TERM CURRENT USE OF INSULIN (HCC): Primary | ICD-10-CM

## 2021-12-17 DIAGNOSIS — E11.65 TYPE 2 DIABETES MELLITUS WITH HYPERGLYCEMIA, WITH LONG-TERM CURRENT USE OF INSULIN (HCC): Primary | ICD-10-CM

## 2021-12-17 PROCEDURE — 99211 OFF/OP EST MAY X REQ PHY/QHP: CPT | Performed by: INTERNAL MEDICINE

## 2021-12-17 NOTE — PROGRESS NOTES
Patient presents for dexcom teaching. She reports using Dexcom before but could not remember if it was the older version.   She presented to clinic with her supplies from the pharmacy and installed Dexcom G6 and clarity isabelle on her phone; she will be using her mychart for her to review. RN also gave her the brochure outlining the steps with pictures. Patient left the clinic with all her questions answered. Total amount spent with patient approximately 35 minutes.

## 2021-12-28 RX ORDER — SYRINGE,INSUL U-500,NDL,0.5ML 31GX15/64"
SYRINGE, EMPTY DISPOSABLE MISCELLANEOUS
Qty: 300 EACH | Refills: 0 | Status: SHIPPED | OUTPATIENT
Start: 2021-12-28

## 2021-12-30 ENCOUNTER — NURSE TRIAGE (OUTPATIENT)
Dept: INTERNAL MEDICINE CLINIC | Facility: CLINIC | Age: 61
End: 2021-12-30

## 2021-12-30 DIAGNOSIS — J02.9 SORE THROAT: Primary | ICD-10-CM

## 2021-12-30 NOTE — TELEPHONE ENCOUNTER
Action Requested: Summary for Provider   []  Critical Lab, Recommendations Needed  [] Need Additional Advice  [x]   FYI    []   Need Orders  [] Need Medications Sent to Pharmacy  []  Other     SUMMARY:   Spoke with pt,  verified, pt took covid home test sprays or wipes according to the label instructions.      You can also get more information at the following websites:   Centers for Disease Control & Prevention (CDC)  What to do if you are sick with coronavirus disease 2019, ShapeHeads.it

## 2022-01-01 DIAGNOSIS — E11.65 TYPE 2 DIABETES MELLITUS WITH HYPERGLYCEMIA, WITH LONG-TERM CURRENT USE OF INSULIN (HCC): ICD-10-CM

## 2022-01-01 DIAGNOSIS — Z79.4 TYPE 2 DIABETES MELLITUS WITH HYPERGLYCEMIA, WITH LONG-TERM CURRENT USE OF INSULIN (HCC): ICD-10-CM

## 2022-01-02 RX ORDER — LANCETS 33 GAUGE
EACH MISCELLANEOUS
Qty: 400 EACH | Refills: 0 | Status: SHIPPED | OUTPATIENT
Start: 2022-01-02

## 2022-01-03 ENCOUNTER — TELEPHONE (OUTPATIENT)
Dept: INTERNAL MEDICINE CLINIC | Facility: CLINIC | Age: 62
End: 2022-01-03

## 2022-01-03 NOTE — TELEPHONE ENCOUNTER
Per Ashley, Victoza 18mg/3ml Pen-injectors needs a prior authorization. Please login to [email or web address] and click \"Enter a Key\".  Enter the patient's last name, date of birth and the Key:    Key: [id number]    Patient Last Name: Sherin     : [birth year]

## 2022-01-06 NOTE — TELEPHONE ENCOUNTER
Zane was approved.     Approved    Prior authorization approved   Case ID: [id number]      Payer:  [address] [place] [address]    [phone]    The case has been Approved from   to   Electronic appeal:  Not s

## 2022-01-10 ENCOUNTER — TELEPHONE (OUTPATIENT)
Dept: ENDOCRINOLOGY CLINIC | Facility: CLINIC | Age: 62
End: 2022-01-10

## 2022-01-10 DIAGNOSIS — Z79.4 TYPE 2 DIABETES MELLITUS WITH HYPERGLYCEMIA, WITH LONG-TERM CURRENT USE OF INSULIN (HCC): ICD-10-CM

## 2022-01-10 DIAGNOSIS — E11.65 TYPE 2 DIABETES MELLITUS WITH HYPERGLYCEMIA, WITH LONG-TERM CURRENT USE OF INSULIN (HCC): ICD-10-CM

## 2022-01-10 DIAGNOSIS — E11.39 TYPE 2 DIABETES MELLITUS WITH OTHER OPHTHALMIC COMPLICATION, WITH LONG-TERM CURRENT USE OF INSULIN (HCC): ICD-10-CM

## 2022-01-10 DIAGNOSIS — Z79.4 TYPE 2 DIABETES MELLITUS WITH OTHER OPHTHALMIC COMPLICATION, WITH LONG-TERM CURRENT USE OF INSULIN (HCC): ICD-10-CM

## 2022-01-10 RX ORDER — INSULIN HUMAN 500 [IU]/ML
INJECTION, SOLUTION SUBCUTANEOUS
Qty: 105 ML | Refills: 0 | Status: SHIPPED | OUTPATIENT
Start: 2022-01-10

## 2022-01-10 RX ORDER — BLOOD SUGAR DIAGNOSTIC
STRIP MISCELLANEOUS
Qty: 400 STRIP | Refills: 0 | Status: SHIPPED | OUTPATIENT
Start: 2022-01-10 | End: 2022-01-13

## 2022-01-10 NOTE — TELEPHONE ENCOUNTER
Patient requesting refills for insulin - states she broke insulin vial and no longer has any insulin. Please call. Thank you.     Current Outpatient Medications   Medication Sig Dispense Refill   • HUMULIN R U-500, CONCENTRATED, 500 UNIT/ML Subcutaneous S

## 2022-01-12 NOTE — TELEPHONE ENCOUNTER
rn called patient states that pharmacist attempted to do override but was not allowed  And patient called insurance to get override but was told no they need to review  And will  Let her know today. Any alternative we can try ? Patient uses u 500 insulin.

## 2022-01-13 DIAGNOSIS — Z79.4 TYPE 2 DIABETES MELLITUS WITH HYPERGLYCEMIA, WITH LONG-TERM CURRENT USE OF INSULIN (HCC): ICD-10-CM

## 2022-01-13 DIAGNOSIS — E11.65 TYPE 2 DIABETES MELLITUS WITH HYPERGLYCEMIA, WITH LONG-TERM CURRENT USE OF INSULIN (HCC): ICD-10-CM

## 2022-01-13 RX ORDER — LOSARTAN POTASSIUM 25 MG/1
TABLET ORAL
Qty: 90 TABLET | Refills: 3 | Status: SHIPPED | OUTPATIENT
Start: 2022-01-13

## 2022-01-13 RX ORDER — BLOOD SUGAR DIAGNOSTIC
STRIP MISCELLANEOUS
Qty: 400 STRIP | Refills: 0 | Status: SHIPPED | OUTPATIENT
Start: 2022-01-13

## 2022-01-17 RX ORDER — ALPRAZOLAM 0.5 MG/1
TABLET ORAL
Qty: 30 TABLET | Refills: 5 | OUTPATIENT
Start: 2022-01-17

## 2022-01-17 RX ORDER — ALPRAZOLAM 0.5 MG/1
TABLET ORAL
Qty: 30 TABLET | Refills: 5 | Status: SHIPPED | OUTPATIENT
Start: 2022-01-17 | End: 2022-07-05

## 2022-01-17 NOTE — TELEPHONE ENCOUNTER
Duplicate request, previously addressed. See other Refill Encounter 1/17/22.      Outpatient Medication Detail     Disp Refills Start End    ALPRAZolam 0.5 MG Oral Tab 30 tablet 5 1/17/2022     Sig: TAKE 1 TABLET BY MOUTH EVERY EVENING    Sent to [facility]

## 2022-01-18 ENCOUNTER — LAB ENCOUNTER (OUTPATIENT)
Dept: LAB | Age: 62
End: 2022-01-18
Attending: INTERNAL MEDICINE
Payer: MEDICAID

## 2022-01-18 DIAGNOSIS — J02.9 SORE THROAT: ICD-10-CM

## 2022-01-21 LAB — SARS-COV-2 RNA RESP QL NAA+PROBE: NOT DETECTED

## 2022-02-01 DIAGNOSIS — E11.39 TYPE 2 DIABETES MELLITUS WITH OTHER OPHTHALMIC COMPLICATION, WITH LONG-TERM CURRENT USE OF INSULIN (HCC): ICD-10-CM

## 2022-02-01 DIAGNOSIS — Z79.4 TYPE 2 DIABETES MELLITUS WITH OTHER OPHTHALMIC COMPLICATION, WITH LONG-TERM CURRENT USE OF INSULIN (HCC): ICD-10-CM

## 2022-02-01 RX ORDER — INSULIN HUMAN 500 [IU]/ML
INJECTION, SOLUTION SUBCUTANEOUS
Qty: 105 ML | Refills: 0 | Status: CANCELLED | OUTPATIENT
Start: 2022-02-01

## 2022-02-02 ENCOUNTER — TELEPHONE (OUTPATIENT)
Dept: ENDOCRINOLOGY CLINIC | Facility: CLINIC | Age: 62
End: 2022-02-02

## 2022-02-02 NOTE — TELEPHONE ENCOUNTER
Medication PA Requested: HUMULIN R U-500, CONCENTRATED, 500 UNIT/ML Subcutaneous Solution                                                         CoverMyMeds Used:  Key:  Quantity: 105 mL  Day Supply:  Sig: Inject 0.35-0.37 mL (175-185 Units total) into the skin 3 (three) times daily before meals.   DX Code: E11.69, Z79.4                                     CPT code (if applicable):   Case Number/Pending Ref#:

## 2022-02-07 NOTE — TELEPHONE ENCOUNTER
rn called [facility] and was informed need to call [employer] [phone]  rn called [employer] to get PA for humulin u500 insulin  Spoke with Carrie santoyo and he faxed over a quantity override form and filled out and faxed back to [phone]  Asked it be expedited   rn called patient to inform override required not pa and form was filled and was sent over   Waiting response.

## 2022-02-08 NOTE — TELEPHONE ENCOUNTER
Received fax from [facility] stating approval of Humulin U500 1/1/22-2/7/23. Spoke with pharmacy and they are able to dispense 40 day supply (2 vials). Notified patient that they are getting Rx ready for her.

## 2022-02-08 NOTE — TELEPHONE ENCOUNTER
Pt called in to follow up on this and states she needs the medication as soon as possible.  Please call

## 2022-02-15 RX ORDER — PROCHLORPERAZINE 25 MG/1
1 SUPPOSITORY RECTAL
Qty: 1 EACH | Refills: 1 | Status: SHIPPED | OUTPATIENT
Start: 2022-02-15 | End: 2022-12-01

## 2022-02-15 RX ORDER — ALPRAZOLAM 0.5 MG/1
TABLET ORAL
Qty: 30 TABLET | Refills: 5 | OUTPATIENT
Start: 2022-02-15

## 2022-03-09 ENCOUNTER — OFFICE VISIT (OUTPATIENT)
Dept: ENDOCRINOLOGY CLINIC | Facility: CLINIC | Age: 62
End: 2022-03-09
Payer: MEDICAID

## 2022-03-09 VITALS
HEART RATE: 99 BPM | SYSTOLIC BLOOD PRESSURE: 132 MMHG | DIASTOLIC BLOOD PRESSURE: 67 MMHG | BODY MASS INDEX: 22 KG/M2 | WEIGHT: 107 LBS

## 2022-03-09 DIAGNOSIS — E11.69 TYPE 2 DIABETES MELLITUS WITH OTHER SPECIFIED COMPLICATION, WITH LONG-TERM CURRENT USE OF INSULIN (HCC): Primary | ICD-10-CM

## 2022-03-09 DIAGNOSIS — Z79.4 TYPE 2 DIABETES MELLITUS WITH OTHER SPECIFIED COMPLICATION, WITH LONG-TERM CURRENT USE OF INSULIN (HCC): Primary | ICD-10-CM

## 2022-03-09 DIAGNOSIS — Z13.220 LIPID SCREENING: ICD-10-CM

## 2022-03-09 LAB
CARTRIDGE LOT#: ABNORMAL NUMERIC
GLUCOSE BLOOD: 132
HEMOGLOBIN A1C: 6.9 % (ref 4.3–5.6)
TEST STRIP LOT #: NORMAL NUMERIC

## 2022-03-09 PROCEDURE — 3075F SYST BP GE 130 - 139MM HG: CPT | Performed by: INTERNAL MEDICINE

## 2022-03-09 PROCEDURE — 95251 CONT GLUC MNTR ANALYSIS I&R: CPT | Performed by: INTERNAL MEDICINE

## 2022-03-09 PROCEDURE — 36416 COLLJ CAPILLARY BLOOD SPEC: CPT | Performed by: INTERNAL MEDICINE

## 2022-03-09 PROCEDURE — 83036 HEMOGLOBIN GLYCOSYLATED A1C: CPT | Performed by: INTERNAL MEDICINE

## 2022-03-09 PROCEDURE — 3078F DIAST BP <80 MM HG: CPT | Performed by: INTERNAL MEDICINE

## 2022-03-09 PROCEDURE — 3044F HG A1C LEVEL LT 7.0%: CPT | Performed by: INTERNAL MEDICINE

## 2022-03-09 PROCEDURE — 99214 OFFICE O/P EST MOD 30 MIN: CPT | Performed by: INTERNAL MEDICINE

## 2022-03-25 ENCOUNTER — TELEPHONE (OUTPATIENT)
Dept: FAMILY MEDICINE CLINIC | Facility: CLINIC | Age: 62
End: 2022-03-25

## 2022-03-25 RX ORDER — BLOOD-GLUCOSE SENSOR
EACH MISCELLANEOUS
Qty: 9 EACH | Refills: 0 | Status: SHIPPED | OUTPATIENT
Start: 2022-03-25

## 2022-03-25 NOTE — TELEPHONE ENCOUNTER
Verified name and . Patient reports that she has been having intermittent bloating and gas pain after eating over the past month. She states that she has history of spastic bowel and IBS. She denies any symptoms at this time. She is requesting an appointment to be assessed. Appointment scheduled:  Future Appointments   Date Time Provider Ruchi Franks   2022  4:30 PM Nicolas Warren MD [address] [place] [address]   2022  9:30 AM Stefano Massey MD [facility]     Patient was advised to call for any worsening of symptoms. Patient verbalizes understanding and agrees with plan.

## 2022-04-03 RX ORDER — SYRINGE,INSUL U-500,NDL,0.5ML 31GX15/64"
SYRINGE, EMPTY DISPOSABLE MISCELLANEOUS
Qty: 300 EACH | Refills: 0 | Status: CANCELLED | OUTPATIENT
Start: 2022-04-03

## 2022-04-04 ENCOUNTER — OFFICE VISIT (OUTPATIENT)
Dept: INTERNAL MEDICINE CLINIC | Facility: CLINIC | Age: 62
End: 2022-04-04
Payer: MEDICAID

## 2022-04-04 VITALS
SYSTOLIC BLOOD PRESSURE: 136 MMHG | HEIGHT: 58 IN | BODY MASS INDEX: 22.67 KG/M2 | HEART RATE: 104 BPM | TEMPERATURE: 99 F | DIASTOLIC BLOOD PRESSURE: 54 MMHG | WEIGHT: 108 LBS

## 2022-04-04 DIAGNOSIS — K58.2 IRRITABLE BOWEL SYNDROME WITH BOTH CONSTIPATION AND DIARRHEA: Primary | ICD-10-CM

## 2022-04-04 DIAGNOSIS — Z79.4 TYPE 2 DIABETES MELLITUS WITH OTHER OPHTHALMIC COMPLICATION, WITH LONG-TERM CURRENT USE OF INSULIN (HCC): ICD-10-CM

## 2022-04-04 DIAGNOSIS — E11.39 TYPE 2 DIABETES MELLITUS WITH OTHER OPHTHALMIC COMPLICATION, WITH LONG-TERM CURRENT USE OF INSULIN (HCC): ICD-10-CM

## 2022-04-04 PROCEDURE — 3075F SYST BP GE 130 - 139MM HG: CPT | Performed by: INTERNAL MEDICINE

## 2022-04-04 PROCEDURE — 99214 OFFICE O/P EST MOD 30 MIN: CPT | Performed by: INTERNAL MEDICINE

## 2022-04-04 PROCEDURE — 3008F BODY MASS INDEX DOCD: CPT | Performed by: INTERNAL MEDICINE

## 2022-04-04 PROCEDURE — 3078F DIAST BP <80 MM HG: CPT | Performed by: INTERNAL MEDICINE

## 2022-04-04 RX ORDER — SYRINGE,INSUL U-500,NDL,0.5ML 31GX15/64"
SYRINGE, EMPTY DISPOSABLE MISCELLANEOUS
Qty: 300 EACH | Refills: 0 | Status: SHIPPED | OUTPATIENT
Start: 2022-04-04

## 2022-04-04 RX ORDER — DICYCLOMINE HYDROCHLORIDE 10 MG/1
10 CAPSULE ORAL 4 TIMES DAILY
Qty: 60 CAPSULE | Refills: 3 | Status: SHIPPED | OUTPATIENT
Start: 2022-04-04 | End: 2022-04-14

## 2022-04-04 NOTE — ASSESSMENT & PLAN NOTE
Did Cologard in the past , not sure when last colonoscopy was , it was done at [facility]. Bentyl   High fiber diet.    Recheck in 1 month with physical. Initiate Treatment: Begin taking Doxycycline 100 mg twice daily and apply Clindamycin gel to scalp, face and ears twice daily. Continue Regimen: Washing hair with Head and Shoulders while washing the scalp with Cetaphil gentle cleanser. Detail Level: Zone

## 2022-04-20 ENCOUNTER — TELEPHONE (OUTPATIENT)
Dept: ENDOCRINOLOGY CLINIC | Facility: CLINIC | Age: 62
End: 2022-04-20

## 2022-04-27 NOTE — TELEPHONE ENCOUNTER
Please review refill protocol failed/ no protocol  Requested Prescriptions   Pending Prescriptions Disp Refills    BIMATOPROST 0.03 % External Solution [Pharmacy Med Name: BIMATOPROST 0.03% OPH SOL W/BRUSHES] 3 mL 1     Sig: PLACE 1 DROP INTO BOTH EYES NIGHTLY        There is no refill protocol information for this order

## 2022-04-28 ENCOUNTER — TELEPHONE (OUTPATIENT)
Dept: ENDOCRINOLOGY CLINIC | Facility: CLINIC | Age: 62
End: 2022-04-28

## 2022-04-28 RX ORDER — BIMATOPROST 3 UG/ML
1 SOLUTION TOPICAL NIGHTLY
Qty: 3 ML | Refills: 1 | Status: SHIPPED | OUTPATIENT
Start: 2022-04-28

## 2022-04-28 RX ORDER — INSULIN HUMAN 500 [IU]/ML
INJECTION, SOLUTION SUBCUTANEOUS
Qty: 100 ML | Refills: 0 | Status: SHIPPED | OUTPATIENT
Start: 2022-04-28

## 2022-05-16 RX ORDER — EMPAGLIFLOZIN 25 MG/1
TABLET, FILM COATED ORAL
Qty: 90 TABLET | Refills: 0 | Status: SHIPPED | OUTPATIENT
Start: 2022-05-16

## 2022-05-23 RX ORDER — VENLAFAXINE HYDROCHLORIDE 75 MG/1
75 CAPSULE, EXTENDED RELEASE ORAL DAILY
Qty: 90 CAPSULE | Refills: 1 | Status: SHIPPED | OUTPATIENT
Start: 2022-05-23 | End: 2022-11-17

## 2022-05-23 NOTE — TELEPHONE ENCOUNTER
Refill passed per [facility] protocol.   Requested Prescriptions   Pending Prescriptions Disp Refills    VENLAFAXINE ER 75 MG Oral Capsule SR 24 Hr [Pharmacy Med Name: VENLAFAXINE ER 75MG CAPSULES] 90 capsule 1     Sig: TAKE 1 CAPSULE(75 MG) BY MOUTH DAILY        Psychiatric Non-Scheduled (Anti-Anxiety) Passed - 5/22/2022  5:56 PM        Passed - Appointment in last 6 or next 3 months            Future Appointments         Provider Department Appt Notes    In 1 week Andry Asher MD [facility], Anamaria Hammer px    In 1 month Cyn Kelley MD [facility], Anamaria Hammer [address] Visits              1 month ago Irritable bowel syndrome with both constipation and diarrhea    [facility], Johnnie Hammer MD    Office Visit    2 months ago Type 2 diabetes mellitus with other specified complication, with long-term current use of insulin [facility])    [facility], Anamaria Hammer MD    Office Visit    5 months ago Type 2 diabetes mellitus with hyperglycemia, with long-term current use of insulin [facility])    [facility] Endocrinology    Nurse Only    6 months ago Dyslipidemia    Jenita Essex, MD    Office Visit    6 months ago Conductive hearing loss of left ear, unspecified hearing status on contralateral side    [facility] Erik Paez MD    Office Visit

## 2022-06-02 ENCOUNTER — TELEPHONE (OUTPATIENT)
Dept: INTERNAL MEDICINE CLINIC | Facility: CLINIC | Age: 62
End: 2022-06-02

## 2022-06-02 ENCOUNTER — LAB ENCOUNTER (OUTPATIENT)
Dept: LAB | Age: 62
End: 2022-06-02
Attending: INTERNAL MEDICINE
Payer: MEDICAID

## 2022-06-02 DIAGNOSIS — B34.9 VIRAL INFECTION: Primary | ICD-10-CM

## 2022-06-02 DIAGNOSIS — B34.9 VIRAL INFECTION: ICD-10-CM

## 2022-06-03 LAB — SARS-COV-2 RNA RESP QL NAA+PROBE: NOT DETECTED

## 2022-06-06 ENCOUNTER — TELEPHONE (OUTPATIENT)
Dept: INTERNAL MEDICINE CLINIC | Facility: CLINIC | Age: 62
End: 2022-06-06

## 2022-06-06 RX ORDER — PROMETHAZINE HYDROCHLORIDE AND CODEINE PHOSPHATE 6.25; 1 MG/5ML; MG/5ML
2.5 SYRUP ORAL EVERY 4 HOURS PRN
Qty: 240 ML | Refills: 0 | Status: SHIPPED | OUTPATIENT
Start: 2022-06-06

## 2022-06-10 ENCOUNTER — TELEPHONE (OUTPATIENT)
Dept: INTERNAL MEDICINE CLINIC | Facility: CLINIC | Age: 62
End: 2022-06-10

## 2022-06-10 NOTE — TELEPHONE ENCOUNTER
Patient calling to ask if she should schedule a physical. Her  is positive for covid as of this morning. Advised she should call back next week to schedule a physical. Patient also requesting a cough medication with hydrocodone in it. Advised since     promethazine-codeine 6.25-10 MG/5ML Oral Syrup 240 mL     Was recently sent to her pharmacy that most likely the cough medication with hydrocodone won't be sent due to both being a controlled substance. Advised I will message her provider for his advice     Please advise.

## 2022-06-12 ENCOUNTER — PATIENT MESSAGE (OUTPATIENT)
Dept: INTERNAL MEDICINE CLINIC | Facility: CLINIC | Age: 62
End: 2022-06-12

## 2022-06-13 ENCOUNTER — TELEPHONE (OUTPATIENT)
Dept: ENDOCRINOLOGY CLINIC | Facility: CLINIC | Age: 62
End: 2022-06-13

## 2022-06-13 RX ORDER — FLUCONAZOLE 150 MG/1
150 TABLET ORAL ONCE
Qty: 1 TABLET | Refills: 1 | Status: SHIPPED | OUTPATIENT
Start: 2022-06-13 | End: 2022-06-13

## 2022-06-13 NOTE — TELEPHONE ENCOUNTER
I spoke with the patient. She c/o vaginal itching and burning that began shortly after starting augmentin. Denies bleeding or discharge. Used OTC monistat without relief. Requesting rx. Diflucan pended for signature.

## 2022-06-13 NOTE — TELEPHONE ENCOUNTER
ALFONSO MONROE Requested: Dexcom G6 sensor                                                         CoverMyMeds Used:  Key:  Quantity: 9  Day Supply: 90 days  Sig: Change sensor every 10 days  DX Code: E11.69                                   Case Number/Pending Ref#:    ALFONSO MONROE Requested: Dexcom G6 transmitter    CoverMyMeds Used:  Key:  Quantity: 1  Day Supply: 90 days  Sig: Change transmitter every 3 months   DX Code: E11.69                                    Case Number/Pending Ref#:

## 2022-06-13 NOTE — TELEPHONE ENCOUNTER
From: Mayte Merritt  To: Padmini Douglass MD  Sent: 6/12/2022 4:29 PM CDT  Subject: medical issue    I have a request for medication for a very bad yeast infection that i got from the antibiotic. I bought the over the counter stuff for the problem, it didn't work. Please give me something for this problem that will work.  Thank you    Mayte Merritt

## 2022-06-17 DIAGNOSIS — E11.39 TYPE 2 DIABETES MELLITUS WITH OTHER OPHTHALMIC COMPLICATION, WITH LONG-TERM CURRENT USE OF INSULIN (HCC): ICD-10-CM

## 2022-06-17 DIAGNOSIS — Z79.4 TYPE 2 DIABETES MELLITUS WITH OTHER OPHTHALMIC COMPLICATION, WITH LONG-TERM CURRENT USE OF INSULIN (HCC): ICD-10-CM

## 2022-06-17 RX ORDER — LIRAGLUTIDE 6 MG/ML
INJECTION SUBCUTANEOUS
Qty: 18 ML | Refills: 3 | Status: SHIPPED | OUTPATIENT
Start: 2022-06-17

## 2022-06-17 NOTE — TELEPHONE ENCOUNTER
Received a fax from [facility] stating, \"Dexcom G6 Sensor Miscellaneous effective 03/17/2022 and ends 06/15/2023\"    Case Number: [id number]  ------------------------------------------------------------------    Received a fax from [facility] stating, \"Dexcom G6 Transmitter Miscellaneous effective 06/02/2022 and ends 06/15/2023\"    Case Number: [id number]    Etherios message sent     Fax sent to scanning

## 2022-06-23 ENCOUNTER — TELEPHONE (OUTPATIENT)
Dept: INTERNAL MEDICINE CLINIC | Facility: CLINIC | Age: 62
End: 2022-06-23

## 2022-06-23 NOTE — TELEPHONE ENCOUNTER
Patient states she had a physical appointment that was cancelled 6/2/22, due to her having a cough. Her COVID test was negative. Patient states she took antibiotic and cough medicine. Patient still having persistent, dry cough. Patient asking if it is ok to schedule an in person appointment for the cough. Patient declining video visit. Please advise.

## 2022-06-25 NOTE — TELEPHONE ENCOUNTER
Quantity Limit override form received, completed and faxed to [address] [place] [address] clinical review dept with office note of 1/20/2021 and phone note of 5/11/2021. Urgent review requested. Fax confirmation received.   Awaiting determination NEGATIVE

## 2022-06-27 ENCOUNTER — OFFICE VISIT (OUTPATIENT)
Dept: INTERNAL MEDICINE CLINIC | Facility: CLINIC | Age: 62
End: 2022-06-27
Payer: MEDICAID

## 2022-06-27 VITALS
SYSTOLIC BLOOD PRESSURE: 126 MMHG | DIASTOLIC BLOOD PRESSURE: 58 MMHG | TEMPERATURE: 98 F | HEART RATE: 84 BPM | BODY MASS INDEX: 22.88 KG/M2 | HEIGHT: 58 IN | WEIGHT: 109 LBS

## 2022-06-27 DIAGNOSIS — J98.01 COUGH DUE TO BRONCHOSPASM: Primary | ICD-10-CM

## 2022-06-27 PROBLEM — J44.9 CHRONIC OBSTRUCTIVE PULMONARY DISEASE, UNSPECIFIED (HCC): Status: ACTIVE | Noted: 2022-06-27

## 2022-06-27 PROCEDURE — 3074F SYST BP LT 130 MM HG: CPT | Performed by: INTERNAL MEDICINE

## 2022-06-27 PROCEDURE — 3008F BODY MASS INDEX DOCD: CPT | Performed by: INTERNAL MEDICINE

## 2022-06-27 PROCEDURE — 3078F DIAST BP <80 MM HG: CPT | Performed by: INTERNAL MEDICINE

## 2022-06-27 PROCEDURE — 99214 OFFICE O/P EST MOD 30 MIN: CPT | Performed by: INTERNAL MEDICINE

## 2022-06-27 RX ORDER — FLUTICASONE PROPIONATE AND SALMETEROL 250; 50 UG/1; UG/1
1 POWDER RESPIRATORY (INHALATION) EVERY 12 HOURS SCHEDULED
Qty: 60 EACH | Refills: 2 | Status: SHIPPED | OUTPATIENT
Start: 2022-06-27

## 2022-06-27 RX ORDER — DICYCLOMINE HYDROCHLORIDE 10 MG/1
CAPSULE ORAL
COMMUNITY
Start: 2022-05-02

## 2022-06-27 RX ORDER — FLUCONAZOLE 150 MG/1
TABLET ORAL
COMMUNITY
Start: 2022-06-24 | End: 2022-06-27 | Stop reason: ALTCHOICE

## 2022-06-28 ENCOUNTER — TELEPHONE (OUTPATIENT)
Dept: INTERNAL MEDICINE CLINIC | Facility: CLINIC | Age: 62
End: 2022-06-28

## 2022-07-05 RX ORDER — ALPRAZOLAM 0.5 MG/1
TABLET ORAL
Qty: 30 TABLET | Refills: 0 | Status: SHIPPED | OUTPATIENT
Start: 2022-07-05

## 2022-07-12 ENCOUNTER — OFFICE VISIT (OUTPATIENT)
Dept: INTERNAL MEDICINE CLINIC | Facility: CLINIC | Age: 62
End: 2022-07-12
Payer: MEDICARE

## 2022-07-12 VITALS
HEART RATE: 88 BPM | BODY MASS INDEX: 22.67 KG/M2 | SYSTOLIC BLOOD PRESSURE: 120 MMHG | DIASTOLIC BLOOD PRESSURE: 54 MMHG | WEIGHT: 108 LBS | TEMPERATURE: 99 F | HEIGHT: 58 IN

## 2022-07-12 DIAGNOSIS — J98.01 COUGH DUE TO BRONCHOSPASM: Primary | ICD-10-CM

## 2022-07-12 DIAGNOSIS — G62.9 PERIPHERAL POLYNEUROPATHY: ICD-10-CM

## 2022-07-12 DIAGNOSIS — S39.012A STRAIN OF LUMBAR REGION, INITIAL ENCOUNTER: ICD-10-CM

## 2022-07-12 DIAGNOSIS — R26.89 IMBALANCE: ICD-10-CM

## 2022-07-12 PROCEDURE — 99214 OFFICE O/P EST MOD 30 MIN: CPT | Performed by: INTERNAL MEDICINE

## 2022-07-13 ENCOUNTER — OFFICE VISIT (OUTPATIENT)
Dept: ENDOCRINOLOGY CLINIC | Facility: CLINIC | Age: 62
End: 2022-07-13
Payer: MEDICARE

## 2022-07-13 VITALS
DIASTOLIC BLOOD PRESSURE: 65 MMHG | WEIGHT: 108.63 LBS | HEART RATE: 78 BPM | SYSTOLIC BLOOD PRESSURE: 119 MMHG | BODY MASS INDEX: 23 KG/M2

## 2022-07-13 DIAGNOSIS — E11.65 TYPE 2 DIABETES MELLITUS WITH HYPERGLYCEMIA, WITH LONG-TERM CURRENT USE OF INSULIN (HCC): Primary | ICD-10-CM

## 2022-07-13 DIAGNOSIS — Z79.4 TYPE 2 DIABETES MELLITUS WITH HYPERGLYCEMIA, WITH LONG-TERM CURRENT USE OF INSULIN (HCC): Primary | ICD-10-CM

## 2022-07-13 LAB
CARTRIDGE LOT#: ABNORMAL NUMERIC
GLUCOSE BLOOD: 271
HEMOGLOBIN A1C: 6.8 % (ref 4.3–5.6)
TEST STRIP LOT #: NORMAL NUMERIC

## 2022-07-13 PROCEDURE — 36416 COLLJ CAPILLARY BLOOD SPEC: CPT | Performed by: INTERNAL MEDICINE

## 2022-07-13 PROCEDURE — 83036 HEMOGLOBIN GLYCOSYLATED A1C: CPT | Performed by: INTERNAL MEDICINE

## 2022-07-13 PROCEDURE — 95251 CONT GLUC MNTR ANALYSIS I&R: CPT | Performed by: INTERNAL MEDICINE

## 2022-07-13 PROCEDURE — 99214 OFFICE O/P EST MOD 30 MIN: CPT | Performed by: INTERNAL MEDICINE

## 2022-07-13 RX ORDER — DULAGLUTIDE 0.75 MG/.5ML
0.75 INJECTION, SOLUTION SUBCUTANEOUS WEEKLY
Qty: 2 ML | Refills: 0 | Status: SHIPPED | OUTPATIENT
Start: 2022-07-13

## 2022-07-13 NOTE — PATIENT INSTRUCTIONS
Insulin U 500: Take 5 units more with lunch and 5 units less with dinner  STOP victoza on the day you take your first injection of trulicity  We will start on 0.75 mg once a week dose of trulicity.    Based on your response we will go up on the dose to 1.5 mg once a week  Please contact us in 2 weeks after you start trulicity about your sugars and how you are doing on it  Please call if you have an side effects  Continue with jardiance at current dose    Get blood work, please fast for 8-10 hours prior to blood work    We will see you back in 3 months    Zain Baez

## 2022-07-21 RX ORDER — PROCHLORPERAZINE 25 MG/1
1 SUPPOSITORY RECTAL
Qty: 3 EACH | Refills: 2 | Status: SHIPPED | OUTPATIENT
Start: 2022-07-21 | End: 2022-10-27

## 2022-07-27 RX ORDER — PEN NEEDLE, DIABETIC 32GX 5/32"
NEEDLE, DISPOSABLE MISCELLANEOUS
Qty: 100 EACH | Refills: 0 | Status: SHIPPED | OUTPATIENT
Start: 2022-07-27

## 2022-07-28 ENCOUNTER — TELEPHONE (OUTPATIENT)
Dept: ENDOCRINOLOGY CLINIC | Facility: CLINIC | Age: 62
End: 2022-07-28

## 2022-07-28 ENCOUNTER — TELEPHONE (OUTPATIENT)
Dept: INTERNAL MEDICINE CLINIC | Facility: CLINIC | Age: 62
End: 2022-07-28

## 2022-07-28 RX ORDER — FLUCONAZOLE 100 MG/1
100 TABLET ORAL DAILY
Qty: 7 TABLET | Refills: 0 | Status: SHIPPED | OUTPATIENT
Start: 2022-07-28

## 2022-07-28 NOTE — TELEPHONE ENCOUNTER
Patient reports few weeks ago was treated for yeast infection with Diflucan, symptoms were improving but have returned for the last 2-3 weeks. Currently having discharge, vaginal itching and discomfort to outer vaginal area. Patient requesting if a cream can be prescribed to treat yeast infection, please advise.

## 2022-07-28 NOTE — TELEPHONE ENCOUNTER
Received denial for coverage from Medicare part D for Dexcom CGM. Fax states that coverage may be given through Medicare Part B. Received fax from [facility] requesting chart notes for Medicare Part B coverage of Dexcom CGMs. Request for LOV note from 7/13/22 faxed to [phone].

## 2022-07-28 NOTE — TELEPHONE ENCOUNTER
Previous treatment was a one time pill , She will now need Diflucan for a week , one a day , I will send the Rx.

## 2022-08-08 ENCOUNTER — PATIENT MESSAGE (OUTPATIENT)
Dept: ENDOCRINOLOGY CLINIC | Facility: CLINIC | Age: 62
End: 2022-08-08

## 2022-08-08 DIAGNOSIS — Z79.4 TYPE 2 DIABETES MELLITUS WITH HYPERGLYCEMIA, WITH LONG-TERM CURRENT USE OF INSULIN (HCC): ICD-10-CM

## 2022-08-08 DIAGNOSIS — E11.65 TYPE 2 DIABETES MELLITUS WITH HYPERGLYCEMIA, WITH LONG-TERM CURRENT USE OF INSULIN (HCC): ICD-10-CM

## 2022-08-08 RX ORDER — ALPRAZOLAM 0.5 MG/1
TABLET ORAL
Qty: 30 TABLET | Refills: 3 | Status: SHIPPED | OUTPATIENT
Start: 2022-08-08 | End: 2022-12-05

## 2022-08-08 RX ORDER — DULAGLUTIDE 1.5 MG/.5ML
1.5 INJECTION, SOLUTION SUBCUTANEOUS
Qty: 6 ML | Refills: 0 | Status: SHIPPED | OUTPATIENT
Start: 2022-08-08

## 2022-08-08 RX ORDER — DULAGLUTIDE 0.75 MG/.5ML
INJECTION, SOLUTION SUBCUTANEOUS
Qty: 2 ML | Refills: 0 | Status: SHIPPED | OUTPATIENT
Start: 2022-08-08 | End: 2022-08-08

## 2022-08-10 RX ORDER — DICYCLOMINE HYDROCHLORIDE 10 MG/1
10 CAPSULE ORAL 4 TIMES DAILY
Qty: 60 CAPSULE | Refills: 5 | Status: SHIPPED | OUTPATIENT
Start: 2022-08-10

## 2022-08-14 RX ORDER — EMPAGLIFLOZIN 25 MG/1
TABLET, FILM COATED ORAL
Qty: 90 TABLET | Refills: 0 | Status: SHIPPED | OUTPATIENT
Start: 2022-08-14

## 2022-08-17 ENCOUNTER — PATIENT MESSAGE (OUTPATIENT)
Dept: ENDOCRINOLOGY CLINIC | Facility: CLINIC | Age: 62
End: 2022-08-17

## 2022-08-18 ENCOUNTER — OFFICE VISIT (OUTPATIENT)
Dept: AUDIOLOGY | Facility: CLINIC | Age: 62
End: 2022-08-18
Payer: MEDICARE

## 2022-08-18 ENCOUNTER — TELEPHONE (OUTPATIENT)
Dept: OTOLARYNGOLOGY | Facility: CLINIC | Age: 62
End: 2022-08-18

## 2022-08-18 DIAGNOSIS — H90.0 CONDUCTIVE HEARING LOSS, BILATERAL: Primary | ICD-10-CM

## 2022-08-18 PROCEDURE — 92567 TYMPANOMETRY: CPT | Performed by: AUDIOLOGIST

## 2022-08-18 PROCEDURE — 92557 COMPREHENSIVE HEARING TEST: CPT | Performed by: AUDIOLOGIST

## 2022-08-18 RX ORDER — BIMATOPROST 3 UG/ML
1 SOLUTION TOPICAL NIGHTLY
Qty: 3 ML | Refills: 1 | Status: SHIPPED | OUTPATIENT
Start: 2022-08-18

## 2022-08-18 NOTE — TELEPHONE ENCOUNTER
Conductive hearing loss. Possibly otosclerosis. Can consider seeing an otologist for stapedectomy. I have recommended Dr. Delma Cortez.

## 2022-08-18 NOTE — TELEPHONE ENCOUNTER
Spoke with pt in regards to [employer]. Pt had first dose of increased Trulicity 0.2GY/MVAKFY on Saturday. She has been giving U500 175u TID. At appt, recommended she increased lunch to 180u but pt continues to give 175u. Reviewed dexcom report which shows the highest post prandial hyperglycemia around lunch. Post dinner returns to target range and sometimes even lower trending. Pt states she gives her U500 after eating. Reviewed MOA of C727 and Trulicity and there is room to always increase dose but to give it more time for now.      Plan:  - Increase lunch dose U500 to 180u   - Continue U500 175U for breakfast and dinner  - Continue Trulicity 1.0RV/HRZFBL  - Continue Jardiance  - Take U500 always BEFORE meals (aim for 15-30 mins)

## 2022-08-18 NOTE — TELEPHONE ENCOUNTER
Agree with advise given by [facility]. Since patient does return within range in 4-5hrs after meals, this means that her doses are probably ok to keep the same at this time, however it is extremely important that she starts taking doses 30 mins prior to start of meal to prevent high BG peak within 2hrs after meals. Also agree to give Trulicity 9.2WG dose some more time. It unusually takes 1-2 weeks to really see BG readings adjust.       Thank you!

## 2022-08-18 NOTE — TELEPHONE ENCOUNTER
From: Arletha Hashimoto  To: Felicia Bhatia MD  Sent: 8/17/2022 8:48 AM CDT  Subject: blood sugar    Hello Dr.   Since i started the Trulicity my blood sugar is high all day In the 300's . I started taking the shot sat. Does it take awhile or its not working. Please advise. You can look at my Dexcom correct to see my numbers.  Thank you

## 2022-08-19 ENCOUNTER — TELEPHONE (OUTPATIENT)
Dept: INTERNAL MEDICINE CLINIC | Facility: CLINIC | Age: 62
End: 2022-08-19

## 2022-08-19 ENCOUNTER — TELEPHONE (OUTPATIENT)
Dept: ENDOCRINOLOGY CLINIC | Facility: CLINIC | Age: 62
End: 2022-08-19

## 2022-08-19 NOTE — TELEPHONE ENCOUNTER
VICTOZA 18MG/3ML INJ PEN 3ML(3PACK)     ADMINISTER 1.8 MG UNDER THE SKIN DAILY   QTY: 18       Message:   Plan does not cover the medication. Please call plan at [phone] to initiate a prior authorization or call/fax pharmacy to change medication.  Patient ID is [id number]

## 2022-08-19 NOTE — TELEPHONE ENCOUNTER
Per Ashley, Bimatoprost 0.03% Oph Sol W/Brushes is not covered under patient's insurance. Please call plan at [phone] to initiate a prior authorization or call/fax [facility] to change medication. Patient ID # is [id number].

## 2022-08-19 NOTE — TELEPHONE ENCOUNTER
Please send my chart message Monday asking for update on BG  We can adjust medications accordingly  Thanks

## 2022-08-19 NOTE — TELEPHONE ENCOUNTER
See message below. Medication not covered. Do you want to proceed with PA or prescribe alternative? Please advise.

## 2022-08-22 ENCOUNTER — PATIENT MESSAGE (OUTPATIENT)
Dept: ENDOCRINOLOGY CLINIC | Facility: CLINIC | Age: 62
End: 2022-08-22

## 2022-08-22 DIAGNOSIS — E11.65 TYPE 2 DIABETES MELLITUS WITH HYPERGLYCEMIA, WITH LONG-TERM CURRENT USE OF INSULIN (HCC): ICD-10-CM

## 2022-08-22 DIAGNOSIS — Z79.4 TYPE 2 DIABETES MELLITUS WITH HYPERGLYCEMIA, WITH LONG-TERM CURRENT USE OF INSULIN (HCC): ICD-10-CM

## 2022-08-22 NOTE — TELEPHONE ENCOUNTER
Continuous Glucose Monitoring Interpretation    Divine Crandall has undergone continuous glucose monitoring with the personal dexcom She was evaluated with the dexcom from Aug 9 to Aug 22, 2022. Her blood glucose tracings demonstrated:   Very high 19 %   High 35 %  Target 45 %   < 1 % low  < 1 % very low  She did not experience any hypoglycemia during the week of evaluation.   As a result of her testing the following plan was made:     - Insulin U 500 175 BF and lunch and 170 with dinner--> 180 breakfast and lunch and 175 with dinner  - c/w trulicity and jardiance  - Send update on BG in 3-4 days    Viviane Short MD

## 2022-08-22 NOTE — TELEPHONE ENCOUNTER
From: Divine Crandall  To: Viviane Short MD  Sent: 8/17/2022 8:48 AM CDT  Subject: blood sugar    Hello Dr.   Since i started the Trulicity my blood sugar is high all day In the 300's . I started taking the shot sat. Does it take awhile or its not working. Please advise. You can look at my Dexcom correct to see my numbers.  Thank you

## 2022-08-23 NOTE — TELEPHONE ENCOUNTER
Medication  CGM  pump supply Requested: VICTOZA 18MG/3ML INJ PEN 3ML(3PACK)     Sig: ADMINISTER 1.8 MG UNDER THE SKIN DAILY     DX Code: E11.65                                       Case Number/Pending Ref#:

## 2022-08-24 ENCOUNTER — HOSPITAL ENCOUNTER (OUTPATIENT)
Dept: MAMMOGRAPHY | Age: 62
Discharge: HOME | End: 2022-08-24
Attending: INTERNAL MEDICINE
Payer: MEDICAID

## 2022-08-24 DIAGNOSIS — Z12.31 BREAST CANCER SCREENING BY MAMMOGRAM: ICD-10-CM

## 2022-08-25 ENCOUNTER — PATIENT MESSAGE (OUTPATIENT)
Dept: ENDOCRINOLOGY CLINIC | Facility: CLINIC | Age: 62
End: 2022-08-25

## 2022-08-25 NOTE — TELEPHONE ENCOUNTER
From: Suresh Jacobson  To: Shashank Meraz MD  Sent: 8/25/2022 1:17 PM CDT  Subject: BLOOD SUGARS    MY BLOOD SUGARS AS OF WED IS BB: 268 BL: 12 BD: 176 BEDTIME 180    MOST SUGARS ARE CLOSE  EVERYDAY SOMETIMES AT NIGHT ITS LOWER I HAVE FOLLOWED THE DOSAGE YOU GAVE ME DOES NOT WORK DOES TRULICITY GO UP IN DOSAGE?     [address]

## 2022-08-26 ENCOUNTER — PATIENT MESSAGE (OUTPATIENT)
Dept: ENDOCRINOLOGY CLINIC | Facility: CLINIC | Age: 62
End: 2022-08-26

## 2022-08-26 NOTE — TELEPHONE ENCOUNTER
From: Aj Salmeron  To: Cammy Reed MD  Sent: 8/25/2022 1:17 PM CDT  Subject: BLOOD SUGARS    MY BLOOD SUGARS AS OF WED IS BB: 268 BL: 12 BD: 176 BEDTIME 180    MOST SUGARS ARE CLOSE  EVERYDAY SOMETIMES AT NIGHT ITS LOWER I HAVE FOLLOWED THE DOSAGE YOU GAVE ME DOES NOT WORK DOES TRULICITY GO UP IN DOSAGE?     [address]

## 2022-08-29 NOTE — TELEPHONE ENCOUNTER
Per LOV   \"Stop victoza  Start Truilcity with gradual dose titration as discussed  Pen teaching provided\"    Per TE 08/26/22, patient was given the option to return to Victoza or continue Trulicity. Patient chose to continue Trulicity.

## 2022-08-31 ENCOUNTER — PATIENT MESSAGE (OUTPATIENT)
Dept: ENDOCRINOLOGY CLINIC | Facility: CLINIC | Age: 62
End: 2022-08-31

## 2022-08-31 NOTE — TELEPHONE ENCOUNTER
From: Zaid Dos Santos  Sent: 8/31/2022 9:52 AM CDT  To: Mira Bryant Clinical Staff  Subject: BLOOD SUGARS    MY sugars are still high. I took to shots on Saturday of Trulicity. I am taking the amount of insulin you suggested. Do you know why this Trulicity does not work for me? Tonio Carver So my sugars have been over 300. This week they have been 200's. I guess if it doesn't work I should go back to Opality or is there another 1 week medicine that works Its nice that I don't have to take a extra shot everyday.  Thank you Avril Bernard

## 2022-09-01 ENCOUNTER — TELEPHONE (OUTPATIENT)
Dept: INTERNAL MEDICINE CLINIC | Facility: CLINIC | Age: 62
End: 2022-09-01

## 2022-09-01 ENCOUNTER — HOSPITAL ENCOUNTER (OUTPATIENT)
Dept: MAMMOGRAPHY | Facility: HOSPITAL | Age: 62
Discharge: HOME | End: 2022-09-01
Attending: INTERNAL MEDICINE
Payer: MEDICAID

## 2022-09-01 ENCOUNTER — HOSPITAL ENCOUNTER (OUTPATIENT)
Dept: ULTRASOUND IMAGING | Facility: HOSPITAL | Age: 62
Discharge: HOME | End: 2022-09-01
Attending: INTERNAL MEDICINE
Payer: MEDICAID

## 2022-09-01 DIAGNOSIS — N64.52 BLOODY DISCHARGE FROM LEFT NIPPLE: ICD-10-CM

## 2022-09-01 DIAGNOSIS — R92.8 OTHER ABNORMAL AND INCONCLUSIVE FINDINGS ON DIAGNOSTIC IMAGING OF BREAST: ICD-10-CM

## 2022-09-01 DIAGNOSIS — N63.20 MASS OF LEFT BREAST, UNSPECIFIED QUADRANT: Primary | ICD-10-CM

## 2022-09-01 PROCEDURE — 76642 ULTRASOUND BREAST LIMITED: CPT | Performed by: INTERNAL MEDICINE

## 2022-09-01 PROCEDURE — 77066 DX MAMMO INCL CAD BI: CPT | Performed by: INTERNAL MEDICINE

## 2022-09-01 PROCEDURE — 77062 BREAST TOMOSYNTHESIS BI: CPT | Performed by: INTERNAL MEDICINE

## 2022-09-02 ENCOUNTER — PATIENT MESSAGE (OUTPATIENT)
Dept: ENDOCRINOLOGY CLINIC | Facility: CLINIC | Age: 62
End: 2022-09-02

## 2022-09-05 NOTE — TELEPHONE ENCOUNTER
From: Earl Freedman  To: Jaqueline Patrick MD  Sent: 8/25/2022 1:17 PM CDT  Subject: BLOOD SUGARS    MY BLOOD SUGARS AS OF WED IS BB: 268 BL: 12 BD: 176 BEDTIME 180    MOST SUGARS ARE CLOSE  EVERYDAY SOMETIMES AT NIGHT ITS LOWER I HAVE FOLLOWED THE DOSAGE YOU GAVE ME DOES NOT WORK DOES TRULICITY GO UP IN DOSAGE?     [address]

## 2022-09-06 ENCOUNTER — PATIENT MESSAGE (OUTPATIENT)
Dept: ENDOCRINOLOGY CLINIC | Facility: CLINIC | Age: 62
End: 2022-09-06

## 2022-09-07 ENCOUNTER — PATIENT MESSAGE (OUTPATIENT)
Dept: ENDOCRINOLOGY CLINIC | Facility: CLINIC | Age: 62
End: 2022-09-07

## 2022-09-07 NOTE — TELEPHONE ENCOUNTER
From: Fanny Palacios  To: Rona Carr MD  Sent: 8/25/2022 1:17 PM CDT  Subject: BLOOD SUGARS    MY BLOOD SUGARS AS OF WED IS BB: 268 BL: 12 BD: 176 BEDTIME 180    MOST SUGARS ARE CLOSE  EVERYDAY SOMETIMES AT NIGHT ITS LOWER I HAVE FOLLOWED THE DOSAGE YOU GAVE ME DOES NOT WORK DOES TRULICITY GO UP IN DOSAGE?     [address]

## 2022-09-12 ENCOUNTER — HOSPITAL ENCOUNTER (OUTPATIENT)
Dept: ULTRASOUND IMAGING | Facility: HOSPITAL | Age: 62
Discharge: HOME | End: 2022-09-12
Attending: INTERNAL MEDICINE
Payer: MEDICARE

## 2022-09-12 ENCOUNTER — HOSPITAL ENCOUNTER (OUTPATIENT)
Dept: MAMMOGRAPHY | Facility: HOSPITAL | Age: 62
Discharge: HOME | End: 2022-09-12
Attending: INTERNAL MEDICINE
Payer: MEDICARE

## 2022-09-12 DIAGNOSIS — N63.20 MASS OF LEFT BREAST, UNSPECIFIED QUADRANT: ICD-10-CM

## 2022-09-12 DIAGNOSIS — R92.8 OTHER ABNORMAL AND INCONCLUSIVE FINDINGS ON DIAGNOSTIC IMAGING OF BREAST: ICD-10-CM

## 2022-09-12 PROCEDURE — 77065 DX MAMMO INCL CAD UNI: CPT | Performed by: INTERNAL MEDICINE

## 2022-09-12 PROCEDURE — 88305 TISSUE EXAM BY PATHOLOGIST: CPT | Performed by: INTERNAL MEDICINE

## 2022-09-12 PROCEDURE — 19083 BX BREAST 1ST LESION US IMAG: CPT | Performed by: INTERNAL MEDICINE

## 2022-09-12 NOTE — PROCEDURES
[facility]  Procedure Note    Jayne Montano Patient Status:  Outpatient    1960 MRN [id number]   Location [address] Attending Mustapha Harvey MD   Hosp Day # 0 PCP Keely Yates MD     Procedure: Left breast ultrasound guided biopsy    Pre-Procedure Diagnosis:  Left breast mass    Post-Procedure Diagnosis: Left breast mass    Anesthesia:  Local    Findings:  Left breast mass    Specimens: multiple cores    Blood Loss:  minimal    Tourniquet Time: none  Complications:  None  Drains:  none    Andrew Goins MD  2022

## 2022-09-13 ENCOUNTER — TELEPHONE (OUTPATIENT)
Dept: ULTRASOUND IMAGING | Facility: HOSPITAL | Age: 62
End: 2022-09-13

## 2022-09-14 NOTE — TELEPHONE ENCOUNTER
Please refer to other [employer] message dated 9/6/22.     Future Appointments   Date Time Provider Ruchi Franks   9/19/2022  9:00 AM Mainor Mays, [address] [address]

## 2022-09-14 NOTE — TELEPHONE ENCOUNTER
From: David Greene  To: Alice Lacy MD  Sent: 9/7/2022 1:49 PM CDT  Subject: blood sugars    The blood sugars keep getting higher so the trulicity does not work for me

## 2022-09-19 ENCOUNTER — OFFICE VISIT (OUTPATIENT)
Dept: ENDOCRINOLOGY CLINIC | Facility: CLINIC | Age: 62
End: 2022-09-19
Payer: MEDICARE

## 2022-09-19 ENCOUNTER — TELEPHONE (OUTPATIENT)
Dept: INTERNAL MEDICINE CLINIC | Facility: CLINIC | Age: 62
End: 2022-09-19

## 2022-09-19 VITALS
DIASTOLIC BLOOD PRESSURE: 67 MMHG | WEIGHT: 110 LBS | BODY MASS INDEX: 23 KG/M2 | HEART RATE: 96 BPM | SYSTOLIC BLOOD PRESSURE: 131 MMHG

## 2022-09-19 DIAGNOSIS — Z79.4 TYPE 2 DIABETES MELLITUS WITH HYPERGLYCEMIA, WITH LONG-TERM CURRENT USE OF INSULIN (HCC): Primary | ICD-10-CM

## 2022-09-19 DIAGNOSIS — E11.65 TYPE 2 DIABETES MELLITUS WITH HYPERGLYCEMIA, WITH LONG-TERM CURRENT USE OF INSULIN (HCC): Primary | ICD-10-CM

## 2022-09-19 LAB
CARTRIDGE EXPIRATION DATE: ABNORMAL DATE
CARTRIDGE LOT#: ABNORMAL NUMERIC
GLUCOSE BLOOD: 194
HEMOGLOBIN A1C: 6.9 % (ref 4.3–5.6)
TEST STRIP EXPIRATION DATE: NORMAL DATE
TEST STRIP LOT #: NORMAL NUMERIC

## 2022-09-19 RX ORDER — DULAGLUTIDE 4.5 MG/.5ML
4.5 INJECTION, SOLUTION SUBCUTANEOUS WEEKLY
Qty: 6 ML | Refills: 1 | Status: SHIPPED | OUTPATIENT
Start: 2022-09-19

## 2022-09-19 NOTE — PATIENT INSTRUCTIONS
Humulin R U500 insulin- 175 units with breakfast and lunch and 170 units with dinner    Continue Trulicity 3mg subcutaneous weekly for 2 weeks and then increase to 4.5mg subcutaneous weekly     Continue Jardiace 25mg PO daily

## 2022-09-20 ENCOUNTER — OFFICE VISIT (OUTPATIENT)
Dept: INTERNAL MEDICINE CLINIC | Facility: CLINIC | Age: 62
End: 2022-09-20

## 2022-09-20 VITALS
DIASTOLIC BLOOD PRESSURE: 60 MMHG | TEMPERATURE: 98 F | BODY MASS INDEX: 23.51 KG/M2 | WEIGHT: 112 LBS | SYSTOLIC BLOOD PRESSURE: 130 MMHG | HEART RATE: 96 BPM | HEIGHT: 58 IN

## 2022-09-20 DIAGNOSIS — E11.39 TYPE 2 DIABETES MELLITUS WITH OTHER OPHTHALMIC COMPLICATION, WITH LONG-TERM CURRENT USE OF INSULIN (HCC): Primary | ICD-10-CM

## 2022-09-20 DIAGNOSIS — Z79.4 TYPE 2 DIABETES MELLITUS WITH OTHER OPHTHALMIC COMPLICATION, WITH LONG-TERM CURRENT USE OF INSULIN (HCC): Primary | ICD-10-CM

## 2022-09-20 DIAGNOSIS — E78.5 DYSLIPIDEMIA: ICD-10-CM

## 2022-09-20 DIAGNOSIS — D24.2 INTRADUCTAL PAPILLOMA OF LEFT BREAST: ICD-10-CM

## 2022-09-20 PROCEDURE — 99213 OFFICE O/P EST LOW 20 MIN: CPT | Performed by: INTERNAL MEDICINE

## 2022-09-20 RX ORDER — LIRAGLUTIDE 6 MG/ML
INJECTION SUBCUTANEOUS
COMMUNITY
Start: 2022-09-17

## 2022-09-21 NOTE — TELEPHONE ENCOUNTER
Please review. Protocol Failed or has No Protocol. Requested Prescriptions   Pending Prescriptions Disp Refills    RENOVA 0.02 % External Cream [Pharmacy Med Name: Dinorah Garduno 0.02% CREAM 40GM] 40 g 3     Sig: APPLY TO THE AFFECTED AREA ON FACE EVERY EVENING        There is no refill protocol information for this order           Future Appointments         Provider Department Appt Notes    In 2 weeks Juan Nunez MD [facility] per IB    In 1 month Heather Breaux MD [facility] 9/15 [place] to r/s appt.  [facility]  9/14 1st  mychart msg to reschedule due to provider-BA  [facility]   [facility]    In 3 months Unknown [place], Candance Gaul, [employer], [facility] 3 month f/u            Recent Outpatient Visits              Yesterday Type 2 diabetes mellitus with other ophthalmic complication, with long-term current use of insulin [facility])    [facility] Heather Breaux MD    Office Visit    2 days ago Type 2 diabetes mellitus with hyperglycemia, with long-term current use of insulin [facility])    [facility], [facility], [place], Candance Gaul, APRN    Office Visit    1 month ago Conductive hearing loss, bilateral    [facility] BAKARI Anne    Office Visit    2 months ago Type 2 diabetes mellitus with hyperglycemia, with long-term current use of insulin [facility])    [facility] Paul Monique MD    Office Visit    2 months ago Cough due to bronchospasm    [facility] Heather Breaux MD    Office Visit

## 2022-09-22 ENCOUNTER — TELEPHONE (OUTPATIENT)
Dept: INTERNAL MEDICINE CLINIC | Facility: CLINIC | Age: 62
End: 2022-09-22

## 2022-09-22 ENCOUNTER — TELEPHONE (OUTPATIENT)
Dept: ENDOCRINOLOGY CLINIC | Facility: CLINIC | Age: 62
End: 2022-09-22

## 2022-09-22 DIAGNOSIS — E11.39 TYPE 2 DIABETES MELLITUS WITH OTHER OPHTHALMIC COMPLICATION, WITH LONG-TERM CURRENT USE OF INSULIN (HCC): ICD-10-CM

## 2022-09-22 DIAGNOSIS — Z79.4 TYPE 2 DIABETES MELLITUS WITH OTHER OPHTHALMIC COMPLICATION, WITH LONG-TERM CURRENT USE OF INSULIN (HCC): ICD-10-CM

## 2022-09-22 RX ORDER — TRETINOIN 0.2 MG/G
CREAM TOPICAL
Qty: 40 G | Refills: 3 | Status: SHIPPED | OUTPATIENT
Start: 2022-09-22

## 2022-09-22 RX ORDER — INSULIN HUMAN 500 [IU]/ML
INJECTION, SOLUTION SUBCUTANEOUS
Qty: 100 ML | Refills: 0 | Status: SHIPPED | OUTPATIENT
Start: 2022-09-22

## 2022-09-22 NOTE — TELEPHONE ENCOUNTER
Patient states she has been paying out of pocket for this medication for years. No need for PA. Pharmacist notified.

## 2022-09-22 NOTE — TELEPHONE ENCOUNTER
Per Ashley, [place] 0.02% Cream 40gm is not covered under patient's insurance. Please call plan at [phone] to initiate a prior authorization or call/fax Ashley to change medication. Patient ID # is [id number].

## 2022-09-22 NOTE — TELEPHONE ENCOUNTER
Medication PA Requested:           Humulin R u 500 unit/ml                                               CoverMyMeds Used:  Key:  Quantity: 100 ml  Day Supply: 30  Sig:  INJECT 175  UNITS UNDER THE SKIN THREE TIMES DAILY BEFORE MEALS  DX Code:         E11.65                             CPT code (if applicable):   Case Number/Pending Ref#:

## 2022-09-27 NOTE — TELEPHONE ENCOUNTER
Medication PA Requested:           Humulin R u 500 unit/ml                                     WAI          CoverMyMeds Used:  Key:KEY: [id number]  Quantity: 100 ml  Day Supply: 30  Sig:  INJECT 175  UNITS UNDER THE SKIN THREE TIMES DAILY BEFORE MEALS  DX Code:         E11.65      Faxed [employer] PA form with LOV, a1c 9/19/2022  Awaiting determination.

## 2022-09-28 ENCOUNTER — PATIENT MESSAGE (OUTPATIENT)
Dept: INTERNAL MEDICINE CLINIC | Facility: CLINIC | Age: 62
End: 2022-09-28

## 2022-09-28 NOTE — TELEPHONE ENCOUNTER
From: Tomy Friday  To: Bucky Simmonds, MD  Sent: 9/28/2022 4:01 PM CDT  Subject: script    Please disregard last message. They don't have the medication in generic form.  Thank you

## 2022-09-28 NOTE — TELEPHONE ENCOUNTER
From: Yoon Garza  To: Ewell Cogan, MD  Sent: 9/28/2022 9:21 AM CDT  Subject: Prescription    I was wondering if you can rewrite the script for Renova. It seems like the amount changed significally. I have been paying for cash with my nRX card. They said if its written a different way I should get it cheaper. Renova (Refissa) 40g of 0.05%. So w can we try this instead?   Thank you and let me know Thank you Haja Rai

## 2022-09-28 NOTE — TELEPHONE ENCOUNTER
Patient wrote additional [employer] message asking for this message to be disregarded. Anali Mccurdy MD 1 hour ago (4:01 PM)         Please disregard last message. They don't have the medication in generic form.   Thank you

## 2022-10-04 NOTE — TELEPHONE ENCOUNTER
Fax received from [employer]. Humulin r u-500 is approved. Effective from 09/01/22 - 09/29/23. Notified via [address]

## 2022-10-05 ENCOUNTER — OFFICE VISIT (OUTPATIENT)
Dept: SURGERY | Facility: CLINIC | Age: 62
End: 2022-10-05
Payer: MEDICARE

## 2022-10-05 VITALS
DIASTOLIC BLOOD PRESSURE: 58 MMHG | SYSTOLIC BLOOD PRESSURE: 159 MMHG | BODY MASS INDEX: 23.13 KG/M2 | WEIGHT: 110.19 LBS | TEMPERATURE: 98 F | OXYGEN SATURATION: 100 % | RESPIRATION RATE: 16 BRPM | HEIGHT: 58 IN | HEART RATE: 94 BPM

## 2022-10-05 DIAGNOSIS — D36.9 INTRADUCTAL PAPILLOMA: Primary | ICD-10-CM

## 2022-10-05 PROCEDURE — 99204 OFFICE O/P NEW MOD 45 MIN: CPT | Performed by: SURGERY

## 2022-10-05 NOTE — PATIENT INSTRUCTIONS
Dr. Mira Martinez  Tel: [phone]  Fax: [address]  [address] [place] [address]  [phone]     Surgery/Procedure: Left breast wire localized excisional biopsy, left breast terminal duct excision     Anesthesia:   MAC  Surgery Length:   1 hour CPT:  46021, 68214   Wire LOC:   Yes Nuc Med:   No   Elisa Seed:  No       Dx & ICD-10: Intraductal papilloma (D36.9). Radiology Instructions: Left breast, 3 o'clock subareolar position, Q shaped clip, biopsy confirms benign intraductal papilloma.   _______________________________________________________________________________    1. Someone must accompany you the day of the procedure to drive you home safely, because of anesthesia. 2. You must remove any kind of makeup, acrylic nails, lotions, powders, creams or deodorant. 3. [place] ONLY: Pre-admission will give instruct you on when to take Gatorade and Tylenol/acetaminophen prior to your surgery, purchase 2 - 12oz bottles of regular Gatorade (NOT RED/SUGAR FREE). Otherwise, you may not eat or drink anything else after 11PM the night before surgery. 4. [place] ONLY: You may not eat or drink anything after midnight the day of your surgery. 5. Wear comfortable clothing that can be easily removed. 6. If you wear dentures, contacts lenses, or any prosthesis, you will be asked to remove them. 7. Do not drink alcohol or smoke 24 hours prior to your procedure. 8. Bring a picture ID and your insurance card. 9. You will be contacted by the hospital for Pre-Admission Covid-19 testing (regardless of vaccination status) to be scheduled as an appointment prior to surgery. They will call closer to the surgery date to set this up, because the earliest this can be done is 72 hours prior to surgery. 10. The Pre-Admission Testing Department will call the day before to confirm your procedure, give you the time you need to arrive by and directions on where to go.  They begin making calls after 2pm, if you are not contacted by 4pm, please call the surgeon's office listed above. 11. Do not take any blood thinners at least one week prior to the procedure/surgery. This includes aspirin, baby aspirin, Ibuprofen products, herbal supplements, diet medications, vitamin E, fish oil and green tea supplements. Please check other supplements for these ingredients. *TYLENOL or acetaminophen is acceptable*  12. If you take Coumadin, Plavix, Xarelto, or Eliquis, please contact your prescribing physician for special instructions on how long to hold. If you take insulin contact your primary care physician for special instructions. 15. Our surgery scheduler, Kike Yañez, will be contacting you to discuss surgery dates. If you have any questions related to scheduling your surgery, please reach out to her at [phone].  _____________________________________________________________________  PRE-OPERATIVE TESTING IF INDICATED BELOW  PLEASE COMPLETE ASAP (AT LEAST 10-14 DAYS PRIOR TO SURGERY)  [] CBC [x] BMP [] CMP [x] EKG    [] PT, PTT, INR [] Cardiac Clearance  [x] H&P Medical Clearance [] Chest X-ray     Please call Central Scheduling to schedule an appointment for pre-operative labs/tests @ [phone]    Does the patient have a pacemaker or ICD?      [] Yes   [x] No

## 2022-10-07 ENCOUNTER — PATIENT MESSAGE (OUTPATIENT)
Dept: ENDOCRINOLOGY CLINIC | Facility: CLINIC | Age: 62
End: 2022-10-07

## 2022-10-07 DIAGNOSIS — Z79.4 TYPE 2 DIABETES MELLITUS WITH HYPERGLYCEMIA, WITH LONG-TERM CURRENT USE OF INSULIN (HCC): ICD-10-CM

## 2022-10-07 DIAGNOSIS — E11.65 TYPE 2 DIABETES MELLITUS WITH HYPERGLYCEMIA, WITH LONG-TERM CURRENT USE OF INSULIN (HCC): ICD-10-CM

## 2022-10-07 PROCEDURE — 99421 OL DIG E/M SVC 5-10 MIN: CPT | Performed by: INTERNAL MEDICINE

## 2022-10-07 NOTE — TELEPHONE ENCOUNTER
Continuous Glucose Monitoring Interpretation    Lorne Patel has undergone continuous glucose monitoring with the personal dexcom. She was evaluated with the dexcom from 9/24 to 10/27/22.   Her blood glucose tracings demonstrated   12 % very high  28 % high  59 % in range  1 % low  0 % very low  As a result of her testing the following plan was made    + steroid induced hyperglycemia      U500  Breakfast 175 --> 180  Lunch 175 --> 180  Dinner 170    Update with Bg next week  Call sooner if Bg is under 80 or persistently over [address] [phone]

## 2022-10-07 NOTE — TELEPHONE ENCOUNTER
Dr Lily Sharpe,    Report printed and placed on your desk. Weds was when pt received steroid and blood sugars have been elevated since then. Would recommend increasing breakfast and lunch dose and keeping dinner as it as patient tends to be lower trending over night.     U500  Breakfast 175 --> 180  Lunch 175 --> 180  Dinner 170

## 2022-10-07 NOTE — TELEPHONE ENCOUNTER
Responded to pt to confirm medication regimen    Dexcom reports ongoing hyperglycemia started Wednesday

## 2022-10-12 ENCOUNTER — PATIENT MESSAGE (OUTPATIENT)
Dept: INTERNAL MEDICINE CLINIC | Facility: CLINIC | Age: 62
End: 2022-10-12

## 2022-10-13 NOTE — TELEPHONE ENCOUNTER
From: Guido Sinclair  To: Felipe Ibrahim MD  Sent: 10/12/2022 4:03 PM CDT  Subject: Vaccines    I received the following vaccines today at [place] pneumococcal. Flu.  Covid booster on 9/28/22 and on that date received the shingles vaccine    4 vaccines total    [facility]

## 2022-10-17 ENCOUNTER — TELEPHONE (OUTPATIENT)
Dept: SURGERY | Facility: CLINIC | Age: 62
End: 2022-10-17

## 2022-10-17 DIAGNOSIS — D36.9 INTRADUCTAL PAPILLOMA: Primary | ICD-10-CM

## 2022-10-17 NOTE — TELEPHONE ENCOUNTER
Calling pt in regards to scheduling surgery. Informed pt that I have 11/28/2022 available at [facility] with Dr. Andrew Monet. Pt verbalized understanding and in agreement with date and location. All questions answered. Encouraged pt to call or [facility] message office with any other questions or concerns.

## 2022-10-23 ENCOUNTER — PATIENT MESSAGE (OUTPATIENT)
Dept: ENDOCRINOLOGY CLINIC | Facility: CLINIC | Age: 62
End: 2022-10-23

## 2022-10-24 NOTE — TELEPHONE ENCOUNTER
From: Tomy Friday  To: Letitia Krishnamurthy MD  Sent: 10/23/2022 5:59 PM CDT  Subject: New orthotics shoes from [facility]    Can you please send a referral to me or [facility] Bharti Colvin for new shoes for my feet. They need a referral so I can go and receive new shoes. [facility]  Thank you let me know so I can make a apt with them    CARLOS ISLAS [facility]

## 2022-10-27 RX ORDER — BLOOD-GLUCOSE SENSOR
EACH MISCELLANEOUS
Qty: 3 EACH | Refills: 2 | Status: SHIPPED | OUTPATIENT
Start: 2022-10-27

## 2022-10-28 ENCOUNTER — OFFICE VISIT (OUTPATIENT)
Dept: INTERNAL MEDICINE CLINIC | Facility: CLINIC | Age: 62
End: 2022-10-28
Payer: MEDICARE

## 2022-10-28 VITALS
HEART RATE: 120 BPM | SYSTOLIC BLOOD PRESSURE: 144 MMHG | DIASTOLIC BLOOD PRESSURE: 58 MMHG | HEIGHT: 58 IN | WEIGHT: 111 LBS | BODY MASS INDEX: 23.3 KG/M2 | TEMPERATURE: 99 F

## 2022-10-28 DIAGNOSIS — E78.5 DYSLIPIDEMIA: ICD-10-CM

## 2022-10-28 DIAGNOSIS — Z00.00 ENCOUNTER FOR ANNUAL WELLNESS EXAM IN MEDICARE PATIENT: Primary | ICD-10-CM

## 2022-10-28 DIAGNOSIS — Z78.0 MENOPAUSE: ICD-10-CM

## 2022-10-28 DIAGNOSIS — E11.39 TYPE 2 DIABETES MELLITUS WITH OTHER OPHTHALMIC COMPLICATION, WITH LONG-TERM CURRENT USE OF INSULIN (HCC): ICD-10-CM

## 2022-10-28 DIAGNOSIS — Z79.4 TYPE 2 DIABETES MELLITUS WITH OTHER OPHTHALMIC COMPLICATION, WITH LONG-TERM CURRENT USE OF INSULIN (HCC): ICD-10-CM

## 2022-10-28 PROBLEM — J98.01 COUGH DUE TO BRONCHOSPASM: Status: RESOLVED | Noted: 2022-06-27 | Resolved: 2022-10-28

## 2022-10-28 PROBLEM — M79.605 PAIN IN BOTH LOWER EXTREMITIES: Status: RESOLVED | Noted: 2020-11-12 | Resolved: 2022-10-28

## 2022-10-28 PROBLEM — K64.1 GRADE II HEMORRHOIDS: Status: RESOLVED | Noted: 2018-03-30 | Resolved: 2022-10-28

## 2022-10-28 PROBLEM — M79.621 AXILLARY PAIN, RIGHT: Status: RESOLVED | Noted: 2021-09-10 | Resolved: 2022-10-28

## 2022-10-28 PROBLEM — N63.20 MASS OF LEFT BREAST: Status: RESOLVED | Noted: 2022-09-01 | Resolved: 2022-10-28

## 2022-10-28 PROBLEM — M79.604 PAIN IN BOTH LOWER EXTREMITIES: Status: RESOLVED | Noted: 2020-11-12 | Resolved: 2022-10-28

## 2022-10-28 PROBLEM — R26.89 IMBALANCE: Status: RESOLVED | Noted: 2022-07-12 | Resolved: 2022-10-28

## 2022-10-28 PROBLEM — M54.41 ACUTE RIGHT-SIDED LOW BACK PAIN WITH RIGHT-SIDED SCIATICA: Status: RESOLVED | Noted: 2018-02-02 | Resolved: 2022-10-28

## 2022-10-28 PROBLEM — B34.9 VIRAL INFECTION: Status: RESOLVED | Noted: 2022-06-02 | Resolved: 2022-10-28

## 2022-10-28 PROBLEM — G62.9 NEUROPATHY: Status: RESOLVED | Noted: 2018-07-13 | Resolved: 2022-10-28

## 2022-10-28 PROBLEM — L23.9 ALLERGIC DERMATITIS: Status: RESOLVED | Noted: 2019-05-18 | Resolved: 2022-10-28

## 2022-10-28 PROBLEM — J44.9 CHRONIC OBSTRUCTIVE PULMONARY DISEASE, UNSPECIFIED (HCC): Status: RESOLVED | Noted: 2022-06-27 | Resolved: 2022-10-28

## 2022-10-28 PROBLEM — S39.012A STRAIN OF LUMBAR REGION: Status: RESOLVED | Noted: 2022-07-12 | Resolved: 2022-10-28

## 2022-10-28 PROBLEM — G62.9 PERIPHERAL POLYNEUROPATHY: Status: RESOLVED | Noted: 2022-07-12 | Resolved: 2022-10-28

## 2022-10-28 PROBLEM — K58.2 IRRITABLE BOWEL SYNDROME WITH BOTH CONSTIPATION AND DIARRHEA: Status: RESOLVED | Noted: 2022-04-04 | Resolved: 2022-10-28

## 2022-10-28 PROBLEM — D24.2 INTRADUCTAL PAPILLOMA OF LEFT BREAST: Status: RESOLVED | Noted: 2022-09-20 | Resolved: 2022-10-28

## 2022-10-28 NOTE — ASSESSMENT & PLAN NOTE
Physical today   Labs soon   T dap today   c scope 2024,   Sees ophtho q 6 months.    Bone density ,   Up to date on mammogram

## 2022-10-28 NOTE — ASSESSMENT & PLAN NOTE
hgba1c is 6.9 in Sept, due to have further labs in a week , sees Dr Victorino Welsh , [facility].

## 2022-11-01 ENCOUNTER — PATIENT MESSAGE (OUTPATIENT)
Dept: INTERNAL MEDICINE CLINIC | Facility: CLINIC | Age: 62
End: 2022-11-01

## 2022-11-01 ENCOUNTER — NURSE TRIAGE (OUTPATIENT)
Dept: INTERNAL MEDICINE CLINIC | Facility: CLINIC | Age: 62
End: 2022-11-01

## 2022-11-09 ENCOUNTER — LAB ENCOUNTER (OUTPATIENT)
Dept: LAB | Age: 62
End: 2022-11-09
Attending: INTERNAL MEDICINE
Payer: MEDICARE

## 2022-11-09 ENCOUNTER — TELEPHONE (OUTPATIENT)
Dept: ENDOCRINOLOGY CLINIC | Facility: CLINIC | Age: 62
End: 2022-11-09

## 2022-11-09 DIAGNOSIS — E78.5 DYSLIPIDEMIA: ICD-10-CM

## 2022-11-09 DIAGNOSIS — Z79.4 TYPE 2 DIABETES MELLITUS WITH HYPERGLYCEMIA, WITH LONG-TERM CURRENT USE OF INSULIN (HCC): ICD-10-CM

## 2022-11-09 DIAGNOSIS — E11.65 TYPE 2 DIABETES MELLITUS WITH HYPERGLYCEMIA, WITH LONG-TERM CURRENT USE OF INSULIN (HCC): ICD-10-CM

## 2022-11-09 LAB
ALBUMIN SERPL-MCNC: 3.5 G/DL (ref 3.4–5)
ALBUMIN/GLOB SERPL: 0.8 {RATIO} (ref 1–2)
ALP LIVER SERPL-CCNC: 87 U/L
ALT SERPL-CCNC: 38 U/L
ANION GAP SERPL CALC-SCNC: 9 MMOL/L (ref 0–18)
AST SERPL-CCNC: 33 U/L (ref 15–37)
BASOPHILS # BLD AUTO: 0.03 X10(3) UL (ref 0–0.2)
BASOPHILS NFR BLD AUTO: 0.4 %
BILIRUB SERPL-MCNC: 0.3 MG/DL (ref 0.1–2)
BUN BLD-MCNC: 16 MG/DL (ref 7–18)
BUN/CREAT SERPL: 18 (ref 10–20)
CALCIUM BLD-MCNC: 9 MG/DL (ref 8.5–10.1)
CHLORIDE SERPL-SCNC: 107 MMOL/L (ref 98–112)
CHOLEST SERPL-MCNC: 143 MG/DL (ref ?–200)
CO2 SERPL-SCNC: 23 MMOL/L (ref 21–32)
CREAT BLD-MCNC: 0.89 MG/DL
CREAT UR-SCNC: 49.4 MG/DL
DEPRECATED RDW RBC AUTO: 60.4 FL (ref 35.1–46.3)
EOSINOPHIL # BLD AUTO: 0.09 X10(3) UL (ref 0–0.7)
EOSINOPHIL NFR BLD AUTO: 1.1 %
ERYTHROCYTE [DISTWIDTH] IN BLOOD BY AUTOMATED COUNT: 19.7 % (ref 11–15)
FASTING PATIENT LIPID ANSWER: YES
FASTING STATUS PATIENT QL REPORTED: YES
GFR SERPLBLD BASED ON 1.73 SQ M-ARVRAT: 74 ML/MIN/1.73M2 (ref 60–?)
GLOBULIN PLAS-MCNC: 4.6 G/DL (ref 2.8–4.4)
GLUCOSE BLD-MCNC: 162 MG/DL (ref 70–99)
HCT VFR BLD AUTO: 34.1 %
HDLC SERPL-MCNC: 49 MG/DL (ref 40–59)
HGB BLD-MCNC: 9.9 G/DL
IMM GRANULOCYTES # BLD AUTO: 0.02 X10(3) UL (ref 0–1)
IMM GRANULOCYTES NFR BLD: 0.3 %
LDLC SERPL CALC-MCNC: 76 MG/DL (ref ?–100)
LYMPHOCYTES # BLD AUTO: 2.49 X10(3) UL (ref 1–4)
LYMPHOCYTES NFR BLD AUTO: 31.5 %
MCH RBC QN AUTO: 24.5 PG (ref 26–34)
MCHC RBC AUTO-ENTMCNC: 29 G/DL (ref 31–37)
MCV RBC AUTO: 84.4 FL
MICROALBUMIN UR-MCNC: 0.78 MG/DL
MICROALBUMIN/CREAT 24H UR-RTO: 15.8 UG/MG (ref ?–30)
MONOCYTES # BLD AUTO: 0.79 X10(3) UL (ref 0.1–1)
MONOCYTES NFR BLD AUTO: 10 %
NEUTROPHILS # BLD AUTO: 4.49 X10 (3) UL (ref 1.5–7.7)
NEUTROPHILS # BLD AUTO: 4.49 X10(3) UL (ref 1.5–7.7)
NEUTROPHILS NFR BLD AUTO: 56.7 %
NONHDLC SERPL-MCNC: 94 MG/DL (ref ?–130)
OSMOLALITY SERPL CALC.SUM OF ELEC: 293 MOSM/KG (ref 275–295)
PLATELET # BLD AUTO: 201 10(3)UL (ref 150–450)
POTASSIUM SERPL-SCNC: 4.6 MMOL/L (ref 3.5–5.1)
PROT SERPL-MCNC: 8.1 G/DL (ref 6.4–8.2)
RBC # BLD AUTO: 4.04 X10(6)UL
SODIUM SERPL-SCNC: 139 MMOL/L (ref 136–145)
TRIGL SERPL-MCNC: 94 MG/DL (ref 30–149)
VLDLC SERPL CALC-MCNC: 15 MG/DL (ref 0–30)
WBC # BLD AUTO: 7.9 X10(3) UL (ref 4–11)

## 2022-11-09 PROCEDURE — 83540 ASSAY OF IRON: CPT | Performed by: INTERNAL MEDICINE

## 2022-11-09 PROCEDURE — 80061 LIPID PANEL: CPT | Performed by: INTERNAL MEDICINE

## 2022-11-09 PROCEDURE — 82570 ASSAY OF URINE CREATININE: CPT | Performed by: INTERNAL MEDICINE

## 2022-11-09 PROCEDURE — 82728 ASSAY OF FERRITIN: CPT | Performed by: INTERNAL MEDICINE

## 2022-11-09 PROCEDURE — 85025 COMPLETE CBC W/AUTO DIFF WBC: CPT

## 2022-11-09 PROCEDURE — 82043 UR ALBUMIN QUANTITATIVE: CPT | Performed by: INTERNAL MEDICINE

## 2022-11-09 PROCEDURE — 80053 COMPREHEN METABOLIC PANEL: CPT | Performed by: INTERNAL MEDICINE

## 2022-11-09 PROCEDURE — 36415 COLL VENOUS BLD VENIPUNCTURE: CPT | Performed by: INTERNAL MEDICINE

## 2022-11-09 PROCEDURE — 84466 ASSAY OF TRANSFERRIN: CPT | Performed by: INTERNAL MEDICINE

## 2022-11-09 NOTE — TELEPHONE ENCOUNTER
Received a fax from [facility] requesting recent  LOV chart notes.  Faxed to [facility] at [phone]

## 2022-11-10 DIAGNOSIS — D62 ACUTE BLOOD LOSS ANEMIA: Primary | ICD-10-CM

## 2022-11-10 LAB
DEPRECATED HBV CORE AB SER IA-ACNC: 8.1 NG/ML
IRON SATN MFR SERPL: 5 %
IRON SERPL-MCNC: 29 UG/DL
TIBC SERPL-MCNC: 632 UG/DL (ref 240–450)
TRANSFERRIN SERPL-MCNC: 424 MG/DL (ref 200–360)

## 2022-11-11 ENCOUNTER — HOSPITAL ENCOUNTER (OUTPATIENT)
Age: 62
Discharge: HOME | End: 2022-11-11
Payer: MEDICARE

## 2022-11-11 VITALS
OXYGEN SATURATION: 100 % | HEART RATE: 81 BPM | DIASTOLIC BLOOD PRESSURE: 76 MMHG | RESPIRATION RATE: 16 BRPM | SYSTOLIC BLOOD PRESSURE: 121 MMHG | TEMPERATURE: 98 F

## 2022-11-11 DIAGNOSIS — J06.9 VIRAL UPPER RESPIRATORY TRACT INFECTION: Primary | ICD-10-CM

## 2022-11-11 DIAGNOSIS — Z20.822 LAB TEST NEGATIVE FOR COVID-19 VIRUS: ICD-10-CM

## 2022-11-11 DIAGNOSIS — Z20.822 ENCOUNTER FOR LABORATORY TESTING FOR COVID-19 VIRUS: ICD-10-CM

## 2022-11-11 LAB — SARS-COV-2 RNA RESP QL NAA+PROBE: NOT DETECTED

## 2022-11-14 ENCOUNTER — OFFICE VISIT (OUTPATIENT)
Dept: INTERNAL MEDICINE CLINIC | Facility: CLINIC | Age: 62
End: 2022-11-14
Payer: MEDICARE

## 2022-11-14 VITALS
HEART RATE: 112 BPM | HEIGHT: 58 IN | WEIGHT: 111 LBS | TEMPERATURE: 99 F | BODY MASS INDEX: 23.3 KG/M2 | DIASTOLIC BLOOD PRESSURE: 54 MMHG | SYSTOLIC BLOOD PRESSURE: 160 MMHG

## 2022-11-14 DIAGNOSIS — J40 BRONCHITIS: Primary | ICD-10-CM

## 2022-11-14 PROBLEM — J44.9 CHRONIC OBSTRUCTIVE PULMONARY DISEASE, UNSPECIFIED (HCC): Status: ACTIVE | Noted: 2022-11-14

## 2022-11-14 PROCEDURE — 99214 OFFICE O/P EST MOD 30 MIN: CPT | Performed by: INTERNAL MEDICINE

## 2022-11-14 RX ORDER — AMOXICILLIN AND CLAVULANATE POTASSIUM 875; 125 MG/1; MG/1
1 TABLET, FILM COATED ORAL 2 TIMES DAILY
Qty: 20 TABLET | Refills: 0 | Status: SHIPPED | OUTPATIENT
Start: 2022-11-14 | End: 2022-11-24

## 2022-11-14 RX ORDER — FLUCONAZOLE 150 MG/1
TABLET ORAL
Qty: 2 TABLET | Refills: 1 | Status: SHIPPED | OUTPATIENT
Start: 2022-11-14

## 2022-11-17 RX ORDER — EMPAGLIFLOZIN 25 MG/1
TABLET, FILM COATED ORAL
Qty: 90 TABLET | Refills: 0 | Status: SHIPPED | OUTPATIENT
Start: 2022-11-17

## 2022-11-17 NOTE — TELEPHONE ENCOUNTER
LOV: 9/19/2022    RTC: 3 months     F/U: 12/20/2022    EMA Parsons-- orders pending, approve if appropriate

## 2022-11-18 ENCOUNTER — LAB ENCOUNTER (OUTPATIENT)
Dept: LAB | Age: 62
End: 2022-11-18
Attending: INTERNAL MEDICINE
Payer: MEDICARE

## 2022-11-18 DIAGNOSIS — D62 ACUTE BLOOD LOSS ANEMIA: ICD-10-CM

## 2022-11-18 LAB
BASOPHILS # BLD AUTO: 0.03 X10(3) UL (ref 0–0.2)
BASOPHILS NFR BLD AUTO: 0.3 %
DEPRECATED RDW RBC AUTO: 65 FL (ref 35.1–46.3)
EOSINOPHIL # BLD AUTO: 0.02 X10(3) UL (ref 0–0.7)
EOSINOPHIL NFR BLD AUTO: 0.2 %
ERYTHROCYTE [DISTWIDTH] IN BLOOD BY AUTOMATED COUNT: 22.5 % (ref 11–15)
HCT VFR BLD AUTO: 35.8 %
HGB BLD-MCNC: 10.2 G/DL
IMM GRANULOCYTES # BLD AUTO: 0.05 X10(3) UL (ref 0–1)
IMM GRANULOCYTES NFR BLD: 0.6 %
LYMPHOCYTES # BLD AUTO: 1.7 X10(3) UL (ref 1–4)
LYMPHOCYTES NFR BLD AUTO: 19.7 %
MCH RBC QN AUTO: 24.5 PG (ref 26–34)
MCHC RBC AUTO-ENTMCNC: 28.5 G/DL (ref 31–37)
MCV RBC AUTO: 85.9 FL
MONOCYTES # BLD AUTO: 0.73 X10(3) UL (ref 0.1–1)
MONOCYTES NFR BLD AUTO: 8.4 %
NEUTROPHILS # BLD AUTO: 6.12 X10 (3) UL (ref 1.5–7.7)
NEUTROPHILS # BLD AUTO: 6.12 X10(3) UL (ref 1.5–7.7)
NEUTROPHILS NFR BLD AUTO: 70.8 %
PLATELET # BLD AUTO: 182 10(3)UL (ref 150–450)
PLATELET MORPHOLOGY: NORMAL
RBC # BLD AUTO: 4.17 X10(6)UL
WBC # BLD AUTO: 8.7 X10(3) UL (ref 4–11)

## 2022-11-18 PROCEDURE — 36415 COLL VENOUS BLD VENIPUNCTURE: CPT

## 2022-11-18 PROCEDURE — 85025 COMPLETE CBC W/AUTO DIFF WBC: CPT

## 2022-11-21 ENCOUNTER — OFFICE VISIT (OUTPATIENT)
Dept: INTERNAL MEDICINE CLINIC | Facility: CLINIC | Age: 62
End: 2022-11-21
Payer: MEDICARE

## 2022-11-21 ENCOUNTER — LAB ENCOUNTER (OUTPATIENT)
Dept: LAB | Age: 62
End: 2022-11-21
Attending: INTERNAL MEDICINE
Payer: MEDICARE

## 2022-11-21 VITALS
TEMPERATURE: 98 F | HEIGHT: 58 IN | SYSTOLIC BLOOD PRESSURE: 130 MMHG | HEART RATE: 104 BPM | BODY MASS INDEX: 23.3 KG/M2 | DIASTOLIC BLOOD PRESSURE: 60 MMHG | WEIGHT: 111 LBS

## 2022-11-21 DIAGNOSIS — D24.2 INTRADUCTAL PAPILLOMA OF LEFT BREAST: Primary | ICD-10-CM

## 2022-11-21 DIAGNOSIS — Z01.818 PREOPERATIVE CLEARANCE: ICD-10-CM

## 2022-11-21 DIAGNOSIS — Z01.810 PREOPERATIVE CARDIOVASCULAR EXAMINATION: ICD-10-CM

## 2022-11-21 DIAGNOSIS — Z79.4 TYPE 2 DIABETES MELLITUS WITH OTHER OPHTHALMIC COMPLICATION, WITH LONG-TERM CURRENT USE OF INSULIN (HCC): ICD-10-CM

## 2022-11-21 DIAGNOSIS — E11.39 TYPE 2 DIABETES MELLITUS WITH OTHER OPHTHALMIC COMPLICATION, WITH LONG-TERM CURRENT USE OF INSULIN (HCC): ICD-10-CM

## 2022-11-21 LAB
ANION GAP SERPL CALC-SCNC: 8 MMOL/L (ref 0–18)
BUN BLD-MCNC: 14 MG/DL (ref 7–18)
BUN/CREAT SERPL: 16.9 (ref 10–20)
CALCIUM BLD-MCNC: 9.2 MG/DL (ref 8.5–10.1)
CHLORIDE SERPL-SCNC: 108 MMOL/L (ref 98–112)
CO2 SERPL-SCNC: 26 MMOL/L (ref 21–32)
CREAT BLD-MCNC: 0.83 MG/DL
FASTING STATUS PATIENT QL REPORTED: NO
GFR SERPLBLD BASED ON 1.73 SQ M-ARVRAT: 80 ML/MIN/1.73M2 (ref 60–?)
GLUCOSE BLD-MCNC: 69 MG/DL (ref 70–99)
OSMOLALITY SERPL CALC.SUM OF ELEC: 293 MOSM/KG (ref 275–295)
POTASSIUM SERPL-SCNC: 4.1 MMOL/L (ref 3.5–5.1)
SODIUM SERPL-SCNC: 142 MMOL/L (ref 136–145)

## 2022-11-21 PROCEDURE — 93000 ELECTROCARDIOGRAM COMPLETE: CPT | Performed by: INTERNAL MEDICINE

## 2022-11-21 PROCEDURE — 36415 COLL VENOUS BLD VENIPUNCTURE: CPT

## 2022-11-21 PROCEDURE — 80048 BASIC METABOLIC PNL TOTAL CA: CPT

## 2022-11-21 PROCEDURE — 99214 OFFICE O/P EST MOD 30 MIN: CPT | Performed by: INTERNAL MEDICINE

## 2022-11-21 RX ORDER — LIRAGLUTIDE 6 MG/ML
INJECTION SUBCUTANEOUS
COMMUNITY
Start: 2022-11-16 | End: 2022-11-21

## 2022-11-25 ENCOUNTER — LAB ENCOUNTER (OUTPATIENT)
Dept: LAB | Age: 62
End: 2022-11-25
Attending: SURGERY
Payer: MEDICARE

## 2022-11-25 DIAGNOSIS — Z01.818 PREOPERATIVE TESTING: ICD-10-CM

## 2022-11-25 LAB — SARS-COV-2 RNA RESP QL NAA+PROBE: NOT DETECTED

## 2022-11-28 ENCOUNTER — HOSPITAL ENCOUNTER (OUTPATIENT)
Dept: MAMMOGRAPHY | Facility: HOSPITAL | Age: 62
Discharge: HOME | End: 2022-11-28
Attending: SURGERY
Payer: MEDICARE

## 2022-11-28 ENCOUNTER — HOSPITAL ENCOUNTER (OUTPATIENT)
Facility: HOSPITAL | Age: 62
Setting detail: HOSPITAL OUTPATIENT SURGERY
Discharge: HOME | End: 2022-11-28
Attending: SURGERY | Admitting: SURGERY
Payer: MEDICARE

## 2022-11-28 ENCOUNTER — ANESTHESIA EVENT (OUTPATIENT)
Dept: SURGERY | Facility: HOSPITAL | Age: 62
End: 2022-11-28
Payer: MEDICARE

## 2022-11-28 ENCOUNTER — APPOINTMENT (OUTPATIENT)
Dept: MAMMOGRAPHY | Facility: HOSPITAL | Age: 62
End: 2022-11-28
Attending: SURGERY
Payer: MEDICARE

## 2022-11-28 ENCOUNTER — ANESTHESIA (OUTPATIENT)
Dept: SURGERY | Facility: HOSPITAL | Age: 62
End: 2022-11-28
Payer: MEDICARE

## 2022-11-28 VITALS
HEART RATE: 92 BPM | RESPIRATION RATE: 16 BRPM | WEIGHT: 119 LBS | OXYGEN SATURATION: 100 % | HEIGHT: 58 IN | SYSTOLIC BLOOD PRESSURE: 133 MMHG | BODY MASS INDEX: 24.98 KG/M2 | DIASTOLIC BLOOD PRESSURE: 49 MMHG | TEMPERATURE: 98 F

## 2022-11-28 DIAGNOSIS — D36.9 INTRADUCTAL PAPILLOMA: ICD-10-CM

## 2022-11-28 DIAGNOSIS — Z01.818 PREOPERATIVE TESTING: Primary | ICD-10-CM

## 2022-11-28 LAB
GLUCOSE BLDC GLUCOMTR-MCNC: 135 MG/DL (ref 70–99)
GLUCOSE BLDC GLUCOMTR-MCNC: 170 MG/DL (ref 70–99)

## 2022-11-28 PROCEDURE — 88307 TISSUE EXAM BY PATHOLOGIST: CPT | Performed by: SURGERY

## 2022-11-28 PROCEDURE — 82962 GLUCOSE BLOOD TEST: CPT

## 2022-11-28 PROCEDURE — 19281 PERQ DEVICE BREAST 1ST IMAG: CPT | Performed by: SURGERY

## 2022-11-28 PROCEDURE — 76098 X-RAY EXAM SURGICAL SPECIMEN: CPT | Performed by: SURGERY

## 2022-11-28 PROCEDURE — 88300 SURGICAL PATH GROSS: CPT | Performed by: SURGERY

## 2022-11-28 PROCEDURE — 0JB60ZZ EXCISION OF CHEST SUBCUTANEOUS TISSUE AND FASCIA, OPEN APPROACH: ICD-10-PCS | Performed by: SURGERY

## 2022-11-28 RX ORDER — CEFAZOLIN SODIUM/WATER 2 G/20 ML
2 SYRINGE (ML) INTRAVENOUS ONCE
Status: DISCONTINUED | OUTPATIENT
Start: 2022-11-28 | End: 2022-11-28 | Stop reason: HOSPADM

## 2022-11-28 RX ORDER — LIDOCAINE HYDROCHLORIDE 10 MG/ML
INJECTION, SOLUTION EPIDURAL; INFILTRATION; INTRACAUDAL; PERINEURAL AS NEEDED
Status: DISCONTINUED | OUTPATIENT
Start: 2022-11-28 | End: 2022-11-28 | Stop reason: SURG

## 2022-11-28 RX ORDER — DEXTROSE MONOHYDRATE 25 G/50ML
50 INJECTION, SOLUTION INTRAVENOUS
Status: DISCONTINUED | OUTPATIENT
Start: 2022-11-28 | End: 2022-11-28 | Stop reason: HOSPADM

## 2022-11-28 RX ORDER — HYDROMORPHONE HYDROCHLORIDE 1 MG/ML
0.2 INJECTION, SOLUTION INTRAMUSCULAR; INTRAVENOUS; SUBCUTANEOUS EVERY 5 MIN PRN
Status: DISCONTINUED | OUTPATIENT
Start: 2022-11-28 | End: 2022-11-28

## 2022-11-28 RX ORDER — MORPHINE SULFATE 10 MG/ML
6 INJECTION, SOLUTION INTRAMUSCULAR; INTRAVENOUS EVERY 10 MIN PRN
Status: DISCONTINUED | OUTPATIENT
Start: 2022-11-28 | End: 2022-11-28

## 2022-11-28 RX ORDER — MORPHINE SULFATE 4 MG/ML
2 INJECTION, SOLUTION INTRAMUSCULAR; INTRAVENOUS EVERY 10 MIN PRN
Status: DISCONTINUED | OUTPATIENT
Start: 2022-11-28 | End: 2022-11-28

## 2022-11-28 RX ORDER — SODIUM CHLORIDE, SODIUM LACTATE, POTASSIUM CHLORIDE, CALCIUM CHLORIDE 600; 310; 30; 20 MG/100ML; MG/100ML; MG/100ML; MG/100ML
INJECTION, SOLUTION INTRAVENOUS CONTINUOUS
Status: DISCONTINUED | OUTPATIENT
Start: 2022-11-28 | End: 2022-11-28

## 2022-11-28 RX ORDER — NICOTINE POLACRILEX 4 MG
30 LOZENGE BUCCAL
Status: DISCONTINUED | OUTPATIENT
Start: 2022-11-28 | End: 2022-11-28 | Stop reason: HOSPADM

## 2022-11-28 RX ORDER — HYDROCODONE BITARTRATE AND ACETAMINOPHEN 5; 325 MG/1; MG/1
1-2 TABLET ORAL EVERY 6 HOURS PRN
Qty: 20 TABLET | Refills: 0 | Status: SHIPPED | OUTPATIENT
Start: 2022-11-28

## 2022-11-28 RX ORDER — MORPHINE SULFATE 4 MG/ML
4 INJECTION, SOLUTION INTRAMUSCULAR; INTRAVENOUS EVERY 10 MIN PRN
Status: DISCONTINUED | OUTPATIENT
Start: 2022-11-28 | End: 2022-11-28

## 2022-11-28 RX ORDER — INSULIN ASPART 100 [IU]/ML
INJECTION, SOLUTION INTRAVENOUS; SUBCUTANEOUS ONCE
Status: COMPLETED | OUTPATIENT
Start: 2022-11-28 | End: 2022-11-28

## 2022-11-28 RX ORDER — MIDAZOLAM HYDROCHLORIDE 1 MG/ML
INJECTION INTRAMUSCULAR; INTRAVENOUS AS NEEDED
Status: DISCONTINUED | OUTPATIENT
Start: 2022-11-28 | End: 2022-11-28 | Stop reason: SURG

## 2022-11-28 RX ORDER — LIDOCAINE HYDROCHLORIDE AND EPINEPHRINE 10; 10 MG/ML; UG/ML
INJECTION, SOLUTION INFILTRATION; PERINEURAL AS NEEDED
Status: DISCONTINUED | OUTPATIENT
Start: 2022-11-28 | End: 2022-11-28 | Stop reason: HOSPADM

## 2022-11-28 RX ORDER — DEXTROSE MONOHYDRATE 25 G/50ML
50 INJECTION, SOLUTION INTRAVENOUS
Status: DISCONTINUED | OUTPATIENT
Start: 2022-11-28 | End: 2022-11-28

## 2022-11-28 RX ORDER — HYDROMORPHONE HYDROCHLORIDE 1 MG/ML
0.6 INJECTION, SOLUTION INTRAMUSCULAR; INTRAVENOUS; SUBCUTANEOUS EVERY 5 MIN PRN
Status: DISCONTINUED | OUTPATIENT
Start: 2022-11-28 | End: 2022-11-28

## 2022-11-28 RX ORDER — NICOTINE POLACRILEX 4 MG
15 LOZENGE BUCCAL
Status: DISCONTINUED | OUTPATIENT
Start: 2022-11-28 | End: 2022-11-28

## 2022-11-28 RX ORDER — BUPIVACAINE HYDROCHLORIDE 5 MG/ML
INJECTION, SOLUTION EPIDURAL; INTRACAUDAL AS NEEDED
Status: DISCONTINUED | OUTPATIENT
Start: 2022-11-28 | End: 2022-11-28 | Stop reason: HOSPADM

## 2022-11-28 RX ORDER — ONDANSETRON 2 MG/ML
4 INJECTION INTRAMUSCULAR; INTRAVENOUS EVERY 6 HOURS PRN
Status: DISCONTINUED | OUTPATIENT
Start: 2022-11-28 | End: 2022-11-28

## 2022-11-28 RX ORDER — NICOTINE POLACRILEX 4 MG
15 LOZENGE BUCCAL
Status: DISCONTINUED | OUTPATIENT
Start: 2022-11-28 | End: 2022-11-28 | Stop reason: HOSPADM

## 2022-11-28 RX ORDER — ACETAMINOPHEN 500 MG
1000 TABLET ORAL ONCE
Status: COMPLETED | OUTPATIENT
Start: 2022-11-28 | End: 2022-11-28

## 2022-11-28 RX ORDER — CEFAZOLIN SODIUM/WATER 2 G/20 ML
SYRINGE (ML) INTRAVENOUS AS NEEDED
Status: DISCONTINUED | OUTPATIENT
Start: 2022-11-28 | End: 2022-11-28 | Stop reason: SURG

## 2022-11-28 RX ORDER — NALOXONE HYDROCHLORIDE 0.4 MG/ML
80 INJECTION, SOLUTION INTRAMUSCULAR; INTRAVENOUS; SUBCUTANEOUS AS NEEDED
Status: DISCONTINUED | OUTPATIENT
Start: 2022-11-28 | End: 2022-11-28

## 2022-11-28 RX ORDER — HYDROMORPHONE HYDROCHLORIDE 1 MG/ML
0.4 INJECTION, SOLUTION INTRAMUSCULAR; INTRAVENOUS; SUBCUTANEOUS EVERY 5 MIN PRN
Status: DISCONTINUED | OUTPATIENT
Start: 2022-11-28 | End: 2022-11-28

## 2022-11-28 RX ORDER — PROCHLORPERAZINE EDISYLATE 5 MG/ML
5 INJECTION INTRAMUSCULAR; INTRAVENOUS EVERY 8 HOURS PRN
Status: DISCONTINUED | OUTPATIENT
Start: 2022-11-28 | End: 2022-11-28

## 2022-11-28 RX ORDER — NICOTINE POLACRILEX 4 MG
30 LOZENGE BUCCAL
Status: DISCONTINUED | OUTPATIENT
Start: 2022-11-28 | End: 2022-11-28

## 2022-11-28 RX ADMIN — MIDAZOLAM HYDROCHLORIDE 2 MG: 1 INJECTION INTRAMUSCULAR; INTRAVENOUS at 08:01:00

## 2022-11-28 RX ADMIN — SODIUM CHLORIDE, SODIUM LACTATE, POTASSIUM CHLORIDE, CALCIUM CHLORIDE: 600; 310; 30; 20 INJECTION, SOLUTION INTRAVENOUS at 08:26:00

## 2022-11-28 RX ADMIN — LIDOCAINE HYDROCHLORIDE 50 MG: 10 INJECTION, SOLUTION EPIDURAL; INFILTRATION; INTRACAUDAL; PERINEURAL at 08:01:00

## 2022-11-28 RX ADMIN — SODIUM CHLORIDE, SODIUM LACTATE, POTASSIUM CHLORIDE, CALCIUM CHLORIDE: 600; 310; 30; 20 INJECTION, SOLUTION INTRAVENOUS at 07:56:00

## 2022-11-28 RX ADMIN — CEFAZOLIN SODIUM/WATER 2 G: 2 G/20 ML SYRINGE (ML) INTRAVENOUS at 08:05:00

## 2022-11-28 NOTE — IMAGING NOTE
7241 Pt  to mammography department scouts completed by  [facility]  mammography technologist     7357  Hx taken procedure explained questions answered. Iv patent no redness or swelling noted at site     0656  Consent signed and verified      Desiree Ty here scanning completed Order verified and signed by all  procedural staff members    0703  Time out taken       site marked LEFT  Breast    0706  Chloro prep as skin prep to site. Lidocaine   1% 10 milligrams per ml given as anesthetic affect from kit  5 ml total given. 5309 Ciklumiatas needle  20 gauge  X 7  cm   placed . Pt re  images procedure complete.    0725  BB marker to site wire secured to breast  strips. A 4x4 dsg secured  over wire with tape by nurse ANDRA VALLEJO [facility] RN after images completed . Iv patent no redness or swelling noted at site     0735 TO [facility] SURGERY  department .

## 2022-11-28 NOTE — OPERATIVE REPORT
[facility]    PATIENT'S NAME: Gil Blair   ATTENDING PHYSICIAN: Ashtyn Bejarano MD   OPERATING PHYSICIAN: Ashtyn Bejarano MD   PATIENT ACCOUNT#:   [id number]    LOCATION:  [facility] 10  MEDICAL RECORD #:   [id number]       YOB: 1960  ADMISSION DATE:       11/28/2022      OPERATION DATE:  11/28/2022    OPERATIVE REPORT      PREOPERATIVE DIAGNOSIS:  Intraductal papilloma of left breast and left breast nipple discharge. POSTOPERATIVE DIAGNOSIS:  Intraductal papilloma of left breast and left breast nipple discharge. PROCEDURE:  Left breast wire localized lumpectomy with left breast specimen radiography and left terminal duct excision. ASSISTANT:  Elizabeth Langston CSA. ANESTHESIA:  Monitored anesthesia care and local.    ESTIMATED BLOOD LOSS:  5 mL. DRAINS:  None. COMPLICATIONS:  None. DISPOSITION:  Stable of transfer to recovery room. INDICATIONS:  The patient is a 58-year-old female with self-detected left nipple discharge. She had an ultrasound that conveyed a 7 mm mass and biopsy confirmed intraductal papilloma. She continued to have nipple discharge status post biopsy. We discussed excision of the papilloma formally to exclude coexisting pathology and remove the remainder of the lesion. I have recommended coinciding terminal duct excision to ensure that there are no additional papillary lesions and/or additional pathology anterior to the location of the biopsy confirmed papilloma in the left breast to be performed simultaneously. Risks and possible complications were discussed with the patient including, but not limited to, infection, bleeding, injury to surrounding structures, possible need for reoperation. She agreed to the proposed surgery. OPERATIVE TECHNIQUE:  The patient was brought to the imaging suite.   She underwent a wire localization of the area of the biopsy confirmed papilloma of the left breast.  She was brought to the OR, placed in supine position, properly padded and secured, given a dose of IV antibiotics. Sequential compression devices were applied to legs for DVT prophylaxis. Monitored anesthesia care was induced. The left breast was prepped and draped in usual sterile fashion. Then, 1% lidocaine with epinephrine was used to infiltrate the skin and subcutaneous tissues at the targeted incision site in the subareolar area. A lacrimal duct probe was then inserted into the duct with the expressible discharge. This was used to dilate and define the offending duct with both a lacrimal duct probe in place and a wire that had been placed by the radiologist.  The incision was completed with a 15 blade knife along the lateral areolar border between the 2 wires. Tunneling with sharp dissection and electrocautery was performed to identify the duct containing the probe immediately in the retroareolar space. This was dissected from the immediate retroareolar most proximal aspect of this duct into normal-appearing breast tissue. She was then noted to have the area of radiology and localized concern posterior to this, so an en bloc excision of this tissue was performed simultaneously. The specimen was then oriented with a short stitch single clip superiorly and a long stitch double clip laterally, placed in the imaging device where specimen x-ray confirmed the presence of the targeted biopsy clip with no other concerning findings. A clip was then placed back within the cavity to assist with subsequent surveillance. The wound was irrigated. Hemostasis was assured with electrocautery. A pexy of the nipple was performed with a 3-0 chromic pursestring suture at the base of the nipple. The wound was then closed with an interrupted 3-0 Vicryl for deep layer and a running 4-0 subcuticular Monocryl for skin. Mastisol and Steri-Strips were applied. Then, 0.5% Marcaine was instilled in the cavity to assist with postoperative analgesia. A sterile dressing and compression were placed. Blood loss was minimal.  All counts were correct at the conclusion of the procedure. She tolerated the procedure well. She was transferred to the recovery area in stable condition. Dictated By Ashtyn Brito. Shila Bejarano MD  d: 11/28/2022 08:49:06  t: 11/28/2022 17:07:12  Job [id number]  CMG/    cc: Rodolfo Adame.  Bard Dominique MD

## 2022-11-28 NOTE — PROCEDURES
[facility]  Procedure Note    Mcpherson Notice Patient Status:  Outpatient    1960 MRN [id number]   Location [address] [facility] Attending Germaine Leon MD   [address] [facility] Day # 0 PCP Jasmin Gary MD     Procedure: Left breast mammographic guided wire localization    Pre-Procedure Diagnosis:  Left breast papilloma    Post-Procedure Diagnosis: Left breast papilloma    Anesthesia:  Local    Findings:  Left breast papilloma    Specimens: n/a    Blood Loss:  minimal    Tourniquet Time: none  Complications:  None  Drains:  none    Odilia Moreno MD  2022

## 2022-11-28 NOTE — BRIEF OP NOTE
Pre-Operative Diagnosis: Intraductal papilloma [D36.9]     Post-Operative Diagnosis: Intraductal papilloma [D36.9]      Procedure Performed:    Left breast wire localized excisional biopsy, left breast terminal duct excision    Surgeon(s) and Role:     Kasia West MD - Primary    Assistant(s):  Surgical Assistant.: Wilmer Meehan CSA     Surgical Findings: serous discharge central duct and clip in xray     Specimen: L lumpectomy     Estimated Blood Loss: Blood Output: 5 mL (11/28/2022  8:28 AM)    Ruthy Louise MD  11/28/2022  8:31 AM

## 2022-12-01 RX ORDER — BLOOD-GLUCOSE TRANSMITTER
1 EACH MISCELLANEOUS
Qty: 1 EACH | Refills: 1 | Status: SHIPPED | OUTPATIENT
Start: 2022-12-01

## 2022-12-02 NOTE — TELEPHONE ENCOUNTER
----- Message from Mia Christensen MD sent at 6/8/2019  9:36 AM CDT -----  Please let the patient know that results of these particular lab tests so far were normal.    Thank you Bedside and Verbal shift change report given to Marlene Alvarez (oncoming nurse) by Nas Scott (offgoing nurse). Report included the following information Accordion, Recent Results, Med Rec Status, Cardiac Rhythm A-fib controlled, and Alarm Parameters .

## 2022-12-07 ENCOUNTER — OFFICE VISIT (OUTPATIENT)
Dept: SURGERY | Facility: CLINIC | Age: 62
End: 2022-12-07
Payer: MEDICARE

## 2022-12-07 VITALS
WEIGHT: 110.19 LBS | RESPIRATION RATE: 16 BRPM | HEART RATE: 97 BPM | BODY MASS INDEX: 23.13 KG/M2 | OXYGEN SATURATION: 98 % | DIASTOLIC BLOOD PRESSURE: 47 MMHG | HEIGHT: 58 IN | TEMPERATURE: 97 F | SYSTOLIC BLOOD PRESSURE: 148 MMHG

## 2022-12-07 DIAGNOSIS — D24.2 PAPILLOMA OF LEFT BREAST: Primary | ICD-10-CM

## 2022-12-07 PROCEDURE — 99024 POSTOP FOLLOW-UP VISIT: CPT | Performed by: SURGERY

## 2022-12-12 ENCOUNTER — PATIENT MESSAGE (OUTPATIENT)
Dept: INTERNAL MEDICINE CLINIC | Facility: CLINIC | Age: 62
End: 2022-12-12

## 2022-12-12 ENCOUNTER — NURSE TRIAGE (OUTPATIENT)
Dept: INTERNAL MEDICINE CLINIC | Facility: CLINIC | Age: 62
End: 2022-12-12

## 2022-12-12 ENCOUNTER — TELEPHONE (OUTPATIENT)
Dept: ENDOCRINOLOGY CLINIC | Facility: CLINIC | Age: 62
End: 2022-12-12

## 2022-12-12 ENCOUNTER — HOSPITAL ENCOUNTER (OUTPATIENT)
Age: 62
Discharge: HOME | End: 2022-12-12
Payer: MEDICARE

## 2022-12-12 VITALS
OXYGEN SATURATION: 96 % | SYSTOLIC BLOOD PRESSURE: 132 MMHG | DIASTOLIC BLOOD PRESSURE: 44 MMHG | RESPIRATION RATE: 20 BRPM | HEART RATE: 100 BPM | TEMPERATURE: 98 F

## 2022-12-12 DIAGNOSIS — N39.0 ACUTE UTI: Primary | ICD-10-CM

## 2022-12-12 DIAGNOSIS — R31.29 OTHER MICROSCOPIC HEMATURIA: ICD-10-CM

## 2022-12-12 DIAGNOSIS — R73.9 HYPERGLYCEMIA: ICD-10-CM

## 2022-12-12 LAB
BILIRUB UR QL STRIP: NEGATIVE
GLUCOSE UR STRIP-MCNC: >=1000 MG/DL
KETONES UR STRIP-MCNC: NEGATIVE MG/DL
NITRITE UR QL STRIP: NEGATIVE
PH UR STRIP: 6 [PH]
PROT UR STRIP-MCNC: NEGATIVE MG/DL
SP GR UR STRIP: <=1.005
UROBILINOGEN UR STRIP-ACNC: <2 MG/DL

## 2022-12-12 PROCEDURE — 81002 URINALYSIS NONAUTO W/O SCOPE: CPT | Performed by: NURSE PRACTITIONER

## 2022-12-12 PROCEDURE — 99213 OFFICE O/P EST LOW 20 MIN: CPT | Performed by: NURSE PRACTITIONER

## 2022-12-12 RX ORDER — NITROFURANTOIN 25; 75 MG/1; MG/1
100 CAPSULE ORAL 2 TIMES DAILY
Qty: 10 CAPSULE | Refills: 0 | Status: SHIPPED | OUTPATIENT
Start: 2022-12-12 | End: 2022-12-17

## 2022-12-12 RX ORDER — FLUCONAZOLE 150 MG/1
150 TABLET ORAL ONCE
Qty: 1 TABLET | Refills: 0 | Status: SHIPPED | OUTPATIENT
Start: 2022-12-12 | End: 2022-12-12

## 2022-12-12 NOTE — TELEPHONE ENCOUNTER
Damon states her blood sugar is at 388 mg/dL - states she had Cortisone injection a few hours ago. Please call. Thank you.

## 2022-12-12 NOTE — ED INITIAL ASSESSMENT (HPI)
Pt states woke up this morning, and felt some pelvic pressure during urination. Pt states only a small amount came out. Pt states urine was cloudy. Pt states having urgency issues.

## 2022-12-12 NOTE — TELEPHONE ENCOUNTER
Dr. Shameka Desir,  Patient had cortisone injection today and was diagnosed with UTI   Patient states she had injection this morning and then lunch around 1pm - BG reading at 3pm was 388  Dexcom report printed - placed on Dr. Andreea Ferrera desk    Patient's DM meds:  U500 175 units with breakfast and 175 units with lunch and 170 units with dinner  trulicity 9.9AV/JNKP  jardiance 25mg daily    Please advise -thanks

## 2022-12-12 NOTE — TELEPHONE ENCOUNTER
Spoke to patient to relay message below - patient stated understanding to increase U500 to 185 units with breakfast and 185 units with lunch and 180 units with dinner  Patient stated understanding to stay well hydrated and will call tomorrow with BG update

## 2022-12-12 NOTE — TELEPHONE ENCOUNTER
C/w lanette and jardiance  Increase insulin U 500 to U500 175--. 185  units with breakfast and 175 --. 185 units with lunch and 170--. 180  units with dinner  Send Bg update on my chart / call with Bg tmrw am   Hydrate well    Thanks

## 2022-12-20 ENCOUNTER — OFFICE VISIT (OUTPATIENT)
Dept: ENDOCRINOLOGY CLINIC | Facility: CLINIC | Age: 62
End: 2022-12-20
Payer: MEDICARE

## 2022-12-20 VITALS
DIASTOLIC BLOOD PRESSURE: 72 MMHG | HEART RATE: 93 BPM | WEIGHT: 110 LBS | BODY MASS INDEX: 23 KG/M2 | SYSTOLIC BLOOD PRESSURE: 137 MMHG

## 2022-12-20 DIAGNOSIS — E11.39 TYPE 2 DIABETES MELLITUS WITH OTHER OPHTHALMIC COMPLICATION, WITH LONG-TERM CURRENT USE OF INSULIN (HCC): Primary | ICD-10-CM

## 2022-12-20 DIAGNOSIS — Z79.4 TYPE 2 DIABETES MELLITUS WITH OTHER OPHTHALMIC COMPLICATION, WITH LONG-TERM CURRENT USE OF INSULIN (HCC): Primary | ICD-10-CM

## 2022-12-20 LAB
CARTRIDGE LOT#: ABNORMAL NUMERIC
GLUCOSE BLOOD: 177
HEMOGLOBIN A1C: 6.3 % (ref 4.3–5.6)
TEST STRIP LOT #: NORMAL NUMERIC

## 2022-12-20 PROCEDURE — 82947 ASSAY GLUCOSE BLOOD QUANT: CPT

## 2022-12-20 PROCEDURE — 99214 OFFICE O/P EST MOD 30 MIN: CPT

## 2022-12-20 PROCEDURE — 83036 HEMOGLOBIN GLYCOSYLATED A1C: CPT

## 2022-12-20 RX ORDER — BLOOD-GLUCOSE SENSOR
EACH MISCELLANEOUS
Qty: 9 EACH | Refills: 1 | Status: SHIPPED | OUTPATIENT
Start: 2022-12-20

## 2023-01-16 RX ORDER — PANTOPRAZOLE SODIUM 40 MG/1
TABLET, DELAYED RELEASE ORAL
Qty: 90 TABLET | Refills: 1 | Status: SHIPPED | OUTPATIENT
Start: 2023-01-16

## 2023-01-16 NOTE — TELEPHONE ENCOUNTER
Refill passed per [place], [facility] protocol.      Requested Prescriptions   Pending Prescriptions Disp Refills    PANTOPRAZOLE 40 MG Oral Tab EC [Pharmacy Med Name: PANTOPRAZOLE 40MG TABLETS] 90 tablet 3     Sig: TAKE 1 TABLET(40 MG) BY MOUTH EVERY MORNING       Gastrointestional Medication Protocol Passed - 1/15/2023  5:55 AM        Passed - In person appointment or virtual visit in the past 12 mos or appointment in next 3 mos     Recent Outpatient Visits              3 weeks ago Type 2 diabetes mellitus with other ophthalmic complication, with long-term current use of insulin [facility])    [address], [address], Adelina Reyes, MARCELA    Office Visit    1 month ago Papilloma of left breast    [facility], Paula Huddleston MD    Office Visit    1 month ago Intraductal papilloma of left breast    [facility], [address], MD Johnnie    Office Visit    2 months ago [facility], MD Johnnie    Office Visit    2 months ago Encounter for annual wellness exam in Medicare patient    [address], [address], [facility] Mono Lo MD    Office Visit          Future Appointments         Provider Department Appt Notes    In 2 months MARCELA Sharp [address], [address], [facility] 3 mt follow up                     Future Appointments         Provider Department Appt Notes    In 2 months MARCELA Sharp [address], [address], [facility] 3 mt follow up             Recent Outpatient Visits              3 weeks ago Type 2 diabetes mellitus with other ophthalmic complication, with long-term current use of insulin [facility])    [address], [address], Adeilna Reyes, MARCELA    Office Visit    1 month ago Papilloma of left breast    [address] Audrey Santiago Froylan Loffler, MD    Office Visit    1 month ago Intraductal papilloma of left breast    [facility], [place] Rivas, MD Johnnie    Office Visit    2 months ago 6 [facility], MD Johnnie    Office Visit    2 months ago Encounter for annual wellness exam in Medicare patient    Yulia Bertha, [place] Shin MD    Office Visit

## 2023-01-20 ENCOUNTER — TELEPHONE (OUTPATIENT)
Dept: ENDOCRINOLOGY CLINIC | Facility: CLINIC | Age: 63
End: 2023-01-20

## 2023-01-20 NOTE — TELEPHONE ENCOUNTER
Received fax from [place] asking for last chart note within the past 6 months to be faxed to [phone]. RN faxed chart note from 12/20/22 as requested via Epic faxing.

## 2023-02-16 RX ORDER — EMPAGLIFLOZIN 25 MG/1
TABLET, FILM COATED ORAL
Qty: 90 TABLET | Refills: 0 | Status: SHIPPED | OUTPATIENT
Start: 2023-02-16

## 2023-02-16 NOTE — TELEPHONE ENCOUNTER
LOV: 12/20/22    RTC:3months    F/U:03/21/23     Pending Monthly Supply:order pending, please approve if agreeable.

## 2023-03-06 NOTE — ASSESSMENT & PLAN NOTE
Glucose is controlled. Patient sees endocrinology . Please follow up with your child's regular outpatient pediatrician within 1-2 days of ED discharge to ensure that he/she continues to show signs of improvement. Continue to avoid contact sports until cleared by your pediatrician.     If your child develops worsening abdominal pain, persistent vomiting and intolerance of any oral intake, fevers, or any other concerning symptoms, please return immediately to the ED for evaluation.

## 2023-03-08 ENCOUNTER — HOSPITAL ENCOUNTER (OUTPATIENT)
Age: 63
Discharge: HOME | End: 2023-03-08
Payer: MEDICARE

## 2023-03-08 DIAGNOSIS — Z20.822 ENCOUNTER FOR LABORATORY TESTING FOR COVID-19 VIRUS: Primary | ICD-10-CM

## 2023-03-08 LAB — SARS-COV-2 RNA RESP QL NAA+PROBE: NOT DETECTED

## 2023-03-08 PROCEDURE — U0002 COVID-19 LAB TEST NON-CDC: HCPCS | Performed by: EMERGENCY MEDICINE

## 2023-03-09 ENCOUNTER — NURSE TRIAGE (OUTPATIENT)
Dept: FAMILY MEDICINE CLINIC | Facility: CLINIC | Age: 63
End: 2023-03-09

## 2023-03-09 DIAGNOSIS — Z20.818 STREPTOCOCCUS EXPOSURE: Primary | ICD-10-CM

## 2023-03-10 ENCOUNTER — HOSPITAL ENCOUNTER (OUTPATIENT)
Age: 63
Discharge: HOME | End: 2023-03-10
Payer: MEDICARE

## 2023-03-10 VITALS
DIASTOLIC BLOOD PRESSURE: 56 MMHG | TEMPERATURE: 97 F | HEART RATE: 104 BPM | SYSTOLIC BLOOD PRESSURE: 149 MMHG | RESPIRATION RATE: 18 BRPM | OXYGEN SATURATION: 100 %

## 2023-03-10 DIAGNOSIS — R07.0 THROAT PAIN IN ADULT: Primary | ICD-10-CM

## 2023-03-10 DIAGNOSIS — R05.1 ACUTE COUGH: ICD-10-CM

## 2023-03-10 DIAGNOSIS — Z20.818 EXPOSURE TO STREP THROAT: ICD-10-CM

## 2023-03-10 LAB — S PYO AG THROAT QL: NEGATIVE

## 2023-03-10 PROCEDURE — 99213 OFFICE O/P EST LOW 20 MIN: CPT | Performed by: NURSE PRACTITIONER

## 2023-03-10 PROCEDURE — 87880 STREP A ASSAY W/OPTIC: CPT | Performed by: NURSE PRACTITIONER

## 2023-03-10 RX ORDER — BENZONATATE 100 MG/1
100 CAPSULE ORAL 3 TIMES DAILY PRN
Qty: 12 CAPSULE | Refills: 0 | Status: SHIPPED | OUTPATIENT
Start: 2023-03-10

## 2023-03-10 NOTE — ED INITIAL ASSESSMENT (HPI)
Pt states having a cough and congestion for about a week now, pt states was here for covid testing and was neg. Pt states spoke with PCP. Pt advised to get a strep test and a culture.

## 2023-03-16 ENCOUNTER — OFFICE VISIT (OUTPATIENT)
Dept: INTERNAL MEDICINE CLINIC | Facility: CLINIC | Age: 63
End: 2023-03-16

## 2023-03-16 VITALS
HEART RATE: 112 BPM | HEIGHT: 58 IN | TEMPERATURE: 99 F | DIASTOLIC BLOOD PRESSURE: 60 MMHG | WEIGHT: 110 LBS | BODY MASS INDEX: 23.09 KG/M2 | SYSTOLIC BLOOD PRESSURE: 120 MMHG

## 2023-03-16 DIAGNOSIS — J01.00 SUBACUTE MAXILLARY SINUSITIS: Primary | ICD-10-CM

## 2023-03-16 DIAGNOSIS — J98.01 COUGH DUE TO BRONCHOSPASM: ICD-10-CM

## 2023-03-16 PROCEDURE — 99213 OFFICE O/P EST LOW 20 MIN: CPT | Performed by: INTERNAL MEDICINE

## 2023-03-16 RX ORDER — CEFUROXIME AXETIL 250 MG/1
250 TABLET ORAL 2 TIMES DAILY
Qty: 20 TABLET | Refills: 0 | Status: SHIPPED | OUTPATIENT
Start: 2023-03-16

## 2023-03-21 RX ORDER — DULAGLUTIDE 4.5 MG/.5ML
4.5 INJECTION, SOLUTION SUBCUTANEOUS WEEKLY
Qty: 6 ML | Refills: 1 | Status: SHIPPED | OUTPATIENT
Start: 2023-03-21

## 2023-03-27 ENCOUNTER — TELEPHONE (OUTPATIENT)
Dept: INTERNAL MEDICINE CLINIC | Facility: CLINIC | Age: 63
End: 2023-03-27

## 2023-03-27 NOTE — TELEPHONE ENCOUNTER
Patient called. Patient c/o cough; took antibiotic and still hasn't felt better. Has blood tinge mucus from coughing. Soreness in throat from constant coughing. Patient seeking Cough syrup with hydrocodone. Has appt 3/28/23. Advised to keep appt as scheduled. Will need re-evaluation. Patient verbalized understanding.

## 2023-03-28 ENCOUNTER — OFFICE VISIT (OUTPATIENT)
Dept: INTERNAL MEDICINE CLINIC | Facility: CLINIC | Age: 63
End: 2023-03-28

## 2023-03-28 ENCOUNTER — HOSPITAL ENCOUNTER (OUTPATIENT)
Dept: GENERAL RADIOLOGY | Age: 63
Discharge: HOME | End: 2023-03-28
Attending: INTERNAL MEDICINE
Payer: MEDICARE

## 2023-03-28 VITALS
BODY MASS INDEX: 23.3 KG/M2 | DIASTOLIC BLOOD PRESSURE: 50 MMHG | TEMPERATURE: 100 F | HEIGHT: 58 IN | WEIGHT: 111 LBS | HEART RATE: 116 BPM | SYSTOLIC BLOOD PRESSURE: 130 MMHG

## 2023-03-28 DIAGNOSIS — J98.01 COUGH DUE TO BRONCHOSPASM: ICD-10-CM

## 2023-03-28 DIAGNOSIS — J98.01 COUGH DUE TO BRONCHOSPASM: Primary | ICD-10-CM

## 2023-03-28 PROCEDURE — 99213 OFFICE O/P EST LOW 20 MIN: CPT | Performed by: INTERNAL MEDICINE

## 2023-03-28 PROCEDURE — 71046 X-RAY EXAM CHEST 2 VIEWS: CPT | Performed by: INTERNAL MEDICINE

## 2023-03-28 RX ORDER — METHYLPREDNISOLONE 4 MG/1
TABLET ORAL
Qty: 1 EACH | Refills: 0 | Status: SHIPPED | OUTPATIENT
Start: 2023-03-28

## 2023-03-28 RX ORDER — LEVOFLOXACIN 500 MG/1
500 TABLET, FILM COATED ORAL DAILY
Qty: 10 TABLET | Refills: 0 | Status: SHIPPED | OUTPATIENT
Start: 2023-03-28 | End: 2023-04-07

## 2023-03-28 RX ORDER — HYDROCODONE BITARTRATE AND HOMATROPINE METHYLBROMIDE ORAL SOLUTION 5; 1.5 MG/5ML; MG/5ML
2.5 LIQUID ORAL EVERY 6 HOURS PRN
Qty: 473 ML | Refills: 0 | Status: SHIPPED | OUTPATIENT
Start: 2023-03-28

## 2023-03-28 RX ORDER — ALBUTEROL SULFATE 90 UG/1
2 AEROSOL, METERED RESPIRATORY (INHALATION) EVERY 4 HOURS PRN
Qty: 18 G | Refills: 1 | Status: SHIPPED | OUTPATIENT
Start: 2023-03-28

## 2023-04-04 ENCOUNTER — OFFICE VISIT (OUTPATIENT)
Dept: ENDOCRINOLOGY CLINIC | Facility: CLINIC | Age: 63
End: 2023-04-04

## 2023-04-04 VITALS
WEIGHT: 110 LBS | SYSTOLIC BLOOD PRESSURE: 146 MMHG | DIASTOLIC BLOOD PRESSURE: 68 MMHG | HEART RATE: 86 BPM | BODY MASS INDEX: 23 KG/M2

## 2023-04-04 DIAGNOSIS — E11.65 UNCONTROLLED TYPE 2 DIABETES MELLITUS WITH HYPERGLYCEMIA (HCC): Primary | ICD-10-CM

## 2023-04-04 LAB
CARTRIDGE LOT#: ABNORMAL NUMERIC
GLUCOSE BLOOD: 114
HEMOGLOBIN A1C: 7.5 % (ref 4.3–5.6)
TEST STRIP LOT #: NORMAL NUMERIC

## 2023-04-04 PROCEDURE — 99214 OFFICE O/P EST MOD 30 MIN: CPT

## 2023-04-04 PROCEDURE — 82947 ASSAY GLUCOSE BLOOD QUANT: CPT

## 2023-04-04 PROCEDURE — 83036 HEMOGLOBIN GLYCOSYLATED A1C: CPT

## 2023-04-04 NOTE — PATIENT INSTRUCTIONS
Insulin     Take 60 units with cereal in the morning and 90 units with breakfast/brunch meal later on       Take 165 units with dinner meal       Continue Trulicity 6.6ZT subcutaneous weekly     Continue Jardiance 25mg daily

## 2023-04-09 RX ORDER — FLUTICASONE PROPIONATE AND SALMETEROL 250; 50 UG/1; UG/1
1 POWDER RESPIRATORY (INHALATION) EVERY 12 HOURS
Qty: 60 EACH | Refills: 2 | OUTPATIENT
Start: 2023-04-09

## 2023-04-09 NOTE — TELEPHONE ENCOUNTER
Duplicate request, previously addressed. Per progress note by Dr. Adria Wolf :  \"Stopped Ovalle Jaegers. No help. Took Ceftin , no help. \"    ASSESSMENT/PLAN:   \"Cough due to bronchospasm  levaquin   Medrol  VEntolin  Hycodan  Chest xray /  Return in 2 weeks\"/

## 2023-04-11 ENCOUNTER — OFFICE VISIT (OUTPATIENT)
Dept: INTERNAL MEDICINE CLINIC | Facility: CLINIC | Age: 63
End: 2023-04-11

## 2023-04-11 VITALS
WEIGHT: 112 LBS | HEART RATE: 80 BPM | DIASTOLIC BLOOD PRESSURE: 54 MMHG | HEIGHT: 58 IN | SYSTOLIC BLOOD PRESSURE: 120 MMHG | TEMPERATURE: 98 F | BODY MASS INDEX: 23.51 KG/M2

## 2023-04-11 DIAGNOSIS — J18.9 PNEUMONIA OF LEFT LOWER LOBE DUE TO INFECTIOUS ORGANISM: Primary | ICD-10-CM

## 2023-04-11 DIAGNOSIS — J43.1 PANLOBULAR EMPHYSEMA (HCC): ICD-10-CM

## 2023-04-11 PROCEDURE — 99214 OFFICE O/P EST MOD 30 MIN: CPT | Performed by: INTERNAL MEDICINE

## 2023-04-11 RX ORDER — FLUTICASONE PROPIONATE AND SALMETEROL 250; 50 UG/1; UG/1
1 POWDER RESPIRATORY (INHALATION) EVERY 12 HOURS SCHEDULED
Qty: 60 EACH | Refills: 5 | Status: SHIPPED | OUTPATIENT
Start: 2023-04-11

## 2023-04-19 ENCOUNTER — HOSPITAL ENCOUNTER (OUTPATIENT)
Dept: GENERAL RADIOLOGY | Age: 63
Discharge: HOME | End: 2023-04-19
Attending: INTERNAL MEDICINE
Payer: MEDICARE

## 2023-04-19 DIAGNOSIS — J18.9 PNEUMONIA OF LEFT LOWER LOBE DUE TO INFECTIOUS ORGANISM: ICD-10-CM

## 2023-04-19 PROCEDURE — 71046 X-RAY EXAM CHEST 2 VIEWS: CPT | Performed by: INTERNAL MEDICINE

## 2023-04-28 ENCOUNTER — TELEPHONE (OUTPATIENT)
Dept: ENDOCRINOLOGY CLINIC | Facility: CLINIC | Age: 63
End: 2023-04-28

## 2023-04-28 RX ORDER — EMPAGLIFLOZIN 25 MG/1
1 TABLET, FILM COATED ORAL DAILY
Qty: 90 TABLET | Refills: 0 | Status: SHIPPED | OUTPATIENT
Start: 2023-04-28

## 2023-04-29 ENCOUNTER — PATIENT MESSAGE (OUTPATIENT)
Dept: INTERNAL MEDICINE CLINIC | Facility: CLINIC | Age: 63
End: 2023-04-29

## 2023-05-01 NOTE — TELEPHONE ENCOUNTER
From: Stephanie Modi  To: All Mae MD  Sent: 4/29/2023 11:46 AM CDT  Subject: Question regarding XR CHEST PA     Do I need another chest x ray?

## 2023-05-01 NOTE — ASSESSMENT & PLAN NOTE
May 1, 2023    Call to Hodan to discuss next steps. Her providers do not offer D&C or D&E for termination. [facility] with [facility] in [place] and they do not offer general anesthesia. She would like to look into coverage to have the procedure at [place].     Hodan isn't sure if she's ready to move forward yet. We generally discussed the differences between D&C and D&E, including that the D&E procedure is often a two-day procedure. Hodan is really hoping to get at least one additional ultrasound before she proceeds with termination. I encouraged her to call her OB provider to see if she could get in sooner with them. I will also see if we may be able to get her in again at [facility] for a 2/3 ultrasound. I also let her know that there are some private ultrasound clinics that will perform 3D ultrasounds at a cost.     I will look into insurance coverage and get back to Hodan as soon as I know more.     Tiffany Pruitt MS, [facility]  Licensed Genetic Counselor  [facility]  Maternal Fetal Medicine  [email or web address]  [phone]   Patient sees ophtho and endo  Needs urine for microalb.   hgbaic fair control.

## 2023-05-12 ENCOUNTER — MED REC SCAN ONLY (OUTPATIENT)
Dept: INTERNAL MEDICINE CLINIC | Facility: CLINIC | Age: 63
End: 2023-05-12

## 2023-05-17 RX ORDER — EMPAGLIFLOZIN 25 MG/1
1 TABLET, FILM COATED ORAL DAILY
Qty: 90 TABLET | Refills: 0 | Status: SHIPPED | OUTPATIENT
Start: 2023-05-17

## 2023-05-22 DIAGNOSIS — E11.39 TYPE 2 DIABETES MELLITUS WITH OTHER OPHTHALMIC COMPLICATION, WITH LONG-TERM CURRENT USE OF INSULIN (HCC): ICD-10-CM

## 2023-05-22 DIAGNOSIS — Z79.4 TYPE 2 DIABETES MELLITUS WITH OTHER OPHTHALMIC COMPLICATION, WITH LONG-TERM CURRENT USE OF INSULIN (HCC): ICD-10-CM

## 2023-05-22 NOTE — TELEPHONE ENCOUNTER
HUMULIN R U-500, CONCENTRATED, 500 UNIT/ML Subcutaneous Solution, ADMINISTER 180-195 UNITS UNDER THE SKIN THREE TIMES DAILY BEFORE MEALS, Disp: 120 mL, Rfl: 0 intact

## 2023-05-24 RX ORDER — INSULIN HUMAN 500 [IU]/ML
INJECTION, SOLUTION SUBCUTANEOUS
Qty: 110 ML | Refills: 0 | Status: SHIPPED | OUTPATIENT
Start: 2023-05-24

## 2023-05-30 RX ORDER — ALPRAZOLAM 0.5 MG/1
TABLET ORAL
Qty: 30 TABLET | Refills: 0 | Status: SHIPPED | OUTPATIENT
Start: 2023-05-30

## 2023-05-30 NOTE — TELEPHONE ENCOUNTER
Please review. Protocol failed or has no protocol. Dr Anthony Decker out on leave.   Requested Prescriptions   Pending Prescriptions Disp Refills    ALPRAZOLAM 0.5 MG Oral Tab [Pharmacy Med Name: ALPRAZOLAM 0.5MG TABLETS] 30 tablet 0     Sig: TAKE 1 TABLET BY MOUTH EVERY EVENING       There is no refill protocol information for this order          Recent Outpatient Visits              1 month ago Pneumonia of left lower lobe due to infectious organism    [facility], [place], Rebecca Hathaway MD    Office Visit    1 month ago Uncontrolled type 2 diabetes mellitus with hyperglycemia [facility])    [place]acy Ash, Jane Reyes APRN    Office Visit    2 months ago [facility] to bronchospasm    [facility]    Office Visit    2 months ago Subacute maxillary sinusitis    [facility], [place] Rivas, [facility] Grady Scruggs MD    Office Visit    5 months ago Type 2 diabetes mellitus with other ophthalmic complication, with long-term current use of insulin [facility])    Tracy Ash, Jane Reyes APRN    Office Visit            Future Appointments         Provider Department Appt Notes    In 4 weeks Uma Brito PA-C [facility], [address] [place] [address], Benedict Panlobular emphysema    In 1 month Jane Miller APRN Orest Bland, [address], [facility] 3 month f/u    In 5 months MD Mike Nassar [address], [facility] annual [place] wellness exam

## 2023-06-08 ENCOUNTER — PATIENT MESSAGE (OUTPATIENT)
Dept: ENDOCRINOLOGY CLINIC | Facility: CLINIC | Age: 63
End: 2023-06-08

## 2023-06-12 RX ORDER — PROCHLORPERAZINE 25 MG/1
SUPPOSITORY RECTAL
Qty: 1 EACH | Refills: 1 | Status: SHIPPED | OUTPATIENT
Start: 2023-06-12

## 2023-06-13 ENCOUNTER — TELEPHONE (OUTPATIENT)
Dept: ENDOCRINOLOGY CLINIC | Facility: CLINIC | Age: 63
End: 2023-06-13

## 2023-06-16 RX ORDER — BLOOD-GLUCOSE,RECEIVER,CONT
1 EACH MISCELLANEOUS DAILY
Qty: 1 EACH | Refills: 0 | Status: SHIPPED | OUTPATIENT
Start: 2023-06-16

## 2023-06-16 RX ORDER — BLOOD-GLUCOSE SENSOR
1 EACH MISCELLANEOUS
Qty: 9 EACH | Refills: 0 | Status: SHIPPED | OUTPATIENT
Start: 2023-06-16

## 2023-06-21 ENCOUNTER — TELEPHONE (OUTPATIENT)
Dept: ENDOCRINOLOGY CLINIC | Facility: CLINIC | Age: 63
End: 2023-06-21

## 2023-06-21 NOTE — TELEPHONE ENCOUNTER
Received fax from [facility] requesting chart notes for Diabetes supplies, faxing LOV 04/04/23 to [phone]. Awaiting confirmation.

## 2023-06-28 RX ORDER — GLUCAGON INJECTION, SOLUTION 1 MG/.2ML
1 INJECTION, SOLUTION SUBCUTANEOUS ONCE AS NEEDED
Qty: 1 EACH | Refills: 0 | Status: SHIPPED | OUTPATIENT
Start: 2023-06-28 | End: 2023-06-28

## 2023-07-10 RX ORDER — SYRINGE,INSUL U-500,NDL,0.5ML 31GX15/64"
1 SYRINGE, EMPTY DISPOSABLE MISCELLANEOUS
Qty: 300 EACH | Refills: 0 | Status: SHIPPED | OUTPATIENT
Start: 2023-07-10 | End: 2023-10-08

## 2023-07-10 NOTE — TELEPHONE ENCOUNTER
BD INSULIN SYRINGE U-500 31G X 6MM 0.5 ML Does not apply Misc USE THREE TIMES DAILY WITH MEALS 300 each 0

## 2023-07-14 RX ORDER — SYRINGE,INSUL U-500,NDL,0.5ML 31GX15/64"
1 SYRINGE, EMPTY DISPOSABLE MISCELLANEOUS
Qty: 300 EACH | Refills: 0 | Status: SHIPPED | OUTPATIENT
Start: 2023-07-14 | End: 2023-10-12

## 2023-07-17 RX ORDER — PANTOPRAZOLE SODIUM 40 MG/1
40 TABLET, DELAYED RELEASE ORAL EVERY MORNING
Qty: 90 TABLET | Refills: 3 | Status: SHIPPED | OUTPATIENT
Start: 2023-07-17

## 2023-07-17 NOTE — TELEPHONE ENCOUNTER
Refill passed per [place] [employer], [facility] protocol.      Requested Prescriptions   Pending Prescriptions Disp Refills    PANTOPRAZOLE 40 MG Oral Tab EC [Pharmacy Med Name: PANTOPRAZOLE 40MG TABLETS] 90 tablet 1     Sig: TAKE 1 TABLET(40 MG) BY MOUTH EVERY MORNING       Gastrointestional Medication Protocol Passed - 7/16/2023  5:58 AM        Passed - In person appointment or virtual visit in the past 12 mos or appointment in next 3 mos     Recent Outpatient Visits              3 months ago Pneumonia of left lower lobe due to infectious organism    [facility], [place] Rivas, Billie Phelan MD    Office Visit    3 months ago Uncontrolled type 2 diabetes mellitus with hyperglycemia [facility])    [address], [address], Sally Reyes, APRN    Office Visit    3 months ago Cough due to bronchospasm    [facility], Tracy Rivas, Billie Phelan MD    Office Visit    4 months ago Subacute maxillary sinusitis    [facility], [address], [address] Diogenes Choe MD    Office Visit    6 months ago Type 2 diabetes mellitus with other ophthalmic complication, with long-term current use of insulin [facility])    [address], [address], Sally Reyes, APRN    Office Visit          Future Appointments         Provider Department Appt Notes    In 2 weeks Mariluz Hare, MARCELA [address], [address], [address] 3 month f/u    In 2 weeks [address] for [employer] [facility]    In 3 months Diogenes Choe MD [address], [address], [address] annual [place] wellness exam                     Recent Outpatient Visits              3 months ago Pneumonia of left lower lobe due to infectious organism    [address], [address], Billie Phelan MD    Office Visit    3 months ago Uncontrolled type 2 diabetes mellitus with hyperglycemia [facility])    Eric Parr Margreta Dock, APRN    Office Visit    3 months ago Cough due to bronchospasm    [facility], [address], MD Johnnie    Office Visit    4 months ago Subacute maxillary sinusitis    [facility], [address], [facility] Corrina Blair MD    Office Visit    6 months ago Type 2 diabetes mellitus with other ophthalmic complication, with long-term current use of insulin [facility])    Tracy Otero, Joanne Reyes APRN    Office Visit             Future Appointments         Provider Department Appt Notes    In 2 weeks MARCELA Gill [address], [facility] 3 month f/u    In 2 weeks [address] for [address] [place] [address]    In 3 months Corrina Blair MD [facility], [facility] annual [place] wellness exam

## 2023-07-28 ENCOUNTER — HOSPITAL ENCOUNTER (OUTPATIENT)
Dept: GENERAL RADIOLOGY | Age: 63
Discharge: HOME | End: 2023-07-28
Attending: INTERNAL MEDICINE
Payer: MEDICARE

## 2023-07-28 ENCOUNTER — NURSE TRIAGE (OUTPATIENT)
Dept: INTERNAL MEDICINE CLINIC | Facility: CLINIC | Age: 63
End: 2023-07-28

## 2023-07-28 ENCOUNTER — OFFICE VISIT (OUTPATIENT)
Dept: INTERNAL MEDICINE CLINIC | Facility: CLINIC | Age: 63
End: 2023-07-28

## 2023-07-28 VITALS
WEIGHT: 117 LBS | RESPIRATION RATE: 19 BRPM | OXYGEN SATURATION: 96 % | SYSTOLIC BLOOD PRESSURE: 122 MMHG | DIASTOLIC BLOOD PRESSURE: 66 MMHG | BODY MASS INDEX: 24.56 KG/M2 | HEIGHT: 58 IN | TEMPERATURE: 98 F | HEART RATE: 99 BPM

## 2023-07-28 DIAGNOSIS — M54.2 NECK PAIN ON LEFT SIDE: ICD-10-CM

## 2023-07-28 DIAGNOSIS — M25.512 CHRONIC LEFT SHOULDER PAIN: Primary | ICD-10-CM

## 2023-07-28 DIAGNOSIS — G89.29 CHRONIC LEFT SHOULDER PAIN: ICD-10-CM

## 2023-07-28 DIAGNOSIS — M25.512 CHRONIC LEFT SHOULDER PAIN: ICD-10-CM

## 2023-07-28 DIAGNOSIS — G89.29 CHRONIC LEFT SHOULDER PAIN: Primary | ICD-10-CM

## 2023-07-28 PROCEDURE — 99214 OFFICE O/P EST MOD 30 MIN: CPT | Performed by: INTERNAL MEDICINE

## 2023-07-28 PROCEDURE — 73030 X-RAY EXAM OF SHOULDER: CPT | Performed by: INTERNAL MEDICINE

## 2023-07-28 PROCEDURE — 72050 X-RAY EXAM NECK SPINE 4/5VWS: CPT | Performed by: INTERNAL MEDICINE

## 2023-07-28 RX ORDER — PREDNISONE 5 MG/1
5 TABLET ORAL DAILY
Qty: 5 TABLET | Refills: 0 | Status: SHIPPED | OUTPATIENT
Start: 2023-07-28 | End: 2023-08-02

## 2023-07-28 NOTE — TELEPHONE ENCOUNTER
Action Requested: Summary for Provider     []  Critical Lab, Recommendations Needed  [] Need Additional Advice  []   FYI    []   Need Orders  [] Need Medications Sent to Pharmacy  []  Other     SUMMARY: Per protocol disposition advised office visit today     Future Appointments   Date Time Provider Ruchi Eli   7/28/2023  2:15 PM Rigoberto Schmidt MD [id number] [address]   7/31/2023 10:30 AM MARCELA Ovalle   8/4/2023  8:40 AM CF DEXA RM1 [facility]H DEXA EM CF   10/30/2023 10:30 AM Heather Breaux MD [address] [place] [address]       Reason for call: Shoulder Pain  Onset: 1 month     Patient reports she has left shoulder/arm pain pain for the past month. No known injury. Afebrile. States \"arm is a little warm\" but denies any swelling or redness. Patient is rating pain ad severe. Patient states Pankaj Motts tried everything\"  Denies other symptoms marked no under protocol.      Reason for Disposition   SEVERE pain (e.g., excruciating, unable to do any normal activities)    Protocols used: Shoulder Pain-A-[place]

## 2023-08-01 ENCOUNTER — TELEPHONE (OUTPATIENT)
Dept: INTERNAL MEDICINE CLINIC | Facility: CLINIC | Age: 63
End: 2023-08-01

## 2023-08-01 NOTE — TELEPHONE ENCOUNTER
Paging    Message # [id number]         2023 04:43p   [ISABELD]  To:  From:  PAIGE Villanueva MD:  Phone#:  ----------------------------------------------------------------------  RE PT Elmermacrina John, [phone]  62, NEEDS NEW RX ORDERED    Paged at number :  PAGE: [phone] at : Coy Otero 16:43  e

## 2023-08-02 NOTE — PROGRESS NOTES
Subjective:     Patient ID: Myra Linder is a 65year old female. HPI  Patient comes in today with complaint of left shoulder pain and arm pain since 1 month times unable to sleep at night pain symptoms comes from the neck    History/Other:   Review of Systems   Constitutional: Negative. HENT: Negative. Eyes: Negative. Respiratory: Negative. Cardiovascular: Negative. Gastrointestinal: Negative. Genitourinary: Negative. Musculoskeletal:  Positive for arthralgias and neck pain. Skin: Negative. Neurological: Negative. Psychiatric/Behavioral: Negative. Current Outpatient Medications   Medication Sig Dispense Refill    predniSONE 5 MG Oral Tab Take 1 tablet (5 mg total) by mouth daily for 5 days. 5 tablet 0    acetaminophen-codeine (TYLENOL WITH CODEINE #3) 300-30 MG Oral Tab Take 1 tablet by mouth every 12 (twelve) hours as needed for Pain. 20 tablet 0    pantoprazole 40 MG Oral Tab EC Take 1 tablet (40 mg total) by mouth every morning. 90 tablet 3    Insulin Syringe/Needle U-500 (BD INSULIN SYRINGE U-500) 31G X 6MM 0.5 ML Does not apply Misc 1 Syringe 3 (three) times daily with meals. 300 each 0    ALPRAZolam 0.5 MG Oral Tab TAKE 1 TABLET BY MOUTH EVERY EVENING 30 tablet 5    Continuous Blood Gluc Sensor (DEXCOM G7 SENSOR) Does not apply Misc 1 each Every 10 days. Replace sensor every 10 days 9 each 0    Continuous Blood Gluc  (DEXCOM G7 ) Does not apply Device 1 each daily.  1 each 0    DEXCOM G6 TRANSMITTER Does not apply Misc USE 1 DEVICE EVERY 3 MONTHS 1 each 1    DEXCOM G6 TRANSMITTER Does not apply Misc USE 1 DEVICE EVERY 3 MONTHS 1 each 1    HUMULIN R U-500, CONCENTRATED, 500 UNIT/ML Subcutaneous Solution ADMINISTER 180-195 UNITS UNDER THE SKIN THREE TIMES DAILY BEFORE MEALS 110 mL 0    JARDIANCE 25 MG Oral Tab TAKE 1 TABLET BY MOUTH DAILY 90 tablet 0    fluticasone-salmeterol (ADVAIR DISKUS) 250-50 MCG/ACT Inhalation Aerosol Powder, Breath Activated Inhale 1 puff into the lungs every 12 (twelve) hours. 60 each 5    HYDROcodone-homatropine 5-1.5 MG/5ML Oral Solution Take 2.5 mL by mouth every 6 (six) hours as needed. (Patient not taking: Reported on 4/11/2023) 473 mL 0    albuterol (VENTOLIN HFA) 108 (90 Base) MCG/ACT Inhalation Aero Soln Inhale 2 puffs into the lungs every 4 (four) hours as needed for Wheezing. 18 g 1    Dulaglutide (TRULICITY) 4.5 RE/2.4VX Subcutaneous Solution Pen-injector Inject 4.5 mg into the skin once a week. 6 mL 1    Continuous Blood Gluc Sensor (DEXCOM G6 SENSOR) Does not apply Misc USE AS DIRECTED AND CHANGE EVERY 10 DAYS 9 each 1    HYDROcodone-acetaminophen 5-325 MG Oral Tab Take 1-2 tablets by mouth every 6 (six) hours as needed for Pain. (Patient not taking: Reported on 3/28/2023) 20 tablet 0    LOSARTAN 25 MG Oral Tab TAKE 1 TABLET(25 MG) BY MOUTH DAILY 90 tablet 3    venlafaxine ER 75 MG Oral Capsule SR 24 Hr Take 1 capsule (75 mg total) by mouth daily. 90 capsule 3    DEXCOM G6 SENSOR Does not apply Misc CHANGE SENSOR EVERY 10 DAYS 3 each 2    Tretinoin, Facial Wrinkles, (RENOVA) 0.02 % External Cream APPLY TO THE AFFECTED AREA ON FACE EVERY EVENING (Patient not taking: Reported on 3/28/2023) 40 g 3    dicyclomine 10 MG Oral Cap Take 1 capsule (10 mg total) by mouth 4 (four) times daily. 60 capsule 5    TRUEPLUS PEN NEEDLES 32G X 4 MM Does not apply Misc USE AS DIRECTED 100 each 0    fluticasone-salmeterol (ADVAIR DISKUS) 250-50 MCG/ACT Inhalation Aerosol Powder, Breath Activated Inhale 1 puff into the lungs every 12 (twelve) hours.  (Patient not taking: Reported on 3/28/2023) 60 each 2    ONETOUCH VERIO In Vitro Strip USE TO CHECK BLOOD SUGAR 3 TO 4 TIMES DAILY 400 strip 0    ONETOUCH DELICA PLUS DIQSDX39S Does not apply Misc TEST FOUR TIMES DAILY 400 each 0    Continuous Blood Gluc Transmit (DEXCOM G6 TRANSMITTER) Does not apply Misc Replace transmitter every 3 months 1 each 0    Continuous Blood Gluc  (DEXCOM G6 ) Does not apply Device Use  daily to check blood sugars. 1 each 0    TRUEPLUS 5-BEVEL PEN NEEDLES 31G X 5 MM Does not apply Misc USE AS DIRECTED 6 TIMES DAILY 200 each 0    Insulin Syringe/Needle U-500 (BD INSULIN SYRINGE U-500) 31G X 6MM 0.5 ML Does not apply Misc Inject 1 each into the skin 3 (three) times daily with meals. 30 each 0    Insulin Pen Needle 31G X 5 MM Does not apply Misc Please use daily as directed 200 each 0    Insulin Syringe/Needle U-500 31G X 6MM 0.5 ML Does not apply Misc 3 each by Does not apply route 3 (three) times daily with meals. 300 each 0    Insulin Syringe/Needle U-500 31G X 6MM 0.5 ML Does not apply Misc 1 Syringe by Does not apply route 3 (three) times daily with meals. 300 each 0    Continuous Blood Gluc Transmit (DEXCOM G6 TRANSMITTER) Does not apply Misc 1 each by Does not apply route every 3 (three) months. 1 each 3    Continuous Blood Gluc  (DEXCOM G6 ) Does not apply Device 1 each by Does not apply route continuous. 1 Device 0    Blood Glucose Monitoring Suppl (Alan Duran) w/Device Does not apply Kit 1 Device by Does not apply route daily. 1 kit 0    OneTouch UltraSoft Lancets Does not apply Misc USE TO CHECK BLOOD SUGAR 3-4X/ each 1    Blood Glucose Monitoring Suppl (FREESTYLE LITE) Does not apply Device TEST QID  UTD  0     Allergies:  Caviar                  SWELLING    Comment:throat  Latex                   RASH    Past Medical History:   Diagnosis Date    Anxiety state     Carpal tunnel syndrome, left 2019    Diabetes ([address] 1990    Type 1    History of blood transfusion 2019    [facility]    Neuropathy     BOTH LEGS AND FEET    Sepsis ([address]     Visual impairment     eyeglasses      Past Surgical History:   Procedure Laterality Date    ABDOMINOPLASTY  2005    Tummy Tuck with Lipo Suction          x 2    COLONOSCOPY      COLONOSCOPY      no polyps. has diverticuli.  2016, 10 year follow up    HYSTERECTOMY  2010 laparoscopic total hysterectomy and \"removed 1 of tubes/ovaries\" for abnormal bleeding    NEEDLE BIOPSY LEFT Left 2022    Left US CNB    WRIST ARTHROSCOP,RELEASE Avelino Click LIG Left 10/01/2019    Left endoscopic carpal tunnel release      Family History   Problem Relation Age of Onset    Diabetes Father 54        Type 1/Type 2    Heart Disorder Father     Diabetes Brother     Heart Disorder Brother     Hypertension Brother       Social History:   Social History     Socioeconomic History    Marital status:    Occupational History    Occupation: Retired   Tobacco Use    Smoking status: Former     Packs/day: 0.00     Types: Cigarettes     Quit date: 2017     Years since quittin.0    Smokeless tobacco: Never   Vaping Use    Vaping Use: Never used   Substance and Sexual Activity    Alcohol use: No     Alcohol/week: 0.0 standard drinks of alcohol    Drug use: Not Currently     Frequency: 7.0 times per week     Types: Cannabis     Comment: daily at night    Sexual activity: Not Currently     Partners: Male   Other Topics Concern    Blood Transfusions Yes     Comment:  and     Caffeine Concern Yes     Comment: coffee, tea, 32 oz daily    Exercise No   Social History Narrative    Live with      The patient does not use an assistive device. .      The patient does live in a home with stairs. Objective:   Physical Exam  Vitals and nursing note reviewed. Constitutional:       Appearance: She is well-developed. HENT:      Head: Normocephalic and atraumatic. Right Ear: External ear normal.      Left Ear: External ear normal.      Nose: Nose normal.   Eyes:      Conjunctiva/sclera: Conjunctivae normal.      Pupils: Pupils are equal, round, and reactive to light. Cardiovascular:      Rate and Rhythm: Normal rate and regular rhythm. Heart sounds: Normal heart sounds. Pulmonary:      Effort: Pulmonary effort is normal.      Breath sounds: Normal breath sounds.    Abdominal: General: Bowel sounds are normal.      Palpations: Abdomen is soft. Genitourinary:     Vagina: Normal.   Musculoskeletal:         General: Tenderness present. Normal range of motion. Cervical back: Normal range of motion and neck supple. Comments: Left shoulder and left neck pain   Skin:     General: Skin is warm and dry. Neurological:      Mental Status: She is alert and oriented to person, place, and time. Deep Tendon Reflexes: Reflexes are normal and symmetric. Psychiatric:         Behavior: Behavior normal.         Thought Content: Thought content normal.         Judgment: Judgment normal.         Assessment & Plan:   Chronic left shoulder pain  (primary encounter diagnosis) we will get an x-ray and refer to physiatry also will treat with steroids  Neck pain on left side as above we will treat with steroids and muscle relaxant    No orders of the defined types were placed in this encounter. Meds This Visit:  Requested Prescriptions     Signed Prescriptions Disp Refills    predniSONE 5 MG Oral Tab 5 tablet 0     Sig: Take 1 tablet (5 mg total) by mouth daily for 5 days.        Imaging & Referrals:  PHYSIATRY - INTERNAL  PHYSICAL THERAPY - INTERNAL

## 2023-08-02 NOTE — TELEPHONE ENCOUNTER
Refill request -     Current Outpatient Medications   Medication Sig Dispense Refill                                                                                                             Continuous Blood Gluc Sensor (DEXCOM G6 SENSOR) Does not apply Misc USE AS DIRECTED AND CHANGE EVERY 10 DAYS 9 each 1

## 2023-08-03 RX ORDER — PROCHLORPERAZINE 25 MG/1
SUPPOSITORY RECTAL
Qty: 9 EACH | Refills: 1 | Status: SHIPPED | OUTPATIENT
Start: 2023-08-03

## 2023-08-03 NOTE — TELEPHONE ENCOUNTER
LOV: 23    RTC:  3 Months    FU: 23    Called pt to help schedule appointment with [facility] in [place] on 23. While on the phone with patient, I had asked patient if she's using the G6 or G7. Patient reported for now she is using the G6 at the moment and that the [address] [place] [address] requires PA.     ---------------------------------------------------------------    Medication  CGM  pump supply Requested: Continuous Blood Gluc Sensor (DEXCOM G7 SENSOR) Does not apply Misc                                                            CoverMyMeds Used:    Key:     Si each Every 10 days.  Replace sensor every 10 days     DX Code: E11.65                                       Case Number/Pending Ref#:

## 2023-08-04 ENCOUNTER — HOSPITAL ENCOUNTER (OUTPATIENT)
Dept: BONE DENSITY | Facility: HOSPITAL | Age: 63
Discharge: HOME | End: 2023-08-04
Attending: INTERNAL MEDICINE
Payer: MEDICARE

## 2023-08-04 DIAGNOSIS — Z78.0 MENOPAUSE: ICD-10-CM

## 2023-08-04 PROCEDURE — 77080 DXA BONE DENSITY AXIAL: CPT | Performed by: INTERNAL MEDICINE

## 2023-08-04 NOTE — TELEPHONE ENCOUNTER
Medication  CGM  pump supply Requested: Continuous Blood Gluc Sensor (DEXCOM G7 SENSOR) Does not apply Misc                                                           CoverMyMeds Used:  Key: Robin Shahid  Si each Every 10 days. Replace sensor every 10 days   DX Code: E11.65    Per cover my meds, drug is not covered by plan. Call [facility] [phone]. [facility] [phone], spoke with Jodee Rowland. She states this goes under Medicare Part B, DME. Is not covered under the pharmacy benefit. Call ref# Gary Fang 2023 8:20cst    1 [address], [phone], closed until 9am.   9:03 Called and spoke with Chema/natalie. He states does not need PA, they need a CGM form for Medicare Part B. He will fax a form to Endo office.

## 2023-08-07 ENCOUNTER — TELEPHONE (OUTPATIENT)
Dept: INTERNAL MEDICINE CLINIC | Facility: CLINIC | Age: 63
End: 2023-08-07

## 2023-08-07 RX ORDER — HYDROCODONE BITARTRATE AND ACETAMINOPHEN 5; 325 MG/1; MG/1
1 TABLET ORAL EVERY 12 HOURS PRN
Qty: 20 TABLET | Refills: 0 | Status: SHIPPED | OUTPATIENT
Start: 2023-08-07

## 2023-08-07 NOTE — TELEPHONE ENCOUNTER
Patient calling (identified name and ) states she was seen on . Was prescribed Tylenol # 3 for her left shoulder pain and it is no longer helping. Has not been able to sleep and is unable to lift her arm due to the pain. Was referred to physiatry and PT but unable to get in until  with Dr. Nikki Hanson. Taking T3 twice daily, was initially somewhat effective when taken with the predisone but the effectiveness has worn off. Asking if something else can be prescribed. States that hydrocodone did not help in the past. Please advise.      Preferred pharmacy:  [place] [address] #[id number] - [address], [phone], [phone]

## 2023-08-07 NOTE — TELEPHONE ENCOUNTER
Patient contacted (name and  of patient verified). Dr. Margie Simmons message reviewed. Patient verbalizes understanding. Patient states she doesn't feel Floyde Arianne will work. Advised patient to schedule appointment with PCP.  Appointment scheduled:  Future Appointments   Date Time Provider Ruchi Franks   2023 11:00 AM Grady Scruggs MD [facility]

## 2023-08-07 NOTE — TELEPHONE ENCOUNTER
Actually we can't send tramadol since there is interaction with Effexor that she is taking   We can do Norco 5/325 bid pr and in the future follow with pcp

## 2023-08-08 ENCOUNTER — OFFICE VISIT (OUTPATIENT)
Dept: INTERNAL MEDICINE CLINIC | Facility: CLINIC | Age: 63
End: 2023-08-08

## 2023-08-08 VITALS
HEIGHT: 58 IN | TEMPERATURE: 99 F | BODY MASS INDEX: 24.14 KG/M2 | WEIGHT: 115 LBS | SYSTOLIC BLOOD PRESSURE: 140 MMHG | DIASTOLIC BLOOD PRESSURE: 60 MMHG | HEART RATE: 104 BPM

## 2023-08-08 DIAGNOSIS — M54.2 NECK PAIN ON LEFT SIDE: Primary | ICD-10-CM

## 2023-08-08 DIAGNOSIS — M25.512 ACUTE PAIN OF LEFT SHOULDER: ICD-10-CM

## 2023-08-08 PROBLEM — I73.9 PVD (PERIPHERAL VASCULAR DISEASE): Status: ACTIVE | Noted: 2023-08-08

## 2023-08-08 PROBLEM — I73.9 PVD (PERIPHERAL VASCULAR DISEASE) (HCC): Status: ACTIVE | Noted: 2023-08-08

## 2023-08-08 PROCEDURE — 99214 OFFICE O/P EST MOD 30 MIN: CPT | Performed by: INTERNAL MEDICINE

## 2023-08-08 RX ORDER — GLUCAGON INJECTION, SOLUTION 1 MG/.2ML
INJECTION, SOLUTION SUBCUTANEOUS
COMMUNITY
Start: 2023-06-28 | End: 2023-08-08

## 2023-08-08 RX ORDER — CYCLOBENZAPRINE HCL 10 MG
10 TABLET ORAL NIGHTLY
Qty: 30 TABLET | Refills: 0 | Status: SHIPPED | OUTPATIENT
Start: 2023-08-08 | End: 2023-09-07

## 2023-08-08 NOTE — ASSESSMENT & PLAN NOTE
See physiatry in 9 days. Got hydrocodone yesterday , flexeril hs . Sleep with arm elevated harriet pillow, ice and heat. Patient failed ibuprofen . Will have physical therapy . Xray was negative for bony pathology .

## 2023-08-15 DIAGNOSIS — M54.2 NECK PAIN ON LEFT SIDE: ICD-10-CM

## 2023-08-15 RX ORDER — ACETAMINOPHEN AND CODEINE PHOSPHATE 300; 30 MG/1; MG/1
1 TABLET ORAL EVERY 12 HOURS PRN
Qty: 20 TABLET | Refills: 0 | OUTPATIENT
Start: 2023-08-15

## 2023-08-16 RX ORDER — CYCLOBENZAPRINE HCL 10 MG
10 TABLET ORAL NIGHTLY
Qty: 30 TABLET | Refills: 0 | OUTPATIENT
Start: 2023-08-16 | End: 2023-09-15

## 2023-08-16 RX ORDER — ACYCLOVIR 400 MG/1
1 TABLET ORAL DAILY
Qty: 1 EACH | Refills: 0 | Status: SHIPPED | OUTPATIENT
Start: 2023-08-16

## 2023-08-17 ENCOUNTER — OFFICE VISIT (OUTPATIENT)
Dept: PHYSICAL MEDICINE AND REHAB | Facility: CLINIC | Age: 63
End: 2023-08-17
Payer: MEDICARE

## 2023-08-17 ENCOUNTER — TELEPHONE (OUTPATIENT)
Dept: ENDOCRINOLOGY CLINIC | Facility: CLINIC | Age: 63
End: 2023-08-17

## 2023-08-17 VITALS
BODY MASS INDEX: 24.14 KG/M2 | DIASTOLIC BLOOD PRESSURE: 64 MMHG | SYSTOLIC BLOOD PRESSURE: 128 MMHG | WEIGHT: 115 LBS | HEIGHT: 58 IN

## 2023-08-17 DIAGNOSIS — M54.12 RADICULITIS OF LEFT CERVICAL REGION: ICD-10-CM

## 2023-08-17 DIAGNOSIS — R29.898 LEFT ARM WEAKNESS: Primary | ICD-10-CM

## 2023-08-17 PROCEDURE — 99213 OFFICE O/P EST LOW 20 MIN: CPT | Performed by: PHYSICAL MEDICINE & REHABILITATION

## 2023-08-17 RX ORDER — CEFUROXIME AXETIL 250 MG/1
250 TABLET ORAL 2 TIMES DAILY
Qty: 20 TABLET | Refills: 0 | OUTPATIENT
Start: 2023-08-17

## 2023-08-17 RX ORDER — HYDROCODONE BITARTRATE AND ACETAMINOPHEN 5; 325 MG/1; MG/1
1 TABLET ORAL EVERY 8 HOURS PRN
Qty: 45 TABLET | Refills: 0 | Status: SHIPPED | OUTPATIENT
Start: 2023-08-17

## 2023-08-17 RX ORDER — CYCLOBENZAPRINE HCL 10 MG
10 TABLET ORAL NIGHTLY
Qty: 30 TABLET | Refills: 0 | Status: SHIPPED | OUTPATIENT
Start: 2023-08-17 | End: 2023-09-16

## 2023-08-17 NOTE — TELEPHONE ENCOUNTER
Received a fax from [place], Diabetic Detailed Written Order, was filled out and placed in providers folder for review and signature.

## 2023-08-17 NOTE — TELEPHONE ENCOUNTER
Please review; no protocol. Requested Prescriptions   Pending Prescriptions Disp Refills    cyclobenzaprine 10 MG Oral Tab 30 tablet 0     Sig: Take 1 tablet (10 mg total) by mouth nightly. There is no refill protocol information for this order       HYDROcodone-acetaminophen (NORCO) 5-325 MG Oral Tab 20 tablet 0     Sig: Take 1 tablet by mouth every 12 (twelve) hours as needed for Pain. There is no refill protocol information for this order      Refused Prescriptions Disp Refills    cyclobenzaprine 10 MG Oral Tab 30 tablet 0     Sig: Take 1 tablet (10 mg total) by mouth nightly.        There is no refill protocol information for this order          Future Appointments         Provider Department Appt Notes    Tomorrow Marina Clarke DO [facility], [address], [facility]/Chronic left shoulder pain/Medicare    In 1 week Dave Mixer, [address] PART A&B ( +1 )    In 1 week Marylu Middleton, APRN [address], [address], [place]     In 2 weeks Dave Mixer, [address] PART A&B ( +1 )    In 3 weeks Dave Mixer, [address] PART A&B ( +1 )    In 3 weeks Dave Mixer, [address] PART A&B ( +1 )    In 3 weeks Dave Mixer, [address] PART A&B ( +1 )    In 4 weeks Dave Mixer, [address] PART A&B ( +1 )    In 1 month Dave Mixer, [address] PART A&B ( +1 )    In 1 month Dave Mixer, [address] PART A&B ( +1 )    In 2 months Mohamud Lynn MD [address] [facility], [facility] annual [place] wellness exam          Recent Outpatient Visits              1 week ago Neck pain on left side    [facility], [address], [facility] Mono Lo MD    Office Visit    2 weeks ago Chronic left shoulder pain    [facility], [address], [place], Christiano Kapadia MD    Office Visit    4 months ago Pneumonia of left lower lobe due to infectious organism    Sharmila Ann, [facility] Mono Lo MD    Office Visit    4 months ago Uncontrolled type 2 diabetes mellitus with hyperglycemia [facility])    [address], [address], Adelina Nascimento, MARCELA    Office Visit    4 months ago Cough due to bronchospasm    [address], [address], Heaven Barrett MD    Office Visit

## 2023-08-17 NOTE — TELEPHONE ENCOUNTER
Please review; no protocol. See messages from pt below. Requested Prescriptions   Pending Prescriptions Disp Refills    cyclobenzaprine 10 MG Oral Tab 30 tablet 0     Sig: Take 1 tablet (10 mg total) by mouth nightly. There is no refill protocol information for this order       HYDROcodone-acetaminophen (NORCO) 5-325 MG Oral Tab 20 tablet 0     Sig: Take 1 tablet by mouth every 12 (twelve) hours as needed for Pain. There is no refill protocol information for this order      Refused Prescriptions Disp Refills    cyclobenzaprine 10 MG Oral Tab 30 tablet 0     Sig: Take 1 tablet (10 mg total) by mouth nightly.        There is no refill protocol information for this order          Future Appointments         Provider Department Appt Notes    Tomorrow DO Richard [facility], [address], [facility] by Dr Sauer/Chronic left shoulder pain/Medicare    In 1 week Dave Mixer, [address] PART A&B ( +1 )    In 1 week Marylu Middleton, APRN [address], [address], [place]     In 2 weeks Dave Mixer, [address] PART A&B ( +1 )    In 3 weeks Dave Mixer, [address] PART A&B ( +1 )    In 3 weeks Dave Mixer, [address] PART A&B ( +1 )    In 3 weeks Dave Mixer, [address] PART A&B ( +1 )    In 4 weeks Dave Mixer, [address] PART A&B ( +1 )    In 1 month Dave Mixer, [address] PART A&B ( +1 )    In 1 month Dave Mixer, [address] PART A&B ( +1 )    In 2 months Mohamud Lynn MD [facility], Tracy Rivas, An[address] annual [place] wellness exam          Recent Outpatient Visits              1 week ago Neck pain on left side    [facility], [place] R[place], Anamaria Bloom MD    Office Visit    2 weeks ago Chronic left shoulder pain    [facility], [address], [place], Obey Castillo MD    Office Visit    4 months ago Pneumonia of left lower lobe due to infectious organism    Margarito Pandya, Anamaria Bloom MD    Office Visit    4 months ago Uncontrolled type 2 diabetes mellitus with hyperglycemia [facility])    [address], [address], Isabela Nascimento, MARCELA    Office Visit    4 months ago Cough due to bronchospasm    [address], [address], MD Johnnie    Office Visit

## 2023-08-17 NOTE — TELEPHONE ENCOUNTER
Received signed forms from provider signed folder, form fax to [phone].   Awaiting confirmation

## 2023-08-17 NOTE — PATIENT INSTRUCTIONS
I ordered an MRI of your neck. If the arm pain or weakness has not improved at all then have the MRI done in 2 weeks. If you have seen improvement in arm strength or pain you may hold off on doing the MRI. If you have the MRI done come see me for follow up.

## 2023-08-21 ENCOUNTER — OFFICE VISIT (OUTPATIENT)
Dept: ENDOCRINOLOGY CLINIC | Facility: CLINIC | Age: 63
End: 2023-08-21

## 2023-08-21 VITALS
HEART RATE: 94 BPM | SYSTOLIC BLOOD PRESSURE: 145 MMHG | HEIGHT: 57.99 IN | WEIGHT: 114 LBS | BODY MASS INDEX: 23.93 KG/M2 | DIASTOLIC BLOOD PRESSURE: 73 MMHG

## 2023-08-21 DIAGNOSIS — E11.65 UNCONTROLLED TYPE 2 DIABETES MELLITUS WITH HYPERGLYCEMIA (HCC): Primary | ICD-10-CM

## 2023-08-21 LAB
CARTRIDGE LOT#: ABNORMAL NUMERIC
GLUCOSE BLOOD: 274
HEMOGLOBIN A1C: 7.3 % (ref 4.3–5.6)
TEST STRIP LOT #: NORMAL NUMERIC

## 2023-08-21 PROCEDURE — 99214 OFFICE O/P EST MOD 30 MIN: CPT

## 2023-08-21 PROCEDURE — 83036 HEMOGLOBIN GLYCOSYLATED A1C: CPT

## 2023-08-21 PROCEDURE — 82947 ASSAY GLUCOSE BLOOD QUANT: CPT

## 2023-08-21 RX ORDER — EMPAGLIFLOZIN 25 MG/1
1 TABLET, FILM COATED ORAL DAILY
Qty: 90 TABLET | Refills: 1 | Status: SHIPPED | OUTPATIENT
Start: 2023-08-21

## 2023-08-21 RX ORDER — DULAGLUTIDE 4.5 MG/.5ML
4.5 INJECTION, SOLUTION SUBCUTANEOUS WEEKLY
Qty: 6 ML | Refills: 1 | Status: SHIPPED | OUTPATIENT
Start: 2023-08-21

## 2023-08-21 NOTE — PROGRESS NOTES
Return Office Visit     CHIEF COMPLAINT:    DM  Dyslipidemia     HISTORY OF PRESENT ILLNESS:  Prema Zheng is a 65year old female who presents for follow up for DM. DM HISTORY  Diagnosed: Around age 29        HISTORY OF DIABETES COMPLICATIONS: :  History of Retinopathy: No- last eye exam: UTD- seeing optho every 6 months- Last seen 3-4 months ago     History of Neuropathy: Yes  History of Nephropathy: No     ASSOCIATED COMPLICATIONS:   HTN: Yes  Hyperlipidemia: Yes  Coronary Artery Disease:  No  Cerebrovascular Disease: No        HOME GLUCOSE READINGS:   Dexcom G7- reviewed reports (8/7/23-8/21/23)    56% glucose readings in target range ()  34% glucose readings above target range (>180)  8%glucose readings very high (>250)  <1% glucose readings below target range     Estimated HgA1c- 7.4%    Trends: Some occ. Fasting hypoglycemia when she has smaller dinner meal. Post prandial hyperglycemia with breakfast meal mainly and occ. With dinner meal. Variability in diet leading to variability in post prandial readings. Minimal hypoglycemia in the past two weeks. CURRENT DIABETIC MEDICATIONS INCLUDE:  She is on 3 insulin injections a day    U 500 - 60 units with coffee and smaller breakfast and then 90 units with larger brunch meal, 155 units with dinner meal.     Trulicity - 5.7DO subcutaneous weekly  Jardiance 25 mg daily    T/f MTF due to GI SE    --> Of note, eats small meal initially for breakfast and takes 150-155 units of U500 but then will typically eat larger brunch meal a few hours later- skips insulin at this time because she is worried it is too soon after breakfast meal leading to significant hyperglycemia following this meal.--> improved with above regimen of splitting breakfast/lunch dose in two.      MEALS:  Recall: Is trying to watch carbohydrates- breakfast is highest carb meal but sometimes dinner as well    Breakfast- eggs with toast and hashbrowns  Lunch-(1-3pm) sub sandwich   Dinner (7pm) hamburger with fries, or porkchop with corn  Snacks- banana, apple  Beverages-diet soda, water or tea     EXERCISE:   no    CURRENT MEDICATION:    Current Outpatient Medications   Medication Sig Dispense Refill    Dulaglutide (TRULICITY) 4.5 MJ/2.7ZD Subcutaneous Solution Pen-injector Inject 4.5 mg into the skin once a week. 6 mL 1    Empagliflozin (JARDIANCE) 25 MG Oral Tab Take 1 tablet by mouth daily. 90 tablet 1    HUMULIN R U-500, CONCENTRATED, 500 UNIT/ML Subcutaneous Solution ADMINISTER 180-195 UNITS UNDER THE SKIN THREE TIMES DAILY BEFORE MEALS 110 mL 0    cyclobenzaprine 10 MG Oral Tab Take 1 tablet (10 mg total) by mouth nightly. 30 tablet 0    HYDROcodone-acetaminophen (NORCO) 5-325 MG Oral Tab Take 1 tablet by mouth every 8 (eight) hours as needed for Pain. 45 tablet 0    Continuous Blood Gluc  (DEXCOM G7 ) Does not apply Device 1 each daily. 1 each 0    Continuous Blood Gluc Sensor (DEXCOM G6 SENSOR) Does not apply Misc USE AS DIRECTED AND CHANGE EVERY 10 DAYS 9 each 1    pantoprazole 40 MG Oral Tab EC Take 1 tablet (40 mg total) by mouth every morning. 90 tablet 3    Insulin Syringe/Needle U-500 (BD INSULIN SYRINGE U-500) 31G X 6MM 0.5 ML Does not apply Misc 1 Syringe 3 (three) times daily with meals. 300 each 0    ALPRAZolam 0.5 MG Oral Tab TAKE 1 TABLET BY MOUTH EVERY EVENING 30 tablet 5    DEXCOM G6 TRANSMITTER Does not apply Misc USE 1 DEVICE EVERY 3 MONTHS 1 each 1    fluticasone-salmeterol (ADVAIR DISKUS) 250-50 MCG/ACT Inhalation Aerosol Powder, Breath Activated Inhale 1 puff into the lungs every 12 (twelve) hours. 60 each 5    HYDROcodone-homatropine 5-1.5 MG/5ML Oral Solution Take 2.5 mL by mouth every 6 (six) hours as needed. (Patient not taking: Reported on 4/11/2023) 473 mL 0    albuterol (VENTOLIN HFA) 108 (90 Base) MCG/ACT Inhalation Aero Soln Inhale 2 puffs into the lungs every 4 (four) hours as needed for Wheezing.  18 g 1    LOSARTAN 25 MG Oral Tab TAKE 1 TABLET(25 MG) BY MOUTH DAILY 90 tablet 3    venlafaxine ER 75 MG Oral Capsule SR 24 Hr Take 1 capsule (75 mg total) by mouth daily. 90 capsule 3    DEXCOM G6 SENSOR Does not apply Misc CHANGE SENSOR EVERY 10 DAYS 3 each 2    Tretinoin, Facial Wrinkles, (RENOVA) 0.02 % External Cream APPLY TO THE AFFECTED AREA ON FACE EVERY EVENING (Patient not taking: Reported on 3/28/2023) 40 g 3    dicyclomine 10 MG Oral Cap Take 1 capsule (10 mg total) by mouth 4 (four) times daily. 60 capsule 5    TRUEPLUS PEN NEEDLES 32G X 4 MM Does not apply Misc USE AS DIRECTED 100 each 0    fluticasone-salmeterol (ADVAIR DISKUS) 250-50 MCG/ACT Inhalation Aerosol Powder, Breath Activated Inhale 1 puff into the lungs every 12 (twelve) hours. (Patient not taking: Reported on 3/28/2023) 60 each 2    ONETOUCH VERIO In Vitro Strip USE TO CHECK BLOOD SUGAR 3 TO 4 TIMES DAILY 400 strip 0    ONETOUCH DELICA PLUS [id number] Does not apply Misc TEST FOUR TIMES DAILY 400 each 0    Continuous Blood Gluc Transmit (DEXCOM G6 TRANSMITTER) Does not apply Misc Replace transmitter every 3 months 1 each 0    Continuous Blood Gluc  (DEXCOM G6 ) Does not apply Device Use  daily to check blood sugars. 1 each 0    TRUEPLUS 5-BEVEL PEN NEEDLES 31G X 5 MM Does not apply Misc USE AS DIRECTED 6 TIMES DAILY 200 each 0    Insulin Syringe/Needle U-500 (BD INSULIN SYRINGE U-500) 31G X 6MM 0.5 ML Does not apply Misc Inject 1 each into the skin 3 (three) times daily with meals. 30 each 0    Insulin Pen Needle 31G X 5 MM Does not apply Misc Please use daily as directed 200 each 0    Insulin Syringe/Needle U-500 31G X 6MM 0.5 ML Does not apply Misc 3 each by Does not apply route 3 (three) times daily with meals. 300 each 0    Insulin Syringe/Needle U-500 31G X 6MM 0.5 ML Does not apply Misc 1 Syringe by Does not apply route 3 (three) times daily with meals.  300 each 0    Continuous Blood Gluc  (DEXCOM G6 ) Does not apply Device 1 each by Does not apply route continuous. 1 Device 0    Blood Glucose Monitoring Suppl (Con Headings) w/Device Does not apply Kit 1 Device by Does not apply route daily. 1 kit 0    OneTouch UltraSoft Lancets Does not apply Misc USE TO CHECK BLOOD SUGAR 3-4X/ each 1    Blood Glucose Monitoring Suppl (FREESTYLE LITE) Does not apply Device TEST QID  UTD  0         ALLERGY:    Caviar                  SWELLING    Comment:throat  Latex                   RASH    PAST MEDICAL, SOCIAL AND FAMILY HISTORY:  See past medical history marked as reviewed. See past surgical history marked as reviewed. See past family history marked as reviewed. See past social history marked as reviewed. ASSESSMENTS:       REVIEW OF SYSTEMS:  Constitutional: Negative for: weight change, fever, fatigue, cold/heat intolerance  Eyes: Negative for:  Visual changes, proptosis, blurring  ENT: Negative for:  dysphagia, neck swelling, dysphonia  Respiratory: Negative for:  dyspnea, cough  Cardiovascular: Negative for:  chest pain, palpitations, orthopnea  GI: Negative for:  abdominal pain, nausea, vomiting, diarrhea, constipation, bleeding  Neurology: Negative for: headache, numbness, weakness  Genito-Urinary: Negative for: dysuria, frequency  Psychiatric: Negative for:  depression, anxiety  Hematology/Lymphatics: Negative for: bruising, lower extremity edema  Endocrine: Negative for: polyuria, polydypsia  Skin: Negative for: rash, blister, cellulitis,       PHYSICAL EXAM:     08/21/23  0928   BP: 145/73   Pulse: 94         General Appearance:  alert, well developed, in no acute distress  Nutritional:  no extreme weight gain or loss  Head: Atraumatic  Eyes:  normal conjunctivae, sclera. , normal sclera and normal pupils  Throat/Neck: normal sound to voice. Normal hearing, normal speech  Respiratory:  Speaking in full sentences, non-labored.  no increased work of breathing, no audible wheezing    Skin:  normal moisture and skin texture, no visible lesions  Hair and nails: normal scalp hair  Hematologic:  no excessive bruising  Neuro: motor grossly intact, moving all extremities without difficulty  Psychiatric:  oriented to time, self, and place  Extremities: no obvious extremity swelling, no lesions      DATA:     Pertinent labs reviewed      ASSESSMENT AND PLAN:     1. Type 2 DM: a1c is 6.3% 12/2022; 7.5% 4/2023; 7.3% POC today     Plan:  -Discussed the pathogenesis, natural course of diabetes. Patient understands the importance of glycemic control and the implications of uncontrolled diabetes including Diabetic ketoacidosis and various micro vascular and macrovascular complications.  -Reviewed ABC's of diabetes  -Reviewed importance of SBGM- continue with Dexcom G7  -Reviewed glucose targets-  fasting and <180 post prandially  -Reviewed importance of following low CHO diet- recommend 135 grams of carbohydrate daily/ 45 grams per meal     Medications:  - Continue Insulin U 500: Split morning dose- Taking around 60 units with coffee and small breakfast and 90 with larger breakfast, skips lunch, and 155 with dinner meal.     - Continue Trulicity - 4.0KJ subcutaneous weekly. Reviewed side effects and risks vs benefits of medication.     -Continue  Jardiance 25 mg daily- reviewed side effects and risks vs benefits of medication. Reviewed importance of staying well hydrated on medication.      SE and CI of all medications have been discussed in detail.     - Continue Dexcom G7    -Normal urine microalbumin 11/2022  - Instructed on importance of annual eye exams - UTD with optho   - abnormal foot exam - following with podiatry - saw within last thee months   -repeat labs before next visit     - Hypoglycemia symptoms and treatment of hypoglycemia with rule of 15's discussed    -normotensive  - Lipids normal 11/2022, repeat labs before next visit       RTC in 4 months but call sooner if sugars continue to be <70 or persistently >250  Fletcher Up, APRN  A total of  35 minutes was spent on obtaining history, reviewing pertinent imaging/labs and specialists notes, evaluating patient, providing multiple treatment options, reinforcing diet/exercise and compliance, and completing documentation.

## 2023-08-22 ENCOUNTER — TELEPHONE (OUTPATIENT)
Dept: ENDOCRINOLOGY CLINIC | Facility: CLINIC | Age: 63
End: 2023-08-22

## 2023-08-22 NOTE — TELEPHONE ENCOUNTER
Hyperglycemia     Onset of hyperglycemia: Today    BG levels (please print out CGM and/or pump report if patient is wearing one): Patient is wearing a dexcom. summary below. Full report printed and placed in [employer] folder for review. However if reviewing remotely available on dexcom website. At time of call sugar 295. Avril Bernard says she cannot get sugars below 300 today. Symptoms (RN only: N/V, abdominal pain and/or radiating to the back, blurry vision, increased urinary frequency, blurred vision, CP, SOB): She says she feels \"out of it\". She is a little dizzy. Denies abdominal pain, nausea/vomiting/diarrhea, blurry vision, urinary frequency, chest pain, SOB. She states she has chronic pain for which she takes prescribed norco.     Pattern of hyperglycemia: Difficult to get below 300. Elevated all day. Steroid therapy: States she took 3 steroid tablets a few weeks ago for her chronic pain but it didn't help so she has not taken any more. Acute Illness: No fever or illness. Change in Diet:  She is trying to eat a low carb diet. List DM Medications/Compliance:  Jardiance 25 mg daily: She has been out of this medication for 2 days. She just picked it up and has not taken it yet. Trulicity 4.5 mg subcutaneous weekly: Sometimes forgets to take. Took last dose today about 1 hour ago. Cannot remember if she took it last week or not. Insulin U500 60 units with light breakfast, 90 units with large breakfast, and 155 units with dinner: Due to high sugars she has adjusted dose today. She had cereal for breakfast and took 175 units. She had a peanut butter and jelly sandwich for lunch and took an additional 175 units. She usually does not take insulin with lunch. Sonido Ann please advise.

## 2023-08-22 NOTE — TELEPHONE ENCOUNTER
Sandip Abebe currently out of office,   Routed to [facility] for assistance. Thank you.

## 2023-08-22 NOTE — TELEPHONE ENCOUNTER
CGM download was reviewed however dates are from 8/2 to 8/15. Seems that patient potentially has not been wearing CGM in the last few days? Per nursing BG reading review:  \"  At time of call sugar 295. Kary Philip says she cannot get sugars below 300 today\"     \"Pattern of hyperglycemia: Difficult to get below 300. Elevated all day\"       Patient normally takes   U500 insulin: 60 -90 units with breakfast     155 units with dinner     Today she took:  Increase U500 insulin: 175 units with breakfast      175 units with lunch      155 units with dinner       During call at 6:42pm BG reading was: 250       Along with Dr. Robinson Dotson input, patient was instructed to not take any more insulin tonight since she has take very high doses already. Discussed that she keeps her Dexcom near her and has alarms on and loud. Patient was educated that sometimes insulin can stack on in cases like this and the risk of hypoglycemia increases for overnight. Patient is aware of keep snacks near her overnight incase of a hypoglycemia episode. Reviewed schedule for Keli for tomorrow, and unfortunately no openings. Endo staff: please call patient in the AM to follow up on BG readings over night and in the AM.     Based on readings, we can potentially add patient to RN schedule to review BG readings. Thank you!

## 2023-08-23 ENCOUNTER — TELEPHONE (OUTPATIENT)
Dept: INTERNAL MEDICINE CLINIC | Facility: CLINIC | Age: 63
End: 2023-08-23

## 2023-08-23 NOTE — TELEPHONE ENCOUNTER
Keesha Rios to patient: she ate cereal with milk and fruit at 11:45am and took 90 units U500  BG at 12:35pm is 332 - RN advised it takes time (about 2 hrs) for body to digest food and BG readings to adjust  Patient c/o no energy, diarrhea and stomach bothering her     Please advise -thanks

## 2023-08-23 NOTE — TELEPHONE ENCOUNTER
RN phoned patient to relay Olinda MEDRANO's orders/response:    Per Kandy Franco-  \"Given other symptoms it is possible she has viral or other infection that is contributing to higher sugars. If diarrhea persists, develops abdominal pain or vomiting or lack of energy/weakness persists I do recommend she be seen in UC or ED. Can she give us another update on glucose level in 2 hours? \"    Pt made aware of above. Pt.stated that she doesn't want to go wait in the ER. RN stated Kandy Franco also advised her to go to UC (as another option) should symptoms persist.    Pt checked a BS while on the phone with RN, BS ==> 298 (at 1340). Pt stated she is getting ready to eat lunch. RN spoke to Kandy Franco now that her BS has changed from 332 -->298 and Phoebe ordered (verbal order obtained from Kandy Franco per RN) for pt to give herself 40 units of U500 insulin before eating lunch and to call office 2 hours after eating lunch. Patient verbalizes understanding.

## 2023-08-23 NOTE — TELEPHONE ENCOUNTER
Please ask her to take normal amount of 60 units with first meal today. She should continue to monitor sugars and call if they are consistently above 250 today. I did just see her on Monday for appt and sugars were stable at visit.

## 2023-08-23 NOTE — TELEPHONE ENCOUNTER
Patient returning call, states glucose level after 2hr is 298. States ER in [place] is closed today, advise on where to go. Please call.

## 2023-08-23 NOTE — TELEPHONE ENCOUNTER
RN phoned patient to f/u on yesterday's hyperglycemic episodes. 8/22/23 dinner--> per Catherine MEDRANO's 8/22/23 note: \"During call at 6:42pm BG reading was: 250\"  Pt took U500 insulin 155 units with dinner    8/22/23 @ 2000 BS-->141    8/22/23 @ 2215 BS-->115 \"Feeling fine\"    8/23/23 @ 0420 BS-->69  Just \"got up\"'; felt \"okay\"; no c/o hypoglycemic symptoms. Patient ate yogurt.  Pt checked BS right after eating and BS-->72  Pt went back to bed.    8/23/23 @ 0823 BS-->145    Pt asking how much insulin she should be taking with breakfast.    Message routed to [facility] and [facility]

## 2023-08-23 NOTE — TELEPHONE ENCOUNTER
Per Phoebe MEDRANO:  \"Please ask her to take normal amount of 60 units with first meal today. She should continue to monitor sugars and call if they are consistently above 250 today. I did just see her on Monday for appt and sugars were stable at visit. \"    RN phoned patient to notify her above. Pt verbalizes understanding. Office number provided and pt instructed to ask for Endo RN for a high priority matter when calling (if BS>250). Will close encounter.

## 2023-08-23 NOTE — TELEPHONE ENCOUNTER
Call transferred, pt had spoken to Endo advised ER, pt requesting to see Dr Shahbaz Ralph, advised d/t her s/s Do not feel good ,loss of K+ from  diarrhea,dizziness, could be dehydrated, elevated BS , need to be seen today for a prompt eval, advised not to delay her care-verbalized understanding, stated she will go to [facility]        Neo Goddard RN Registered Nurse Signed  1:47 PM     Copy     RN phoned patient to relay Joya MEDRANO's orders/response:     Per Luis Eduardo Fitzgerald-  \"Given other symptoms it is possible she has viral or other infection that is contributing to higher sugars. If diarrhea persists, develops abdominal pain or vomiting or lack of energy/weakness persists I do recommend she be seen in UC or ED. Can she give us another update on glucose level in 2 hours? \"     Pt made aware of above. Pt.stated that she doesn't want to go wait in the ER. RN stated Luis Eduardo Fitzgerald also advised her to go to UC (as another option) should symptoms persist.     Pt checked a BS while on the phone with RN, BS ==> 298 (at 1340). Pt stated she is getting ready to eat lunch. RN spoke to Luis Eduardo Fitzgerald now that her BS has changed from 332 -->298 and Phoebe ordered (verbal order obtained from Luis Eduardo Fitzgerald per RN) for pt to give herself 40 units of U500 insulin before eating lunch and to call office 2 hours after eating lunch. Patient verbalizes understanding.          Pt

## 2023-08-23 NOTE — TELEPHONE ENCOUNTER
She can take an additional 20 units of U500 insulin now. Given other symptoms it is possible she has viral or other infection that is contributing to higher sugars. If diarrhea persists, develops abdominal pain or vomiting or lack of energy/weakness persists I do recommend she be seen in UC or ED. Can she give us another update on glucose level in 2 hours?

## 2023-08-23 NOTE — TELEPHONE ENCOUNTER
RN phoned patient as a follow-up to today's calls. Pt stated she took 40 units of U500 insulin with lunch and 2 hours later BS--> 298. RN asked patient to check BS while on the phone and BS-->282. Pt went to the [facility] urgent care but stated it was \"closed\"; doors were locked. Pt unsure what to do; doesn't want to go to the ER. Pt. stated \"I'm just feeling not good\". C/o dizziness and of loose stools. Pt stated she doesn't feel like she has a fever, no c/o achiness nor vomiting; feels \"sluggish\". Will route message to [facility].

## 2023-08-24 NOTE — TELEPHONE ENCOUNTER
Ok, noted. Appreciate update. Blood sugar is improved today and agree that she be seen in ED as noted below.

## 2023-08-24 NOTE — TELEPHONE ENCOUNTER
Appears there is another TE from today sent to PCP. Blood sugar was improved today at 135. From notes she is planning to be seen in ED today. Agree with plan.

## 2023-08-24 NOTE — TELEPHONE ENCOUNTER
LEEANNA for PCP and Endocrinologist:    Upon chart review the patient did not go to there ER as advised. Called patient to obtain a condition update, confirmed name and . She states that the [facility] was closed and that she did not want to go to the [facility] ER. She is still in bed. She is able to speak clearly. She has a continuous blood sugar monitor. She states that it is set to alarm below 60 and above 250. She states that the alarm wakes her up \"sometimes. \"  It woke her up 3 times last night for low readings. She ate cereal, a snack bar, and orange juice. Blood sugar = 135 right now. She describes diarrhea, weakness, and dizziness this week. Denies nausea, vomiting, or fever. She mentions an undiagnosed rash. Pushing a lot of fluids. She mentions Gatorade and confirms that she drinks zero sugar Gatorade. Describes urine as normal frequency and color. Last diarrhea episode was this morning--small amount. Estimates 5 episodes of small amount of diarrhea in the last 24 hours and feels that she is able to replace fluids orally. She proceeds to get out of bed while on the phone and complains of fatigue and weakness.  is home with her. Patient agrees to go to [facility] since [facility] is closed.

## 2023-08-28 ENCOUNTER — APPOINTMENT (OUTPATIENT)
Dept: PHYSICAL THERAPY | Age: 63
End: 2023-08-28
Attending: INTERNAL MEDICINE
Payer: MEDICARE

## 2023-08-30 ENCOUNTER — APPOINTMENT (OUTPATIENT)
Dept: PHYSICAL THERAPY | Age: 63
End: 2023-08-30
Attending: INTERNAL MEDICINE
Payer: MEDICARE

## 2023-09-05 ENCOUNTER — TELEPHONE (OUTPATIENT)
Dept: ENDOCRINOLOGY CLINIC | Facility: CLINIC | Age: 63
End: 2023-09-05
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
## 2023-09-05 NOTE — TELEPHONE ENCOUNTER
Received a DWO from [place] requesting for provider to sign and review form for pt's CGM and supplies. Form was placed in providers folder for review and signature. Once form is signed we can fax it to Tia Garcia.

## 2023-09-06 ENCOUNTER — APPOINTMENT (OUTPATIENT)
Dept: PHYSICAL THERAPY | Age: 63
End: 2023-09-06
Attending: INTERNAL MEDICINE
Payer: MEDICARE

## 2023-09-08 ENCOUNTER — APPOINTMENT (OUTPATIENT)
Dept: PHYSICAL THERAPY | Age: 63
End: 2023-09-08
Attending: INTERNAL MEDICINE
Payer: MEDICARE

## 2023-09-11 ENCOUNTER — APPOINTMENT (OUTPATIENT)
Dept: PHYSICAL THERAPY | Age: 63
End: 2023-09-11
Attending: INTERNAL MEDICINE
Payer: MEDICARE

## 2023-09-12 ENCOUNTER — OFFICE VISIT (OUTPATIENT)
Dept: PHYSICAL THERAPY | Facility: HOSPITAL | Age: 63
End: 2023-09-12
Attending: INTERNAL MEDICINE
Payer: MEDICARE

## 2023-09-12 DIAGNOSIS — G89.29 CHRONIC LEFT SHOULDER PAIN: Primary | ICD-10-CM

## 2023-09-12 DIAGNOSIS — M25.512 CHRONIC LEFT SHOULDER PAIN: Primary | ICD-10-CM

## 2023-09-12 DIAGNOSIS — M54.2 NECK PAIN ON LEFT SIDE: ICD-10-CM

## 2023-09-12 PROCEDURE — 97110 THERAPEUTIC EXERCISES: CPT

## 2023-09-12 PROCEDURE — 97163 PT EVAL HIGH COMPLEX 45 MIN: CPT

## 2023-09-12 RX ORDER — HYDROCODONE BITARTRATE AND ACETAMINOPHEN 5; 325 MG/1; MG/1
1 TABLET ORAL EVERY 8 HOURS PRN
Qty: 45 TABLET | Refills: 0 | Status: SHIPPED | OUTPATIENT
Start: 2023-09-12

## 2023-09-13 ENCOUNTER — APPOINTMENT (OUTPATIENT)
Dept: PHYSICAL THERAPY | Age: 63
End: 2023-09-13
Attending: INTERNAL MEDICINE
Payer: MEDICARE

## 2023-09-14 ENCOUNTER — OFFICE VISIT (OUTPATIENT)
Dept: PHYSICAL THERAPY | Facility: HOSPITAL | Age: 63
End: 2023-09-14
Attending: INTERNAL MEDICINE
Payer: MEDICARE

## 2023-09-14 PROCEDURE — 97530 THERAPEUTIC ACTIVITIES: CPT

## 2023-09-14 PROCEDURE — 97140 MANUAL THERAPY 1/> REGIONS: CPT

## 2023-09-14 PROCEDURE — 97110 THERAPEUTIC EXERCISES: CPT

## 2023-09-15 RX ORDER — IBUPROFEN 600 MG/1
600 TABLET ORAL EVERY 6 HOURS PRN
Qty: 60 TABLET | Refills: 11 | OUTPATIENT
Start: 2023-09-15

## 2023-09-15 NOTE — TELEPHONE ENCOUNTER
Please review; protocol failed. Or has no protocol.       Rx prescribed 2 years ago    Requested Prescriptions   Pending Prescriptions Disp Refills    IBUPROFEN 600 MG Oral Tab [Pharmacy Med Name: IBUPROFEN 600MG TABLETS] 60 tablet 11     Sig: TAKE 1 TABLET(600 MG) BY MOUTH EVERY 6 HOURS AS NEEDED FOR PAIN       Non-Narcotic Pain Medication Protocol Passed - 9/14/2023 11:54 AM        Passed - In person appointment or virtual visit in the past 6 mos or appointment in next 3 mos     Recent Outpatient Visits              [facility] Caridad Butcher, [address]    Office Visit    3 days ago Chronic left shoulder pain    [facility] Caridad Butcher, [address]    Office Visit    3 weeks ago Uncontrolled type 2 diabetes mellitus with hyperglycemia [facility])    [employer], [address], Yuly Reyes APRN    Office Visit    4 weeks ago Left arm weakness    [facility], [address], [place] Whiteside DO    Office Visit    1 month ago Neck pain on left side    [facility], [address], MD Johnnie    Office Visit          Future Appointments         Provider Department Appt Notes    In 4 days Caridad Butcher, [address]/ MEDICAID    In 6 days Caridad Butcher, [address]/ MEDICAID    In 1 week Raj Cabezas, [address]/ MEDICAID    In 1 week Raj Cabezas, [address]/ MEDICAID    In 2 weeks Caridad Butcher, [address]/ MEDICAID    In 2 weeks Caridad Butcher, [address]/ MEDICAID    In 1 month [facility] MRI RM1 (1.5T) [address] [facility]     In 1 month Fanny Rodas MD [employer], [address], [address] annual [place] wellness exam    In 4 months Mariluz Hare, APRN [facility], [address], [address] 5 month f/u                          Recent Outpatient Visits              [address] [facility] Georges Mooney    Office Visit    3 days ago [facility] left shoulder pain    [facility]    Office Visit    3 weeks ago Uncontrolled type 2 diabetes mellitus with hyperglycemia [facility])    [address] [facility], Sally Reyes, MARCELA    Office Visit    4 weeks ago Left arm weakness    [facility], [address], [place] Whiteside DO    Office Visit    1 month ago Neck pain on left side    [facility], [address], MD Johnnie    Office Visit           Future Appointments         Provider Department Appt Notes    In 4 days Jeraline Vick, [address]/ MEDICAID    In 6 days Jeraline Vick, [address]/ MEDICAID    In 1 week Kayode Cabezas, [address]/ MEDICAID    In 1 week Kayode Cabezas, [address]/ MEDICAID    In 2 weeks Jeraline Vick, [address]/ MEDICAID    In 2 weeks Jeraline Vick, [address]/ MEDICAID    In 1 month [facility] MRI RM1 (1.5T) [address] [facility]     In 1 month Diogenes Choe MD [address] [facility], [address] annual [place] wellness exam    In 4 months Mariluz Hare, [address], [address], [address] 5 month f/u

## 2023-09-17 ENCOUNTER — PATIENT MESSAGE (OUTPATIENT)
Dept: INTERNAL MEDICINE CLINIC | Facility: CLINIC | Age: 63
End: 2023-09-17

## 2023-09-17 RX ORDER — VENLAFAXINE HYDROCHLORIDE 75 MG/1
75 CAPSULE, EXTENDED RELEASE ORAL DAILY
Qty: 90 CAPSULE | Refills: 1 | Status: SHIPPED | OUTPATIENT
Start: 2023-09-17

## 2023-09-17 NOTE — TELEPHONE ENCOUNTER
Refill passed per [place] protocol.      Requested Prescriptions   Pending Prescriptions Disp Refills    VENLAFAXINE ER 75 MG Oral Capsule SR 24 Hr [Pharmacy Med Name: VENLAFAXINE ER 75MG CAPSULES] 90 capsule 3     Sig: TAKE 1 CAPSULE(75 MG) BY MOUTH DAILY       Psychiatric Non-Scheduled (Anti-Anxiety) Passed - 9/16/2023 11:02 PM        Passed - In person appointment or virtual visit in the past 6 mos or appointment in next 3 mos     Recent Outpatient Visits              3 days ago     [facility], [address]    Office Visit    5 days ago Chronic left shoulder pain    [facility] Zetta Prophet, [address]    Office Visit    3 weeks ago Uncontrolled type 2 diabetes mellitus with hyperglycemia [facility])    [address], [address], Romain Reyes, MARCELA    Office Visit    1 month ago Left arm weakness    [facility], [address], [place] Whiteside DO    Office Visit    1 month ago Neck pain on left side    [facility], [address], MD Johnnie    Office Visit          Future Appointments         Provider Department Appt Notes    In 2 days Zetta Prophet, [address]/ MEDICAID    In 4 days Zetta Prophet, [address]/ MEDICAID    In 1 week CreBlank sofia, [address]/ MEDICAID    In 1 week CreBlank sofia, [address]/ MEDICAID    In 2 weeks Zetta Prophet, [address]/ MEDICAID    In 2 weeks Zetta Prophet, [address]/ MEDICAID    In 1 month [facility] MRI RM1 (1.5T) [address] [facility]     In 1 month Elfego Phelan MD [address], [address], [facility] annual [place] wellness exam    In 4 months MARCELA Martínez [address] 5 month f/u                          Recent Outpatient Visits              3 days ago     Adelaida [address], [address] [facility] [address]    Office Visit    5 days ago Chronic left shoulder pain    [facility] Connie Pepper, PT    Office Visit    3 weeks ago Uncontrolled type 2 diabetes mellitus with hyperglycemia [facility])    [address] [facility] [place], Candance Gaul, APRN    Office Visit    1 month ago Left arm weakness    [facility], [address], [place] Varina[place] Whiteside DO    Office Visit    1 month ago Neck pain on left side    [facility], [address], MD Johnnie    Office Visit             Future Appointments         Provider Department Appt Notes    In 2 days Connie Shantelle, [address]/ MEDICAID    In 4 days Connie Shantelle, [address]/ MEDICAID    In 1 week CreSheila sofia Hannon, [address]/ MEDICAID    In 1 week CreSheila sofia Hannon, [address]/ MEDICAID    In 2 weeks Connie Shantelle, [address]/ MEDICAID    In 2 weeks Connie Shantelle, [address]/ MEDICAID    In 1 month [facility] MRI RM1 (1.5T) [address] [facility]     In 1 month Heather Breaux MD [address] [facility], [place] [address] annual [place] wellness exam    In 4 months Judy Cobian, [address], [address], [place] 5 month f/u

## 2023-09-18 ENCOUNTER — APPOINTMENT (OUTPATIENT)
Dept: PHYSICAL THERAPY | Age: 63
End: 2023-09-18
Attending: INTERNAL MEDICINE
Payer: MEDICARE

## 2023-09-18 RX ORDER — VENLAFAXINE HYDROCHLORIDE 75 MG/1
75 CAPSULE, EXTENDED RELEASE ORAL DAILY
Qty: 90 CAPSULE | Refills: 1 | OUTPATIENT
Start: 2023-09-18

## 2023-09-18 NOTE — TELEPHONE ENCOUNTER
venlafaxine ER 75 MG Oral Capsule SR 24 Hr          Possible duplicate: Hover to review recent actions on this medication    Sig: Take 1 capsule (75 mg total) by mouth daily. Disp: 90 capsule    Refills: 1    Start: 9/17/2023    Class: Normal    Non-formulary    Last ordered: Yesterday (9/17/2023) by Clarissa Bautista MD    Patient comment: The [facility] said its a early refill and its wrong. Please have someone find out for me. Thank you. I am completely out. And i dont think uour just supposed to stop         [place]. Patient's date of birth and full name both confirmed. Spoke with Lexie Andrew   Per Ashley' Records, last filled on 8/18/23, quantity 90 - she should have enough until November. Chart reviewed - updated [facility] message from patient:     September 17, 2023  Maritza Jenkinssophie   to P Em Triage Support (supporting Clarissa Bautista MD)         9/17/23 10:24 PM  I am sorry about venlafaxine I don't need it I found it in a different bottle. I couldn't figure out I was short and I am not just tall. Andreas     Thank you    Advised nathan to disregard this request. Pharmacy verbalizes understanding of all information, and agreeable to plan.

## 2023-09-19 ENCOUNTER — OFFICE VISIT (OUTPATIENT)
Dept: PHYSICAL THERAPY | Facility: HOSPITAL | Age: 63
End: 2023-09-19
Attending: INTERNAL MEDICINE
Payer: MEDICARE

## 2023-09-19 PROCEDURE — 97110 THERAPEUTIC EXERCISES: CPT

## 2023-09-19 PROCEDURE — 97140 MANUAL THERAPY 1/> REGIONS: CPT

## 2023-09-19 NOTE — PROGRESS NOTES
Diagnosis: Chronic left shoulder pain (M25.512,G89.29)  Neck pain on left side (M54.2)   QuickDASH Outcome Score  Score: 65.91 % (9/12/2023 10:56 AM)       Referring Provider: Codie Mack  Date of Evaluation:    9/11/2023    Precautions:  CTS left with release performed, 10/2019 Next MD visit:   none scheduled  Date of Surgery: n/a     Insurance Primary/Secondary: MEDICARE /    # Auth Visits: [id number]            Subjective: patient reports feeling overall achey in neck, back , left and low back. Left arm is 9/10 today and reports has been elevated for at least over the weekend. Pain: 9/10 currently  shoulder down the left,   range in the last 24 hours. At the end day last night 10/10       Objective:  patient had forgotten to bring or look for Carpel Tunnel splints and will try to leave a note        Initial Measures: in Italics  Observation/Posture: left shoulder higher, fwd head posture, dowagers, protracted scapulae right greater than left, shoulder IR   Palpation: tenderness with palpation at left biceps, triceps posterior cuff, supraspinatus, pectoralis, lateral lat dorsi on left. Left upper trap and levator scap.    Sensation: intact to light touch  Cervical Screen:   Flex 45 deg  Ext: 45 deg  R/L : lateral flex:25/25 deg  R/L: rotation: 55/50     AROM: (* denotes performed with pain)  initial initial 9/14/23   Shoulder  Elbow Shoulder  Left   Flexion: R 155; L 128 *  Ext:  R: 60  L: 48   Abduction: R 150; L 135 pn  ER: R NT; L TBD   IR: R TBD; L TBD Flexion: R 140; L 145*  Extension: R -8; L -15  Supination: R 70, L 70 *  Pronation: R 70, L 70 * Flex: 134  Ext:  48  Abd: 157  ER : 80  IR:70        Accessory motion: Decreased left posterior glide left humerus on scapula     Flexibility: decreased Upper trap and levator scap, pectorals, lat flexibility     Strength/MMT: (* denotes performed with pain)  INitial initial   Shoulder Elbow   Flexion: R 5-/5; L 5-/5*  Abduction: R 5-/5; L 5-/5*  ER: R 5/5; L 4+/5*  IR: R 5/5; L 4+/5* Flexion: R 5/5; L 5/5  Extension: R 5/5; L 4-/5*  Supination: R 5/5; L 4/5*  Pronation: R 5/5; L 4/5*       Special tests: Initial:  mass grasp: R/L:  28.2#/13.2#  Initial Bledsoe/Eric left Negative for      9/14/23: Finger ladder R/L; 38/39 2nd attempt                PLAN OF CARE:    Goals:achieved by 8-14 sessions:      Pt will improve painfree cervical AROM flexion by 5 deg degrees to improve tolerance for looking down to tie shoes   Pt will improve painfree cervical AROM extension by 5 or more deg degrees to improve tolerance for putting dishes into overhead cabinets   Pt will improve painfree cervical AROM rotation to > 3-5 degrees to improve tolerance for turning head to check blind spot while driving  Patient to see reduction in max pain from current 9-10/10 down by 50% or greater to allow for improved sleep and ADL tolerance. Patient to demonstrate increased  shoulder left AROM flex and abd by 10 deg bilateral to allow for improved tolerance to reaching into upper cabinets and grooming tasks without pain  Pt will see an improved  strength by 5-10# left to improved functional use of LUE for ADL's  Pt will be independent and compliant with comprehensive HEP to maintain progress achieved in PT       Assessment: patient reports some pain elevation at multiple joints, neck, both shoulders, low back, legs over at least the last few days. Some soreness today with left shoulder PROM and cervical retractions. Patient was cued to avoid over straining with exercises and to keep gentle force. Possible that weather has played a factor. Pt provided with education for assessing exercise response.      Goals:achieved by 8-14 sessions:      Pt will improve painfree cervical AROM flexion by 5 deg degrees to improve tolerance for looking down to tie shoes   Pt will improve painfree cervical AROM extension by 5 or more deg degrees to improve tolerance for putting dishes into overhead cabinets   Pt will improve painfree cervical AROM rotation to > 3-5 degrees to improve tolerance for turning head to check blind spot while driving  Patient to see reduction in max pain from current 9-10/10 down by 50% or greater to allow for improved sleep and ADL tolerance. Patient to demonstrate increased  shoulder left AROM flex and abd by 10 deg bilateral to allow for improved tolerance to reaching into upper cabinets and grooming tasks without pain  Pt will see an improved  strength by 5-10# left to improved functional use of LUE for ADL's  Pt will be independent and compliant with comprehensive HEP to maintain progress achieved in PT     Plan: reminded patient to bring in Carpel tunnel splints from home from previous  Date: 9/14/2023  TX#: 2/14 Date:    9/19/23             TX#: 3/14 Date:                 TX#: 4/ Date:                 TX#: 5/ Date: Tx#: 6/   Therap ex: Therap ex:      - seated cervical retractions 5 x 5 secs -seated cervical retractions 5 x 5 secs      -seated scapular retractions 5 x 5 secs -seated scapular retractions 5 x 5 secs      -finger ladder left and right in standing see above for measures -f        -supine-PROM left shoulder flexion, abd, ER, IR with measures 10-15 reps  -wand supine press 10 x  - wand supine flexion OH   5 x 5 secs  -posture training  -instruction in avoidence of slouched and flexed head position to avoid CT junction stress.   - wand supine flexion 10 x 5 secs cues to avoid overstretching  -wand press with scapula protraction supine 10 x 2-3 secs  --PROM: supine shoulder left flexion, abd, ER IR with 10-15 reps  -education in exercise tolerance and progression tips  -PROM left elbow flex/and extension      Therap activities:       -posture training  -instruction in avoidence of slouched and flexed head position to avoid CT junction stress.  -discussion in trial use of previous Carpel Tunnel night splints with pt to bring in her previous splints                            Man rx: Manual therapy      -supine OA release  -supine STM to left upper trap, left levator scap, left posterior cuff -supine OA release  -supine STM to left upper trap, left levator scap, left posterior cuff      -supine: STM to both cervical p/s -supine left shoulder grade 2-3 lateral humeral head glide and posterior glide      -supine left shoulder grade 2-3 lateral humeral head glide and posterior glide -STM to left biceps supine      -STM to left pect major insertion area           Charges: Therap ex x 2,  (30 min),  Man x 1   (15 min)       Total Timed Treatment: 45 min  Total Treatment Time: 45 min  HEP:   Access Code: [email or web address]  URL: [email or web address]/  Date: 09/12/2023  Prepared by: Berenice Hernández     Exercises  - Seated Scapular Retraction  - 2 x daily - 7 x weekly - 1 sets - 5 reps - 3  secs hold  - Seated Cervical Retraction  - 2 x daily - 7 x weekly - 1 sets - 5 reps - 3 secs hold  Certification From: 3/09/0499  To:12/10/2023       Access Code: [email or web address]  URL: [employer]/  Date: 09/14/2023  Prepared by: Berenice Hernández  Exercises  - Supine Shoulder Press with Dowel  - 1 x daily - 5 x weekly - 1 sets - 10 reps  - Supine Shoulder Flexion AAROM with Dowel  - 1 x daily - 3 x weekly - 1 sets - 5 reps - 5 secs hold  -posture training  -instruction in avoidence of slouched and flexed head position to avoid CT junction stress.

## 2023-09-20 ENCOUNTER — APPOINTMENT (OUTPATIENT)
Dept: PHYSICAL THERAPY | Age: 63
End: 2023-09-20
Attending: INTERNAL MEDICINE
Payer: MEDICARE

## 2023-09-21 ENCOUNTER — OFFICE VISIT (OUTPATIENT)
Dept: PHYSICAL THERAPY | Facility: HOSPITAL | Age: 63
End: 2023-09-21
Attending: INTERNAL MEDICINE
Payer: MEDICARE

## 2023-09-21 PROCEDURE — 97110 THERAPEUTIC EXERCISES: CPT

## 2023-09-21 PROCEDURE — 97140 MANUAL THERAPY 1/> REGIONS: CPT

## 2023-09-21 NOTE — PROGRESS NOTES
Diagnosis: Chronic left shoulder pain (M25.512,G89.29)  Neck pain on left side (M54.2)   QuickDASH Outcome Score  Score: 65.91 % (9/12/2023 10:56 AM)       Referring Provider: Flor Hughes  Date of Evaluation:    9/11/2023    Precautions:  CTS left with release performed, 10/2019 Next MD visit:   none scheduled  Date of Surgery: n/a     Insurance Primary/Secondary: MEDICARE /     Auth Visits: [id number]            Subjective: patient reports feeling overall achey in neck, back , left and low back. Left arm is 6/10 today and reports use of Carpel Tunnel brace last two nights and slept better and thought to be helping. Left arm and hand still feels weak and hard to open any doors. Pain: 6/10 currently  shoulder down the left,       Objective:  patient brought 3 wrist splints in which consisted of one cockup wrist brace on left and 2 for right wrist.  Left wrist splint was to be ordered for similar style due to wear and age of brace. Initial Measures: in Italics  Observation/Posture: left shoulder higher, fwd head posture, dowagers, protracted scapulae right greater than left, shoulder IR   Palpation: tenderness with palpation at left biceps, triceps posterior cuff, supraspinatus, pectoralis, lateral lat dorsi on left. Left upper trap and levator scap.    Sensation: intact to light touch  Cervical Screen:   Flex 45 deg  Ext: 45 deg  R/L : lateral flex:25/25 deg  R/L: rotation: 55/50     AROM: (* denotes performed with pain)  initial initial 9/14/23 9/21/23   Shoulder  Elbow Shoulder  Left Shoulder file   Flexion: R 155; L 128 *  Ext:  R: 60  L: 48   Abduction: R 150; L 135 pn  ER: R NT; L TBD   IR: R TBD; L TBD Flexion: R 140; L 145*  Extension: R -8; L -15  Supination: R 70, L 70 *  Pronation: R 70, L 70 * Flex: 134  Ext:  48  Abd: 157  ER : 80  IR:70   Flex: left :144  Ext: 47  (right 54)  Abd: 157 (right 157)  ER:85 deg in POS  IR: WNL in POS supine      Accessory motion: Decreased left posterior glide left humerus on scapula     Flexibility: decreased Upper trap and levator scap, pectorals, lat flexibility     Strength/MMT: (* denotes performed with pain)  INitial initial   Shoulder Elbow   Flexion: R 5-/5; L 5-/5*  Abduction: R 5-/5; L 5-/5*  ER: R 5/5; L 4+/5*  IR: R 5/5; L 4+/5* Flexion: R 5/5; L 5/5  Extension: R 5/5; L 4-/5*  Supination: R 5/5; L 4/5*  Pronation: R 5/5; L 4/5*       Special tests: Initial:  mass grasp: R/L:  28.2#/13.2#  Initial Bledsoe/Eric left Negative for      9/14/23: Finger ladder R/L; 38/39 2nd attempt                PLAN OF CARE:    Goals:achieved by 8-14 sessions:      Pt will improve painfree cervical AROM flexion by 5 deg degrees to improve tolerance for looking down to tie shoes   Pt will improve painfree cervical AROM extension by 5 or more deg degrees to improve tolerance for putting dishes into overhead cabinets   Pt will improve painfree cervical AROM rotation to > 3-5 degrees to improve tolerance for turning head to check blind spot while driving  Patient to see reduction in max pain from current 9-10/10 down by 50% or greater to allow for improved sleep and ADL tolerance. Patient to demonstrate increased  shoulder left AROM flex and abd by 10 deg bilateral to allow for improved tolerance to reaching into upper cabinets and grooming tasks without pain  Pt will see an improved  strength by 5-10# left to improved functional use of LUE for ADL's  Pt will be independent and compliant with comprehensive HEP to maintain progress achieved in PT       Assessment: patient reports overall benefit from use of Carpel tunnel brace at night last couple nights with pain lower and sleeping better. Some soreness today with cervical retractions. Patient was cued to avoid over straining with exercises and to keep gentle force. Possible that weather has played a factor. Pt provided with education for assessing exercise response.      Goals:achieved by 8-14 sessions:      Pt will improve painfree cervical AROM flexion by 5 deg degrees to improve tolerance for looking down to tie shoes   Pt will improve painfree cervical AROM extension by 5 or more deg degrees to improve tolerance for putting dishes into overhead cabinets   Pt will improve painfree cervical AROM rotation to > 3-5 degrees to improve tolerance for turning head to check blind spot while driving  Patient to see reduction in max pain from current 9-10/10 down by 50% or greater to allow for improved sleep and ADL tolerance. Patient to demonstrate increased  shoulder left AROM flex and abd by 10 deg bilateral to allow for improved tolerance to reaching into upper cabinets and grooming tasks without pain  Pt will see an improved  strength by 5-10# left to improved functional use of LUE for ADL's  Pt will be independent and compliant with comprehensive HEP to maintain progress achieved in PT     Plan: reminded patient to continued use Carpel tunnel splint on left especially at night and hold CT retraction exercise in HEP  Date: 9/14/2023  TX#: 2/14 Date:    9/19/23             TX#: 3/14 Date:   9/21/23              TX#: 4/14 Date:                 TX#: 5/ Date: Tx#: 6/   Therap ex: Therap ex: Therap  ex:     - seated cervical retractions 5 x 5 secs -seated cervical retractions 5 x 5 secs -review cervical retractions  HELD     -seated scapular retractions 5 x 5 secs -seated scapular retractions 5 x 5 secs      -finger ladder left and right in standing see above for measures -f -standing wall slides to painfree tolerance 5 reps       -supine-PROM left shoulder flexion, abd, ER, IR with measures 10-15 reps  -wand supine press 10 x  - wand supine flexion OH   5 x 5 secs  -posture training  -instruction in avoidence of slouched and flexed head position to avoid CT junction stress.   - wand supine flexion 10 x 5 secs cues to avoid overstretching  -wand press with scapula protraction supine 10 x 2-3 secs  --PROM: supine shoulder left flexion, abd, ER IR with 10-15 reps  -education in exercise tolerance and progression tips  -PROM left elbow flex/and extension -seated passive Assisted pect stretch by therapists with back support 2 mins  -PROM: supine shoulder left flexion, abd, ER IR with 10-15 reps  -finger ladder exercises  -seated OH pulley for flexion and scaption 45+ secs each bilateral  Scapular retractions in standing and seated postures  Cervical spine extension x 5 reps     Therap activities:       -posture training  -instruction in avoidence of slouched and flexed head position to avoid CT junction stress.  -discussion in trial use of previous Carpel Tunnel night splints with pt to bring in her previous splints                            Man rx: Manual therapy Manual therapy     -supine OA release  -supine STM to left upper trap, left levator scap, left posterior cuff -supine OA release  -supine STM to left upper trap, left levator scap, left posterior cuff -supine OA release  -supine STM to left and right cervical p/s, upper traps and levator scap     -supine: STM to both cervical p/s -supine left shoulder grade 2-3 lateral humeral head glide and posterior glide supine left shoulder grade 2-3 lateral humeral head glide and posterior glide     -supine left shoulder grade 2-3 lateral humeral head glide and posterior glide -STM to left biceps supine -STM to left pect major insertion area  -scapular mobilization all directions left      -STM to left pect major insertion area           Charges: Therap ex x 2,  (30 min),  Man x 1   (15 min)       Total Timed Treatment: 45 min  Total Treatment Time: 45 min  HEP:   Access Code: [email or web address]  URL: [email or web address]  Date: 09/12/2023  Prepared by: Rudi Duncan     Exercises  - Seated Scapular Retraction  - 2 x daily - 7 x weekly - 1 sets - 5 reps - 3  secs hold  - Seated Cervical Retraction  - 2 x daily - 7 x weekly - 1 sets - 5 reps - 3 secs hold  HELD 2/09/13  Certification From: 8/30/8679 To:12/10/2023       Access Code: [email or web address]  URL: [email or web address]  Date: 09/14/2023  Prepared by: Eula Jeans  Exercises  - Supine Shoulder Press with Dowel  - 1 x daily - 5 x weekly - 1 sets - 10 reps  - Supine Shoulder Flexion AAROM with Dowel  - 1 x daily - 3 x weekly - 1 sets - 5 reps - 5 secs hold  -posture training  -instruction in avoidence of slouched and flexed head position to avoid CT junction stress.

## 2023-09-22 RX ORDER — PROCHLORPERAZINE 25 MG/1
SUPPOSITORY RECTAL
Qty: 9 EACH | Refills: 1 | Status: SHIPPED | OUTPATIENT
Start: 2023-09-22 | End: 2023-09-25

## 2023-09-22 RX ORDER — PROCHLORPERAZINE 25 MG/1
SUPPOSITORY RECTAL
Qty: 9 EACH | Refills: 1 | OUTPATIENT
Start: 2023-09-22

## 2023-09-25 RX ORDER — PROCHLORPERAZINE 25 MG/1
SUPPOSITORY RECTAL
Qty: 9 EACH | Refills: 1 | Status: SHIPPED | OUTPATIENT
Start: 2023-09-25

## 2023-09-25 NOTE — PROGRESS NOTES
Diagnosis: Chronic left shoulder pain (M25.512,G89.29)  Neck pain on left side (M54.2)   QuickDASH Outcome Score  Score: 65.91 % (9/12/2023 10:56 AM)       Referring Provider: Shelby Varela  Date of Evaluation:    9/11/2023    Precautions:  CTS left with release performed, 10/2019 Next MD visit:   none scheduled  Date of Surgery: n/a     Insurance Primary/Secondary: MEDICARE /     Auth Visits: [id number]            Subjective: patient reports feeling overall achey in neck, back , left and low back. Left arm is 6/10 today and reports use of Carpel Tunnel brace last two nights and slept better and thought to be helping. Left arm and hand still feels weak and hard to open any doors. Pain: 6/10 currently  shoulder down the left,       Objective:  patient brought 3 wrist splints in which consisted of one cockup wrist brace on left and 2 for right wrist.  Left wrist splint was to be ordered for similar style due to wear and age of brace. Initial Measures: in Italics  Observation/Posture: left shoulder higher, fwd head posture, dowagers, protracted scapulae right greater than left, shoulder IR   Palpation: tenderness with palpation at left biceps, triceps posterior cuff, supraspinatus, pectoralis, lateral lat dorsi on left. Left upper trap and levator scap.    Sensation: intact to light touch  Cervical Screen:   Flex 45 deg  Ext: 45 deg  R/L : lateral flex:25/25 deg  R/L: rotation: 55/50     AROM: (* denotes performed with pain)  initial initial 9/14/23 9/21/23   Shoulder  Elbow Shoulder  Left Shoulder file   Flexion: R 155; L 128 *  Ext:  R: 60  L: 48   Abduction: R 150; L 135 pn  ER: R NT; L TBD   IR: R TBD; L TBD Flexion: R 140; L 145*  Extension: R -8; L -15  Supination: R 70, L 70 *  Pronation: R 70, L 70 * Flex: 134  Ext:  48  Abd: 157  ER : 80  IR:70   Flex: left :144  Ext: 47  (right 54)  Abd: 157 (right 157)  ER:85 deg in POS  IR: WNL in POS supine      Accessory motion: Decreased left posterior glide left humerus on scapula     Flexibility: decreased Upper trap and levator scap, pectorals, lat flexibility     Strength/MMT: (* denotes performed with pain)  INitial initial   Shoulder Elbow   Flexion: R 5-/5; L 5-/5*  Abduction: R 5-/5; L 5-/5*  ER: R 5/5; L 4+/5*  IR: R 5/5; L 4+/5* Flexion: R 5/5; L 5/5  Extension: R 5/5; L 4-/5*  Supination: R 5/5; L 4/5*  Pronation: R 5/5; L 4/5*       Special tests: Initial:  mass grasp: R/L:  28.2#/13.2#  Initial Bledsoe/Eric left Negative for      9/14/23: Finger ladder R/L; 38/39 2nd attempt                PLAN OF CARE:    Goals:achieved by 8-14 sessions:      Pt will improve painfree cervical AROM flexion by 5 deg degrees to improve tolerance for looking down to tie shoes   Pt will improve painfree cervical AROM extension by 5 or more deg degrees to improve tolerance for putting dishes into overhead cabinets   Pt will improve painfree cervical AROM rotation to > 3-5 degrees to improve tolerance for turning head to check blind spot while driving  Patient to see reduction in max pain from current 9-10/10 down by 50% or greater to allow for improved sleep and ADL tolerance. Patient to demonstrate increased  shoulder left AROM flex and abd by 10 deg bilateral to allow for improved tolerance to reaching into upper cabinets and grooming tasks without pain  Pt will see an improved  strength by 5-10# left to improved functional use of LUE for ADL's  Pt will be independent and compliant with comprehensive HEP to maintain progress achieved in PT       Assessment: patient reports overall benefit from use of Carpel tunnel brace at night last couple nights with pain lower and sleeping better. Some soreness today with cervical retractions. Patient was cued to avoid over straining with exercises and to keep gentle force. Possible that weather has played a factor. Pt provided with education for assessing exercise response.      Goals:achieved by 8-14 sessions:      Pt will improve painfree cervical AROM flexion by 5 deg degrees to improve tolerance for looking down to tie shoes   Pt will improve painfree cervical AROM extension by 5 or more deg degrees to improve tolerance for putting dishes into overhead cabinets   Pt will improve painfree cervical AROM rotation to > 3-5 degrees to improve tolerance for turning head to check blind spot while driving  Patient to see reduction in max pain from current 9-10/10 down by 50% or greater to allow for improved sleep and ADL tolerance. Patient to demonstrate increased  shoulder left AROM flex and abd by 10 deg bilateral to allow for improved tolerance to reaching into upper cabinets and grooming tasks without pain  Pt will see an improved  strength by 5-10# left to improved functional use of LUE for ADL's  Pt will be independent and compliant with comprehensive HEP to maintain progress achieved in PT     Plan: reminded patient to continued use Carpel tunnel splint on left especially at night and hold CT retraction exercise in HEP  Date: 9/14/2023  TX#: 2/14 Date:    9/19/23             TX#: 3/14 Date:   9/21/23              TX#: 4/14 Date:  9/26/23               TX#: 5/14 Date: Tx#: 6/   Therap ex: Therap ex:  Therap  ex: TE  -standing wall slides to painfree tolerance 5 reps  -seated passive Assisted pect stretch by therapists with back support 2 mins  -PROM: supine shoulder left flexion, abd, ER IR with 10-15 reps  -finger ladder exercises  -seated OH pulley for flexion and scaption 45+ secs each bilateral  Scapular retractions in standing and seated postures  Cervical spine extension x 5 reps    - seated cervical retractions 5 x 5 secs -seated cervical retractions 5 x 5 secs -review cervical retractions  HELD     -seated scapular retractions 5 x 5 secs -seated scapular retractions 5 x 5 secs      -finger ladder left and right in standing see above for measures -f -standing wall slides to painfree tolerance 5 reps       -supine-PROM left shoulder flexion, abd, ER, IR with measures 10-15 reps  -wand supine press 10 x  - wand supine flexion OH   5 x 5 secs  -posture training  -instruction in avoidence of slouched and flexed head position to avoid CT junction stress.   - wand supine flexion 10 x 5 secs cues to avoid overstretching  -wand press with scapula protraction supine 10 x 2-3 secs  --PROM: supine shoulder left flexion, abd, ER IR with 10-15 reps  -education in exercise tolerance and progression tips  -PROM left elbow flex/and extension -seated passive Assisted pect stretch by therapists with back support 2 mins  -PROM: supine shoulder left flexion, abd, ER IR with 10-15 reps  -finger ladder exercises  -seated OH pulley for flexion and scaption 45+ secs each bilateral  Scapular retractions in standing and seated postures  Cervical spine extension x 5 reps     Therap activities:       -posture training  -instruction in avoidence of slouched and flexed head position to avoid CT junction stress.  -discussion in trial use of previous Carpel Tunnel night splints with pt to bring in her previous splints                            Man rx: Manual therapy Manual therapy MT  -supine OA release  -supine STM to left and right cervical p/s, upper traps and levator scap  -supine left shoulder grade 2-3 lateral humeral head glide and posterior glide  -STM to left pect major insertion area  -scapular mobilization all directions left     -supine OA release  -supine STM to left upper trap, left levator scap, left posterior cuff -supine OA release  -supine STM to left upper trap, left levator scap, left posterior cuff -supine OA release  -supine STM to left and right cervical p/s, upper traps and levator scap     -supine: STM to both cervical p/s -supine left shoulder grade 2-3 lateral humeral head glide and posterior glide supine left shoulder grade 2-3 lateral humeral head glide and posterior glide     -supine left shoulder grade 2-3 lateral humeral head glide and posterior glide -STM to left biceps supine -STM to left pect major insertion area  -scapular mobilization all directions left      -STM to left pect major insertion area           Charges: Therap ex x 2,  (30 min),  Man x 1   (15 min)       Total Timed Treatment: 45 min  Total Treatment Time: 45 min  HEP:   Access Code: [email or web address]  URL: [email or web address]  Date: 09/12/2023  Prepared by: Raffaele Cox     Exercises  - Seated Scapular Retraction  - 2 x daily - 7 x weekly - 1 sets - 5 reps - 3  secs hold  - Seated Cervical Retraction  - 2 x daily - 7 x weekly - 1 sets - 5 reps - 3 secs hold  HELD 9/82/89  Certification From: 7/66/5957  To:12/10/2023       Access Code: [email or web address]  URL: [email or web address]  Date: 09/14/2023  Prepared by: Raffaele Cox  Exercises  - Supine Shoulder Press with Dowel  - 1 x daily - 5 x weekly - 1 sets - 10 reps  - Supine Shoulder Flexion AAROM with Dowel  - 1 x daily - 3 x weekly - 1 sets - 5 reps - 5 secs hold  -posture training  -instruction in avoidence of slouched and flexed head position to avoid CT junction stress.

## 2023-09-26 ENCOUNTER — APPOINTMENT (OUTPATIENT)
Dept: PHYSICAL THERAPY | Facility: HOSPITAL | Age: 63
End: 2023-09-26
Attending: INTERNAL MEDICINE
Payer: MEDICARE

## 2023-09-26 ENCOUNTER — TELEPHONE (OUTPATIENT)
Dept: PHYSICAL THERAPY | Facility: HOSPITAL | Age: 63
End: 2023-09-26

## 2023-09-27 NOTE — PROGRESS NOTES
Diagnosis: Chronic left shoulder pain (M25.512,G89.29)  Neck pain on left side (M54.2)   QuickDASH Outcome Score  Score: 65.91 % (9/12/2023 10:56 AM)       Referring Provider: Nargis Gaona  Date of Evaluation:    9/11/2023    Precautions:  CTS left with release performed, 10/2019 Next MD visit:   none scheduled  Date of Surgery: n/a     Insurance Primary/Secondary: MEDICARE /    # Auth Visits: 14            Subjective: Pt said the wrist splints have been helping a little bit but continues to feel paresthesias bilaterally, left>right. Her left shoulder feels better, but it's mostly the hand that's giving her issues now. Always had numbness, but pain is intermittent. Night and morning is the worst.     Pain: 4/10 currently  shoulder down the left,   N/T L: 8/10     Objective:  patient brought 3 wrist splints in which consisted of one cockup wrist brace on left and 2 for right wrist.  Left wrist splint was to be ordered for similar style due to wear and age of brace. Initial Measures: in Italics  Observation/Posture: left shoulder higher, fwd head posture, dowagers, protracted scapulae right greater than left, shoulder IR   Palpation: tenderness with palpation at left biceps, triceps posterior cuff, supraspinatus, pectoralis, lateral lat dorsi on left. Left upper trap and levator scap.    Sensation: intact to light touch  Cervical Screen:   Flex 45 deg  Ext: 45 deg  R/L : lateral flex:25/25 deg  R/L: rotation: 55/50     AROM: (* denotes performed with pain)  initial initial 9/14/23 9/21/23   Shoulder  Elbow Shoulder  Left Shoulder file   Flexion: R 155; L 128 *  Ext:  R: 60  L: 48   Abduction: R 150; L 135 pn  ER: R NT; L TBD   IR: R TBD; L TBD Flexion: R 140; L 145*  Extension: R -8; L -15  Supination: R 70, L 70 *  Pronation: R 70, L 70 * Flex: 134  Ext:  48  Abd: 157  ER : 80  IR:70   Flex: left :144  Ext: 47  (right 54)  Abd: 157 (right 157)  ER:85 deg in POS  IR: WNL in POS supine      Accessory motion: Decreased left posterior glide left humerus on scapula     Flexibility: decreased Upper trap and levator scap, pectorals, lat flexibility     Strength/MMT: (* denotes performed with pain)  INitial initial   Shoulder Elbow   Flexion: R 5-/5; L 5-/5*  Abduction: R 5-/5; L 5-/5*  ER: R 5/5; L 4+/5*  IR: R 5/5; L 4+/5* Flexion: R 5/5; L 5/5  Extension: R 5/5; L 4-/5*  Supination: R 5/5; L 4/5*  Pronation: R 5/5; L 4/5*       Special tests: Initial:  mass grasp: R/L:  28.2#/13.2#  Initial Ladi/Eric left Negative for      9/14/23: Finger ladder R/L; 38/39 2nd attempt                PLAN OF CARE:    Goals:achieved by 8-14 sessions:      Pt will improve painfree cervical AROM flexion by 5 deg degrees to improve tolerance for looking down to tie shoes   Pt will improve painfree cervical AROM extension by 5 or more deg degrees to improve tolerance for putting dishes into overhead cabinets   Pt will improve painfree cervical AROM rotation to > 3-5 degrees to improve tolerance for turning head to check blind spot while driving  Patient to see reduction in max pain from current 9-10/10 down by 50% or greater to allow for improved sleep and ADL tolerance. Patient to demonstrate increased  shoulder left AROM flex and abd by 10 deg bilateral to allow for improved tolerance to reaching into upper cabinets and grooming tasks without pain  Pt will see an improved  strength by 5-10# left to improved functional use of LUE for ADL's  Pt will be independent and compliant with comprehensive HEP to maintain progress achieved in PT       Assessment: Patient continues to report overall benefit from use of Carpel tunnel brace at night last couple nights with pain lower and sleeping better. Cervical retractions did not increase pain today, and she felt less tightness after completing. Performed median nerve glides, did not increase symptoms down her left arm. She continues to c/o numbness in bilateral hands, left>right. Goals:achieved by 8-14 sessions:      Pt will improve painfree cervical AROM flexion by 5 deg degrees to improve tolerance for looking down to tie shoes   Pt will improve painfree cervical AROM extension by 5 or more deg degrees to improve tolerance for putting dishes into overhead cabinets   Pt will improve painfree cervical AROM rotation to > 3-5 degrees to improve tolerance for turning head to check blind spot while driving  Patient to see reduction in max pain from current 9-10/10 down by 50% or greater to allow for improved sleep and ADL tolerance. Patient to demonstrate increased  shoulder left AROM flex and abd by 10 deg bilateral to allow for improved tolerance to reaching into upper cabinets and grooming tasks without pain  Pt will see an improved  strength by 5-10# left to improved functional use of LUE for ADL's  Pt will be independent and compliant with comprehensive HEP to maintain progress achieved in PT     Plan: reminded patient to continued use Carpel tunnel splint on left especially at night and hold CT retraction exercise in HEP  Date: 9/14/2023  TX#: 2/14 Date:    9/19/23             TX#: 3/14 Date:   9/21/23              TX#: 4/14 Date:  9/26/23               TX#: 5/14 Date: Tx#: 6/   Therap ex: Therap ex:  Therap  ex: TE 25'  Pulleys flex/ext 3' ea  -PROM: shoulder  Scapular retractions seated postures 3x10   Cervical retractions (2x10) demo and tactile cues provided for form   Median nerve glides x10  ROM screening  Pt ed: findings and plan    - seated cervical retractions 5 x 5 secs -seated cervical retractions 5 x 5 secs -review cervical retractions  HELD     -seated scapular retractions 5 x 5 secs -seated scapular retractions 5 x 5 secs      -finger ladder left and right in standing see above for measures -f -standing wall slides to painfree tolerance 5 reps       -supine-PROM left shoulder flexion, abd, ER, IR with measures 10-15 reps  -wand supine press 10 x  - wand supine flexion OH   5 x 5 secs  -posture training  -instruction in avoidence of slouched and flexed head position to avoid CT junction stress.   - wand supine flexion 10 x 5 secs cues to avoid overstretching  -wand press with scapula protraction supine 10 x 2-3 secs  --PROM: supine shoulder left flexion, abd, ER IR with 10-15 reps  -education in exercise tolerance and progression tips  -PROM left elbow flex/and extension -seated passive Assisted pect stretch by therapists with back support 2 mins  -PROM: supine shoulder left flexion, abd, ER IR with 10-15 reps  -finger ladder exercises  -seated OH pulley for flexion and scaption 45+ secs each bilateral  Scapular retractions in standing and seated postures  Cervical spine extension x 5 reps     Therap activities:       -posture training  -instruction in avoidence of slouched and flexed head position to avoid CT junction stress.  -discussion in trial use of previous Carpel Tunnel night splints with pt to bring in her previous splints                            Man rx: Manual therapy Manual therapy MT 20'  -supine STM to left and right cervical p/s, upper traps and levator scap  -supine left shoulder grade 2-3 lateral humeral head glide and posterior glide  -STM to left pect major insertion area    -supine OA release  -supine STM to left upper trap, left levator scap, left posterior cuff -supine OA release  -supine STM to left upper trap, left levator scap, left posterior cuff -supine OA release  -supine STM to left and right cervical p/s, upper traps and levator scap     -supine: STM to both cervical p/s -supine left shoulder grade 2-3 lateral humeral head glide and posterior glide supine left shoulder grade 2-3 lateral humeral head glide and posterior glide     -supine left shoulder grade 2-3 lateral humeral head glide and posterior glide -STM to left biceps supine -STM to left pect major insertion area  -scapular mobilization all directions left      -STM to left pect major insertion area           Charges: Therap ex x 2,  (25 min),  Man x 1   (20 min)       Total Timed Treatment: 45 min  Total Treatment Time: 45 min  HEP:   Access Code: [email or web address]  URL: [employer]/  Date: 09/12/2023  Prepared by: Derek Monaco     Exercises  - Seated Scapular Retraction  - 2 x daily - 7 x weekly - 1 sets - 5 reps - 3  secs hold  - Seated Cervical Retraction  - 2 x daily - 7 x weekly - 1 sets - 5 reps - 3 secs hold  HELD 6/44/37  Certification From: 8/47/1743  To:12/10/2023       Access Code: [email or web address]  URL: [employer]/  Date: 09/14/2023  Prepared by: Derek Monaco  Exercises  - Supine Shoulder Press with Dowel  - 1 x daily - 5 x weekly - 1 sets - 10 reps  - Supine Shoulder Flexion AAROM with Dowel  - 1 x daily - 3 x weekly - 1 sets - 5 reps - 5 secs hold  -posture training  -instruction in avoidence of slouched and flexed head position to avoid CT junction stress.

## 2023-09-28 ENCOUNTER — OFFICE VISIT (OUTPATIENT)
Dept: PHYSICAL THERAPY | Facility: HOSPITAL | Age: 63
End: 2023-09-28
Attending: INTERNAL MEDICINE
Payer: MEDICARE

## 2023-09-28 PROCEDURE — 97140 MANUAL THERAPY 1/> REGIONS: CPT

## 2023-09-28 PROCEDURE — 97110 THERAPEUTIC EXERCISES: CPT

## 2023-10-02 ENCOUNTER — OFFICE VISIT (OUTPATIENT)
Dept: PHYSICAL THERAPY | Facility: HOSPITAL | Age: 63
End: 2023-10-02
Attending: INTERNAL MEDICINE
Payer: MEDICARE

## 2023-10-02 PROCEDURE — 97140 MANUAL THERAPY 1/> REGIONS: CPT

## 2023-10-02 PROCEDURE — 97110 THERAPEUTIC EXERCISES: CPT

## 2023-10-02 NOTE — PROGRESS NOTES
Diagnosis: Chronic left shoulder pain (M25.512,G89.29)  Neck pain on left side (M54.2)   QuickDASH Outcome Score  Score: 65.91 % (9/12/2023 10:56 AM)       Referring Provider: Lee Oneal  Date of Evaluation:    9/11/2023    Precautions:  CTS left with release performed, 10/2019 Next MD visit:   none scheduled  Date of Surgery: n/a     Insurance Primary/Secondary: MEDICARE /     Auth Visits: 14            Subjective: Pt said the wrist splints have been helping a little bit but continues to feel pins and needle, somewhat less bilaterally, left>right. Her left shoulder feels better, but still weak but hand stronger. but it's mostly the hand that's giving her issues now. Always had numbness, but pain is intermittent. Nightime pain better. Wearing     Pain: 3-4/10 currently     Objective:    Left wrist splint was to be ordered for similar style due to wear and age of brace but was sent wrong size. Patient was given information for closest brace similar on [employer] website for CTS which was brace brand she was using      Initial Measures: in Italics  Observation/Posture: left shoulder higher, fwd head posture, dowagers, protracted scapulae right greater than left, shoulder IR   Palpation: tenderness with palpation at left biceps, triceps posterior cuff, supraspinatus, pectoralis, lateral lat dorsi on left. Left upper trap and levator scap.    Sensation: intact to light touch  Cervical Screen:   Flex 45 deg  Ext: 45 deg  R/L : lateral flex:25/25 deg  R/L: rotation: 55/50     AROM: (* denotes performed with pain)  initial initial 9/14/23 9/21/23 10/2/23   Shoulder  Elbow Shoulder  Left Shoulder file    Flexion: R 155; L 128 *  Ext:  R: 60  L: 48   Abduction: R 150; L 135 pn  ER: R NT; L TBD   IR: R TBD; L TBD Flexion: R 140; L 145*  Extension: R -8; L -15  Supination: R 70, L 70 *  Pronation: R 70, L 70 * Flex: 134  Ext:  48  Abd: 157  ER : 80  IR:70   Flex: left :144  Ext: 47  (right 54)  Abd: 157 (right 157)  ER:85 deg in POS  IR: WNL in POS supine Flex : pre 140  Ext: 44  Abd: 156 no pain             Accessory motion: Decreased left posterior glide left humerus on scapula     Flexibility: decreased Upper trap and levator scap, pectorals, lat flexibility     Strength/MMT: (* denotes performed with pain)  INitial initial   Shoulder Elbow   Flexion: R 5-/5; L 5-/5*  Abduction: R 5-/5; L 5-/5*  ER: R 5/5; L 4+/5*  IR: R 5/5; L 4+/5* Flexion: R 5/5; L 5/5  Extension: R 5/5; L 4-/5*  Supination: R 5/5; L 4/5*  Pronation: R 5/5; L 4/5*       Special tests: Initial:  mass grasp: R/L:  28.2#/13.2#  10/2/23:  Mass grasp R/L         33.6#/18.8#  /  Initial Ladi/Eric left Negative for      10/2/23: Finger ladder R/L; 28/29 2nd attempt                PLAN OF CARE:    Goals:achieved by 8-14 sessions:      Pt will improve painfree cervical AROM flexion by 5 deg degrees to improve tolerance for looking down to tie shoes   Pt will improve painfree cervical AROM extension by 5 or more deg degrees to improve tolerance for putting dishes into overhead cabinets   Pt will improve painfree cervical AROM rotation to > 3-5 degrees to improve tolerance for turning head to check blind spot while driving  Patient to see reduction in max pain from current 9-10/10 down by 50% or greater to allow for improved sleep and ADL tolerance. Patient to demonstrate increased  shoulder left AROM flex and abd by 10 deg bilateral to allow for improved tolerance to reaching into upper cabinets and grooming tasks without pain  Pt will see an improved  strength by 5-10# left to improved functional use of LUE for ADL's  Pt will be independent and compliant with comprehensive HEP to maintain progress achieved in PT       Assessment: Patient continues to report overall benefit from use of Carpel tunnel brace at night last couple nights with pain lower and sleeping better. Performed median nerve glides, did not increase symptoms down her left arm.  She continues to c/o numbness in bilateral hands, left>right but  strength improved. Goals:achieved by 8-14 sessions:      Pt will improve painfree cervical AROM flexion by 5 deg degrees to improve tolerance for looking down to tie shoes   Pt will improve painfree cervical AROM extension by 5 or more deg degrees to improve tolerance for putting dishes into overhead cabinets   Pt will improve painfree cervical AROM rotation to > 3-5 degrees to improve tolerance for turning head to check blind spot while driving  Patient to see reduction in max pain from current 9-10/10 down by 50% or greater to allow for improved sleep and ADL tolerance. Patient to demonstrate increased  shoulder left AROM flex and abd by 10 deg bilateral to allow for improved tolerance to reaching into upper cabinets and grooming tasks without pain  Pt will see an improved  strength by 5-10# left to improved functional use of LUE for ADL's  Pt will be independent and compliant with comprehensive HEP to maintain progress achieved in PT     Plan: reminded patient to continued use Carpel tunnel splint on left especially at night and look into new splint purchase and added  strengthening exercises  Date: 9/14/2023  TX#: 2/14 Date:    9/19/23             TX#: 3/14 Date:   9/21/23              TX#: 4/14 Date:  9/26/23               TX#: 5/14 Date: 10/2/23  Tx#: 6/14   Therap ex: Therap ex:  Therap  ex: TE 25'  Pulleys flex/ext 3' ea  -PROM: shoulder  Scapular retractions seated postures 3x10   Cervical retractions (2x10) demo and tactile cues provided for form   Median nerve glides x10  ROM screening  Pt ed: findings and plan TE:   Pulleys OH flex/ext and  scap and return 1:30 mins each  -supine PROM left shoulder flex, abd, ER, IR,   -seated scapular retractions 3 x 10 reps   --hand mass grasp testing  - strengthening ex  -AROM measures left shoulder see above  -finger ladder bilateral measures see above     - seated cervical retractions 5 x 5 secs -seated cervical retractions 5 x 5 secs -review cervical retractions  HELD     -seated scapular retractions 5 x 5 secs -seated scapular retractions 5 x 5 secs      -finger ladder left and right in standing see above for measures -f -standing wall slides to painfree tolerance 5 reps  Standing wall slides 10x  HELD FOR TODAY     -supine-PROM left shoulder flexion, abd, ER, IR with measures 10-15 reps  -wand supine press 10 x  - wand supine flexion OH   5 x 5 secs  -posture training  -instruction in avoidence of slouched and flexed head position to avoid CT junction stress.   - wand supine flexion 10 x 5 secs cues to avoid overstretching  -wand press with scapula protraction supine 10 x 2-3 secs  --PROM: supine shoulder left flexion, abd, ER IR with 10-15 reps  -education in exercise tolerance and progression tips  -PROM left elbow flex/and extension -seated passive Assisted pect stretch by therapists with back support 2 mins  -PROM: supine shoulder left flexion, abd, ER IR with 10-15 reps  -finger ladder exercises  -seated OH pulley for flexion and scaption 45+ secs each bilateral  Scapular retractions in standing and seated postures  Cervical spine extension x 5 reps  -   Therap activities:       -posture training  -instruction in avoidence of slouched and flexed head position to avoid CT junction stress.  -discussion in trial use of previous Carpel Tunnel night splints with pt to bring in her previous splints                            Man rx: Manual therapy Manual therapy MT 20'  -supine STM to left and right cervical p/s, upper traps and levator scap  -supine left shoulder grade 2-3 lateral humeral head glide and posterior glide  -STM to left pect major insertion area MT 20'  -supine STM to left and right cervical p/s, upper traps and levator scap  -OA release  -supine left shoulder grade 2-3 lateral humeral head glide and posterior glide  -STM to left pect major insertion area   -supine OA release  -supine STM to left upper trap, left levator scap, left posterior cuff -supine OA release  -supine STM to left upper trap, left levator scap, left posterior cuff -supine OA release  -supine STM to left and right cervical p/s, upper traps and levator scap  scapular mobilization all directions left    -supine: STM to both cervical p/s -supine left shoulder grade 2-3 lateral humeral head glide and posterior glide supine left shoulder grade 2-3 lateral humeral head glide and posterior glide     -supine left shoulder grade 2-3 lateral humeral head glide and posterior glide -STM to left biceps supine -STM to left pect major insertion area  -scapular mobilization all directions left      -STM to left pect major insertion area           Charges: Therap ex x 2,  (25 min),  Man x 1   (20 min)       Total Timed Treatment: 45 min  Total Treatment Time: 45 min  HEP:   Access Code: [email or web address]  URL: [email or web address]  Date: 09/12/2023  Prepared by: Marsha Stewart     Exercises  - Seated Scapular Retraction  - 2 x daily - 7 x weekly - 1 sets - 5 reps - 3  secs hold  - Seated Cervical Retraction  - 2 x daily - 7 x weekly - 1 sets - 5 reps - 3 secs hold  HELD 2/37/13  Certification From: 3/35/7785  To:12/10/2023       Access Code: [email or web address]  URL: [email or web address]  Date: 09/14/2023  Prepared by: Marsha Stewart  Exercises  - Supine Shoulder Press with Dowel  - 1 x daily - 5 x weekly - 1 sets - 10 reps  - Supine Shoulder Flexion AAROM with Dowel  - 1 x daily - 3 x weekly - 1 sets - 5 reps - 5 secs hold  -posture training  -instruction in avoidence of slouched and flexed head position to avoid CT junction stress    Access Code: [email or web address]  URL: [email or web address]  Date: 10/02/2023  Prepared by: Marsha Stewart    Exercises  - Seated Gripping Towel  - 1 x daily - 5 x weekly - 1 sets - 10 reps

## 2023-10-04 ENCOUNTER — OFFICE VISIT (OUTPATIENT)
Dept: PHYSICAL THERAPY | Facility: HOSPITAL | Age: 63
End: 2023-10-04
Attending: INTERNAL MEDICINE
Payer: MEDICARE

## 2023-10-04 PROCEDURE — 97110 THERAPEUTIC EXERCISES: CPT

## 2023-10-04 PROCEDURE — 97140 MANUAL THERAPY 1/> REGIONS: CPT

## 2023-10-04 NOTE — PROGRESS NOTES
Diagnosis: Chronic left shoulder pain (M25.512,G89.29)  Neck pain on left side (M54.2)   QuickDASH Outcome Score  Score: 65.91 % (9/12/2023 10:56 AM)       Referring Provider: Loren Trejo  Date of Evaluation:    9/11/2023    Precautions:  CTS left with release performed, 10/2019 Next MD visit:   none scheduled  Date of Surgery: n/a     Insurance Primary/Secondary: Karen Reid /    Alisson Auth Visits: 14            Subjective: Patient reports left shoulder and arm pain better but main symptoms are pain at left thumb area and forearm today    Pain: 3/10 currently hand,  left arm 0/10,  mostly hand and forearm was primary pain area with hand at  6/10 this am upon awaking    Objective:    Left wrist splint was to be ordered for similar style due to wear and age of brace but was sent wrong size. Patient was given information for closest brace similar on [employer] website for CTS which was brace brand she was using. Discussed with patient recommendation to be seen by OT for Hand and CTS symptoms. Patient was interested in seeing if primary would OK OT referral. Message sent to patient's primary requesting OT referral      Initial Measures: in Italics  Observation/Posture: left shoulder higher, fwd head posture, dowagers, protracted scapulae right greater than left, shoulder IR   Palpation: tenderness with palpation at left biceps, triceps posterior cuff, supraspinatus, pectoralis, lateral lat dorsi on left. Left upper trap and levator scap.    Sensation: intact to light touch  Cervical Screen:   Flex 45 deg  Ext: 45 deg  R/L : lateral flex:25/25 deg  R/L: rotation: 55/50     AROM: (* denotes performed with pain)  initial initial 9/14/23 9/21/23 10/2/23 10/2/23   Shoulder  Elbow Shoulder  Left Shoulder file Left shoulder Left shoulder   Flexion: R 155; L 128 *  Ext:  R: 60  L: 48   Abduction: R 150; L 135 pn  ER: R NT; L TBD   IR: R TBD; L TBD Flexion: R 140; L 145*  Extension: R -8; L -15  Supination: R 70, L 70 *  Pronation: R 70, L 70 * Flex: 134  Ext:  48  Abd: 157  ER : 80  IR:70   Flex: left :144  Ext: 47  (right 54)  Abd: 157 (right 157)  ER:85 deg in POS  IR: WNL in POS supine Flex : pre 140  Ext: 44  Abd: 156 no pain        Flex: pre 143  Ext: 44 deg 0/10  Abd: 160 0/10      Accessory motion: Decreased left posterior glide left humerus on scapula     Flexibility: decreased Upper trap and levator scap, pectorals, lat flexibility     Strength/MMT: (* denotes performed with pain)  INitial initial   Shoulder Elbow   Flexion: R 5-/5; L 5-/5*  Abduction: R 5-/5; L 5-/5*  ER: R 5/5; L 4+/5*  IR: R 5/5; L 4+/5* Flexion: R 5/5; L 5/5  Extension: R 5/5; L 4-/5*  Supination: R 5/5; L 4/5*  Pronation: R 5/5; L 4/5*       Special tests: Initial:  mass grasp: R/L:  28.2#/13.2#  10/2/23:  Mass grasp R/L         33.6#/18.8#  /  Initial Bledsoe/Eric left Negative for      10/2/23: Finger ladder R/L; 28/29 2nd attempt                PLAN OF CARE:    Goals:achieved by 8-14 sessions:      Pt will improve painfree cervical AROM flexion by 5 deg degrees to improve tolerance for looking down to tie shoes   Pt will improve painfree cervical AROM extension by 5 or more deg degrees to improve tolerance for putting dishes into overhead cabinets   Pt will improve painfree cervical AROM rotation to > 3-5 degrees to improve tolerance for turning head to check blind spot while driving  Patient to see reduction in max pain from current 9-10/10 down by 50% or greater to allow for improved sleep and ADL tolerance.   Patient to demonstrate increased  shoulder left AROM flex and abd by 10 deg bilateral to allow for improved tolerance to reaching into upper cabinets and grooming tasks without pain  Pt will see an improved  strength by 5-10# left to improved functional use of LUE for ADL's  Pt will be independent and compliant with comprehensive HEP to maintain progress achieved in PT       Assessment: Patient continues to report overall benefit from use of Carpel tunnel brace at night last couple nights with pain lower and sleeping better. Performing median nerve glides,  She continues to c/o numbness in bilateral hands, left>right but  strength improved and reduced left shoulder and neck area pain. Requested per her primary consideration of OT consult for hand and forearm symptoms. Goals:achieved by 8-14 sessions:      Pt will improve painfree cervical AROM flexion by 5 deg degrees to improve tolerance for looking down to tie shoes   Pt will improve painfree cervical AROM extension by 5 or more deg degrees to improve tolerance for putting dishes into overhead cabinets   Pt will improve painfree cervical AROM rotation to > 3-5 degrees to improve tolerance for turning head to check blind spot while driving  Patient to see reduction in max pain from current 9-10/10 down by 50% or greater to allow for improved sleep and ADL tolerance. Patient to demonstrate increased  shoulder left AROM flex and abd by 10 deg bilateral to allow for improved tolerance to reaching into upper cabinets and grooming tasks without pain  Pt will see an improved  strength by 5-10# left to improved functional use of LUE for ADL's  Pt will be independent and compliant with comprehensive HEP to maintain progress achieved in PT     Plan: reminded patient to continued use Carpel tunnel splint on left especially at night and look into new splint purchase and added  strengthening exercises. Await response from PCP regarding additional OT referral  Date: 9/14/2023  TX#: 2/14 Date:    9/19/23             TX#: 3/14 Date:   9/21/23              TX#: 4/14 Date:  9/26/23               TX#: 5/14 Date: 10/2/23  Tx#: 6/14 Date: 10/4/23  Tx:7/14   Therap ex: Therap ex:  Therap  ex: TE 22'  Pulleys flex/ext 3' ea  -PROM: shoulder  Scapular retractions seated postures 3x10   Cervical retractions (2x10) demo and tactile cues provided for form   Median nerve glides x10  ROM screening  Pt ed: findings and plan TE:   Pulleys OH flex/ext and  scap and return 1:30 mins each  -supine PROM left shoulder flex, abd, ER, IR,   -seated scapular retractions 3 x 10 reps   --hand mass grasp testing  - strengthening ex  -AROM measures left shoulder see above  -finger ladder bilateral measures see above   TE: 25'  Pulleys OH flex/ext and  scap and return 1:30 mins each  -supine PROM left shoulder flex, abd, ER, IR,   -AROM measures left shoulder see above  -scapular retractions with YTB 1 x 10  --wall slides into flexion with assist for scapular upward rotation Held for NEXT SESSION  -shoulder rolls 10 x and given for HEP   - seated cervical retractions 5 x 5 secs -seated cervical retractions 5 x 5 secs -review cervical retractions  HELD      -seated scapular retractions 5 x 5 secs -seated scapular retractions 5 x 5 secs       -finger ladder left and right in standing see above for measures -f -standing wall slides to painfree tolerance 5 reps  Standing wall slides 10x  HELD FOR TODAY      -supine-PROM left shoulder flexion, abd, ER, IR with measures 10-15 reps  -wand supine press 10 x  - wand supine flexion OH   5 x 5 secs  -posture training  -instruction in avoidence of slouched and flexed head position to avoid CT junction stress.   - wand supine flexion 10 x 5 secs cues to avoid overstretching  -wand press with scapula protraction supine 10 x 2-3 secs  --PROM: supine shoulder left flexion, abd, ER IR with 10-15 reps  -education in exercise tolerance and progression tips  -PROM left elbow flex/and extension -seated passive Assisted pect stretch by therapists with back support 2 mins  -PROM: supine shoulder left flexion, abd, ER IR with 10-15 reps  -finger ladder exercises  -seated OH pulley for flexion and scaption 45+ secs each bilateral  Scapular retractions in standing and seated postures  Cervical spine extension x 5 reps  -    Therap activities:        -posture training  -instruction in avoidence of slouched and flexed head position to avoid CT junction stress.  -discussion in trial use of previous Carpel Tunnel night splints with pt to bring in her previous splints                                Man rx: Manual therapy Manual therapy MT 20'  -supine STM to left and right cervical p/s, upper traps and levator scap  -supine left shoulder grade 2-3 lateral humeral head glide and posterior glide  -STM to left pect major insertion area MT 20'  -supine STM to left and right cervical p/s, upper traps and levator scap  -OA release  -supine left shoulder grade 2-3 lateral humeral head glide and posterior glide  -STM to left pect major insertion area MT 15'  -supine STM to left and right cervical p/s, upper traps and levator scap  -OA release  -supine left shoulder grade 2-3 lateral humeral head glide and posterior glide  -STM to left pect major insertion area   -supine OA release  -supine STM to left upper trap, left levator scap, left posterior cuff -supine OA release  -supine STM to left upper trap, left levator scap, left posterior cuff -supine OA release  -supine STM to left and right cervical p/s, upper traps and levator scap  scapular mobilization all directions left     -supine: STM to both cervical p/s -supine left shoulder grade 2-3 lateral humeral head glide and posterior glide supine left shoulder grade 2-3 lateral humeral head glide and posterior glide      -supine left shoulder grade 2-3 lateral humeral head glide and posterior glide -STM to left biceps supine -STM to left pect major insertion area  -scapular mobilization all directions left       -STM to left pect major insertion area            Charges: Therap ex x 2,  (25 min),  Man x 1   (20 min)       Total Timed Treatment: 45 min  Total Treatment Time: 45 min  HEP:   Access Code: [email or web address]  URL: [email or web address]  Date: 09/12/2023  Prepared by: Rudi Duncan     Exercises  - Seated Scapular Retraction  - 2 x daily - 7 x weekly - 1 sets - 5 reps - 3  secs hold  - Seated Cervical Retraction  - 2 x daily - 7 x weekly - 1 sets - 5 reps - 3 secs hold  HELD 3/72/32  Certification From: 3/62/4286  To:12/10/2023       Access Code: [email or web address]  URL: [email or web address]  Date: 09/14/2023  Prepared by: Jose Kumar  Exercises  - Supine Shoulder Press with Dowel  - 1 x daily - 5 x weekly - 1 sets - 10 reps  - Supine Shoulder Flexion AAROM with Dowel  - 1 x daily - 3 x weekly - 1 sets - 5 reps - 5 secs hold  -posture training  -instruction in avoidence of slouched and flexed head position to avoid CT junction stress    Access Code: [email or web address]  URL: [email or web address]  Date: 10/02/2023  Prepared by: Jose Kumar    Exercises  - Seated Gripping Towel  - 1 x daily - 5 x weekly - 1 sets - 10 reps    Access Code: [email or web address]  URL: [email or web address]/  Date: 10/04/2023  Prepared by: Jose Kumar    Exercises  - Seated Shoulder Rolls  - 1 x daily - 7 x weekly - 1 sets - 10 reps

## 2023-10-05 ENCOUNTER — TELEPHONE (OUTPATIENT)
Dept: INTERNAL MEDICINE CLINIC | Facility: CLINIC | Age: 63
End: 2023-10-05

## 2023-10-05 DIAGNOSIS — G56.03 BILATERAL CARPAL TUNNEL SYNDROME: Primary | ICD-10-CM

## 2023-10-10 NOTE — PROGRESS NOTES
Diagnosis: Chronic left shoulder pain (M25.512,G89.29)  Neck pain on left side (M54.2)   QuickDASH Outcome Score  Score: 65.91 % (9/12/2023 10:56 AM)       Referring Provider: Fab Carter  Date of Evaluation:    9/11/2023    Precautions:  CTS left with release performed, 10/2019 Next MD visit:   none scheduled  Date of Surgery: n/a     Insurance Primary/Secondary: [employer]ash [employer] /    # Auth Visits: 14            Subjective: Pt says her left shoulder is better- still has some good and bad days but mainly feels weak    Pain: left arm 0/10; ongoing numbness in wrist and hand    Objective:   improved mobility with PROM, ongoing TTP along pec tendons      Assessment: Pt feels about 50% better, as she has good and bad days though her left shoulder is feeling much better. Progresses postural strengthening today, which pt tolerated well without increased pain in her left shoulder. Will continue to progress as able. Of note- She continues to report paresthesias in her hands, left>right. She would benefit from an OT referral to address these complaints, still waiting on response from PCP. Goals:achieved by 8-14 sessions:   Pt will improve painfree cervical AROM flexion by 5 deg degrees to improve tolerance for looking down to tie shoes   Pt will improve painfree cervical AROM extension by 5 or more deg degrees to improve tolerance for putting dishes into overhead cabinets   Pt will improve painfree cervical AROM rotation to > 3-5 degrees to improve tolerance for turning head to check blind spot while driving  Patient to see reduction in max pain from current 9-10/10 down by 50% or greater to allow for improved sleep and ADL tolerance.   Patient to demonstrate increased  shoulder left AROM flex and abd by 10 deg bilateral to allow for improved tolerance to reaching into upper cabinets and grooming tasks without pain  Pt will see an improved  strength by 5-10# left to improved functional use of LUE for ADL's  Pt will be independent and compliant with comprehensive HEP to maintain progress achieved in PT     Plan: reminded patient to continued use Carpel tunnel splint on left especially at night and look into new splint purchase and added  strengthening exercises.  Await response from PCP regarding additional OT referral  Date:  9/26/23               TX#: 5/14 Date: 10/2/23  Tx#: 6/14 Date: 10/4/23  Tx:7/14 Date: 10/11/23  Tx#: 8/12   TE 25'  Pulleys flex/ext 3' ea  -PROM: shoulder  Scapular retractions seated postures 3x10   Cervical retractions (2x10) demo and tactile cues provided for form   Median nerve glides x10  ROM screening  Pt ed: findings and plan TE:   Pulleys OH flex/ext and  scap and return 1:30 mins each  -supine PROM left shoulder flex, abd, ER, IR,   -seated scapular retractions 3 x 10 reps   --hand mass grasp testing  - strengthening ex  -AROM measures left shoulder see above  -finger ladder bilateral measures see above   TE: 25'  Pulleys OH flex/ext and  scap and return 1:30 mins each  -supine PROM left shoulder flex, abd, ER, IR,   -AROM measures left shoulder see above  -scapular retractions with YTB 1 x 10  --wall slides into flexion with assist for scapular upward rotation Held for NEXT SESSION  -shoulder rolls 10 x and given for HEP TE: 25'  Pulleys OH flex/ext and scap and return 3 mins each  -supine PROM left shoulder flex, abd, ER, IR,   -Supine chest press 3# dowel 2x10   -Supine shoulder flexion with 2# [id number]; 3# x10  -Wall push up x10  -SB up the wall x10  -Doorway pec stretch  -review of HEP      MT 12'  -supine left shoulder grade 2-3 lateral humeral head glide and posterior glide  -STM to left pect major insertion area, biceps       NR 8'  scapular retractions with RTB x 10 (cues for decreased UT act)  Bilat shoulder ER x10 YTB    Standing wall slides 10x  HELD FOR TODAY      -                                   MT 20'  -supine STM to left and right cervical p/s, upper traps and levator scap  -supine left shoulder grade 2-3 lateral humeral head glide and posterior glide  -STM to left pect major insertion area MT 20'  -supine STM to left and right cervical p/s, upper traps and levator scap  -OA release  -supine left shoulder grade 2-3 lateral humeral head glide and posterior glide  -STM to left pect major insertion area MT 15'  -supine STM to left and right cervical p/s, upper traps and levator scap  -OA release  -supine left shoulder grade 2-3 lateral humeral head glide and posterior glide  -STM to left pect major insertion area     scapular mobilization all directions left                          Charges: TE 1, MT 1 , NR 1     Total Timed Treatment: 45 min  Total Treatment Time: 45 min    HEP:   Access Code: [email or web address]  URL: [employer]/  Date: 09/12/2023  Prepared by: Oneta Ala     Exercises  - Seated Scapular Retraction  - 2 x daily - 7 x weekly - 1 sets - 5 reps - 3  secs hold  - Seated Cervical Retraction  - 2 x daily - 7 x weekly - 1 sets - 5 reps - 3 secs hold  HELD 7/79/73  Certification From: 1/32/6275  To:12/10/2023       Access Code: [email or web address]  URL: [employer]/  Date: 09/14/2023  Prepared by: Oneta Ala  Exercises  - Supine Shoulder Press with Dowel  - 1 x daily - 5 x weekly - 1 sets - 10 reps  - Supine Shoulder Flexion AAROM with Dowel  - 1 x daily - 3 x weekly - 1 sets - 5 reps - 5 secs hold  -posture training  -instruction in avoidence of slouched and flexed head position to avoid CT junction stress    Access Code: [email or web address]  URL: [employer]/  Date: 10/02/2023  Prepared by: Oneta Ala    Exercises  - Seated Gripping Towel  - 1 x daily - 5 x weekly - 1 sets - 10 reps    Access Code: [email or web address]  URL: [employer]/  Date: 10/04/2023  Prepared by: Oneta Ala    Exercises  - Seated Shoulder Rolls  - 1 x daily - 7 x weekly - 1 sets - 10 reps    10/22- doorway pec stretch     ------  Initial Measures: in Italics  Observation/Posture: left shoulder higher, fwd head posture, dowagers, protracted scapulae right greater than left, shoulder IR   Palpation: tenderness with palpation at left biceps, triceps posterior cuff, supraspinatus, pectoralis, lateral lat dorsi on left. Left upper trap and levator scap.    Sensation: intact to light touch  Cervical Screen:   Flex 45 deg  Ext: 45 deg  R/L : lateral flex:25/25 deg  R/L: rotation: 55/50     AROM: (* denotes performed with pain)  initial initial 9/14/23 9/21/23 10/2/23 10/2/23   Shoulder  Elbow Shoulder  Left Shoulder file Left shoulder Left shoulder   Flexion: R 155; L 128 *  Ext:  R: 60  L: 48   Abduction: R 150; L 135 pn  ER: R NT; L TBD   IR: R TBD; L TBD Flexion: R 140; L 145*  Extension: R -8; L -15  Supination: R 70, L 70 *  Pronation: R 70, L 70 * Flex: 134  Ext:  48  Abd: 157  ER : 80  IR:70   Flex: left :144  Ext: 47  (right 54)  Abd: 157 (right 157)  ER:85 deg in POS  IR: WNL in POS supine Flex : pre 140  Ext: 44  Abd: 156 no pain        Flex: pre 143  Ext: 44 deg 0/10  Abd: 160 0/10      Accessory motion: Decreased left posterior glide left humerus on scapula     Flexibility: decreased Upper trap and levator scap, pectorals, lat flexibility     Strength/MMT: (* denotes performed with pain)  INitial initial   Shoulder Elbow   Flexion: R 5-/5; L 5-/5*  Abduction: R 5-/5; L 5-/5*  ER: R 5/5; L 4+/5*  IR: R 5/5; L 4+/5* Flexion: R 5/5; L 5/5  Extension: R 5/5; L 4-/5*  Supination: R 5/5; L 4/5*  Pronation: R 5/5; L 4/5*       Special tests: Initial:  mass grasp: R/L:  28.2#/13.2#  10/2/23:  Mass grasp R/L         33.6#/18.8#     10/2/23: Finger ladder R/L; 28/29 2nd attempt

## 2023-10-11 ENCOUNTER — OFFICE VISIT (OUTPATIENT)
Dept: PHYSICAL THERAPY | Facility: HOSPITAL | Age: 63
End: 2023-10-11
Attending: INTERNAL MEDICINE
Payer: MEDICARE

## 2023-10-11 PROCEDURE — 97110 THERAPEUTIC EXERCISES: CPT

## 2023-10-11 PROCEDURE — 97112 NEUROMUSCULAR REEDUCATION: CPT

## 2023-10-11 PROCEDURE — 97140 MANUAL THERAPY 1/> REGIONS: CPT

## 2023-10-13 NOTE — PROGRESS NOTES
Diagnosis: Chronic left shoulder pain (M25.512,G89.29)  Neck pain on left side (M54.2)   QuickDASH Outcome Score  Score: 65.91 % (9/12/2023 10:56 AM)       Referring Provider: Bisi Abarca  Date of Evaluation:    9/11/2023    Precautions:  CTS left with release performed, 10/2019 Next MD visit:   none scheduled  Date of Surgery: n/a     Insurance Primary/Secondary: MEDICARE /    # Auth Visits: 14            Subjective: Pt says the left shoulder feels better, not getting the pain. Things are feeling stronger. Main thing that is bothering her is the wrist/hands. Pain: left arm 0/10; ongoing numbness in wrist and hand but better today    Objective:   cues for form with postural strengthening provided; difficulty with shoulder ext and ER      Assessment: Progressed postural strengthening today, pt tolerated well. Updated HEP to continue independent management of symptoms. Pt will be scheduling OT eval to address the ongoing paresthesias in her wrists and hands. Will continue to progress postural strength as able. Goals:achieved by 8-14 sessions:   Pt will improve painfree cervical AROM flexion by 5 deg degrees to improve tolerance for looking down to tie shoes   Pt will improve painfree cervical AROM extension by 5 or more deg degrees to improve tolerance for putting dishes into overhead cabinets   Pt will improve painfree cervical AROM rotation to > 3-5 degrees to improve tolerance for turning head to check blind spot while driving  Patient to see reduction in max pain from current 9-10/10 down by 50% or greater to allow for improved sleep and ADL tolerance.   Patient to demonstrate increased  shoulder left AROM flex and abd by 10 deg bilateral to allow for improved tolerance to reaching into upper cabinets and grooming tasks without pain  Pt will see an improved  strength by 5-10# left to improved functional use of LUE for ADL's  Pt will be independent and compliant with comprehensive HEP to maintain progress achieved in PT     Plan: reminded patient to continued use Carpel tunnel splint on left especially at night and look into new splint purchase and added  strengthening exercises.  Await response from PCP regarding additional OT referral  Date: 10/2/23  Tx#: 6/14 Date: 10/4/23  Tx:7/14 Date: 10/11/23  Tx#: 8/12 Date: 10/17/23  Tx#: 9/12   TE:   Pulleys OH flex/ext and  scap and return 1:30 mins each  -supine PROM left shoulder flex, abd, ER, IR,   -seated scapular retractions 3 x 10 reps   --hand mass grasp testing  - strengthening ex  -AROM measures left shoulder see above  -finger ladder bilateral measures see above   TE: 25'  Pulleys OH flex/ext and  scap and return 1:30 mins each  -supine PROM left shoulder flex, abd, ER, IR,   -AROM measures left shoulder see above  -scapular retractions with YTB 1 x 10  --wall slides into flexion with assist for scapular upward rotation Held for NEXT SESSION  -shoulder rolls 10 x and given for HEP TE: 25'  Pulleys OH flex/ext and scap and return 3 mins each  -supine PROM left shoulder flex, abd, ER, IR,   -Supine chest press 3# dowel 2x10   -Supine shoulder flexion with 2# [id number]; 3# x10  -Wall push up x10  -SB up the wall x10  -Doorway pec stretch  -review of HEP TE: 29'  Pulleys OH flex/ext and scap and return 3 mins each  -Wall push up x10  -Towel slides up wall x15  -Doorway pec stretch  -Seated thoracic ext over ball 2x10  -review of HEP     MT 12'  -supine left shoulder grade 2-3 lateral humeral head glide and posterior glide  -STM to left pect major insertion area, biceps  MT x     NR 8'  scapular retractions with RTB x 10 (cues for decreased UT act)  Bilat shoulder ER x10 YTB NR 20'  Standing IYT  scapular retractions with RTB 2x10 (cues for decreased UT act)  GH ext YTB x10  Bilat shoulder ER x10 YTB   Standing wall slides 10x  HELD FOR TODAY      -                                    MT 20'  -supine STM to left and right cervical p/s, upper traps and levator scap  -OA release  -supine left shoulder grade 2-3 lateral humeral head glide and posterior glide  -STM to left pect major insertion area MT 15'  -supine STM to left and right cervical p/s, upper traps and levator scap  -OA release  -supine left shoulder grade 2-3 lateral humeral head glide and posterior glide  -STM to left pect major insertion area     scapular mobilization all directions left                           Charges: TE 2, NR 1     Total Timed Treatment: 48 min  Total Treatment Time: 48 min    HEP:   Access Code: [email or web address]  URL: [email or web address]  Date: 09/12/2023  Prepared by: Bijan      Exercises  - Seated Scapular Retraction  - 2 x daily - 7 x weekly - 1 sets - 5 reps - 3  secs hold  - Seated Cervical Retraction  - 2 x daily - 7 x weekly - 1 sets - 5 reps - 3 secs hold  HELD 9[birth year]03[birth year]70  Certification From: 2/63/1900  To:12/10/2023       Access Code: [email or web address]  URL: [employer]/  Date: 09/14/2023  Prepared by: Bijan   Exercises  - Supine Shoulder Press with Dowel  - 1 x daily - 5 x weekly - 1 sets - 10 reps  - Supine Shoulder Flexion AAROM with Dowel  - 1 x daily - 3 x weekly - 1 sets - 5 reps - 5 secs hold  -posture training  -instruction in avoidence of slouched and flexed head position to avoid CT junction stress    Access Code: [email or web address]  URL: Footnote/  Date: 10/02/2023  Prepared by: Bijan     Exercises  - Seated Gripping Towel  - 1 x daily - 5 x weekly - 1 sets - 10 reps    Access Code: [email or web address]  URL: [employer]note/  Date: 10/04/2023  Prepared by: Julious     Exercises  - Seated Shoulder Rolls  - 1 x daily - 7 x weekly - 1 sets - 10 reps    10/22- doorway pec stretch     10/17 Exercises  - Doorway Pec Stretch at 90 Degrees Abduction  - 2 x daily - 7 x weekly - 5-10 reps - 10 sec hold  - Doorway Pec Stretch at 60 Elevation  - 2 x daily - 7 x weekly - 5-10 reps - 10 sec hold  - Bilateral Shoulder Flexion Wall Slide with Towel  - 1 x daily - 3-5 x weekly - 10 reps  - Scaption Wall Slide with Towel  - 1 x daily - 3-5 x weekly - 10 reps  - Standing Shoulder Row with Anchored Resistance  - 1 x daily - 3-5 x weekly - 10 reps  - Shoulder extension with resistance - Neutral  - 1 x daily - 3-5 x weekly - 10 reps  - Wall Push Up  - 1 x daily - 3-5 x weekly - 10 reps  - Shoulder External Rotation and Scapular Retraction with Resistance  - 1 x daily - 3-5 x weekly - 10 reps    ------  Initial Measures: in Italics  Observation/Posture: left shoulder higher, fwd head posture, dowagers, protracted scapulae right greater than left, shoulder IR   Palpation: tenderness with palpation at left biceps, triceps posterior cuff, supraspinatus, pectoralis, lateral lat dorsi on left. Left upper trap and levator scap.    Sensation: intact to light touch  Cervical Screen:   Flex 45 deg  Ext: 45 deg  R/L : lateral flex:25/25 deg  R/L: rotation: 55/50     AROM: (* denotes performed with pain)  initial initial 9/14/23 9/21/23 10/2/23 10/2/23   Shoulder  Elbow Shoulder  Left Shoulder file Left shoulder Left shoulder   Flexion: R 155; L 128 *  Ext:  R: 60  L: 48   Abduction: R 150; L 135 pn  ER: R NT; L TBD   IR: R TBD; L TBD Flexion: R 140; L 145*  Extension: R -8; L -15  Supination: R 70, L 70 *  Pronation: R 70, L 70 * Flex: 134  Ext:  48  Abd: 157  ER : 80  IR:70   Flex: left :144  Ext: 47  (right 54)  Abd: 157 (right 157)  ER:85 deg in POS  IR: WNL in POS supine Flex : pre 140  Ext: 44  Abd: 156 no pain        Flex: pre 143  Ext: 44 deg 0/10  Abd: 160 0/10      Accessory motion: Decreased left posterior glide left humerus on scapula     Flexibility: decreased Upper trap and levator scap, pectorals, lat flexibility     Strength/MMT: (* denotes performed with pain)  INitial initial   Shoulder Elbow   Flexion: R 5-/5; L 5-/5*  Abduction: R 5-/5; L 5-/5*  ER: R 5/5; L 4+/5*  IR: R 5/5; L 4+/5* Flexion: R 5/5; L 5/5  Extension: R 5/5; L 4-/5*  Supination: R 5/5; L 4/5*  Pronation: R 5/5; L 4/5*       Special tests: Initial:  mass grasp: R/L:  28.2#/13.2#  10/2/23:  Mass grasp R/L         33.6#/18.8#     10/2/23: Finger ladder R/L; 28/29 2nd attempt

## 2023-10-17 ENCOUNTER — OFFICE VISIT (OUTPATIENT)
Dept: PHYSICAL THERAPY | Facility: HOSPITAL | Age: 63
End: 2023-10-17
Attending: INTERNAL MEDICINE
Payer: MEDICARE

## 2023-10-17 PROCEDURE — 97110 THERAPEUTIC EXERCISES: CPT

## 2023-10-17 PROCEDURE — 97112 NEUROMUSCULAR REEDUCATION: CPT

## 2023-10-18 NOTE — PROGRESS NOTES
Progress Summary  Pt has attended 10 visits in Physical Therapy. Diagnosis: Chronic left shoulder pain (M25.512,G89.29)  Neck pain on left side (M54.2)   QuickDASH Outcome Score  Score: 65.91 % (9/12/2023 10:56 AM)       Referring Provider: Dash Fatima  Date of Evaluation:    9/11/2023    Precautions:  CTS left with release performed, 10/2019 Next MD visit:   none scheduled  Date of Surgery: n/a     Insurance Primary/Secondary: MEDICARE /     Auth Visits: 14            Subjective: Pt says left shoulder feels better, she had her OT eval today. Pain: left arm 0/10; ongoing numbness in wrist and hand but better today    Objective:         Cervical Screen 10/19 Cervical Screen: initial   Flex: 45 deg  Ext: 45  SB: R 31 deg  L 36  Rot: R 65 deg L 65 deg Flex 45 deg  Ext: 45 deg  R/L : lateral flex:25/25 deg  R/L: rotation: 55/50     AROM: (* denotes performed with pain)  Shoulder 10/19   Flexion: R NT; L 148  Abduction: R NT; L 150  ER: R NT; L T2 (overhead)  IR: R NT; L T8 (behind back)       Strength/MMT: (* denotes performed with pain)  INitial initial   Shoulder Elbow   Flexion: R 5-/5; L 5-/5*  Abduction: R 5-/5; L 5-/5*  ER: R 5/5; L 4+/5*  IR: R 5/5; L 4+/5* Flexion: R 5/5; L 5/5  Extension: R 5/5; L 4-/5*  Supination: R 5/5; L 4/5*  Pronation: R 5/5; L 4/5*    10/19  Flexion: L 5/5  Abduction: L 5/5  ER: L 5-/5  IR: L 5/5 10/19  Extension: L 4/5  Supination: L 4/5  Pronation:  L 4/5      Special tests: Initial:  mass grasp: R/L:  28.2#/13.2#  10/2/23:  Mass grasp R/L         33.6#/18.8#  10/19/23: Mass Gras-: R 21.4 lbs L 12.4 lbs      Assessment: Pt has made good progress with PT. Her cervical and shoulder ROM, and shoulder strength have improved as outlined above. Her remaining deficits are primarily related to weakness and paresthesias in her wrists and hands. She had her OT eval today, therefore pt will continue to address these deficits in OT.  Provided pt with comprehensive HEP, and encouraged pt to reach out if she has any remaining questions or concerns. Goals:achieved by 8-14 sessions:   Pt will improve painfree cervical AROM flexion by 5 deg degrees to improve tolerance for looking down to tie shoes met  Pt will improve painfree cervical AROM extension by 5 or more deg degrees to improve tolerance for putting dishes into overhead cabinets met  Pt will improve painfree cervical AROM rotation to > 3-5 degrees to improve tolerance for turning head to check blind spot while driving met  Patient to see reduction in max pain from current 9-10/10 down by 50% or greater to allow for improved sleep and ADL tolerance. met  Patient to demonstrate increased  shoulder left AROM flex and abd by 10 deg bilateral to allow for improved tolerance to reaching into upper cabinets and grooming tasks without pain met  Pt will see an improved  strength by 5-10# left to improved functional use of LUE for ADL's not met  Pt will be independent and compliant with comprehensive HEP to maintain progress achieved in PT  met    QuickDASH Outcome Score  Score: 65.91 % (9/12/2023 10:56 AM)    Post QuickDASH Outcome Score  Post Score: 34.09 % (10/19/2023  1:09 PM)    31.82 % improvement       Thank you for your referral. If you have any questions, please contact me at Dept: [phone].     Sincerely,  Electronically signed by therapist: Alexsandra Box, PT     Certification From: 94/48/2782  To:1/16/2024      Date: 10/2/23  Tx#: 6/14 Date: 10/4/23  Tx:7/14 Date: 10/11/23  Tx#: 8/12 Date: 10/17/23  Tx#: 9/12 Date: 10/19/23  Tx#: 10/12   TE:   Pulleys OH flex/ext and  scap and return 1:30 mins each  -supine PROM left shoulder flex, abd, ER, IR,   -seated scapular retractions 3 x 10 reps   --hand mass grasp testing  - strengthening ex  -AROM measures left shoulder see above  -finger ladder bilateral measures see above   TE: 25'  Pulleys OH flex/ext and  scap and return 1:30 mins each  -supine PROM left shoulder flex, abd, ER, IR, -AROM measures left shoulder see above  -scapular retractions with YTB 1 x 10  --wall slides into flexion with assist for scapular upward rotation Held for NEXT SESSION  -shoulder rolls 10 x and given for HEP TE: 25'  Pulleys OH flex/ext and scap and return 3 mins each  -supine PROM left shoulder flex, abd, ER, IR,   -Supine chest press 3# dowel 2x10   -Supine shoulder flexion with 2# [id number]; 3# x10  -Wall push up x10  -SB up the wall x10  -Doorway pec stretch  -review of HEP TE: 29'  Pulleys OH flex/ext and scap and return 3 mins each  -Wall push up x10  -Towel slides up wall x15  -Doorway pec stretch  -Seated thoracic ext over ball 2x10  -review of HEP TE: 39'  Pulleys OH flex/ext and scap and return 3 mins each  Progress note: ROM, strength, QuickDASH  -Doorway pec stretch  -Seated thoracic ext over towel 2x10  -review of HEP and update  Pt edu: plan for discharge, expectations for OT     MT 12'  -supine left shoulder grade 2-3 lateral humeral head glide and posterior glide  -STM to left pect major insertion area, biceps  MT x      NR 8'  scapular retractions with RTB x 10 (cues for decreased UT act)  Bilat shoulder ER x10 YTB NR 20'  Standing IYT  scapular retractions with RTB 2x10 (cues for decreased UT act)  GH ext YTB x10  Bilat shoulder ER x10 YTB    Standing wall slides 10x  HELD FOR TODAY       -                                          MT 20'  -supine STM to left and right cervical p/s, upper traps and levator scap  -OA release  -supine left shoulder grade 2-3 lateral humeral head glide and posterior glide  -STM to left pect major insertion area MT 15'  -supine STM to left and right cervical p/s, upper traps and levator scap  -OA release  -supine left shoulder grade 2-3 lateral humeral head glide and posterior glide  -STM to left pect major insertion area      scapular mobilization all directions left                               Charges: TE 3    Total Timed Treatment: 45 min  Total Treatment Time: 45 min    HEP: [id number]  Exercises 10/19  - Corner Pec Major Stretch  - 2 x daily - 3 sets - 15 sec hold  - Doorway Pec Stretch at 90 Degrees Abduction  - 2 x daily - 5-10 reps - 10 sec hold  - Doorway Pec Stretch at 60 Elevation  - 2 x daily - 5-10 reps - 10 sec hold  - Seated Thoracic Lumbar Extension  - 20 reps  - Bilateral Shoulder Flexion Wall Slide with Towel  - 1 x daily - 10 reps  - Scaption Wall Slide with Towel  - 1 x daily - 10 reps  - Standing Shoulder Row with Anchored Resistance  - 1 x daily - 10 reps  - Shoulder extension with resistance - Neutral  - 1 x daily - 10 reps  - Wall Push Up  - 1 x daily - 10 reps  - Shoulder External Rotation and Scapular Retraction with Resistance  - 1 x daily - 10 reps    ------  Initial Measures: in Italics  Observation/Posture: left shoulder higher, fwd head posture, dowagers, protracted scapulae right greater than left, shoulder IR   Palpation: tenderness with palpation at left biceps, triceps posterior cuff, supraspinatus, pectoralis, lateral lat dorsi on left. Left upper trap and levator scap.    Sensation: intact to light touch  Cervical Screen:   Flex 45 deg  Ext: 45 deg  R/L : lateral flex:25/25 deg  R/L: rotation: 55/50     AROM: (* denotes performed with pain)  initial initial 9/14/23 9/21/23 10/2/23 10/2/23   Shoulder  Elbow Shoulder  Left Shoulder file Left shoulder Left shoulder   Flexion: R 155; L 128 *  Ext:  R: 60  L: 48   Abduction: R 150; L 135 pn  ER: R NT; L TBD   IR: R TBD; L TBD Flexion: R 140; L 145*  Extension: R -8; L -15  Supination: R 70, L 70 *  Pronation: R 70, L 70 * Flex: 134  Ext:  48  Abd: 157  ER : 80  IR:70   Flex: left :144  Ext: 47  (right 54)  Abd: 157 (right 157)  ER:85 deg in POS  IR: WNL in POS supine Flex : pre 140  Ext: 44  Abd: 156 no pain        Flex: pre 143  Ext: 44 deg 0/10  Abd: 160 0/10      Accessory motion: Decreased left posterior glide left humerus on scapula     Flexibility: decreased Upper trap and levator scap, pectorals, lat flexibility     Strength/MMT: (* denotes performed with pain)  INitial initial   Shoulder Elbow   Flexion: R 5-/5; L 5-/5*  Abduction: R 5-/5; L 5-/5*  ER: R 5/5; L 4+/5*  IR: R 5/5; L 4+/5* Flexion: R 5/5; L 5/5  Extension: R 5/5; L 4-/5*  Supination: R 5/5; L 4/5*  Pronation: R 5/5; L 4/5*       Special tests: Initial:  mass grasp: R/L:  28.2#/13.2#  10/2/23:  Mass grasp R/L         33.6#/18.8#     10/2/23: Finger ladder R/L; 28/29 2nd attempt

## 2023-10-19 ENCOUNTER — OFFICE VISIT (OUTPATIENT)
Dept: PHYSICAL THERAPY | Facility: HOSPITAL | Age: 63
End: 2023-10-19
Attending: INTERNAL MEDICINE
Payer: MEDICARE

## 2023-10-19 ENCOUNTER — OFFICE VISIT (OUTPATIENT)
Dept: OCCUPATIONAL MEDICINE | Facility: HOSPITAL | Age: 63
End: 2023-10-19
Attending: INTERNAL MEDICINE
Payer: MEDICARE

## 2023-10-19 DIAGNOSIS — G56.03 BILATERAL CARPAL TUNNEL SYNDROME: Primary | ICD-10-CM

## 2023-10-19 PROCEDURE — 97110 THERAPEUTIC EXERCISES: CPT

## 2023-10-19 PROCEDURE — 97165 OT EVAL LOW COMPLEX 30 MIN: CPT

## 2023-10-23 ENCOUNTER — APPOINTMENT (OUTPATIENT)
Dept: PHYSICAL THERAPY | Facility: HOSPITAL | Age: 63
End: 2023-10-23
Attending: INTERNAL MEDICINE
Payer: MEDICARE

## 2023-10-23 ENCOUNTER — OFFICE VISIT (OUTPATIENT)
Dept: OCCUPATIONAL MEDICINE | Facility: HOSPITAL | Age: 63
End: 2023-10-23
Attending: INTERNAL MEDICINE
Payer: MEDICARE

## 2023-10-23 PROCEDURE — 97110 THERAPEUTIC EXERCISES: CPT

## 2023-10-23 NOTE — PROGRESS NOTES
Diagnosis:   Bilateral carpal tunnel syndrome (G56.03)       Referring Provider: Bradley Padron  Date of Evaluation:    10/19/2023    Precautions:  None Next MD visit:   none scheduled  Date of Surgery: n/a   Insurance Primary/Secondary: MEDICARE / MEDICAID     # Auth Visits: no auth noted            Subjective: Pt states no significant changes from her evaluation. Pt verbalized compliance with her HEP. Pain: 0/10      Objective: See below for exercises and activities performed today. Assessment: Pt demo'd fair carry over of her HEP as evidenced by min-mod v/c for positioning specifically hook and straight fist positioning. Reinforcement will be needed for comprehension at home. Pt tolerated strengthening exercises well, as evidenced by minimal rest breaks and no verbalization of pain. Goals: (to be met in 8 visits)  Pt complaints of pain in bilateral hands will decrease at worst to 1/10 during pt's daily routine  Pt will be independent and compliant with comprehensive HEP to maintain progress achieved in OT  Pt will increase her B  strength by 10# in order to carry heavy groceries. Pt will increase her L pinch strength by 5# in order to open bottles and jars  Pt will decrease her QuickDASH score by 10 points to demo improved functional independence.      Plan:Continue training with tendon glides, light strengthening   Date 10/23/23                  Visit #      2                                    Evaluation                 Manual                                                                             Ther ex                   Blue Web Forearm extensor and flexor 15 sec for 5x                 Tendon Glide 10x                 Lumbrical Clamp cotton ball transportation 10 mins                 Straight fist   x20                 1# Wrist Curls  x20                                                         HEP instruction                                        Therapeutic Activity Neuromuscular Re-education                                                             Modalities                                                           HEP:   HEP:  Assignment date:   Tendon glides  10/19/23                Charges: TE 3       Total Timed Treatment: 43 min  Total Treatment Time: 43 min

## 2023-10-26 ENCOUNTER — APPOINTMENT (OUTPATIENT)
Dept: OCCUPATIONAL MEDICINE | Facility: HOSPITAL | Age: 63
End: 2023-10-26
Attending: INTERNAL MEDICINE
Payer: MEDICARE

## 2023-10-26 ENCOUNTER — TELEPHONE (OUTPATIENT)
Dept: OCCUPATIONAL MEDICINE | Facility: HOSPITAL | Age: 63
End: 2023-10-26

## 2023-10-26 ENCOUNTER — APPOINTMENT (OUTPATIENT)
Dept: PHYSICAL THERAPY | Facility: HOSPITAL | Age: 63
End: 2023-10-26
Attending: INTERNAL MEDICINE
Payer: MEDICARE

## 2023-10-30 ENCOUNTER — OFFICE VISIT (OUTPATIENT)
Dept: OCCUPATIONAL MEDICINE | Facility: HOSPITAL | Age: 63
End: 2023-10-30
Attending: INTERNAL MEDICINE
Payer: MEDICARE

## 2023-10-30 PROCEDURE — 97110 THERAPEUTIC EXERCISES: CPT

## 2023-10-30 NOTE — PROGRESS NOTES
Diagnosis:   Bilateral carpal tunnel syndrome (G56.03)       Referring Provider: Avril Perez  Date of Evaluation:    10/19/2023    Precautions:  None Next MD visit:   none scheduled  Date of Surgery: n/a   Insurance Primary/Secondary: MEDICARE / MEDICAID     # Auth Visits: no auth noted            Subjective: Pt states she has been working on her tendon glides. Pain: 0/10      Objective: See below for exercises and activities performed today. Assessment: Pt demo'd good carry over of her HEP as evidenced by min-no v/c for accuracy. Pt able to correctly demonstrate hook positioning. OT trained and demo'd isometric wrist stability exercises to patient, pt returned demo with mod v/c. Isometric exercises planned to be added to HEP in following sessions. Goals: (to be met in 8 visits)  Pt complaints of pain in bilateral hands will decrease at worst to 1/10 during pt's daily routine  Pt will be independent and compliant with comprehensive HEP to maintain progress achieved in OT  Pt will increase her B  strength by 10# in order to carry heavy groceries. Pt will increase her L pinch strength by 5# in order to open bottles and jars  Pt will decrease her QuickDASH score by 10 points to demo improved functional independence.      Plan:Continue training with tendon glides, light strengthening   Date 10/23/23   10/30/23                 Visit #      2/8   3/8                                  Evaluation                 Manual                                                                             Ther ex                   Blue Web Forearm extensor and flexor 15 sec for 5x 20 sec for 5x               [employer] Ex   and twist, lumbrical clamp x20         Tendon Glide 10x 10x               Lumbrical Clamp cotton ball transportation 10 mins                 Straight fist   x20                 1# Wrist Curls  x20  x20               Isometric Wrist strength    90 secs, supinated, pronated, neutral Red flexbar 10#   Sup/pronation x20   \"T\" stability exercise               HEP instruction                                        Therapeutic Activity                                       Neuromuscular Re-education                                                             Modalities                                                           HEP:   HEP:  Assignment date:   Tendon glides  10/19/23                Charges: TE 3       Total Timed Treatment: 42 min  Total Treatment Time: 42 min

## 2023-11-01 ENCOUNTER — OFFICE VISIT (OUTPATIENT)
Dept: INTERNAL MEDICINE CLINIC | Facility: CLINIC | Age: 63
End: 2023-11-01

## 2023-11-01 ENCOUNTER — HOSPITAL ENCOUNTER (OUTPATIENT)
Dept: GENERAL RADIOLOGY | Facility: HOSPITAL | Age: 63
Discharge: HOME | End: 2023-11-01
Payer: MEDICARE

## 2023-11-01 VITALS
SYSTOLIC BLOOD PRESSURE: 129 MMHG | BODY MASS INDEX: 23.51 KG/M2 | WEIGHT: 109 LBS | OXYGEN SATURATION: 97 % | HEART RATE: 97 BPM | HEIGHT: 57 IN | DIASTOLIC BLOOD PRESSURE: 74 MMHG

## 2023-11-01 DIAGNOSIS — R09.89 ABNORMAL LUNG SOUNDS: ICD-10-CM

## 2023-11-01 DIAGNOSIS — R05.2 SUBACUTE COUGH: Primary | ICD-10-CM

## 2023-11-01 DIAGNOSIS — R05.2 SUBACUTE COUGH: ICD-10-CM

## 2023-11-01 PROCEDURE — 71046 X-RAY EXAM CHEST 2 VIEWS: CPT

## 2023-11-01 PROCEDURE — 99214 OFFICE O/P EST MOD 30 MIN: CPT

## 2023-11-01 RX ORDER — AZITHROMYCIN 250 MG/1
TABLET, FILM COATED ORAL
Qty: 6 TABLET | Refills: 0 | Status: SHIPPED | OUTPATIENT
Start: 2023-11-01 | End: 2023-11-05

## 2023-11-01 RX ORDER — AMOXICILLIN AND CLAVULANATE POTASSIUM 875; 125 MG/1; MG/1
1 TABLET, FILM COATED ORAL 2 TIMES DAILY
Qty: 10 TABLET | Refills: 0 | Status: SHIPPED | OUTPATIENT
Start: 2023-11-01 | End: 2023-11-06

## 2023-11-02 ENCOUNTER — APPOINTMENT (OUTPATIENT)
Dept: OCCUPATIONAL MEDICINE | Facility: HOSPITAL | Age: 63
End: 2023-11-02
Attending: INTERNAL MEDICINE
Payer: MEDICARE

## 2023-11-06 ENCOUNTER — TELEPHONE (OUTPATIENT)
Dept: PHYSICAL THERAPY | Facility: HOSPITAL | Age: 63
End: 2023-11-06

## 2023-11-07 ENCOUNTER — APPOINTMENT (OUTPATIENT)
Dept: OCCUPATIONAL MEDICINE | Facility: HOSPITAL | Age: 63
End: 2023-11-07
Attending: INTERNAL MEDICINE
Payer: MEDICARE

## 2023-11-07 ENCOUNTER — TELEPHONE (OUTPATIENT)
Dept: PHYSICAL THERAPY | Facility: HOSPITAL | Age: 63
End: 2023-11-07

## 2023-11-08 ENCOUNTER — OFFICE VISIT (OUTPATIENT)
Dept: OCCUPATIONAL MEDICINE | Facility: HOSPITAL | Age: 63
End: 2023-11-08
Attending: INTERNAL MEDICINE
Payer: MEDICARE

## 2023-11-08 PROCEDURE — 97110 THERAPEUTIC EXERCISES: CPT

## 2023-11-08 NOTE — PROGRESS NOTES
Diagnosis:   Bilateral carpal tunnel syndrome (G56.03)       Referring Provider: Charlotte Bettencourt  Date of Evaluation:    10/19/2023    Precautions:  None Next MD visit:   none scheduled  Date of Surgery: n/a   Insurance Primary/Secondary: MEDICARE / MEDICAID     # Auth Visits: no auth noted            Subjective: Pt states she was unable to come to other appt d/t being sick for awhile. Pain: 0/10      Objective: See below for exercises and activities performed today. Assessment: Pt demo'd independence in tendon glides. OT trained and demo'd blue sponge exercises to patient, pt returned demo with mod v/c until able to display independence. Blue sponge issued to pt and sponge exercises added to HEP. Pt able to perform 20 reps with rest breaks at around 10 reps for each exercise. Goals: (to be met in 8 visits)  Pt complaints of pain in bilateral hands will decrease at worst to 1/10 during pt's daily routine  Pt will be independent and compliant with comprehensive HEP to maintain progress achieved in OT  Pt will increase her B  strength by 10# in order to carry heavy groceries. Pt will increase her L pinch strength by 5# in order to open bottles and jars  Pt will decrease her QuickDASH score by 10 points to demo improved functional independence.      Plan: Follow up on sponge exercises, trial manual to bilateral forearm muscles  Date 10/23/23   10/30/23    11/8/2023              Visit #      2/8   3/8  4/8                                Evaluation                 Manual                                                                             Ther ex                   Blue Web Forearm extensor and flexor 15 sec for 5x 20 sec for 5x 20 sec for 5x             [employer] Ex   and twist, lumbrical clamp x20  and twist, lumbrical clamp x20        Tendon Glide 10x 10x  5x             Lumbrical Clamp cotton ball transportation 10 mins                 Straight fist   x20                 1# Wrist Curls x20  x20               Isometric Wrist strength    90 secs, supinated, pronated, neutral 15 sec holds x10 supinated, pronated, neutral             Red flexbar 10#   Sup/pronation x20   \"T\" stability exercise               HEP instruction      Sponge exercises x20                                 Therapeutic Activity                                       Neuromuscular Re-education                                                             Modalities                                                           HEP:   HEP:  Assignment date:   Tendon glides  10/19/23   Sponge exercises (2x a day) 11/8/23                 Charges: TE 3       Total Timed Treatment: 44 min  Total Treatment Time: 44 min

## 2023-11-14 ENCOUNTER — APPOINTMENT (OUTPATIENT)
Dept: OCCUPATIONAL MEDICINE | Facility: HOSPITAL | Age: 63
End: 2023-11-14
Attending: INTERNAL MEDICINE
Payer: MEDICARE

## 2023-11-14 RX ORDER — LOSARTAN POTASSIUM 25 MG/1
25 TABLET ORAL DAILY
Qty: 90 TABLET | Refills: 3 | Status: SHIPPED | OUTPATIENT
Start: 2023-11-14

## 2023-11-14 NOTE — TELEPHONE ENCOUNTER
Please review; protocol failed. Patient had labs done at [facility] Where 08/2023  Requested Prescriptions   Pending Prescriptions Disp Refills    LOSARTAN 25 MG Oral Tab [Pharmacy Med Name: LOSARTAN 25MG TABLETS] 90 tablet 3     Sig: TAKE 1 TABLET(25 MG) BY MOUTH DAILY       Hypertensive Medications Protocol Failed - 11/13/2023  5:50 AM        Failed - CMP or BMP in past 6 months     No results found for this or any previous visit (from the past 4392 hour(s)).             Passed - In person appointment in the past 12 or next 3 months     Recent Outpatient Visits              6 days ago     [address], OT    Office Visit    1 week ago Subacute cough    [address] [facility], [address] [facility], APRN    Office Visit    2 weeks ago     [address] Kaila Uribe, OT    Office Visit    3 weeks ago     [address] Kaila Uribe, OT    Office Visit    3 weeks ago Bilateral carpal tunnel syndrome    [facility] [address] Occupational Therapy Kaila Uribe, OT    Office Visit          Future Appointments         Provider Department Appt Notes    In 2 days Elbert Chapa S Sai Wright    In 6 days Kaila Uribe [address] [place] [address] Rehab Occupational Therapy Medicare/Medicaid    In 1 week Elbert Chapa S Sai Wright    In 2 weeks Elbert Chapa S Sai Wright    In 2 weeks Kaila Uribe [address] [place], [place] [address]ab Occupational Therapy Medicare/Medicaid    In 3 weeks Elbert Chapa S Sai Wright    In 3 weeks Tatyana Yan, [address], [address], [facility] 5 month f/u    In 3 weeks Kaila Uribe [address] [place] [address] Rehab Occupational Therapy Medicare/Medicaid    In 1 month MD Sandy Rios, Anamaria khanna annual [employer] wellness exam               Passed - Last BP reading less than 140/90     BP Readings from Last 1 Encounters:   11/01/23 129/74               Passed - In person appointment or virtual visit in the past 6 months     Recent Outpatient Visits              6 days ago     [address] [facility], [address] [facility] Visit    1 week ago Subacute cough    [facility], [address] [facility] MARCELA Edgar    Office Visit    2 weeks ago     [facility], [address] [place] [address] , OT    Office Visit    3 weeks ago     [address] Daina Amin OT    Office Visit    3 weeks ago Bilateral carpal tunnel syndrome    [address] [facility] [address] Gabi Perry    Office Visit          Future Appointments         Provider Department Appt Notes    In 2 days Daina First, [address]    In 6 days Daina First, [address]    In 1 week Daina First, [address]    In 2 weeks Daina First, [address]    In 2 weeks Daina First, [address]    In 3 weeks Daina First, [address]    In 3 weeks [address], Anamaria khanna 5 month f/u    In 3 weeks Daina First, [address]    In 1 month Caroline Gonzales  [facility], [address], Anamaria khanna annual [employer] wellness exam               Passed - [facility] or GFRNAA > 50     GFR Evaluation  EGFRCR: 80 , resulted on 11/21/2022             Recent Outpatient Visits              6 days ago     [address] Manolo Herrera, OT    Office Visit    1 week ago Subacute cough    [facility], [address] [facility] Cici Giang, APRN    Office Visit    2 weeks ago     [address] Manolo Herrera, OT    Office Visit    3 weeks ago     [address] Manolo Herrera, OT    Office Visit    3 weeks ago Bilateral carpal tunnel syndrome    [facility] [address] Occupational Therapy Manolo Herrera, OT    Office Visit          Future Appointments         Provider Department Appt Notes    In 2 days Manolo Herrera, [address]    In 6 days Manolo Herrera, [address]    In 1 week Manolo Herrera, [address]    In 2 weeks Manolo Herrera, [address] [place] [address] Rehab Occupational Therapy Medicare/Medicaid    In 2 weeks Manolo Herrera, [address] [place] [address] Rehab Occupational Therapy Medicare/Medicaid    In 3 weeks Manolo Herrera, [address]    In 3 weeks Maikol Lawler, [address], [facility] [address], [place] 5 month f/u    In 3 weeks Manolo Herrera, [address] [place] [address] Rehab Occupational Therapy Medicare/Medicaid    In 1 month Emerson Acosta MD [address], [address], [place] annual [place] wellness exam

## 2023-11-16 ENCOUNTER — OFFICE VISIT (OUTPATIENT)
Dept: OCCUPATIONAL MEDICINE | Facility: HOSPITAL | Age: 63
End: 2023-11-16
Attending: INTERNAL MEDICINE
Payer: MEDICARE

## 2023-11-16 PROCEDURE — 97110 THERAPEUTIC EXERCISES: CPT

## 2023-11-16 NOTE — PROGRESS NOTES
Diagnosis:   Bilateral carpal tunnel syndrome (G56.03)       Referring Provider: Anthony Decker  Date of Evaluation:    10/19/2023    Precautions:  None Next MD visit:   none scheduled  Date of Surgery: n/a   Insurance Primary/Secondary: MEDICARE / MEDICAID     # Auth Visits: no auth noted             Progress Summary  Pt has attended 5 visits in Occupational Therapy. Subjective: Pt reports feeling stronger and more mobile. Pt states she is still dropping items and unable to lift heavy items. Assessment: Pt has been seen for 5 skilled OT visits to increase her bilateral hand strength and mobility while relieving carpal tunnel pressure and pain. Pt had to cancel a few visits d/t getting sick which has impacted therapeutic gains. Although pt made progress in strength goals, pt has only made 2/5 goals today. See below for objective measurement obtained. Pt would benefit from continued skilled OT to reach final goals and return to PLOF. OT requesting 6 more skilled visits. Objective Data:     Strength (lbs) Right Average Eval Right Average Today Left Average Eval Left Average Today   :      25 28     20 25   2 pt Pinch:       5 NT      3 4   3 pt Pinch:       8 NT      4 5   Lateral Pinch:       9 NT      7 8       Goals:   Pt complaints of pain in bilateral hands will decrease at worst to 1/10 during pt's daily routine-MET  Pt will be independent and compliant with comprehensive HEP to maintain progress achieved in OT.-MET, continuous goal   Pt will increase her B  strength by 10# in order to carry heavy groceries.-Progressing  Pt will increase her L pinch strength by 5# in order to open bottles and jars-Progressing  Pt will decrease her QuickDASH score by 10 points to demo improved functional independence. -NT    Rehab Potential: excellent    Plan: Continue skilled Occupational Therapy 1-2x/week or a total of 6 visits over a 90 day period.  Treatment will include: Manual Therapy, Soft tissue mobilization, AROM, PROM, Strengthening, Therapeutic Activity, Moist heat, cryotherapy, Ultrasound, Whirlpool (fluidotherapy), Paraffin, Electrical Stim, Orthosis,  Neuromuscular Re-education, Patient education, Home exercise program,           Patient/Family/Caregiver was advised of these findings, precautions, and treatment options and has agreed to actively participate in planning and for this course of care. Thank you for your referral. If you have any questions, please contact me at Dept: [phone]. Sincerely,  Electronically signed by therapist: Nadine Mims MS, OTR/L     Physician's certification required: Yes  Please co-sign or sign and return this letter via fax as soon as possible to [phone]. I certify the need for these services furnished under this plan of treatment and while under my care. X___________________________________________________ Date____________________    Certification From: 77/17/1374  To:2/14/2024               Treatment Objective: See below for exercises and activities performed today.       Date 10/23/23   10/30/23    11/8/2023   11/16/23              Visit #      2/8   3/8  4/8  5/8                              Evaluation                 Manual                                                                             Ther ex                   Blue Web Forearm extensor and flexor 15 sec for 5x 20 sec for 5x 20 sec for 5x 20 sec for 5x           [employer] Ex   and twist, lumbrical clamp x20  and twist, lumbrical clamp x20  and twist, lumbrical clamp x20       Tendon Glide 10x 10x  5x             Lumbrical Clamp cotton ball transportation 10 mins                 Straight fist   x20                 1# Wrist Curls  x20  x20               Isometric Wrist strength    90 secs, supinated, pronated, neutral 15 sec holds x10 supinated, pronated, neutral             Red flexbar 10#   Sup/pronation x20   \"T\" stability exercise   Bilateral and ipsilateral Sup/pronation x20 3.0# digiflex  and opposition x20       HEP instruction      Sponge exercises x20                                 Therapeutic Activity                                       Neuromuscular Re-education                                                             Modalities                                                           HEP:   HEP:  Assignment date:   Tendon glides  10/19/23   Sponge exercises (2x a day) 11/8/23           Charges: TE 3 (40)       Total Timed Treatment: 40 min  Total Treatment Time: 45 min

## 2023-11-20 ENCOUNTER — OFFICE VISIT (OUTPATIENT)
Dept: OCCUPATIONAL MEDICINE | Facility: HOSPITAL | Age: 63
End: 2023-11-20
Attending: INTERNAL MEDICINE
Payer: MEDICARE

## 2023-11-20 PROCEDURE — 97110 THERAPEUTIC EXERCISES: CPT

## 2023-11-20 NOTE — PROGRESS NOTES
Diagnosis:   Bilateral carpal tunnel syndrome (G56.03)       Referring Provider: Mariana Jackson  Date of Evaluation:    10/19/2023    Precautions:  None Next MD visit:   none scheduled  Date of Surgery: n/a   Insurance Primary/Secondary: MEDICARE / MEDICAID     # Auth Visits: no auth noted           Subjective: Pt states she feels stronger. Pain: 0/10        Objective: See below for exercises and activities performed today. Assessment: Pt able to tolerate 2# wt exercises to BUE while standing without rest breaks. All exercises besides newly added ones graded up to 30 reps. Pt struggled with resisted 2 point pinch as evidenced by trying to compensate with additional fingers. Goals: (to be met in 8 visits)  Pt will be independent and compliant with comprehensive HEP to maintain progress achieved in OT. Pt will increase her B  strength by 10# in order to carry heavy groceries. Pt will increase her L pinch strength by 5# in order to open bottles and jars  Pt will decrease her QuickDASH score by 10 points to demo improved functional independence.       Plan: Continue weighted exercises    Date 10/23/23   10/30/23    11/8/2023   11/16/23   Progress noted requested 6 more  11/20/23          Visit #      2/8   3/8 4/8 5/8  6/11                            Evaluation                 Manual                                                                             Ther ex                   Blue Web Forearm extensor and flexor 15 sec for 5x 20 sec for 5x 20 sec for 5x 20 sec for 5x  20 secs 3x         [employer] Ex   and twist, lumbrical clamp x20  and twist, lumbrical clamp x20  and twist, lumbrical clamp x20  and twist, lumbrical clamp x30      Tendon Glide 10x 10x  5x             Lumbrical Clamp cotton ball transportation 10 mins       Red 2# pin block removal in 2 point pinch with B hands     Green 4# pin block removal in 3 point pinch with B hands          Straight fist   x20 1# Wrist Curls  x20  x20               Isometric Wrist strength    90 secs, supinated, pronated, neutral 15 sec holds x10 supinated, pronated, neutral             Red flexbar 10#   Sup/pronation x20   \"T\" stability exercise   Bilateral and ipsilateral Sup/pronation x20    Bilateral and ipsilateral Sup/pronation x30               2# standing BUE exercises: bicep curls, shoulder flexion, shoulder abduction x20          3.0# digiflex  and opposition x20       HEP instruction      Sponge exercises x20                                 Therapeutic Activity                                       Neuromuscular Re-education                                                             Modalities                                                           HEP:   HEP:  Assignment date:   Tendon glides  10/19/23   Sponge exercises (2x a day) 11/8/23       Charges: TE 3 (45)       Total Timed Treatment: 45 min  Total Treatment Time: 45 min

## 2023-11-21 RX ORDER — PROCHLORPERAZINE 25 MG/1
SUPPOSITORY RECTAL
Qty: 9 EACH | Refills: 1 | Status: SHIPPED | OUTPATIENT
Start: 2023-11-21

## 2023-11-22 ENCOUNTER — OFFICE VISIT (OUTPATIENT)
Dept: OCCUPATIONAL MEDICINE | Facility: HOSPITAL | Age: 63
End: 2023-11-22
Attending: INTERNAL MEDICINE
Payer: MEDICARE

## 2023-11-22 PROCEDURE — 97110 THERAPEUTIC EXERCISES: CPT

## 2023-11-22 NOTE — PROGRESS NOTES
Diagnosis:   Bilateral carpal tunnel syndrome (G56.03)       Referring Provider: Florence Toney  Date of Evaluation:    10/19/2023    Precautions:  None Next MD visit:   none scheduled  Date of Surgery: n/a   Insurance Primary/Secondary: MEDICARE / MEDICAID     # Auth Visits: no auth noted           Subjective: Pt reports she is feeling good. Pain: 0/10        Objective: See below for exercises and activities performed today. All exercises today performed bilaterally. Assessment: Pt engaged in digital  and pinch strengthening through use of red putty. Pt able to tolerate all hand strengthening exercises with min to no rest breaks. Pt able to perform strengthening exercises today with increased wt and green 15# flexbar instead of 10# flexbar. Goals: (to be met in 11 visits)  Pt will be independent and compliant with comprehensive HEP to maintain progress achieved in OT. Pt will increase her B  strength by 10# in order to carry heavy groceries. Pt will increase her L pinch strength by 5# in order to open bottles and jars  Pt will decrease her QuickDASH score by 10 points to demo improved functional independence.       Plan: Continue with increasing endurance and strength for adls    Date 54/58/45   10/30/23    11/8/2023   11/16/23   Progress noted requested 6 more  11/20/23 11/22/23        Visit #      2/8   3/8  4/8  5/8  6/11  7/11                          Evaluation                 Manual                                                                             Ther ex                   Blue Web Forearm extensor and flexor 15 sec for 5x 20 sec for 5x 20 sec for 5x 20 sec for 5x  20 secs 3x         [employer] Ex   and twist, lumbrical clamp x20  and twist, lumbrical clamp x20  and twist, lumbrical clamp x20  and twist, lumbrical clamp x30  and twist, lumbrical clamp x30     Tendon Glide 10x 10x  5x             Lumbrical Clamp cotton ball transportation 10 mins       Red 2# pin block removal in 2 point pinch with B hands     Green 4# pin block removal in 3 point pinch with B hands          Straight fist   x20                 1# Wrist Curls  x20  x20               Isometric Wrist strength    90 secs, supinated, pronated, neutral 15 sec holds x10 supinated, pronated, neutral             Red flexbar 10#   Sup/pronation x20   \"T\" stability exercise   Bilateral and ipsilateral Sup/pronation x20    Bilateral and ipsilateral Sup/pronation x30  Green 15# flexbar Bilateral Sup/pronation x10, 2 sets            2# standing BUE exercises: bicep curls, shoulder flexion, shoulder abduction x20 3# standing BUE exercises: bicep curls, shoulder flexion, shoulder abduction x20         3.0# digiflex  and opposition x20  Red putty ex: x20 opposition, straight fist            3.0# digiflex opposition x20    Rubber band lumbrical extension x20     HEP instruction      Sponge exercises x20                                 Therapeutic Activity                                       Neuromuscular Re-education                                                             Modalities                                                           HEP:   HEP:  Assignment date:   Tendon glides  10/19/23   Sponge exercises (2x a day) 11/8/23       Charges: TE 3 (40)       Total Timed Treatment: 40 min  Total Treatment Time: 40 min

## 2023-11-29 ENCOUNTER — OFFICE VISIT (OUTPATIENT)
Dept: OCCUPATIONAL MEDICINE | Facility: HOSPITAL | Age: 63
End: 2023-11-29
Attending: INTERNAL MEDICINE
Payer: MEDICARE

## 2023-11-29 PROCEDURE — 97110 THERAPEUTIC EXERCISES: CPT

## 2023-11-29 NOTE — PROGRESS NOTES
Diagnosis:   Bilateral carpal tunnel syndrome (G56.03)       Referring Provider: Helena Slaughter  Date of Evaluation:    10/19/2023    Precautions:  None Next MD visit:   none scheduled  Date of Surgery: n/a   Insurance Primary/Secondary: MEDICARE / MEDICAID     # Auth Visits: no auth noted           Subjective: Pt reports feeling stronger with no pain. Pain: 0/10        Objective: See below for exercises and activities performed today. All exercises today performed bilaterally. Assessment: OT discussed pt's progress and plan of care as pt is progressing well and consistently arriving with no pain. Tentatively pt will discharge next week if goals obtained. Pt challenged in bilateral UE strengthening through green elastic band exercises, breaks needed throughout exercises. Pt able to grade pinch strengthening exercises to higher resistance pins than prior sessions. Goals: (to be met in 11 visits)  Pt will be independent and compliant with comprehensive HEP to maintain progress achieved in OT. Pt will increase her B  strength by 10# in order to carry heavy groceries. Pt will increase her L pinch strength by 5# in order to open bottles and jars  Pt will decrease her QuickDASH score by 10 points to demo improved functional independence.       Plan: 2 more possible visits until d/c    Date 10/23/23   10/30/23    11/8/2023   11/16/23   Progress noted requested 6 more  11/20/23 11/22/23 11/29/23      Visit #      2/8   3/8  4/8  5/8  6/11  7/11  8/11                        Evaluation                 Manual                                                                             Ther ex                   Blue Web Forearm extensor and flexor 15 sec for 5x 20 sec for 5x 20 sec for 5x 20 sec for 5x  20 secs 3x  20 secs 3x            Digital AROM x20    Blue Web Ex   and twist, lumbrical clamp x20  and twist, lumbrical clamp x20  and twist, lumbrical clamp x20  and twist, lumbrical clamp x30  and twist, lumbrical clamp x30     Tendon Glide 10x 10x  5x             Lumbrical Clamp cotton ball transportation 10 mins       Red 2# pin block removal in 2 point pinch with B hands     Green 4# pin block removal in 3 point pinch with B hands    Green 4# pin block removal in 2 point pinch with B hands     Blue 6# pin block removal in 3 point pinch with B hands      Straight fist   x20                 1# Wrist Curls  x20  x20               Isometric Wrist strength    90 secs, supinated, pronated, neutral 15 sec holds x10 supinated, pronated, neutral             Red flexbar 10#   Sup/pronation x20   \"T\" stability exercise   Bilateral and ipsilateral Sup/pronation x20    Bilateral and ipsilateral Sup/pronation x30  Green 15# flexbar Bilateral Sup/pronation x10, 2 sets            2# standing BUE exercises: bicep curls, shoulder flexion, shoulder abduction x20 3# standing BUE exercises: bicep curls, shoulder flexion, shoulder abduction x20 Seated green band ex x20 bicep curls, shoulder flexion, and abduction        3.0# digiflex  and opposition x20  Red putty ex: x20 opposition, straight fist            3.0# digiflex opposition x20    Rubber band lumbrical extension x20     HEP instruction      Sponge exercises x20                                 Therapeutic Activity                                       Neuromuscular Re-education                                                             Modalities                                                           HEP:   HEP:  Assignment date:   Tendon glides  10/19/23   Sponge exercises (2x a day) 11/8/23       Charges: TE 3 (42)       Total Timed Treatment: 42 min  Total Treatment Time: 42 min

## 2023-11-30 ENCOUNTER — APPOINTMENT (OUTPATIENT)
Dept: OCCUPATIONAL MEDICINE | Facility: HOSPITAL | Age: 63
End: 2023-11-30
Attending: INTERNAL MEDICINE
Payer: MEDICARE

## 2023-12-04 ENCOUNTER — OFFICE VISIT (OUTPATIENT)
Dept: OCCUPATIONAL MEDICINE | Facility: HOSPITAL | Age: 63
End: 2023-12-04
Attending: INTERNAL MEDICINE
Payer: MEDICARE

## 2023-12-04 PROCEDURE — 97110 THERAPEUTIC EXERCISES: CPT

## 2023-12-04 NOTE — PROGRESS NOTES
Diagnosis:   Bilateral carpal tunnel syndrome (G56.03)       Referring Provider: Mane Agarwal  Date of Evaluation:    10/19/2023    Precautions:  None Next MD visit:   none scheduled  Date of Surgery: n/a   Insurance Primary/Secondary: MEDICARE / MEDICAID     # Auth Visits: no auth noted           Subjective: Pt reports feeling good with minimal issues/problems. Pain: 0/10        Objective: See below for exercises and activities performed today. All exercises today performed bilaterally. Assessment: Pt able to perform pinch strengthening with no breaks today demo'ing improved endurance from prior session. Pt had difficulty with resisted 2 point pinch as evidenced by compensation into 3 point pinch in which OT needed to cue corrections. Goals: (to be met in 11 visits)  Pt will be independent and compliant with comprehensive HEP to maintain progress achieved in OT. Pt will increase her B  strength by 10# in order to carry heavy groceries. Pt will increase her L pinch strength by 5# in order to open bottles and jars  Pt will decrease her QuickDASH score by 10 points to demo improved functional independence.       Plan: Discharge next session    Date 10/23/23   10/30/23    11/8/2023   11/16/23   Progress noted requested 6 more  11/20/23 11/22/23 11/29/23 12/4/23    Visit #      2/8   3/8  4/8  5/8  6/11  7/11  8/11 9/11                       Evaluation                 Manual                                                                             Ther ex                   Blue Web Forearm extensor and flexor 15 sec for 5x 20 sec for 5x 20 sec for 5x 20 sec for 5x  20 secs 3x  20 secs 3x 30 secs 3x          Digital AROM x20    Blue Web Ex   and twist, lumbrical clamp x20  and twist, lumbrical clamp x20  and twist, lumbrical clamp x20  and twist, lumbrical clamp x30  and twist, lumbrical clamp x30  Rubber band resisted digital abduction, WB with masher x20 B hands   Tendon Glide 10x 10x  5x             Lumbrical Clamp cotton ball transportation 10 mins       Red 2# pin block removal in 2 point pinch with B hands     Green 4# pin block removal in 3 point pinch with B hands    Green 4# pin block removal in 2 point pinch with B hands     Blue 6# pin block removal in 3 point pinch with B hands  Green 4# pin block removal in 2 point pinch with B hands     Blue 6# pin block removal in 3 point pinch with B hands    Straight fist   x20                 1# Wrist Curls  x20  x20               Isometric Wrist strength    90 secs, supinated, pronated, neutral 15 sec holds x10 supinated, pronated, neutral             Red flexbar 10#   Sup/pronation x20   \"T\" stability exercise   Bilateral and ipsilateral Sup/pronation x20    Bilateral and ipsilateral Sup/pronation x30  Green 15# flexbar Bilateral Sup/pronation x10, 2 sets            2# standing BUE exercises: bicep curls, shoulder flexion, shoulder abduction x20 3# standing BUE exercises: bicep curls, shoulder flexion, shoulder abduction x20 Seated green band ex x20 bicep curls, shoulder flexion, and abduction        3.0# digiflex  and opposition x20  Red putty ex: x20 opposition, straight fist   Red putty ex: x20 opposition,          3.0# digiflex opposition x20    Rubber band lumbrical extension x20     HEP instruction      Sponge exercises x20                                 Therapeutic Activity                                       Neuromuscular Re-education                                                             Modalities                                                           HEP:   HEP:  Assignment date:   Tendon glides  10/19/23   Sponge exercises (2x a day) 11/8/23       Charges: TE 3 40)       Total Timed Treatment: 40 min  Total Treatment Time: 40 min

## 2023-12-06 ENCOUNTER — APPOINTMENT (OUTPATIENT)
Dept: OCCUPATIONAL MEDICINE | Facility: HOSPITAL | Age: 63
End: 2023-12-06
Payer: MEDICARE

## 2023-12-08 DIAGNOSIS — E11.39 TYPE 2 DIABETES MELLITUS WITH OTHER OPHTHALMIC COMPLICATION, WITH LONG-TERM CURRENT USE OF INSULIN (HCC): ICD-10-CM

## 2023-12-08 DIAGNOSIS — Z79.4 TYPE 2 DIABETES MELLITUS WITH OTHER OPHTHALMIC COMPLICATION, WITH LONG-TERM CURRENT USE OF INSULIN (HCC): ICD-10-CM

## 2023-12-08 NOTE — TELEPHONE ENCOUNTER
EMA Ivey,     Please advise regarding refill request pending for approval     LOV: 8/21/3  RTC: 12/11/23

## 2023-12-08 NOTE — TELEPHONE ENCOUNTER
Current Outpatient Medications    HUMULIN R U-500, CONCENTRATED, 500 UNIT/ML Subcutaneous Solution ADMINISTER 180-195 UNITS UNDER THE SKIN THREE TIMES DAILY BEFORE MEALS 110 mL 0

## 2023-12-11 ENCOUNTER — APPOINTMENT (OUTPATIENT)
Dept: OCCUPATIONAL MEDICINE | Facility: HOSPITAL | Age: 63
End: 2023-12-11
Payer: MEDICARE

## 2023-12-11 ENCOUNTER — LAB ENCOUNTER (OUTPATIENT)
Dept: LAB | Age: 63
End: 2023-12-11
Payer: MEDICARE

## 2023-12-11 ENCOUNTER — OFFICE VISIT (OUTPATIENT)
Dept: ENDOCRINOLOGY CLINIC | Facility: CLINIC | Age: 63
End: 2023-12-11

## 2023-12-11 VITALS
WEIGHT: 106 LBS | SYSTOLIC BLOOD PRESSURE: 131 MMHG | HEART RATE: 92 BPM | DIASTOLIC BLOOD PRESSURE: 70 MMHG | HEIGHT: 58 IN | BODY MASS INDEX: 22.25 KG/M2

## 2023-12-11 DIAGNOSIS — E11.65 UNCONTROLLED TYPE 2 DIABETES MELLITUS WITH HYPERGLYCEMIA (HCC): ICD-10-CM

## 2023-12-11 DIAGNOSIS — E11.65 UNCONTROLLED TYPE 2 DIABETES MELLITUS WITH HYPERGLYCEMIA (HCC): Primary | ICD-10-CM

## 2023-12-11 LAB
CARTRIDGE LOT#: ABNORMAL NUMERIC
CHOLEST SERPL-MCNC: 148 MG/DL (ref ?–200)
CREAT UR-SCNC: 33.1 MG/DL
FASTING PATIENT LIPID ANSWER: NO
GLUCOSE BLOOD: 327
HDLC SERPL-MCNC: 48 MG/DL (ref 40–59)
HEMOGLOBIN A1C: 6.8 % (ref 4.3–5.6)
LDLC SERPL CALC-MCNC: 74 MG/DL (ref ?–100)
MICROALBUMIN UR-MCNC: <0.3 MG/DL
NONHDLC SERPL-MCNC: 100 MG/DL (ref ?–130)
TEST STRIP LOT #: NORMAL NUMERIC
TRIGL SERPL-MCNC: 154 MG/DL (ref 30–149)
VLDLC SERPL CALC-MCNC: 24 MG/DL (ref 0–30)

## 2023-12-11 PROCEDURE — 83036 HEMOGLOBIN GLYCOSYLATED A1C: CPT

## 2023-12-11 PROCEDURE — 99214 OFFICE O/P EST MOD 30 MIN: CPT

## 2023-12-11 PROCEDURE — 82947 ASSAY GLUCOSE BLOOD QUANT: CPT

## 2023-12-11 PROCEDURE — 82043 UR ALBUMIN QUANTITATIVE: CPT

## 2023-12-11 PROCEDURE — 36415 COLL VENOUS BLD VENIPUNCTURE: CPT

## 2023-12-11 PROCEDURE — 82570 ASSAY OF URINE CREATININE: CPT

## 2023-12-11 PROCEDURE — 80061 LIPID PANEL: CPT

## 2023-12-11 RX ORDER — INSULIN HUMAN 500 [IU]/ML
INJECTION, SOLUTION SUBCUTANEOUS
Qty: 110 ML | Refills: 0 | Status: SHIPPED | OUTPATIENT
Start: 2023-12-11

## 2023-12-11 NOTE — PROGRESS NOTES
Return Office Visit     CHIEF COMPLAINT:    DM  Dyslipidemia     HISTORY OF PRESENT ILLNESS:  Elizabeth Le is a 59year old female who presents for follow up for DM. DM HISTORY  Diagnosed: Around age 29        HISTORY OF DIABETES COMPLICATIONS: :  History of Retinopathy: No- last eye exam: UTD- seeing optho every 6 months- Has upcoming appt in the next 1-2 months. History of Neuropathy: Yes, numbness, symptoms stable   History of Nephropathy: No     ASSOCIATED COMPLICATIONS:   HTN: Yes  Hyperlipidemia: Yes  Coronary Artery Disease:  No  Cerebrovascular Disease: No        HOME GLUCOSE READINGS:   Dexcom G7-     Unfortunately we were unable to access Dexcom data during appointment today. She has upgraded to [facility] but was not able to log in to dexcom clarity isabelle. Per recall she is having sugars >200's fasting and at times >250 after lunch and dinner which is higher than HgA1c is reflecting. CURRENT DIABETIC MEDICATIONS INCLUDE:  She is on 3 insulin injections a day    U 500 - 175 units with breakfast (typically cereal), skips lunch, and then 155 units with dinner. Trulicity - 2.8TC subcutaneous weekly  Jardiance 25 mg daily    T/f MTF due to GI SE    --> Of note, eats cereal initially for breakfast and takes 175 units of U500 but then will typically eat larger brunch meal a few hours later- skips insulin at this time because she is worried it is too soon after breakfast meal leading to significant hyperglycemia following this meal.--> improved with above regimen of splitting breakfast/lunch dose in two.      MEALS:  Recall: Is trying to watch carbohydrates- breakfast is highest carb meal but sometimes dinner as well    Breakfast- eggs with toast and hashbrowns  Lunch-(1-3pm) sub sandwich   Dinner (7pm) hamburger with fries, or porkchop with corn  Snacks- banana, apple  Beverages-diet soda, water or tea     EXERCISE:   no    CURRENT MEDICATION:    Current Outpatient Medications   Medication Sig Dispense Refill    HUMULIN R U-500, CONCENTRATED, 500 UNIT/ML Subcutaneous Solution ADMINISTER 180-195 UNITS UNDER THE SKIN THREE TIMES DAILY BEFORE MEALS 110 mL 0    Continuous Blood Gluc Sensor (DEXCOM G6 SENSOR) Does not apply Misc USE AND CHANGE EVERY 10 DAYS 9 each 1    losartan 25 MG Oral Tab Take 1 tablet (25 mg total) by mouth daily. 90 tablet 3    venlafaxine ER 75 MG Oral Capsule SR 24 Hr Take 1 capsule (75 mg total) by mouth daily. 90 capsule 1    HYDROcodone-acetaminophen (NORCO) 5-325 MG Oral Tab Take 1 tablet by mouth every 8 (eight) hours as needed for Pain. 45 tablet 0    Dulaglutide (TRULICITY) 4.5 XI/4.9VB Subcutaneous Solution Pen-injector Inject 4.5 mg into the skin once a week. 6 mL 1    Empagliflozin (JARDIANCE) 25 MG Oral Tab Take 1 tablet by mouth daily. 90 tablet 1    Continuous Blood Gluc  (DEXCOM G7 ) Does not apply Device 1 each daily. 1 each 0    Continuous Blood Gluc Sensor (DEXCOM G6 SENSOR) Does not apply Misc USE AS DIRECTED AND CHANGE EVERY 10 DAYS 9 each 1    pantoprazole 40 MG Oral Tab EC Take 1 tablet (40 mg total) by mouth every morning. 90 tablet 3    ALPRAZolam 0.5 MG Oral Tab TAKE 1 TABLET BY MOUTH EVERY EVENING 30 tablet 5    DEXCOM G6 TRANSMITTER Does not apply Misc USE 1 DEVICE EVERY 3 MONTHS 1 each 1    fluticasone-salmeterol (ADVAIR DISKUS) 250-50 MCG/ACT Inhalation Aerosol Powder, Breath Activated Inhale 1 puff into the lungs every 12 (twelve) hours. 60 each 5    DEXCOM G6 SENSOR Does not apply Misc CHANGE SENSOR EVERY 10 DAYS 3 each 2    dicyclomine 10 MG Oral Cap Take 1 capsule (10 mg total) by mouth 4 (four) times daily.  60 capsule 5    TRUEPLUS PEN NEEDLES 32G X 4 MM Does not apply Misc USE AS DIRECTED 100 each 0    ONETOUCH VERIO In Vitro Strip USE TO CHECK BLOOD SUGAR 3 TO 4 TIMES DAILY 400 strip 0    ONETOUCH DELICA PLUS [id number] Does not apply Misc TEST FOUR TIMES DAILY 400 each 0    Continuous Blood Gluc Transmit (DEXCOM G6 TRANSMITTER) Does not apply Misc Replace transmitter every 3 months 1 each 0    Continuous Blood Gluc  (DEXCOM G6 ) Does not apply Device Use  daily to check blood sugars. 1 each 0    TRUEPLUS 5-BEVEL PEN NEEDLES 31G X 5 MM Does not apply Misc USE AS DIRECTED 6 TIMES DAILY 200 each 0    Insulin Syringe/Needle U-500 (BD INSULIN SYRINGE U-500) 31G X 6MM 0.5 ML Does not apply Misc Inject 1 each into the skin 3 (three) times daily with meals. 30 each 0    Insulin Pen Needle 31G X 5 MM Does not apply Misc Please use daily as directed 200 each 0    Insulin Syringe/Needle U-500 31G X 6MM 0.5 ML Does not apply Misc 3 each by Does not apply route 3 (three) times daily with meals. 300 each 0    Insulin Syringe/Needle U-500 31G X 6MM 0.5 ML Does not apply Misc 1 Syringe by Does not apply route 3 (three) times daily with meals. 300 each 0    Continuous Blood Gluc  (DEXCOM G6 ) Does not apply Device 1 each by Does not apply route continuous. 1 Device 0    Blood Glucose Monitoring Suppl (Shelli Moss) w/Device Does not apply Kit 1 Device by Does not apply route daily. 1 kit 0    OneTouch UltraSoft Lancets Does not apply Misc USE TO CHECK BLOOD SUGAR 3-4X/ each 1    Blood Glucose Monitoring Suppl (FREESTYLE LITE) Does not apply Device TEST QID  UTD  0    fluocinonide 0.05 % External Cream  (Patient not taking: Reported on 11/1/2023)      albuterol (VENTOLIN HFA) 108 (90 Base) MCG/ACT Inhalation Aero Soln Inhale 2 puffs into the lungs every 4 (four) hours as needed for Wheezing. (Patient not taking: Reported on 11/1/2023) 18 g 1         ALLERGY:  Allergies   Allergen Reactions    Caviar SWELLING     throat    Latex RASH       PAST MEDICAL, SOCIAL AND FAMILY HISTORY:  See past medical history marked as reviewed. See past surgical history marked as reviewed. See past family history marked as reviewed. See past social history marked as reviewed.     ASSESSMENTS:       REVIEW OF SYSTEMS:  Constitutional: Negative for: weight change, fever, fatigue, cold/heat intolerance  Eyes: Negative for:  Visual changes, proptosis, blurring  ENT: Negative for:  dysphagia, neck swelling, dysphonia  Respiratory: Negative for:  dyspnea, cough  Cardiovascular: Negative for:  chest pain, palpitations, orthopnea  GI: Negative for:  abdominal pain, nausea, vomiting, diarrhea, constipation, bleeding  Neurology: Negative for: headache, numbness, weakness  Genito-Urinary: Negative for: dysuria, frequency  Psychiatric: Negative for:  depression, anxiety  Hematology/Lymphatics: Negative for: bruising, lower extremity edema  Endocrine: Negative for: polyuria, polydypsia  Skin: Negative for: rash, blister, cellulitis,       PHYSICAL EXAM:    Vitals:    12/11/23 1015   BP: 131/70   Pulse: 92           General Appearance:  alert, well developed, in no acute distress  Nutritional:  no extreme weight gain or loss  Head: Atraumatic  Eyes:  normal conjunctivae, sclera. , normal sclera and normal pupils  Throat/Neck: normal sound to voice. Normal hearing, normal speech  Respiratory:  Speaking in full sentences, non-labored. no increased work of breathing, no audible wheezing    Skin:  normal moisture and skin texture, no visible lesions  Hair and nails: normal scalp hair  Hematologic:  no excessive bruising  Neuro: motor grossly intact, moving all extremities without difficulty  Psychiatric:  oriented to time, self, and place  Extremities: no obvious extremity swelling, no lesions      DATA:     Pertinent labs reviewed      ASSESSMENT AND PLAN:     1. Type 2 DM: a1c is 6.3% 12/2022; 7.5% 4/2023; 7.3% 8/2023; 6.8% POC today     Plan:  -Discussed the pathogenesis, natural course of diabetes.  Patient understands the importance of glycemic control and the implications of uncontrolled diabetes including Diabetic ketoacidosis and various micro vascular and macrovascular complications.  -Reviewed ABC's of diabetes  -Reviewed importance of SBGM- continue with Dexcom G7  -Reviewed glucose targets-  fasting and <180 post prandially  -Reviewed importance of following low CHO diet- recommend 135 grams of carbohydrate daily/ 45 grams per meal     Medications:  - Continue Insulin U 500: 175 units with breakfast, add 100 units with lunch, and then continue 155 units with dinner.     - Continue Trulicity - 1.1TO subcutaneous weekly. Reviewed side effects and risks vs benefits of medication.     -Continue  Jardiance 25 mg daily- reviewed side effects and risks vs benefits of medication. Reviewed importance of staying well hydrated on medication.     -She will reset her password for Dexcom clarity and attempt to log in to generate share code. Would like to review data given she is reporting fairly significant hyperglycemia at home despite HgA1c of 6.8% She will let me know if unable to access her dexcom clarity isabelle. SE and CI of all medications have been discussed in detail.     - Continue Dexcom G7    -No nephropathy- needs repeat labs- to be done today  - Instructed on importance of annual eye exams - UTD with optho   - abnormal foot exam - following with podiatry - saw within last thee months   -repeat labstoday    - Hypoglycemia symptoms and treatment of hypoglycemia with rule of 15's discussed    -normotensive  - Lipids normal 11/2022, repeat labs today       RTC in 4 months but call sooner if sugars continue to be <70 or persistently >250  MARCELA Rueda  12/13/2023  A total of  30 minutes was spent on obtaining history, reviewing pertinent imaging/labs and specialists notes, evaluating patient, providing multiple treatment options, reinforcing diet/exercise and compliance, and completing documentation.

## 2023-12-12 ENCOUNTER — PATIENT MESSAGE (OUTPATIENT)
Dept: ENDOCRINOLOGY CLINIC | Facility: CLINIC | Age: 63
End: 2023-12-12

## 2023-12-12 ENCOUNTER — TELEPHONE (OUTPATIENT)
Dept: INTERNAL MEDICINE CLINIC | Facility: CLINIC | Age: 63
End: 2023-12-12

## 2023-12-12 ENCOUNTER — MED REC SCAN ONLY (OUTPATIENT)
Dept: INTERNAL MEDICINE CLINIC | Facility: CLINIC | Age: 63
End: 2023-12-12

## 2023-12-14 ENCOUNTER — TELEPHONE (OUTPATIENT)
Dept: ENDOCRINOLOGY CLINIC | Facility: CLINIC | Age: 63
End: 2023-12-14

## 2023-12-14 DIAGNOSIS — Z79.4 TYPE 2 DIABETES MELLITUS WITH HYPERGLYCEMIA, WITH LONG-TERM CURRENT USE OF INSULIN (HCC): ICD-10-CM

## 2023-12-14 DIAGNOSIS — E11.65 TYPE 2 DIABETES MELLITUS WITH HYPERGLYCEMIA, WITH LONG-TERM CURRENT USE OF INSULIN (HCC): ICD-10-CM

## 2023-12-14 RX ORDER — BLOOD SUGAR DIAGNOSTIC
STRIP MISCELLANEOUS
Qty: 400 STRIP | Refills: 0 | Status: SHIPPED | OUTPATIENT
Start: 2023-12-14

## 2023-12-14 NOTE — TELEPHONE ENCOUNTER
Current Outpatient Medications   Medication Sig Dispense Refill    HUMULIN R U-500, CONCENTRATED, 500 UNIT/ML Subcutaneous Solution ADMINISTER 180-195 UNITS UNDER THE SKIN THREE TIMES DAILY BEFORE MEALS 110 mL 0

## 2023-12-27 RX ORDER — PROCHLORPERAZINE 25 MG/1
1 SUPPOSITORY RECTAL
Qty: 1 EACH | Refills: 1 | Status: SHIPPED | OUTPATIENT
Start: 2023-12-27

## 2023-12-27 NOTE — TELEPHONE ENCOUNTER
Please review; protocol failed/No Protocol  Requested Prescriptions   Pending Prescriptions Disp Refills    ALPRAZOLAM 0.5 MG Oral Tab [Pharmacy Med Name: ALPRAZOLAM 0.5MG TABLETS] 30 tablet 0     Sig: TAKE 1 TABLET BY MOUTH EVERY EVENING       There is no refill protocol information for this order        Future Appointments         Provider Department Appt Notes    In 1 week Codie Jiang MD [address], [address], [facility] wellness exam          Recent Outpatient Visits              2 weeks ago Uncontrolled type 2 diabetes mellitus with hyperglycemia [facility])    [address], [address], Amada Reyes APRN    Office Visit    3 weeks ago     [address] [facility] [address] Gabi Polanco    Office Visit    4 weeks ago     [address] [facility] [address] Emmett Pack, OT    Office Visit    1 month ago     [address] Emmett Pack, OT    Office Visit    1 month ago     [address] [facility] [address] Emmett Pack, OT    Office Visit

## 2023-12-28 RX ORDER — ALPRAZOLAM 0.5 MG/1
TABLET ORAL
Qty: 30 TABLET | Refills: 5 | Status: SHIPPED | OUTPATIENT
Start: 2023-12-28

## 2023-12-29 RX ORDER — TRETINOIN 0.2 MG/G
CREAM TOPICAL
Qty: 40 G | Refills: 3 | OUTPATIENT
Start: 2023-12-29

## 2024-01-04 NOTE — ED INITIAL ASSESSMENT (HPI)
LANGUAGE SLP FLOW SHEET    SKILL AREA CURRENT SESSION   SPEECH THERAPY: LANGUAGE  CPT 49008    MOTOR SPEECH:  Provided Auditory bombardment, picture stimulus, direct visual/aud model, pt will produce target words with /p,b,m,w,t,d,n,s,l/ in initial position on first trial, 80% of presentations.  12/28/2023  CVC- repeated consonant   fading cues from auditory bombardment, direct model, picture cue  /b/- Mod A  /m/- Mod A  /p/- Mod A    12/19/2023  Aud bombardment CVC /d/  9/10 max A    12/14/2023  CVC  /b/ 10/11  /p/ 10/10  /m/ 8/9 12/7/2023  Practiced target sounds in numbers and letters.   Min cues for /p, b, m, w, s/  Mod A for /t, d, n, l/    11/30/2023  Auditory bombardment, picture, visual stimulus  /p,b/ CVC and CV words. 20 words with cues above.  Pt benefited from anticipation of looking at therapist face.      11/28/2023  Auditory bombardment, picture, visual stimulus  /p,b/ CVC and CV words. 20 words with cues above.    Practiced letter names: a,c, k, t, p,  d, ,m, j, u      Fading cues from above goal: Pt will produce CVCV, VCV and CVC syllables 80% of presentations.  1/4/2024  Writing  3-5 letter words: letter fill in.  - become frustrated     Comprehension  Match oral word to written word F6: (body parts and clothes)- 66%  Match word to photograph F4: (animals, places, people) 9/10    Read word to photograph F4: 9/10     Reading  Read phrase F4 to photo: 9/10   Phrase completion: F4: 7/10         1/2/2024  Provided 3 letter words with letter fillins, improved naming of bodyparts and clothing items. ([employer] Language ted- 3-4 letter words)    12/7/2023  Naming ted 18/20 word approximations provided clinician model.    11/30/2023  Practiced /M,b,p/ in CV and CVC single syllables. Pt continues to benefit from a prep/anticipatory set for initial phoneme placement prior to the direct model.    11/28/2023      SPOKEN LANG EXP  Given simple picture cue pt will produce simple sentences of 3-4 words with  Patient sent from [address] [place] [address] for left left swelling, r/o dvt since 5/3. simplified grammatical form and approximated content words to convey meaning, Min A, 80% of presentations.    2024  Advanced Language (comp) build  Difficulty with L5-6, +1 extra word- 40%  L5: Nouns, non-reversible: 2/5, 40%  L6: 1 pronoun, 1 nouns, non reversible : 2/5, 40%-Additional 3 with hint    2023  2-5 words unscramble to photograph.  90%:  1 noun, simple sentence 2/2  1 pronoun, simple sentence 2/2  1 noun present progressive 1/2  1 pronoun, present progressive 2/2  2 nouns, non-reversible 2/2    2023  Pt visually unscrambled 3 words to describe a picture on Advanced Comprehension kristel build- Noun- Simple Sentences.   Pt will produce simple automatics provided mod A:  1-20  A-J  MALLY 2023  Unison: 1-10,   Fading unison: 1-10  Unison: 1-14   Unison: A-J, cues to look at therapist's lips   MALLY: one at a time with direct model    2023  Apraxia Kristel  1-10:8/10  MALLY:  0/, improved with therapist isolating and repeating   A-J: 7/10 Mod to max A.    2023  Apraxia Kristel:  1-10: Improving  MALLY: improving  A-Z: improving with use of words at end of ApraxiaApp       Apraxia kristel  1-10:  1-5  MALLY: very difficult      Pt will produce simple social responses, Mod A:  Name     Address (parts) 2023  Confrontational questions:pt produced-full name, (S),  Mod-Max A, Address Mod to Max A.     2023  Confrontational questions:pt produced-full name, (S),  Mod A     2023  Flashcards:   Pt says full name,  address, Mod to max A    2023  Pt produced first and last name,  Cues to produce Month, date, and year of , and address.      2023  Flash cards- Pt    SPOKEN LANG COMP  The pt will demonstrate understanding of 2 noun in simple and present and progressive reversible sentences F4, 80% accy.   (Advanced language comprehension L1-2 Identify)     2023  L6-7, F4 9/10  2 nouns, non-reversible passive 5/5  3 nouns non-reversible 4/5  L7-8, F4  3  "nouns non-reversible 5/5  3 nouns reversible 5/5     2023  L 5-7: 8/10, L6-7:  2 nouns, non-reversible passive /  2 nouns reversible passive  2/3, 2/5, 8/10  3 nouns non-reversible 2/3, 4/5    2023  F4  L1-4: 8/10 (quick)  L5: 9/10 2 nouns, non-reversible passive  L6: 7/10 2 nouns reversible passive  L7: 9/10, 3 nouns non-reversible    2023 Field of 2- next session increase to F4  L1: 10/10 (quick)   L2: 10/10 (quick)   L3: 10/10   L4: 10/10   L1-4: 10/10     2023  L1: 7/10, 10/10  L1-2: 9/10  L1-3: 7/10    2023  L1-4, 33%, 33%, 100%, 50%  L1-2: 80%, 100%   L1: 90%    Provided body parts (on paper), single letters, digits in 10's/100's place value, pt will demonstrate  understanding of \"point to/touch/pick up___\" commands 85% of trials. 2024  3-4 letter words focused around body parts and letters.  Pt able to add single phoneme and name word with phonemic errors.      Body parts: f6: 90% accy.    2023  [employer] Language Kristel  Body parts F6: 9/10    2023  [employer] Language Kristel  Body parts F6: 9/10  10 place value numbers 7/, F10.      2023  [employer] Language Kristel  Body parts F4: 9/10    2023  Letter ID: F4 7/10    2023  Letter ID: F4 5/10     Education/Strategy Training 2023  Pt expectation was discussed this date that pt is expected to look at therapist mouth and produce vocal attempt.     Other 2023  Language kristel- reading phrase completion F4: 8/10    2023  F4 Matching 2 word phrases to single picture: 8/10  Phrase completion: F2- 9/10    2023  Pt reports losing weight because unable to cook and read cook books,  Not enjoying food people bring.    2023  Pt was able to share that Thelma's dog .       Judie Sams    6-2-3 [facility].       "

## 2024-01-09 ENCOUNTER — OFFICE VISIT (OUTPATIENT)
Dept: INTERNAL MEDICINE CLINIC | Facility: CLINIC | Age: 64
End: 2024-01-09

## 2024-01-09 VITALS
WEIGHT: 108 LBS | HEART RATE: 100 BPM | SYSTOLIC BLOOD PRESSURE: 130 MMHG | HEIGHT: 58 IN | DIASTOLIC BLOOD PRESSURE: 56 MMHG | TEMPERATURE: 99 F | BODY MASS INDEX: 22.67 KG/M2

## 2024-01-09 DIAGNOSIS — Z12.11 COLON CANCER SCREENING: ICD-10-CM

## 2024-01-09 DIAGNOSIS — Z12.31 VISIT FOR SCREENING MAMMOGRAM: ICD-10-CM

## 2024-01-09 DIAGNOSIS — E78.5 DYSLIPIDEMIA: Primary | ICD-10-CM

## 2024-01-09 DIAGNOSIS — Z00.00 ENCOUNTER FOR ANNUAL WELLNESS EXAM IN MEDICARE PATIENT: ICD-10-CM

## 2024-01-09 DIAGNOSIS — Z79.4 TYPE 2 DIABETES MELLITUS WITH OTHER OPHTHALMIC COMPLICATION, WITH LONG-TERM CURRENT USE OF INSULIN (HCC): ICD-10-CM

## 2024-01-09 DIAGNOSIS — E11.39 TYPE 2 DIABETES MELLITUS WITH OTHER OPHTHALMIC COMPLICATION, WITH LONG-TERM CURRENT USE OF INSULIN (HCC): ICD-10-CM

## 2024-01-09 PROBLEM — Z78.0 MENOPAUSE: Status: RESOLVED | Noted: 2022-10-28 | Resolved: 2024-01-09

## 2024-01-09 PROBLEM — Z01.818 PREOPERATIVE CLEARANCE: Status: RESOLVED | Noted: 2022-11-21 | Resolved: 2024-01-09

## 2024-01-09 PROBLEM — E11.9 DIABETES MELLITUS, STABLE (HCC): Status: ACTIVE | Noted: 2024-01-09

## 2024-01-09 PROBLEM — M25.512 ACUTE PAIN OF LEFT SHOULDER: Status: RESOLVED | Noted: 2023-08-08 | Resolved: 2024-01-09

## 2024-01-09 PROBLEM — E11.9 DIABETES MELLITUS (HCC): Status: RESOLVED | Noted: 2018-02-02 | Resolved: 2024-01-09

## 2024-01-09 PROBLEM — J40 BRONCHITIS: Status: RESOLVED | Noted: 2022-11-14 | Resolved: 2024-01-09

## 2024-01-09 PROBLEM — J01.00 SUBACUTE MAXILLARY SINUSITIS: Status: RESOLVED | Noted: 2023-03-16 | Resolved: 2024-01-09

## 2024-01-09 PROBLEM — J98.01 COUGH DUE TO BRONCHOSPASM: Status: RESOLVED | Noted: 2023-03-16 | Resolved: 2024-01-09

## 2024-01-09 PROBLEM — I73.9 PVD (PERIPHERAL VASCULAR DISEASE): Status: RESOLVED | Noted: 2023-08-08 | Resolved: 2024-01-09

## 2024-01-09 PROBLEM — J44.9 CHRONIC OBSTRUCTIVE PULMONARY DISEASE, UNSPECIFIED (HCC): Status: RESOLVED | Noted: 2022-11-14 | Resolved: 2024-01-09

## 2024-01-09 PROBLEM — J18.9 PNEUMONIA OF LEFT LOWER LOBE DUE TO INFECTIOUS ORGANISM: Status: RESOLVED | Noted: 2023-04-11 | Resolved: 2024-01-09

## 2024-01-09 PROBLEM — I73.9 PVD (PERIPHERAL VASCULAR DISEASE) (HCC): Status: RESOLVED | Noted: 2023-08-08 | Resolved: 2024-01-09

## 2024-01-09 PROBLEM — E11.9 DIABETES MELLITUS, STABLE (HCC): Status: RESOLVED | Noted: 2024-01-09 | Resolved: 2024-01-09

## 2024-01-09 PROBLEM — D24.2 INTRADUCTAL PAPILLOMA OF LEFT BREAST: Status: RESOLVED | Noted: 2022-11-21 | Resolved: 2024-01-09

## 2024-01-09 PROBLEM — Z01.810 PREOPERATIVE CARDIOVASCULAR EXAMINATION: Status: RESOLVED | Noted: 2022-11-21 | Resolved: 2024-01-09

## 2024-01-09 PROBLEM — M54.2 NECK PAIN ON LEFT SIDE: Status: RESOLVED | Noted: 2023-08-08 | Resolved: 2024-01-09

## 2024-01-09 PROCEDURE — G0438 PPPS, INITIAL VISIT: HCPCS | Performed by: INTERNAL MEDICINE

## 2024-01-09 NOTE — H&P
HPI:   Annamaria Dominguez is a 63 year old female who presents for a Medicare Annual Wellness visit.    Patient Active Problem List   Diagnosis    Dyslipidemia    Colon cancer screening    Type 2 diabetes mellitus with other ophthalmic complication, with long-term current use of insulin ([facility])    Encounter for annual wellness exam in Medicare patient       General Health     In the past six months, have you lost more than 10 pounds without trying?: 2 - No    Has your appetite been poor?: No    Type of Diet: Balanced    How does the patient maintain a good energy level?: Other    How would you describe your daily physical activity?: Light    How would you describe your current health state?: Good    How do you maintain positive mental well-being?: Visiting Family         Have you had any immunizations at another office such as Influenza, Hepatitis B, Tetanus, or Pneumococcal?: Yes     Functional Ability     Bathing or Showering: Able without help    Toileting: Able without help    Dressing: Able without help    Eating: Able without help    Driving: Able without help    Preparing your meals: Able without help    Managing money/bills: Able without help    Taking medications as prescribed: Able without help    Are you able to afford your medications?: Yes    Hearing Problems?: Yes     Functional Status     Hearing Problems?: Yes    Vision Problems? : Yes    Difficulty walking?: Yes    Difficulty dressing or bathing?: No    Problems with daily activities? : No    Memory Problems?: No      Fall/Risk Assessment                                                              Depression Screening (PHQ-2/PHQ-9): Over the LAST 2 WEEKS                 1. Little interest or pleasure in doing things: Several days  2. Feeling down, depressed, or hopeless: Several days  3.    4.    5.    6.    7.    8.    9.              Advance Directives     Do you have a healthcare power of ?: No    Do you have a living will?: Yes   Was  Medicare Assessment Questionnaire completed by patient and sent to HIM for scanning?Yes    Please go to \"Cognitive Assessment\" under Medicare Assessment section in Charting, test patient and document.    Then, refresh your progress note to see your input here.  Cognitive Assessment     What day of the week is this?: Correct    What month is it?: Correct    What year is it?: Correct    Recall \"Ball\": Correct    Recall \"Flag\": Correct    Recall \"Tree\": Correct      ALLERGIES:     Allergies   Allergen Reactions    Caviar SWELLING     throat    Latex RASH       CURRENT MEDICATIONS:   Current Outpatient Medications   Medication Sig Dispense Refill    ALPRAZolam 0.5 MG Oral Tab TAKE 1 TABLET BY MOUTH EVERY EVENING 30 tablet 5    HUMULIN R U-500, CONCENTRATED, 500 UNIT/ML Subcutaneous Solution ADMINISTER 180-195 UNITS UNDER THE SKIN THREE TIMES DAILY BEFORE MEALS 110 mL 1    venlafaxine ER 75 MG Oral Capsule SR 24 Hr Take 1 capsule (75 mg total) by mouth daily. 90 capsule 1    HYDROcodone-acetaminophen (NORCO) 5-325 MG Oral Tab Take 1 tablet by mouth every 8 (eight) hours as needed for Pain. 45 tablet 0    Dulaglutide (TRULICITY) 4.5 MG/0.5ML Subcutaneous Solution Pen-injector Inject 4.5 mg into the skin once a week. 6 mL 1    Empagliflozin (JARDIANCE) 25 MG Oral Tab Take 1 tablet by mouth daily. 90 tablet 1    pantoprazole 40 MG Oral Tab EC Take 1 tablet (40 mg total) by mouth every morning. 90 tablet 3    fluticasone-salmeterol (ADVAIR DISKUS) 250-50 MCG/ACT Inhalation Aerosol Powder, Breath Activated Inhale 1 puff into the lungs every 12 (twelve) hours. 60 each 5    dicyclomine 10 MG Oral Cap Take 1 capsule (10 mg total) by mouth 4 (four) times daily. 60 capsule 5    Continuous Blood Gluc Transmit (DEXCOM G6 TRANSMITTER) Does not apply Misc 1 Device every 3 (three) months. 1 each 1    Continuous Blood Gluc Sensor (DEXCOM G6 SENSOR) Does not apply Misc CHANGE SENSOR EVERY 10 DAYS 3 each 3    Glucose Blood (ONETOUCH  VERIO) In Vitro Strip USE TO CHECK BLOOD SUGAR 3 TO 4 TIMES DAILY 400 strip 0    Continuous Blood Gluc Sensor (DEXCOM G6 SENSOR) Does not apply Misc USE AND CHANGE EVERY 10 DAYS 9 each 1    losartan 25 MG Oral Tab Take 1 tablet (25 mg total) by mouth daily. 90 tablet 3    fluocinonide 0.05 % External Cream  (Patient not taking: Reported on 11/1/2023)      Continuous Blood Gluc  (DEXCOM G7 ) Does not apply Device 1 each daily. 1 each 0    Continuous Blood Gluc Sensor (DEXCOM G6 SENSOR) Does not apply Misc USE AS DIRECTED AND CHANGE EVERY 10 DAYS 9 each 1    albuterol (VENTOLIN HFA) 108 (90 Base) MCG/ACT Inhalation Aero Soln Inhale 2 puffs into the lungs every 4 (four) hours as needed for Wheezing. (Patient not taking: Reported on 11/1/2023) 18 g 1    TRUEPLUS PEN NEEDLES 32G X 4 MM Does not apply Misc USE AS DIRECTED 100 each 0    ONETOUCH DELICA PLUS [id number] Does not apply Misc TEST FOUR TIMES DAILY 400 each 0    Continuous Blood Gluc Transmit (DEXCOM G6 TRANSMITTER) Does not apply Misc Replace transmitter every 3 months 1 each 0    Continuous Blood Gluc  (DEXCOM G6 ) Does not apply Device Use  daily to check blood sugars. 1 each 0    TRUEPLUS 5-BEVEL PEN NEEDLES 31G X 5 MM Does not apply Misc USE AS DIRECTED 6 TIMES DAILY 200 each 0    Insulin Syringe/Needle U-500 (BD INSULIN SYRINGE U-500) 31G X 6MM 0.5 ML Does not apply Misc Inject 1 each into the skin 3 (three) times daily with meals. 30 each 0    Insulin Pen Needle 31G X 5 MM Does not apply Misc Please use daily as directed 200 each 0    Insulin Syringe/Needle U-500 31G X 6MM 0.5 ML Does not apply Misc 3 each by Does not apply route 3 (three) times daily with meals. 300 each 0    Insulin Syringe/Needle U-500 31G X 6MM 0.5 ML Does not apply Misc 1 Syringe by Does not apply route 3 (three) times daily with meals. 300 each 0    Continuous Blood Gluc  (DEXCOM G6 ) Does not apply Device 1 each by Does not  apply route continuous. 1 Device 0    Blood Glucose Monitoring Suppl (ONETOUCH VERIO) w/Device Does not apply Kit 1 Device by Does not apply route daily. 1 kit 0    OneTouch UltraSoft Lancets Does not apply Misc USE TO CHECK BLOOD SUGAR 3-4X/ each 1    Blood Glucose Monitoring Suppl (FREESTYLE LITE) Does not apply Device TEST QID  UTD  0      MEDICAL INFORMATION:   Past Medical History:   Diagnosis Date    Anxiety state     Carpal tunnel syndrome, left 2019    Diabetes (HCC) 1990    Type 1    History of blood transfusion     [facility]    Neuropathy     BOTH LEGS AND FEET    Sepsis (HCC)     Visual impairment     eyeglasses      Past Surgical History:   Procedure Laterality Date    ABDOMINOPLASTY      Tummy Tuck with Lipo Suction          x 2    COLONOSCOPY      COLONOSCOPY      no polyps. has diverticuli. 2016, 10 year follow up    HYSTERECTOMY  2010    laparoscopic total hysterectomy and \"removed 1 of tubes/ovaries\" for abnormal bleeding    NEEDLE BIOPSY LEFT Left 2022    Left US CNB    WRIST ARTHROSCOP,RELEASE XVERS LIG Left 10/01/2019    Left endoscopic carpal tunnel release      Family History   Problem Relation Age of Onset    Diabetes Father 55        Type 1/Type 2    Heart Disorder Father     Diabetes Brother     Heart Disorder Brother     Hypertension Brother       SOCIAL HISTORY:   Social History     Socioeconomic History    Marital status:    Occupational History    Occupation: Retired   Tobacco Use    Smoking status: Former     Packs/day: 0     Types: Cigarettes     Quit date: 2017     Years since quittin.4    Smokeless tobacco: Never   Vaping Use    Vaping Use: Never used   Substance and Sexual Activity    Alcohol use: No     Alcohol/week: 0.0 standard drinks of alcohol    Drug use: Not Currently     Frequency: 7.0 times per week     Types: Cannabis     Comment: daily at night    Sexual activity: Not Currently     Partners: Male   Other Topics  Concern    Blood Transfusions Yes     Comment: 1990 and 2019    Caffeine Concern Yes     Comment: coffee, tea, 32 oz daily    Exercise No   Social History Narrative    Live with      The patient does not use an assistive device..      The patient does live in a home with stairs.     Occ: retired : yes      REVIEW OF SYSTEMS:   GENERAL: feels well otherwise  SKIN: denies any unusual skin lesions  EYES: denies blurred vision or double vision  HEENT: denies nasal congestion, sinus pain or ST  LUNGS: denies shortness of breath with exertion  CARDIOVASCULAR: denies chest pain on exertion  GI: denies abdominal pain, denies heartburn  : denies dysuria, vaginal discharge or itching, no complaint of urinary incontinence   MUSCULOSKELETAL: denies back pain  NEURO: denies headaches  PSYCHE: denies depression or anxiety  HEMATOLOGIC: denies hx of anemia  ENDOCRINE: denies thyroid history  ALL/ASTHMA: denies hx of allergy or asthma    EXAM:   /56   Pulse 100   Temp 98.6 °F (37 °C)   Ht 4' 10\" (1.473 m)   Wt 108 lb (49 kg)   BMI 22.57 kg/m²      >   Wt Readings from Last 6 Encounters:   01/09/24 108 lb (49 kg)   12/11/23 106 lb (48.1 kg)   11/01/23 109 lb (49.4 kg)   08/21/23 114 lb (51.7 kg)   08/17/23 115 lb (52.2 kg)   08/08/23 115 lb (52.2 kg)       >   BP Readings from Last 3 Encounters:   01/09/24 130/56   12/11/23 131/70   11/01/23 129/74       GENERAL: well developed, well nourished, in no apparent distress  SKIN: no rashes, no suspicious lesions  HEENT: atraumatic, normocephalic, ears and throat are clear     EYES: PERRLA, EOMI, conjunctiva are clear  Right Eye Visual Acuity: Corrected Left Eye Visual Acuity: Corrected   Right Eye Chart Acuity: 20/100 Left Eye Chart Acuity: 20/100   NECK: supple, no adenopathy, no bruits  CHEST: no chest tenderness  BREAST:  LUNGS: clear to auscultation  CARDIO: RRR without murmur  GI: good BS's, no masses, HSM or tenderness  : deferred  RECTAL:  deferred  MUSCULOSKELETAL: back is not tender, FROM of the back  EXTREMITIES: no cyanosis, clubbing or edema.  Diabetic Foot Exam:  No skin breakdown, acute deformity, history of amputation, dystrophic nails or dry skin.  NEURO: Oriented times three, cranial nerves are intact, motor and sensory are grossly intact    ASSESSMENT AND OTHER RELEVANT CHRONIC CONDITIONS:   Annamaria Dominguez is a 63 year old female who presents for a Medicare Assessment.     PLAN SUMMARY:    Colon cancer screening  C scope due in Nov.     Dyslipidemia  Lipids controlled , labs a month ago .     Encounter for annual wellness exam in Medicare patient  Physical   Labs soon   Mammo  C scope in Nov.   Sees ophtho.     Type 2 diabetes mellitus with other ophthalmic complication, with long-term current use of insulin (HCC)  Sees endo and ophtho, labs done with endo a month ago .       The patient indicates understanding of these issues and agrees to the plan.  The patient is asked to return in 3 months for follow up.       PREVENTATIVE SERVICES  INDICATIONS AND SCHEDULE Internal Lab or Procedure External Lab or Procedure   Diabetes Screening      HbgA1C   Annually HEMOGLOBIN A1C (%)   Date Value   12/11/2023 6.8 (A)         No data to display                Fasting Blood Sugar (FSB)Annually No results found for: \"GLUCOSE\"    Cardiovascular Disease Screening     LDL Annually LDL Cholesterol (mg/dL)   Date Value   12/11/2023 74        EKG One Time done    Colorectal Cancer Screening      Colonoscopy Screen every 10 years Health Maintenance   Topic Date Due    Colorectal Cancer Screening  11/09/2024    Update Health Maintenance if applicable    Flex Sigmoidoscopy Screen every 10 years No results found for this or any previous visit.      No data to display                 Fecal Occult Blood Annually No results found for: \"FOB\"      No data to display                Glaucoma Screening      Ophthalmology Visit Annually done    Bone Density Screening       Dexascan Every two years Last Dexa Scan:    XR DEXA BONE DENSITOMETRY (CPT=77080) 08/04/2023        No data to display                Pap and Pelvic      Pap  Annually if high risk No recommendations at this time Update Health Maintenance if applicable    Pap  Every two years No recommendations at this time Update Health Maintenance if applicable    Chlamydia  Annually if high risk No results found for: \"CHLAMYDIA\"      No data to display                Screening Mammogram      Mammogram  Annually Health Maintenance   Topic Date Due    Mammogram  09/01/2023    Update Health Maintenance if applicable   Immunizations      Influenza No orders found for this or any previous visit. Update Immunization Activity if applicable    Pneumococcal Orders placed or performed in visit on 10/22/19    PNEUMOCOCCAL IMM, 23   Orders placed or performed in visit on 10/10/18    PNEUMOCOCCAL VACC, 13 VIOLA IM    Update Immunization Activity if applicable    Hepatitis B No orders found for this or any previous visit. Update Immunization Activity if applicable    Tetanus Orders placed or performed in visit on 10/28/22    TETANUS, DIPHTHERIA TOXOIDS AND ACELLULAR PERTUSIS VACCINE (TDAP), >7 YEARS, IM USE    Update Immunization Activity if applicable        SPECIFIC DISEASE MONITORING Internal Lab or Procedure External Lab or Procedure   Annual Monitoring of Persistent     Medications (ACE/ARB, digoxin diuretics, anticonvulsants.)    Potassium  Annually Potassium (mmol/L)   Date Value   11/21/2022 4.1         No data to display                Creatinine  Annually Creatinine (mg/dL)   Date Value   11/21/2022 0.83         No data to display                BUN  Annually BUN (mg/dL)   Date Value   11/21/2022 14         No data to display                 Drug Serum Conc  Annually No results found for: \"DIGOXIN\", \"DIG\", \"VALP\"      No data to display                Diabetes      HgbA1C  Annually HEMOGLOBIN A1C (%)   Date Value   12/11/2023 6.8 (A)          No data to display                Creat/alb ratio  Annually      LDL  Annually LDL Cholesterol (mg/dL)   Date Value   12/11/2023 74         No data to display                 Dilated Eye exam  Annually     12/27/2023     1:20 PM   Data entered on:   Last Dilated Eye Exam 12/6/2023          No data to display                COPD      Spirometry Testing Annually No results found for this or any previous visit.      No data to display                  SCREENING SCHEDULE - FEMALE      SCREEN COVERAGE SCHEDULE  (If Indicated) LAST DONE   Dexa If at Risk q2 years done   Lipids All Patients q5 years LDL Cholesterol (mg/dL)   Date Value   12/11/2023 74      Colonoscopy All Patients q10 years Health Maintenance   Topic Date Due    Colorectal Cancer Screening  11/09/2024      FBS All Patients q1 year No results found for: \"GLUCOSE\"   Glaucoma If at high risk q1 year done   Pap If at high risk q1 year No recommendations at this time   Pap All Patients q2 year if indicated No recommendations at this time   Mammogram Age > 39 q1 year Health Maintenance   Topic Date Due    Mammogram  09/01/2023      EKG All Patients One at initial exam done   Vaccines:      Pneumococcal All Patients Once per lifetime Orders placed or performed in visit on 10/22/19    PNEUMOCOCCAL IMM, 23   Orders placed or performed in visit on 10/10/18    PNEUMOCOCCAL VACC, 13 VIOLA IM      Influenza All Patients Annually No orders found for this or any previous visit.   Hepatitis B If at Risk 3 scheduled doses No orders found for this or any previous visit.         SUGGESTED VACCINATIONS - Influenza, Pneumococcal, Zoster, Tetanus     Immunization History   Administered Date(s) Administered    Covid-19 Vaccine Pfizer 30 mcg/0.3 ml 03/22/2021, 04/12/2021, 10/20/2021    Covid-19 Vaccine Pfizer Bivalent 30mcg/0.3mL 09/28/2022    FLULAVAL 6 months & older 0.5 ml Prefilled syringe (96035) 10/10/2018, 10/22/2019, 10/02/2020    FLUZONE 6 months and older PFS 0.5  ml (79369) 12/11/2015, 10/10/2018, 10/20/2021, 10/06/2023    Fluvirin, 3 Years & >, Im 01/09/2014    Influenza 12/01/2013, 10/12/2022    Pfizer Covid-19 Vaccine 30mcg/0.3ml 12yrs+ ([id number]) 10/06/2023    Pneumococcal (Prevnar 13) 10/10/2018    Pneumococcal Conjugate PCV20 10/12/2022    Pneumovax 23 02/19/2014, 10/22/2019    TDAP 10/28/2022    Tb Intradermal Test 10/22/2019    Zoster Vaccine Live (Zostavax) 01/01/2013    Zoster Vaccine Recombinant Adjuvanted (Shingrix) 09/28/2022, 12/03/2022

## 2024-01-15 DIAGNOSIS — E11.39 TYPE 2 DIABETES MELLITUS WITH OTHER OPHTHALMIC COMPLICATION, WITH LONG-TERM CURRENT USE OF INSULIN (HCC): ICD-10-CM

## 2024-01-15 DIAGNOSIS — Z79.4 TYPE 2 DIABETES MELLITUS WITH OTHER OPHTHALMIC COMPLICATION, WITH LONG-TERM CURRENT USE OF INSULIN (HCC): ICD-10-CM

## 2024-01-15 NOTE — TELEPHONE ENCOUNTER
Insulin Syringe/Needle U-500 (BD INSULIN SYRINGE U-500) 31G X 6MM 0.5 ML Does not apply Misc, Inject 1 each into the skin 3 (three) times daily with meals., Disp: 30 each, Rfl: 0

## 2024-01-16 RX ORDER — SYRINGE,INSUL U-500,NDL,0.5ML 31GX15/64"
1 SYRINGE, EMPTY DISPOSABLE MISCELLANEOUS
Qty: 100 EACH | Refills: 2 | Status: SHIPPED | OUTPATIENT
Start: 2024-01-16

## 2024-01-18 ENCOUNTER — TELEPHONE (OUTPATIENT)
Dept: ENDOCRINOLOGY CLINIC | Facility: CLINIC | Age: 64
End: 2024-01-18

## 2024-01-18 NOTE — TELEPHONE ENCOUNTER
Received DWO form from [employer], filled out and placed in providers folder for signature.

## 2024-01-19 ENCOUNTER — NURSE ONLY (OUTPATIENT)
Facility: CLINIC | Age: 64
End: 2024-01-19

## 2024-01-19 DIAGNOSIS — Z12.11 SCREENING FOR COLON CANCER: Primary | ICD-10-CM

## 2024-01-19 NOTE — PROGRESS NOTES
GI Staff:  [facility] Colon Screening Orders    Please schedule: Colonoscopy [id number] / [id number] with MAC OR IV (if appropriate)    Please send split dose Golytely bowel prep     Diagnosis: Colon Screening Z12.11 /     Medication adjustments: JARDIANCE, TRULICITY, HUMULIN  Need to contact Dr. Joe for insulin orders.    >>>Please inform patient if new medications are started after scheduling procedure they need to call clinic to notify us.

## 2024-01-19 NOTE — PROGRESS NOTES
Called patient for scheduled telephone colon screening  Medications, pharmacy, and allergies verified with the patient.     Age 45-64 y/o (Y/N):   66-74 y/o ? Route to GI provider per rotation schedule   › GI MD preference:   › Insurance:  MEDICARE  › Last PCP Visit: 1/9/2024  › Last CBC drawn: 8/24/2024  › H/W/BMI: 4'10\" / 108lbs / 22.58    Special comments/notes:  Trulicity, Jardiance, Humulin (Dr. Joe)  First CLN: 2016 - no polyps    Telephone colon screening Questionnaires:  Yes No   Are you currently experiencing any new GI symptoms? [] [x]   If yes, symptom details:     Rectal Bleeding with or without bowel movements: [] [x]   Black stool: [] [x]   Dysphagia &Food feeling/getting stuck: [] [x]   Intractable Vomiting: [] [x]   Unexplained weight loss: [] [x]   First colonoscopy? [] [x]   Family history of colon cancer? [] [x]   Any issues with anesthesia? [] [x]   If yes, explain details:      Personal history of Resp. Issues/Oxygen Use/JOSE/COPD? [] [x]   If yes, CPAP/BiPAP? [] [x]   History of devices Pacemaker/Defibrillator/Stents? [] [x]   History of Cardiac/CVA issues/(MI/Stroke):  [] [x]     Medication usage:  Yes  No   Anticoagulants:  Anticoagulant (Except Aspirin) ? Route to RN staff to obtain ordering provider orders [] [x]   Diabetic Meds:   PO DM Meds ? Hold day prior and day of procedure  Insulin ? Route to RN clinical staff to obtain provider orders  [x] []   Weight loss meds (phentermine/Vyvanse/Saxsenda): [] [x]   Iron/Herbal/Multivitamin Supplement (RX/OTC):  Calcium supplement [x] []   Usage of marijuana, CBD &/or vape products: [] [x]

## 2024-01-25 NOTE — PROGRESS NOTES
Scheduled for:  Colonoscopy 53177  Provider Name: Dr Dozier  Date:  4/17/2024  Location:  [facility]  Sedation:  MAC  Time:  9:30 am. (pt is aware to arrive at 8:30 am)  Prep:  Golytely. Bowel prep was sent to pharmacy on file.  Meds/Allergies Reconciled?:  yes  Diagnosis with codes:    CRC screening Z12.11  Was patient informed to call insurance with codes (Y/N):  Yes  Referral sent?:  Referral was sent at the time of electronic surgical scheduling.  EM or [facility] notified?:  I sent an electronic request to Endo Scheduling and received a confirmation today.  Medication Orders:  Pt is aware to NOT take iron pills, herbal meds and diet supplements for 7 days before exam.   Patient is aware to hold:  Trulicity 7 days prior to the procedure  Jardiance 4 days prior to the procedure  Humulin will get dosing instructions from PCP.  Also to NOT take any form of alcohol, recreational drugs and any forms of ED meds 24 hours before exam.   Misc Orders:  N/A   Further instructions given by staff:  I discussed the prep instructions with the patient which she verbally understood. Patient is aware I will be sending prep instructions via My Chart.

## 2024-02-05 RX ORDER — DULAGLUTIDE 4.5 MG/.5ML
4.5 INJECTION, SOLUTION SUBCUTANEOUS WEEKLY
Qty: 6 ML | Refills: 1 | Status: SHIPPED | OUTPATIENT
Start: 2024-02-05

## 2024-02-05 NOTE — TELEPHONE ENCOUNTER
LOV: 12/11/23    RTC:  4 Months    FU: No FU Appt Scheduled    Last Refill: 8/21/23    MCM sent to patient to schedule a follow up appointment in April 2024.

## 2024-02-06 ENCOUNTER — PATIENT MESSAGE (OUTPATIENT)
Dept: ENDOCRINOLOGY CLINIC | Facility: CLINIC | Age: 64
End: 2024-02-06

## 2024-02-06 RX ORDER — DULAGLUTIDE 4.5 MG/.5ML
4.5 INJECTION, SOLUTION SUBCUTANEOUS WEEKLY
Qty: 6 ML | Refills: 1 | Status: CANCELLED | OUTPATIENT
Start: 2024-02-06

## 2024-02-07 ENCOUNTER — TELEPHONE (OUTPATIENT)
Dept: ENDOCRINOLOGY CLINIC | Facility: CLINIC | Age: 64
End: 2024-02-07

## 2024-02-07 NOTE — TELEPHONE ENCOUNTER
From: Mervin Dominguez  Sent: 2/6/2024 8:42 PM CST  To: Mira Bryant Clinical Staff  Subject: Trulicity    The pharmacy said they did not get a pre authorization from the insurance. The insurance is holding it up for some reason. Can you help me with this. I don’t know what to do. I never had a problem before. Thank you please let me know it’s been almost a week that I can’t get the script. Thanks mervin

## 2024-02-07 NOTE — TELEPHONE ENCOUNTER
Medication PA Requested:   (TRULICITY) 4.5 MG/0.5ML Subcutaneous Solution Pen-injector                                                        Quantity: 6mL  Day Supply: 90  Sig: Inject 4.5 mg into the skin once a week.   DX Code:       E11.65

## 2024-02-08 ENCOUNTER — PATIENT MESSAGE (OUTPATIENT)
Dept: ENDOCRINOLOGY CLINIC | Facility: CLINIC | Age: 64
End: 2024-02-08

## 2024-02-08 NOTE — TELEPHONE ENCOUNTER
Medication PA Requested:   (TRULICITY) 4.5 MG/0.5ML Subcutaneous Solution Pen-injector     CoverMyMeds: Yes  Key: [id number]                                                   Quantity: 6mL  Day Supply: 90  Sig: Inject 4.5 mg into the skin once a week.   DX Code:       E11.65           CMM submitted, LOV 12/11 and A1C 12/11  Awaiting determination

## 2024-02-09 NOTE — TELEPHONE ENCOUNTER
Received fax from [employer] in regards to Trulicity 4.5MG/0.5Ml. Medication has been approved from 1/25/2024 until further notice. [employer] message sent to pt and approval letter sent to scanning.   Approval # - [id number]

## 2024-02-10 ENCOUNTER — TELEPHONE (OUTPATIENT)
Dept: ENDOCRINOLOGY CLINIC | Facility: CLINIC | Age: 64
End: 2024-02-10

## 2024-02-10 NOTE — TELEPHONE ENCOUNTER
From: Annamaria Benz  To: Anna Rainey  Sent: 2/8/2024 11:57 AM CST  Subject: TRULICITY     Have you called the insurance to see what the hold up is. I have new prescription insurance it’s called wellness phone #[phone]. Member I’d [id number]. Garcia. [id number]. [employer] Sorry I forgot to tell you. Thank you please let me know    Annamaria benz sept 14 1960

## 2024-02-10 NOTE — TELEPHONE ENCOUNTER
Current Outpatient Medications   Medication Sig Dispense Refill    Dulaglutide (TRULICITY) 4.5 MG/0.5ML Subcutaneous Solution Pen-injector Inject 4.5 mg into the skin once a week. 6 mL 1       KEY: [id number]

## 2024-02-10 NOTE — TELEPHONE ENCOUNTER
MA's - Rx for Trulicity sent 2/5/24 shows it is pending PA. There is a TE from today for the prior auth but not sure if we have the correct insurance on file. PETAR sent to pt asking her to send pictures. Ty!

## 2024-02-16 RX ORDER — PROCHLORPERAZINE 25 MG/1
1 SUPPOSITORY RECTAL
Qty: 1 EACH | Refills: 1 | Status: SHIPPED | OUTPATIENT
Start: 2024-02-16

## 2024-02-16 NOTE — TELEPHONE ENCOUNTER
LOV;12/11/23    RTC;4months    FU;No F/U     Pending Monthly Supply;order pending, approve if appropriate.

## 2024-02-17 NOTE — TELEPHONE ENCOUNTER
Last ordered 8/10/22,on med list at LOV  Refill passed per [facility] protocol.      Requested Prescriptions   Pending Prescriptions Disp Refills    DICYCLOMINE 10 MG Oral Cap [Pharmacy Med Name: DICYCLOMINE 10MG CAPSULES] 60 capsule 5     Sig: TAKE 1 CAPSULE(10 MG) BY MOUTH FOUR TIMES DAILY       Gastrointestional Medication Protocol Passed - 2/16/2024 11:49 AM        Passed - In person appointment or virtual visit in the past 12 mos or appointment in next 3 mos     Recent Outpatient Visits              4 weeks ago Screening for colon cancer    [facility]    Nurse Only    1 month ago Dyslipidemia    [facility] Darrius Gil MD    Office Visit    2 months ago Uncontrolled type 2 diabetes mellitus with hyperglycemia (HCC)    [facility] Anna Rainey APRN    Office Visit    2 months ago     [facility] Nicole Crocker, OT    Office Visit    2 months ago     [facility]    Office Visit          Future Appointments         Provider Department Appt Notes    In 6 days [address]     In 2 months RZEPCZYNSKI, PROCEDURE [facility] Colonoscopy w/MAC @ [facility]                     Future Appointments         Provider Department Appt Notes    In 6 days [address]     In 2 months RZEPCZYNSKI, PROCEDURE [facility] Colonoscopy w/MAC @ [facility]            Recent Outpatient Visits              4 weeks ago Screening for colon cancer    [facility]    Nurse Only    1 month ago Dyslipidemia    [facility] Darrius Gil MD    Office Visit    2 months ago Uncontrolled type 2  diabetes mellitus with hyperglycemia (HCC)    [facility] Anna Rainey APRN    Office Visit    2 months ago     [facility] Occup[facility] Therapy Nicole Crocker, ENEIDA    Office Visit    2 months ago     [facility] Nicole Crocker, ENEIDA    Office Visit

## 2024-02-18 RX ORDER — DICYCLOMINE HYDROCHLORIDE 10 MG/1
10 CAPSULE ORAL 4 TIMES DAILY
Qty: 60 CAPSULE | Refills: 5 | Status: SHIPPED | OUTPATIENT
Start: 2024-02-18

## 2024-02-20 ENCOUNTER — TELEPHONE (OUTPATIENT)
Dept: ENDOCRINOLOGY CLINIC | Facility: CLINIC | Age: 64
End: 2024-02-20

## 2024-02-20 NOTE — TELEPHONE ENCOUNTER
Received dwo from [employer]. Filled out. Placed in providers folder for review and signature.

## 2024-02-21 RX ORDER — PROCHLORPERAZINE 25 MG/1
SUPPOSITORY RECTAL
Qty: 1 EACH | Refills: 0 | Status: SHIPPED | OUTPATIENT
Start: 2024-02-21

## 2024-02-21 NOTE — TELEPHONE ENCOUNTER
Phoebe for your patient   ENDO STAFF: are we waiting on a PA, please see patients comments:     Need a transmitter for my g6 glucose monitor. The pharmacy is waiting for a yes from insurance a preauthorization.  I habe been waiting a month. Let me know what is going on. Annamaria benz

## 2024-02-21 NOTE — TELEPHONE ENCOUNTER
Spoke to pharmacist - stated G6 transmitter goes through insurance - pharmacist could not get price d/t their system being very slow - pharmacist requested call back in hour to provide pricing of transmitter    Spoke to pharmacist - their system is still down - pharmacist stated she will call Medicare for update and call clinic back (# to nurse line given)    OV note dtd 12/11/23 and SWO for CGM dtd 9/11/23 faxed to Medicare CGM at [phone] so they have updated OV note (OV note dtd 12/20/22 was faxed on 1/29/23)

## 2024-02-22 NOTE — TELEPHONE ENCOUNTER
Spoke to pharmacist - she stated she will call Medicare for update (there were problems yesterday and nothing was going through) - pharmacist stated she will call clinic with update

## 2024-02-23 ENCOUNTER — HOSPITAL ENCOUNTER (OUTPATIENT)
Dept: MAMMOGRAPHY | Age: 64
Discharge: HOME | End: 2024-02-23
Attending: INTERNAL MEDICINE
Payer: MEDICARE

## 2024-02-23 DIAGNOSIS — Z12.31 VISIT FOR SCREENING MAMMOGRAM: ICD-10-CM

## 2024-02-23 PROCEDURE — 77067 SCR MAMMO BI INCL CAD: CPT | Performed by: INTERNAL MEDICINE

## 2024-02-23 PROCEDURE — 77063 BREAST TOMOSYNTHESIS BI: CPT | Performed by: INTERNAL MEDICINE

## 2024-02-23 NOTE — TELEPHONE ENCOUNTER
Called and spoke with Angelica, provided update below. Offered G6 sample. She states she will  in the office. Provided [facility] address and open hours. Advised to check with fingerstick until she is able to  sample and/or until the pharmacy is able to process her prescription claim. Confirmed she has a glucometer at home.

## 2024-02-23 NOTE — TELEPHONE ENCOUNTER
Spoke with Jacinda PIERCE --     Yes, OK to offer sample. I actually believe I set up a G6 transmitter for this pt; refer to  1/8/2024

## 2024-02-23 NOTE — TELEPHONE ENCOUNTER
Spoke with Jackelin at [facility]. She states she was told the medicare team needs last 6 months of records and testing results (unsure of which results). She gave me a number to call and further discuss: [phone].     I called and was transferred to the dexcom team. I was informed that they did receive the chart notes we faxed to them on Wednesday at [phone]. Confirmed correct fax number. She states patient's case was authorized for insurance coverage based on notes. However, most likely prescription is not going through insurance at the pharmacy end because they are still experiencing technical issues. Currently, there is a nationwide problem with pharmacies being unable to submit claims to medicare and [facility].     Returned call to Jackelin the pharmacist. She states she will continue to try and submit the claim. Hopefully tech issues will be fixed by Monday.     Donna, can I offer one of the G6 samples to this patient?

## 2024-02-26 ENCOUNTER — PATIENT MESSAGE (OUTPATIENT)
Dept: OTHER | Age: 64
End: 2024-02-26

## 2024-02-26 RX ORDER — PROCHLORPERAZINE 25 MG/1
1 SUPPOSITORY RECTAL
Qty: 1 EACH | Refills: 1 | Status: SHIPPED | OUTPATIENT
Start: 2024-02-26

## 2024-03-03 ENCOUNTER — PATIENT MESSAGE (OUTPATIENT)
Dept: INTERNAL MEDICINE CLINIC | Facility: CLINIC | Age: 64
End: 2024-03-03

## 2024-03-04 NOTE — TELEPHONE ENCOUNTER
[employer] message sent to patient instructing to call nurse triage to speak directly to a nurse regarding symptoms as per department protocol.      Future Appointments   Date Time Provider Department Center   4/17/2024  9:30 AM EZIO YATES [id number] None

## 2024-03-04 NOTE — TELEPHONE ENCOUNTER
From: Mervin Dominguez  To: Darrius Gil  Sent: 3/3/2024 8:07 PM CST  Subject: Yeast infection     Hello doc. Can you please send me the medication for the yeast infection. Thank you mervin

## 2024-03-04 NOTE — TELEPHONE ENCOUNTER
From: Annamaria Dominguez  Sent: 3/1/2024 3:27 PM CST  To: Ginger Aranda, [facility]  Subject: Medication list    Sorry I still use the medication but I thought I saw it twice on the list that’s all so you don’t have to delete it. I still use it it’s up to you. Please check if there was a duplication somewhere. Thank you, Annamaria.

## 2024-03-05 ENCOUNTER — NURSE TRIAGE (OUTPATIENT)
Dept: INTERNAL MEDICINE CLINIC | Facility: CLINIC | Age: 64
End: 2024-03-05

## 2024-03-05 ENCOUNTER — OFFICE VISIT (OUTPATIENT)
Dept: INTERNAL MEDICINE CLINIC | Facility: CLINIC | Age: 64
End: 2024-03-05

## 2024-03-05 VITALS
BODY MASS INDEX: 22.25 KG/M2 | TEMPERATURE: 98 F | WEIGHT: 106 LBS | HEIGHT: 58 IN | DIASTOLIC BLOOD PRESSURE: 48 MMHG | SYSTOLIC BLOOD PRESSURE: 120 MMHG | HEART RATE: 98 BPM | OXYGEN SATURATION: 96 %

## 2024-03-05 DIAGNOSIS — B37.31 VAGINAL YEAST INFECTION: Primary | ICD-10-CM

## 2024-03-05 PROCEDURE — 99213 OFFICE O/P EST LOW 20 MIN: CPT | Performed by: INTERNAL MEDICINE

## 2024-03-05 RX ORDER — FLUCONAZOLE 150 MG/1
150 TABLET ORAL ONCE
Qty: 1 TABLET | Refills: 0 | Status: SHIPPED | OUTPATIENT
Start: 2024-03-05 | End: 2024-03-05

## 2024-03-05 RX ORDER — OFLOXACIN 3 MG/ML
SOLUTION/ DROPS OPHTHALMIC
COMMUNITY
Start: 2024-02-27 | End: 2024-03-08

## 2024-03-05 RX ORDER — POLYETHYLENE GLYCOL-3350 AND ELECTROLYTES 236; 6.74; 5.86; 2.97; 22.74 G/274.31G; G/274.31G; G/274.31G; G/274.31G; G/274.31G
POWDER, FOR SOLUTION ORAL
COMMUNITY
Start: 2024-01-25

## 2024-03-05 NOTE — ASSESSMENT & PLAN NOTE
Discussed possible relationship to Jardiance, patient has been on it quite a while and hasn't had this problem very often .   Diflucan

## 2024-03-05 NOTE — TELEPHONE ENCOUNTER
Please reply to pool: EM RN TRIAGE  Action Requested: Summary for Provider     []  Critical Lab, Recommendations Needed  [] Need Additional Advice  [x]   FYI    []   Need Orders  [] Need Medications Sent to Pharmacy  []  Other     SUMMARY: Patient contacts clinic reporting vaginal itching, irritation and clear discharge for several days.  Denies fever, body aches, chills, pelvic or abdominal pain, no buring with urination.  Requesting rx for yeast infection.  Acute visit booked today.    Reason for call: Vaginal Discharge  Onset: Data Unavailable                       Reason for Disposition   MODERATE-SEVERE itching (i.e., interferes with school, work, or sleep)    Protocols used: Vaginal Symptoms-A-OH

## 2024-03-05 NOTE — PROGRESS NOTES
HPI:    Patient ID: Annamaria Dominguez is a 63 year old female.    Yeast Infection  The patient's primary symptoms include vaginal discharge. Pertinent negatives include no chills, constipation, diarrhea, dysuria, fever, flank pain, frequency, headaches, hematuria, nausea or urgency.   patient developed a vaginal yeast infection  no recent use of antibiotics, patient is on Jardiance.   Glucose has been controlled, last hgba1c 6.8 .   No dysuria, no hematuria. Has vaginal itching and discharge.     Review of Systems   Constitutional:  Negative for activity change, appetite change, chills, fatigue and fever.   HENT:  Negative for ear pain, hearing loss, nosebleeds, postnasal drip, rhinorrhea, sneezing and tinnitus.    Eyes:  Negative for pain, discharge, redness, itching and visual disturbance.   Respiratory:  Negative for apnea, cough, chest tightness, shortness of breath and wheezing.    Cardiovascular:  Negative for chest pain, palpitations and leg swelling.   Gastrointestinal:  Negative for abdominal distention, blood in stool, constipation, diarrhea and nausea.   Endocrine: Negative for cold intolerance and heat intolerance.   Genitourinary:  Positive for vaginal discharge. Negative for difficulty urinating, dysuria, flank pain, frequency, genital sores, hematuria and urgency.   Musculoskeletal:  Negative for neck pain and neck stiffness.   Allergic/Immunologic: Negative for immunocompromised state.   Neurological:  Negative for dizziness, syncope, facial asymmetry, weakness, light-headedness, numbness and headaches.            Current Outpatient Medications   Medication Sig Dispense Refill    GAVILYTE-G 236 g Oral Recon Soln       fluconazole (DIFLUCAN) 150 MG Oral Tab Take 1 tablet (150 mg total) by mouth once for 1 dose. 1 tablet 0    dicyclomine 10 MG Oral Cap Take 1 capsule (10 mg total) by mouth 4 (four) times daily. 60 capsule 5    Dulaglutide (TRULICITY) 4.5 MG/0.5ML Subcutaneous Solution Pen-injector Inject  4.5 mg into the skin once a week. 6 mL 1    ALPRAZolam 0.5 MG Oral Tab TAKE 1 TABLET BY MOUTH EVERY EVENING 30 tablet 5    HUMULIN R U-500, CONCENTRATED, 500 UNIT/ML Subcutaneous Solution ADMINISTER 180-195 UNITS UNDER THE SKIN THREE TIMES DAILY BEFORE MEALS 110 mL 1    losartan 25 MG Oral Tab Take 1 tablet (25 mg total) by mouth daily. 90 tablet 3    venlafaxine ER 75 MG Oral Capsule SR 24 Hr Take 1 capsule (75 mg total) by mouth daily. 90 capsule 1    HYDROcodone-acetaminophen (NORCO) 5-325 MG Oral Tab Take 1 tablet by mouth every 8 (eight) hours as needed for Pain. 45 tablet 0    Empagliflozin (JARDIANCE) 25 MG Oral Tab Take 1 tablet by mouth daily. 90 tablet 1    pantoprazole 40 MG Oral Tab EC Take 1 tablet (40 mg total) by mouth every morning. 90 tablet 3    fluticasone-salmeterol (ADVAIR DISKUS) 250-50 MCG/ACT Inhalation Aerosol Powder, Breath Activated Inhale 1 puff into the lungs every 12 (twelve) hours. 60 each 5    ofloxacin 0.3 % Ophthalmic Solution 1 DROP INTO THE RIGHT EYE Ophthalmic FOUR TIMES A DAY STARTING 2 DAYS BEFORE SURGERY for 10 days (Patient not taking: Reported on 3/5/2024)      Continuous Blood Gluc Transmit (DEXCOM G6 TRANSMITTER) Does not apply Misc 1 Device every 3 (three) months. 1 each 1    Continuous Blood Gluc Transmit (DEXCOM G6 TRANSMITTER) Does not apply Misc Replace transmitter every 3 months 1 each 0    Continuous Blood Gluc Transmit (DEXCOM G6 TRANSMITTER) Does not apply Misc 1 Device every 3 (three) months. 1 each 1    Insulin Syringe/Needle U-500 (BD INSULIN SYRINGE U-500) 31G X 6MM 0.5 ML Does not apply Misc Inject 1 each into the skin 3 (three) times daily with meals. 100 each 2    Continuous Blood Gluc Sensor (DEXCOM G6 SENSOR) Does not apply Misc CHANGE SENSOR EVERY 10 DAYS 3 each 3    Glucose Blood (ONETOUCH VERIO) In Vitro Strip USE TO CHECK BLOOD SUGAR 3 TO 4 TIMES DAILY 400 strip 0    Continuous Blood Gluc Sensor (DEXCOM G6 SENSOR) Does not apply Misc USE AND CHANGE  EVERY 10 DAYS 9 each 1    fluocinonide 0.05 % External Cream  (Patient not taking: Reported on 11/1/2023)      Continuous Blood Gluc  (DEXCOM G7 ) Does not apply Device 1 each daily. 1 each 0    Continuous Blood Gluc Sensor (DEXCOM G6 SENSOR) Does not apply Misc USE AS DIRECTED AND CHANGE EVERY 10 DAYS 9 each 1    albuterol (VENTOLIN HFA) 108 (90 Base) MCG/ACT Inhalation Aero Soln Inhale 2 puffs into the lungs every 4 (four) hours as needed for Wheezing. (Patient not taking: Reported on 11/1/2023) 18 g 1    TRUEPLUS PEN NEEDLES 32G X 4 MM Does not apply Misc USE AS DIRECTED 100 each 0    ONETOUCH DELICA PLUS [id number] Does not apply Misc TEST FOUR TIMES DAILY 400 each 0    Continuous Blood Gluc  (DEXCOM G6 ) Does not apply Device Use  daily to check blood sugars. 1 each 0    TRUEPLUS 5-BEVEL PEN NEEDLES 31G X 5 MM Does not apply Misc USE AS DIRECTED 6 TIMES DAILY 200 each 0    Insulin Pen Needle 31G X 5 MM Does not apply Misc Please use daily as directed 200 each 0    Insulin Syringe/Needle U-500 31G X 6MM 0.5 ML Does not apply Misc 3 each by Does not apply route 3 (three) times daily with meals. 300 each 0    Insulin Syringe/Needle U-500 31G X 6MM 0.5 ML Does not apply Misc 1 Syringe by Does not apply route 3 (three) times daily with meals. 300 each 0    Continuous Blood Gluc  (DEXCOM G6 ) Does not apply Device 1 each by Does not apply route continuous. 1 Device 0    Blood Glucose Monitoring Suppl (ONETOUCH VERIO) w/Device Does not apply Kit 1 Device by Does not apply route daily. 1 kit 0    OneTouch UltraSoft Lancets Does not apply Misc USE TO CHECK BLOOD SUGAR 3-4X/ each 1    Blood Glucose Monitoring Suppl (FREESTYLE LITE) Does not apply Device TEST QID  UTD  0     Allergies:  Allergies   Allergen Reactions    Caviar SWELLING     throat    Latex RASH    Bacitracin UNKNOWN      PHYSICAL EXAM:   /48   Pulse 98   Temp 98.4 °F (36.9 °C)   Ht 4'  10\" (1.473 m)   Wt 106 lb (48.1 kg)   SpO2 96%   BMI 22.15 kg/m²      Physical Exam  Constitutional:       Appearance: She is well-developed.   Eyes:      Pupils: Pupils are equal, round, and reactive to light.   Neck:      Thyroid: No thyromegaly.   Cardiovascular:      Rate and Rhythm: Normal rate and regular rhythm.      Heart sounds: Normal heart sounds. No murmur heard.     No gallop.   Pulmonary:      Effort: Pulmonary effort is normal. No respiratory distress.      Breath sounds: Normal breath sounds. No wheezing or rales.   Chest:      Chest wall: No tenderness.   Abdominal:      General: There is no distension.      Palpations: There is no mass.      Tenderness: There is no abdominal tenderness. There is no guarding or rebound.   Musculoskeletal:      Cervical back: Normal range of motion.   Skin:     General: Skin is warm.      Coloration: Skin is not pale.      Findings: No erythema or rash.   Neurological:      Mental Status: She is alert and oriented to person, place, and time.      Motor: No abnormal muscle tone.      Coordination: Coordination normal.   Psychiatric:         Mood and Affect: Mood normal.         Behavior: Behavior normal.         Thought Content: Thought content normal.         Judgment: Judgment normal.                ASSESSMENT/PLAN:   Vaginal yeast infection  Discussed possible relationship to Jardiance, patient has been on it quite a while and hasn't had this problem very often .   Diflucan    No orders of the defined types were placed in this encounter.      Meds This Visit:  Requested Prescriptions     Signed Prescriptions Disp Refills    fluconazole (DIFLUCAN) 150 MG Oral Tab 1 tablet 0     Sig: Take 1 tablet (150 mg total) by mouth once for 1 dose.       Imaging & Referrals:  None       ID#[id number]

## 2024-03-06 ENCOUNTER — TELEPHONE (OUTPATIENT)
Dept: ENDOCRINOLOGY CLINIC | Facility: CLINIC | Age: 64
End: 2024-03-06

## 2024-03-06 NOTE — TELEPHONE ENCOUNTER
Received dwo from [employer] for her CGM. Filled out. Placed in providers folder for review and signature.

## 2024-04-08 ENCOUNTER — NURSE TRIAGE (OUTPATIENT)
Dept: INTERNAL MEDICINE CLINIC | Facility: CLINIC | Age: 64
End: 2024-04-08

## 2024-04-08 RX ORDER — FLUCONAZOLE 150 MG/1
150 TABLET ORAL ONCE
Qty: 1 TABLET | Refills: 0 | Status: SHIPPED | OUTPATIENT
Start: 2024-04-08 | End: 2024-04-08

## 2024-04-08 NOTE — TELEPHONE ENCOUNTER
Action Requested: Summary for Provider     []  Critical Lab, Recommendations Needed  [] Need Additional Advice  []   FYI    []   Need Orders  [x] Need Medications Sent to Pharmacy  []  Other     SUMMARY: See below       Reason for call: Vaginal Problem  Onset: >1 month   Per patient she completed Diflucan x1 dose about a month ago. She does not feel like symptoms fully resolved, current symptoms: itching.       4/7/24  8:55 PM  I think the pill for the yeast infection did not complete the job. Sorry can I have another pill. I should have took 2 like you said. Thank you     Annamaria    Reason for Disposition   Vaginal itching and not improved > 3 days following CARE ADVICE    Protocols used: Vaginal Symptoms-A-OH    Dr. Gil: patient would like to know if you would send second dose of Diflucan?     Patient notified, verbalized understanding.

## 2024-04-11 RX ORDER — PANTOPRAZOLE SODIUM 40 MG/1
40 TABLET, DELAYED RELEASE ORAL EVERY MORNING
Qty: 90 TABLET | Refills: 3 | OUTPATIENT
Start: 2024-04-11

## 2024-04-14 ENCOUNTER — PATIENT MESSAGE (OUTPATIENT)
Dept: INTERNAL MEDICINE CLINIC | Facility: CLINIC | Age: 64
End: 2024-04-14

## 2024-04-15 ENCOUNTER — TELEPHONE (OUTPATIENT)
Facility: CLINIC | Age: 64
End: 2024-04-15

## 2024-04-15 NOTE — TELEPHONE ENCOUNTER
Spoke to patient    She was unsure when to start the bowel prep    I referred her to the [employer] message from January 2024 that was titled \"colonoscopy prep instructions\"    We discussed all the instructions, what is acceptable to have on a clear liquid diet, and when she needs to start the bowel prep    Answered all of patient's questions.  Pt verbalized understanding

## 2024-04-15 NOTE — TELEPHONE ENCOUNTER
Charmiane Yin,    Please call [facility] office they will be able to help with your question.     Please call    Thank you for using Tori Perez RN

## 2024-04-18 ENCOUNTER — TELEPHONE (OUTPATIENT)
Dept: ENDOCRINOLOGY CLINIC | Facility: CLINIC | Age: 64
End: 2024-04-18

## 2024-04-18 NOTE — TELEPHONE ENCOUNTER
Received fax from [employer] in regards to CGM. Order date 4/11/2024. Form filled out and placed in providers folder for review and signature.

## 2024-05-21 RX ORDER — EMPAGLIFLOZIN 25 MG/1
1 TABLET, FILM COATED ORAL DAILY
Qty: 90 TABLET | Refills: 1 | Status: SHIPPED | OUTPATIENT
Start: 2024-05-21

## 2024-05-21 NOTE — TELEPHONE ENCOUNTER
Endocrine Refill protocol for oral and injectable diabetic medications    Protocol Criteria:    -Appointment with Endocrinology completed in the last 6 months or scheduled in the next 3 months    -A1c result <8.5% in the past 6 months      Verify the above has been completed or scheduled in the appropriate timeline. If so can send a 90 day supply with 1 refill.     Last completed office visit: 12/11/2023   Next scheduled Follow up: No future appointments.   Last A1c result: Last A1c value was 6.8% done 12/11/2023.

## 2024-05-22 RX ORDER — PROCHLORPERAZINE 25 MG/1
SUPPOSITORY RECTAL
Qty: 3 EACH | Refills: 3 | Status: SHIPPED | OUTPATIENT
Start: 2024-05-22

## 2024-05-22 RX ORDER — DULAGLUTIDE 4.5 MG/.5ML
4.5 INJECTION, SOLUTION SUBCUTANEOUS WEEKLY
Qty: 6 ML | Refills: 1 | OUTPATIENT
Start: 2024-05-22

## 2024-05-22 NOTE — TELEPHONE ENCOUNTER
Endocrine Refill protocol for CGM supplies       Protocol Criteria:  Appointment with Endocrinology completed in the last 12 months or scheduled in the next 6 months     Verify appointment has been completed or scheduled in the appropriate timeline. If so can send a 90 day supply with 1 refill.        Last completed office visit:12/11/2023  Next scheduled Follow up: No future appointments.

## 2024-06-05 ENCOUNTER — TELEPHONE (OUTPATIENT)
Dept: ENDOCRINOLOGY CLINIC | Facility: CLINIC | Age: 64
End: 2024-06-05

## 2024-06-05 NOTE — TELEPHONE ENCOUNTER
Received signed fax from provider in regards to pt DWO Faxed signed forms to [employer] at fax number [phone]. Awaiting confirmation.

## 2024-06-14 ENCOUNTER — PATIENT MESSAGE (OUTPATIENT)
Dept: INTERNAL MEDICINE CLINIC | Facility: CLINIC | Age: 64
End: 2024-06-14

## 2024-06-14 RX ORDER — VENLAFAXINE HYDROCHLORIDE 75 MG/1
75 CAPSULE, EXTENDED RELEASE ORAL DAILY
Qty: 90 CAPSULE | Refills: 1 | OUTPATIENT
Start: 2024-06-14

## 2024-06-14 RX ORDER — VENLAFAXINE HYDROCHLORIDE 75 MG/1
75 CAPSULE, EXTENDED RELEASE ORAL DAILY
Qty: 90 CAPSULE | Refills: 1 | Status: SHIPPED | OUTPATIENT
Start: 2024-06-14

## 2024-06-14 NOTE — TELEPHONE ENCOUNTER
See refill encounter 6/13/24.       From: Annamaria Dominguez  To: Darrius Gil  Sent: 6/14/2024  3:32 PM CDT  Subject: Meds    Hi dr. I need a refill for venelexine. They said to call you. I am almost out.  Please let me know thank you    Annamaria

## 2024-06-14 NOTE — TELEPHONE ENCOUNTER
Refill passed per [facility] protocol.     Requested Prescriptions   Pending Prescriptions Disp Refills    venlafaxine ER 75 MG Oral Capsule SR 24 Hr 90 capsule 1     Sig: Take 1 capsule (75 mg total) by mouth daily.       Psychiatric Non-Scheduled (Anti-Anxiety) Passed - 6/14/2024  5:23 PM        Passed - In person appointment or virtual visit in the past 6 mos or appointment in next 3 mos     Recent Outpatient Visits              3 months ago Vaginal yeast infection    [facility] Darrius Gil MD    Office Visit    4 months ago Screening for colon cancer    [facility] [place]    Nurse Only    5 months ago Dyslipidemia    [facility] Darrius Gil MD    Office Visit    6 months ago Uncontrolled type 2 diabetes mellitus with hyperglycemia (HCC)    [facility] Anna Rainey APRN    Office Visit    6 months ago     [facility] Nicole Crocker, OT    Office Visit          Future Appointments         Provider Department Appt Notes    In 4 days Anna Rainey APRN [facility] A1c and maybe switch meds                    Passed - Depression Screening completed within the past 12 months              Recent Outpatient Visits              3 months ago Vaginal yeast infection    [facility] Darrius Gil MD    Office Visit    4 months ago Screening for colon cancer    [facility]    Nurse Only    5 months ago Dyslipidemia    [facility] Darrius Gil MD    Office Visit    6 months ago Uncontrolled type 2 diabetes mellitus with hyperglycemia (HCC)    [facility] Anna Rainey APRN    Office Visit    6 months  ago     [facility] Nicole Crocker, OT    Office Visit             Future Appointments         Provider Department Appt Notes    In 4 days Anna Rainey APRN [facility] A1c and maybe switch meds

## 2024-06-18 ENCOUNTER — LAB ENCOUNTER (OUTPATIENT)
Dept: LAB | Age: 64
End: 2024-06-18
Attending: INTERNAL MEDICINE

## 2024-06-18 ENCOUNTER — OFFICE VISIT (OUTPATIENT)
Dept: ENDOCRINOLOGY CLINIC | Facility: CLINIC | Age: 64
End: 2024-06-18

## 2024-06-18 VITALS
TEMPERATURE: 99 F | BODY MASS INDEX: 23.09 KG/M2 | SYSTOLIC BLOOD PRESSURE: 152 MMHG | HEIGHT: 57.99 IN | WEIGHT: 110 LBS | DIASTOLIC BLOOD PRESSURE: 70 MMHG | HEART RATE: 112 BPM

## 2024-06-18 DIAGNOSIS — E78.5 DYSLIPIDEMIA: ICD-10-CM

## 2024-06-18 DIAGNOSIS — E11.65 UNCONTROLLED TYPE 2 DIABETES MELLITUS WITH HYPERGLYCEMIA (HCC): Primary | ICD-10-CM

## 2024-06-18 LAB
ALBUMIN SERPL-MCNC: 4.1 G/DL (ref 3.2–4.8)
ALBUMIN/GLOB SERPL: 1.3 {RATIO} (ref 1–2)
ALP LIVER SERPL-CCNC: 75 U/L
ALT SERPL-CCNC: 35 U/L
ANION GAP SERPL CALC-SCNC: 9 MMOL/L (ref 0–18)
AST SERPL-CCNC: 57 U/L (ref ?–34)
BILIRUB SERPL-MCNC: 0.7 MG/DL (ref 0.2–1.1)
BUN BLD-MCNC: 17 MG/DL (ref 9–23)
BUN/CREAT SERPL: 18.5 (ref 10–20)
CALCIUM BLD-MCNC: 9.2 MG/DL (ref 8.7–10.4)
CHLORIDE SERPL-SCNC: 107 MMOL/L (ref 98–112)
CO2 SERPL-SCNC: 24 MMOL/L (ref 21–32)
CREAT BLD-MCNC: 0.92 MG/DL
EGFRCR SERPLBLD CKD-EPI 2021: 70 ML/MIN/1.73M2 (ref 60–?)
FASTING STATUS PATIENT QL REPORTED: NO
GLOBULIN PLAS-MCNC: 3.1 G/DL (ref 2–3.5)
GLUCOSE BLD-MCNC: 290 MG/DL (ref 70–99)
GLUCOSE BLOOD: 322
HEMOGLOBIN A1C: 7.3 % (ref 4.3–5.6)
OSMOLALITY SERPL CALC.SUM OF ELEC: 302 MOSM/KG (ref 275–295)
POTASSIUM SERPL-SCNC: 4.6 MMOL/L (ref 3.5–5.1)
PROT SERPL-MCNC: 7.2 G/DL (ref 5.7–8.2)
SODIUM SERPL-SCNC: 140 MMOL/L (ref 136–145)
TEST STRIP LOT #: NORMAL NUMERIC

## 2024-06-18 PROCEDURE — 80053 COMPREHEN METABOLIC PANEL: CPT

## 2024-06-18 PROCEDURE — 99214 OFFICE O/P EST MOD 30 MIN: CPT

## 2024-06-18 PROCEDURE — 82947 ASSAY GLUCOSE BLOOD QUANT: CPT

## 2024-06-18 PROCEDURE — 36415 COLL VENOUS BLD VENIPUNCTURE: CPT

## 2024-06-18 PROCEDURE — 83036 HEMOGLOBIN GLYCOSYLATED A1C: CPT

## 2024-06-18 RX ORDER — LOSARTAN POTASSIUM 50 MG/1
50 TABLET ORAL DAILY
Qty: 30 TABLET | Refills: 3 | Status: SHIPPED | OUTPATIENT
Start: 2024-06-18

## 2024-06-18 RX ORDER — SEMAGLUTIDE 1.34 MG/ML
1 INJECTION, SOLUTION SUBCUTANEOUS WEEKLY
Qty: 1 EACH | Refills: 3 | Status: SHIPPED | OUTPATIENT
Start: 2024-06-18

## 2024-06-18 NOTE — PROGRESS NOTES
Return Office Visit     CHIEF COMPLAINT:    DM  Dyslipidemia     HISTORY OF PRESENT ILLNESS:  Annamaria Dominguez is a 63 year old female who presents for follow up for DM.     DM HISTORY  Diagnosed: Around age 36        HISTORY OF DIABETES COMPLICATIONS: :  History of Retinopathy: No - last eye exam: UTD- seeing optho every 6 months- Last appointment- 5/2024- Dr. Gibson.    History of Neuropathy: Yes, numbness, symptoms stable   History of Nephropathy: No     ASSOCIATED COMPLICATIONS:   HTN: Yes  Hyperlipidemia: Yes  Coronary Artery Disease:  No  Cerebrovascular Disease: No        HOME GLUCOSE READINGS:   She is using Dexcom G7  Reviewed glucose readings for 2 weeks prior to visit date:    50% of glucose readings in target range ()  36% of glucose readings above target range (>180)  12% of glucose readings very high (>250)  1% of glucose readings below target range (<70)    Trends: Post prandial hyperglycemia with all meals but most consistently with lunch and dinner meal. Occ. Fasting hypoglycemia but improved since last visit- inconsistent pattern.     Estimated HgA1c- 7.6%    CURRENT DIABETIC MEDICATIONS INCLUDE:  She is on 3 insulin injections a day    U 500 - 175 units with breakfast (typically cereal), 100 units with lunch, and then 150-175 units with dinner.     Ozempic- 0.5 mg subcutaneous weekly   Jardiance 25 mg daily    T/f MTF due to GI SE    --> Of note, eats cereal initially for breakfast and takes 175 units of U500 but then will typically eat larger brunch meal a few hours later- skips insulin at this time because she is worried it is too soon after breakfast meal leading to significant hyperglycemia following this meal.--> improved with above regimen of splitting breakfast/lunch dose in two.     MEALS:  Recall: Is trying to watch carbohydrates- breakfast is highest carb meal but sometimes dinner as well    Breakfast- eggs with toast and hashbrowns  Lunch-(1-3pm) sub sandwich   Dinner (7pm)  hamburger with fries, or porkchop with corn  Snacks- banana, apple  Beverages-diet soda, water or tea     EXERCISE:   no    CURRENT MEDICATION:    Current Outpatient Medications   Medication Sig Dispense Refill    semaglutide (OZEMPIC, 1 MG/DOSE,) 4 MG/3ML Subcutaneous Solution Pen-injector Inject 1 mg into the skin once a week. 1 each 3    losartan 50 MG Oral Tab Take 1 tablet (50 mg total) by mouth daily. 30 tablet 3    JARDIANCE 25 MG Oral Tab TAKE 1 TABLET BY MOUTH DAILY 90 tablet 1    HUMULIN R U-500, CONCENTRATED, 500 UNIT/ML Subcutaneous Solution ADMINISTER 180-195 UNITS UNDER THE SKIN THREE TIMES DAILY BEFORE MEALS 110 mL 1    venlafaxine ER 75 MG Oral Capsule SR 24 Hr Take 1 capsule (75 mg total) by mouth daily. 90 capsule 1    Continuous Glucose Sensor (DEXCOM G6 SENSOR) Does not apply Misc CHANGE SENSOR EVERY 10 DAYS 3 each 3    atorvastatin 20 MG Oral Tab       Continuous Blood Gluc Transmit (DEXCOM G6 TRANSMITTER) Does not apply Misc 1 Device every 3 (three) months. 1 each 1    Continuous Blood Gluc Transmit (DEXCOM G6 TRANSMITTER) Does not apply Misc Replace transmitter every 3 months 1 each 0    dicyclomine 10 MG Oral Cap Take 1 capsule (10 mg total) by mouth 4 (four) times daily. 60 capsule 5    Continuous Blood Gluc Transmit (DEXCOM G6 TRANSMITTER) Does not apply Misc 1 Device every 3 (three) months. 1 each 1    Insulin Syringe/Needle U-500 (BD INSULIN SYRINGE U-500) 31G X 6MM 0.5 ML Does not apply Misc Inject 1 each into the skin 3 (three) times daily with meals. 100 each 2    ALPRAZolam 0.5 MG Oral Tab TAKE 1 TABLET BY MOUTH EVERY EVENING 30 tablet 5    Glucose Blood (ONETOUCH VERIO) In Vitro Strip USE TO CHECK BLOOD SUGAR 3 TO 4 TIMES DAILY 400 strip 0    Continuous Blood Gluc Sensor (DEXCOM G6 SENSOR) Does not apply Misc USE AND CHANGE EVERY 10 DAYS 9 each 1    fluocinonide 0.05 % External Cream  (Patient not taking: Reported on 11/1/2023)      HYDROcodone-acetaminophen (NORCO) 5-325 MG Oral Tab  Take 1 tablet by mouth every 8 (eight) hours as needed for Pain. (Patient not taking: Reported on 4/9/2024) 45 tablet 0    Continuous Blood Gluc  (DEXCOM G7 ) Does not apply Device 1 each daily. 1 each 0    Continuous Blood Gluc Sensor (DEXCOM G6 SENSOR) Does not apply Misc USE AS DIRECTED AND CHANGE EVERY 10 DAYS 9 each 1    pantoprazole 40 MG Oral Tab EC Take 1 tablet (40 mg total) by mouth every morning. 90 tablet 3    fluticasone-salmeterol (ADVAIR DISKUS) 250-50 MCG/ACT Inhalation Aerosol Powder, Breath Activated Inhale 1 puff into the lungs every 12 (twelve) hours. 60 each 5    albuterol (VENTOLIN HFA) 108 (90 Base) MCG/ACT Inhalation Aero Soln Inhale 2 puffs into the lungs every 4 (four) hours as needed for Wheezing. (Patient not taking: Reported on 11/1/2023) 18 g 1    TRUEPLUS PEN NEEDLES 32G X 4 MM Does not apply Misc USE AS DIRECTED 100 each 0    ONETOUCH DELICA PLUS [id number] Does not apply Misc TEST FOUR TIMES DAILY 400 each 0    Continuous Blood Gluc  (DEXCOM G6 ) Does not apply Device Use  daily to check blood sugars. 1 each 0    TRUEPLUS 5-BEVEL PEN NEEDLES 31G X 5 MM Does not apply Misc USE AS DIRECTED 6 TIMES DAILY 200 each 0    Insulin Pen Needle 31G X 5 MM Does not apply Misc Please use daily as directed 200 each 0    Insulin Syringe/Needle U-500 31G X 6MM 0.5 ML Does not apply Misc 3 each by Does not apply route 3 (three) times daily with meals. 300 each 0    Insulin Syringe/Needle U-500 31G X 6MM 0.5 ML Does not apply Misc 1 Syringe by Does not apply route 3 (three) times daily with meals. 300 each 0    Continuous Blood Gluc  (DEXCOM G6 ) Does not apply Device 1 each by Does not apply route continuous. 1 Device 0    Blood Glucose Monitoring Suppl (ONETOUCH VERIO) w/Device Does not apply Kit 1 Device by Does not apply route daily. 1 kit 0    OneTouch UltraSoft Lancets Does not apply Misc USE TO CHECK BLOOD SUGAR 3-4X/ each 1    Blood  Glucose Monitoring Suppl (FREESTYLE LITE) Does not apply Device TEST QID  UTD  0         ALLERGY:  Allergies   Allergen Reactions    Caviar SWELLING     throat    Latex RASH    Bacitracin UNKNOWN       PAST MEDICAL, SOCIAL AND FAMILY HISTORY:  See past medical history marked as reviewed.  See past surgical history marked as reviewed.  See past family history marked as reviewed.  See past social history marked as reviewed.    ASSESSMENTS:       REVIEW OF SYSTEMS:  Constitutional: Negative for: weight change, fever, fatigue, cold/heat intolerance  Eyes: Negative for:  Visual changes, proptosis, blurring  ENT: Negative for:  dysphagia, neck swelling, dysphonia  Respiratory: Negative for:  dyspnea, cough  Cardiovascular: Negative for:  chest pain, palpitations, orthopnea  GI: Negative for:  abdominal pain, nausea, vomiting, diarrhea, constipation, bleeding  Neurology: Negative for: headache, numbness, weakness  Genito-Urinary: Negative for: dysuria, frequency  Psychiatric: Negative for:  depression, anxiety  Hematology/Lymphatics: Negative for: bruising, lower extremity edema  Endocrine: Negative for: polyuria, polydypsia  Skin: Negative for: rash, blister, cellulitis,       PHYSICAL EXAM:    Vitals:    06/18/24 0952   BP: 152/70   Pulse: 112         General Appearance:  alert, well developed, in no acute distress  Nutritional:  no extreme weight gain or loss  Head: Atraumatic  Eyes:  normal conjunctivae, sclera., normal sclera and normal pupils  Throat/Neck: normal sound to voice. Normal hearing, normal speech  Respiratory:  Speaking in full sentences, non-labored. no increased work of breathing, no audible wheezing    Skin:  normal moisture and skin texture, no visible lesions  Hair and nails: normal scalp hair  Hematologic:  no excessive bruising  Neuro: motor grossly intact, moving all extremities without difficulty  Psychiatric:  oriented to time, self, and place  Extremities: no obvious extremity swelling, no  lesions      DATA:     Pertinent labs reviewed      ASSESSMENT AND PLAN:     1. Type 2 DM: A1c: 6.3% 12/2022; 7.5% 4/2023; 7.3% 8/2023; 6.8% 12/2023; 7.3% POC today     Plan:  -Discussed the pathogenesis, natural course of diabetes. Patient understands the importance of glycemic control and the implications of uncontrolled diabetes including Diabetic ketoacidosis and various micro vascular and macrovascular complications.  -Reviewed ABC's of diabetes  -Reviewed importance of SBGM- continue with Dexcom G7  -Reviewed glucose targets-  fasting and <180 post prandially  -Reviewed importance of following low CHO diet- recommend 135 grams of carbohydrate daily/ 45 grams per meal     Medications:  - Continue Insulin U 500: 175 units with breakfast,  100 units with lunch, and then decrease dose to 165 units with dinner.     - Increase Ozempic to 1mg subcutaneous weekly. Reviewed side effects and risks vs benefits of medication.     -Continue  Jardiance 25 mg daily- reviewed side effects and risks vs benefits of medication. Reviewed importance of staying well hydrated on medication.       SE and CI of all medications have been discussed in detail.     - Continue Dexcom G7    -No nephropathy- last lab 12/2023- on losartan   - Instructed on importance of annual eye exams - UTD with optho   - abnormal foot exam - following with podiatry - saw within last thee months     - Hypoglycemia symptoms and treatment of hypoglycemia with rule of 15's discussed      -BP elevated today, not improved on repeat. She occasionally checks BP at home and reports has been running >130/80's. Will increase Losartan to 50mg daily. Follow up with PCP office - make appt today. HR also elevated this morning. Denies dizziness, chest pain, SOB- she will alert PCP office- does have URI currently but afebrile.     - Lipids 12/2023- LDL- 74, on atorvastatin       RTC in 3 months but call sooner if sugars continue to be <70 or persistently  >250  Anna Rainey, MARCELA  6/18/2024  A total of  30 minutes was spent on obtaining history, reviewing pertinent imaging/labs and specialists notes, evaluating patient, providing multiple treatment options, reinforcing diet/exercise and compliance, and completing documentation.

## 2024-06-20 ENCOUNTER — TELEPHONE (OUTPATIENT)
Dept: ENDOCRINOLOGY CLINIC | Facility: CLINIC | Age: 64
End: 2024-06-20

## 2024-06-20 NOTE — TELEPHONE ENCOUNTER
Received fax from [employer] in regards to last office visit. LOV was printed and faxed back to Ashley @ [phone]

## 2024-06-24 ENCOUNTER — OFFICE VISIT (OUTPATIENT)
Dept: INTERNAL MEDICINE CLINIC | Facility: CLINIC | Age: 64
End: 2024-06-24

## 2024-06-24 VITALS
OXYGEN SATURATION: 95 % | WEIGHT: 109 LBS | HEIGHT: 58 IN | BODY MASS INDEX: 22.88 KG/M2 | HEART RATE: 103 BPM | DIASTOLIC BLOOD PRESSURE: 50 MMHG | SYSTOLIC BLOOD PRESSURE: 120 MMHG | TEMPERATURE: 100 F

## 2024-06-24 DIAGNOSIS — J01.00 SUBACUTE MAXILLARY SINUSITIS: Primary | ICD-10-CM

## 2024-06-24 DIAGNOSIS — J45.21 MILD INTERMITTENT ASTHMA WITH EXACERBATION (HCC): ICD-10-CM

## 2024-06-24 PROCEDURE — 99214 OFFICE O/P EST MOD 30 MIN: CPT | Performed by: INTERNAL MEDICINE

## 2024-06-24 RX ORDER — ALBUTEROL SULFATE 90 UG/1
2 AEROSOL, METERED RESPIRATORY (INHALATION) EVERY 4 HOURS PRN
Qty: 18 G | Refills: 1 | Status: SHIPPED | OUTPATIENT
Start: 2024-06-24

## 2024-06-24 RX ORDER — CEFUROXIME AXETIL 250 MG/1
250 TABLET ORAL 2 TIMES DAILY
Qty: 20 TABLET | Refills: 0 | Status: SHIPPED | OUTPATIENT
Start: 2024-06-24

## 2024-06-24 NOTE — PROGRESS NOTES
HPI:    Patient ID: Annamaria Dominguez is a 63 year old female.    Cough  Associated symptoms include ear pain, postnasal drip, rhinorrhea and wheezing. Pertinent negatives include no chest pain, chills, eye redness, fever, headaches or shortness of breath.   Wheezing   Associated symptoms include coughing, ear pain and rhinorrhea. Pertinent negatives include no chest pain, chills, diarrhea, fever, headaches, neck pain or shortness of breath.   patient has been sick for a week, family members are ill also .   She has a fever, cough , wheezing, post nasal drip , and right ear pain .   She has not been using advair consistently .   She has yellow and green sputum production , and sinus fullness.     Review of Systems   Constitutional:  Negative for activity change, appetite change, chills, fatigue and fever.   HENT:  Positive for congestion, ear pain, postnasal drip, rhinorrhea, sinus pressure and sinus pain. Negative for hearing loss, nosebleeds, sneezing and tinnitus.    Eyes:  Negative for pain, discharge, redness, itching and visual disturbance.   Respiratory:  Positive for cough and wheezing. Negative for apnea, chest tightness and shortness of breath.    Cardiovascular:  Negative for chest pain, palpitations and leg swelling.   Gastrointestinal:  Negative for abdominal distention, blood in stool, constipation, diarrhea and nausea.   Endocrine: Negative for cold intolerance and heat intolerance.   Genitourinary:  Negative for difficulty urinating, dysuria, flank pain, frequency, genital sores, hematuria and urgency.   Musculoskeletal:  Negative for neck pain and neck stiffness.   Skin: Negative.    Allergic/Immunologic: Negative for immunocompromised state.   Neurological:  Negative for dizziness, syncope, facial asymmetry, weakness, light-headedness, numbness and headaches.   Psychiatric/Behavioral: Negative.              Current Outpatient Medications   Medication Sig Dispense Refill    albuterol (VENTOLIN HFA) 108  (90 Base) MCG/ACT Inhalation Aero Soln Inhale 2 puffs into the lungs every 4 (four) hours as needed for Wheezing. 18 g 1    cefuroxime 250 MG Oral Tab Take 1 tablet (250 mg total) by mouth 2 (two) times daily. 20 tablet 0    semaglutide (OZEMPIC, 1 MG/DOSE,) 4 MG/3ML Subcutaneous Solution Pen-injector Inject 1 mg into the skin once a week. 1 each 3    losartan 50 MG Oral Tab Take 1 tablet (50 mg total) by mouth daily. 30 tablet 3    venlafaxine ER 75 MG Oral Capsule SR 24 Hr Take 1 capsule (75 mg total) by mouth daily. 90 capsule 1    JARDIANCE 25 MG Oral Tab TAKE 1 TABLET BY MOUTH DAILY 90 tablet 1    atorvastatin 20 MG Oral Tab       dicyclomine 10 MG Oral Cap Take 1 capsule (10 mg total) by mouth 4 (four) times daily. 60 capsule 5    ALPRAZolam 0.5 MG Oral Tab TAKE 1 TABLET BY MOUTH EVERY EVENING 30 tablet 5    HUMULIN R U-500, CONCENTRATED, 500 UNIT/ML Subcutaneous Solution ADMINISTER 180-195 UNITS UNDER THE SKIN THREE TIMES DAILY BEFORE MEALS 110 mL 1    pantoprazole 40 MG Oral Tab EC Take 1 tablet (40 mg total) by mouth every morning. 90 tablet 3    fluticasone-salmeterol (ADVAIR DISKUS) 250-50 MCG/ACT Inhalation Aerosol Powder, Breath Activated Inhale 1 puff into the lungs every 12 (twelve) hours. 60 each 5    Continuous Glucose Sensor (DEXCOM G6 SENSOR) Does not apply Misc CHANGE SENSOR EVERY 10 DAYS 3 each 3    Continuous Blood Gluc Transmit (DEXCOM G6 TRANSMITTER) Does not apply Misc 1 Device every 3 (three) months. 1 each 1    Continuous Blood Gluc Transmit (DEXCOM G6 TRANSMITTER) Does not apply Misc Replace transmitter every 3 months 1 each 0    Continuous Blood Gluc Transmit (DEXCOM G6 TRANSMITTER) Does not apply Misc 1 Device every 3 (three) months. 1 each 1    Insulin Syringe/Needle U-500 (BD INSULIN SYRINGE U-500) 31G X 6MM 0.5 ML Does not apply Misc Inject 1 each into the skin 3 (three) times daily with meals. 100 each 2    Glucose Blood (ONETOUCH VERIO) In Vitro Strip USE TO CHECK BLOOD SUGAR 3 TO  4 TIMES DAILY 400 strip 0    Continuous Blood Gluc Sensor (DEXCOM G6 SENSOR) Does not apply Misc USE AND CHANGE EVERY 10 DAYS 9 each 1    fluocinonide 0.05 % External Cream  (Patient not taking: Reported on 11/1/2023)      HYDROcodone-acetaminophen (NORCO) 5-325 MG Oral Tab Take 1 tablet by mouth every 8 (eight) hours as needed for Pain. (Patient not taking: Reported on 4/9/2024) 45 tablet 0    Continuous Blood Gluc  (DEXCOM G7 ) Does not apply Device 1 each daily. 1 each 0    Continuous Blood Gluc Sensor (DEXCOM G6 SENSOR) Does not apply Misc USE AS DIRECTED AND CHANGE EVERY 10 DAYS 9 each 1    TRUEPLUS PEN NEEDLES 32G X 4 MM Does not apply Misc USE AS DIRECTED 100 each 0    ONETOUCH DELICA PLUS [id number] Does not apply Misc TEST FOUR TIMES DAILY 400 each 0    Continuous Blood Gluc  (DEXCOM G6 ) Does not apply Device Use  daily to check blood sugars. 1 each 0    TRUEPLUS 5-BEVEL PEN NEEDLES 31G X 5 MM Does not apply Misc USE AS DIRECTED 6 TIMES DAILY 200 each 0    Insulin Pen Needle 31G X 5 MM Does not apply Misc Please use daily as directed 200 each 0    Insulin Syringe/Needle U-500 31G X 6MM 0.5 ML Does not apply Misc 3 each by Does not apply route 3 (three) times daily with meals. 300 each 0    Insulin Syringe/Needle U-500 31G X 6MM 0.5 ML Does not apply Misc 1 Syringe by Does not apply route 3 (three) times daily with meals. 300 each 0    Continuous Blood Gluc  (DEXCOM G6 ) Does not apply Device 1 each by Does not apply route continuous. 1 Device 0    Blood Glucose Monitoring Suppl (ONETOUCH VERIO) w/Device Does not apply Kit 1 Device by Does not apply route daily. 1 kit 0    OneTouch UltraSoft Lancets Does not apply Misc USE TO CHECK BLOOD SUGAR 3-4X/ each 1    Blood Glucose Monitoring Suppl (FREESTYLE LITE) Does not apply Device TEST QID  UTD  0     Allergies:  Allergies   Allergen Reactions    Caviar SWELLING     throat    Latex RASH     Bacitracin UNKNOWN      PHYSICAL EXAM:   /50   Pulse 103   Temp 99.9 °F (37.7 °C)   Ht 4' 10\" (1.473 m)   Wt 109 lb (49.4 kg)   SpO2 95%   BMI 22.78 kg/m²      Physical Exam  Constitutional:       Appearance: She is well-developed.   HENT:      Head:      Comments: Right canal is red .      Mouth/Throat:      Pharynx: Posterior oropharyngeal erythema present.   Eyes:      Pupils: Pupils are equal, round, and reactive to light.   Neck:      Thyroid: No thyromegaly.   Cardiovascular:      Rate and Rhythm: Normal rate and regular rhythm.      Heart sounds: Normal heart sounds. No murmur heard.     No gallop.   Pulmonary:      Effort: Pulmonary effort is normal. No respiratory distress.      Breath sounds: Decreased air movement present. Wheezing present. No rales.   Chest:      Chest wall: No tenderness.   Abdominal:      General: There is no distension.      Palpations: There is no mass.      Tenderness: There is no abdominal tenderness. There is no guarding or rebound.   Musculoskeletal:         General: No tenderness.      Cervical back: Normal range of motion.   Skin:     General: Skin is warm.      Coloration: Skin is not pale.      Findings: No erythema or rash.   Neurological:      Mental Status: She is alert and oriented to person, place, and time.      Motor: No abnormal muscle tone.      Coordination: Coordination normal.   Psychiatric:         Mood and Affect: Mood normal.         Behavior: Behavior normal.         Thought Content: Thought content normal.         Judgment: Judgment normal.                ASSESSMENT/PLAN:   Subacute maxillary sinusitis  Ceftin  250 mg bid   Ventolin 2 puffs qid .     Mild intermittent asthma with exacerbation (HCC)  Rescue inhaler needed.     No orders of the defined types were placed in this encounter.      Meds This Visit:  Requested Prescriptions     Signed Prescriptions Disp Refills    albuterol (VENTOLIN HFA) 108 (90 Base) MCG/ACT Inhalation Aero Soln 18 g 1      Sig: Inhale 2 puffs into the lungs every 4 (four) hours as needed for Wheezing.    cefuroxime 250 MG Oral Tab 20 tablet 0     Sig: Take 1 tablet (250 mg total) by mouth 2 (two) times daily.       Imaging & Referrals:  None       ID#[id number]

## 2024-06-27 RX ORDER — PROCHLORPERAZINE 25 MG/1
SUPPOSITORY RECTAL
Qty: 1 EACH | Refills: 0 | Status: SHIPPED | OUTPATIENT
Start: 2024-06-27

## 2024-06-27 NOTE — TELEPHONE ENCOUNTER
Endocrine Refill protocol for CGM supplies       Protocol Criteria:pass  Appointment with Endocrinology completed in the last 12 months or scheduled in the next 6 months     Verify appointment has been completed or scheduled in the appropriate timeline. If so can send a 90 day supply with 1 refill.        Last completed office visit:6/18/2024 Anna Rainey APRN   Next scheduled Follow up: No future appointments.

## 2024-06-28 RX ORDER — ALPRAZOLAM 0.5 MG/1
TABLET ORAL
Qty: 30 TABLET | Refills: 5 | OUTPATIENT
Start: 2024-06-28

## 2024-07-01 DIAGNOSIS — E11.39 TYPE 2 DIABETES MELLITUS WITH OTHER OPHTHALMIC COMPLICATION, WITH LONG-TERM CURRENT USE OF INSULIN (HCC): ICD-10-CM

## 2024-07-01 DIAGNOSIS — Z79.4 TYPE 2 DIABETES MELLITUS WITH OTHER OPHTHALMIC COMPLICATION, WITH LONG-TERM CURRENT USE OF INSULIN (HCC): ICD-10-CM

## 2024-07-02 ENCOUNTER — TELEPHONE (OUTPATIENT)
Dept: ENDOCRINOLOGY CLINIC | Facility: CLINIC | Age: 64
End: 2024-07-02

## 2024-07-02 DIAGNOSIS — Z79.4 TYPE 2 DIABETES MELLITUS WITH OTHER OPHTHALMIC COMPLICATION, WITH LONG-TERM CURRENT USE OF INSULIN (HCC): ICD-10-CM

## 2024-07-02 DIAGNOSIS — E11.39 TYPE 2 DIABETES MELLITUS WITH OTHER OPHTHALMIC COMPLICATION, WITH LONG-TERM CURRENT USE OF INSULIN (HCC): ICD-10-CM

## 2024-07-02 RX ORDER — SYRINGE,INSUL U-500,NDL,0.5ML 31GX15/64"
1 SYRINGE, EMPTY DISPOSABLE MISCELLANEOUS
Qty: 100 EACH | Refills: 1 | Status: SHIPPED | OUTPATIENT
Start: 2024-07-02

## 2024-07-02 NOTE — TELEPHONE ENCOUNTER
Endocrine Refill protocol for Glucose testing supplies       Protocol Criteria: PASS  Appointment with Endocrinology completed in the last 12 months or scheduled in the next 6 months     Verify appointment has been completed or scheduled in the appropriate timeline. If so can send a 90 day supply with 1 refill.        Last completed office visit: 6/18/2024 Anna Rainey APRN   Next scheduled Follow up: No future appointments.

## 2024-07-02 NOTE — TELEPHONE ENCOUNTER
HUMULIN R U-500, CONCENTRATED, 500 UNIT/ML Subcutaneous Solution, ADMINISTER 180-195 UNITS UNDER THE SKIN THREE TIMES DAILY BEFORE MEALS, Disp: 110 mL,     Plan does not covet this medication. Please call plan at [phone] to initiate PA

## 2024-07-04 NOTE — TELEPHONE ENCOUNTER
Refill Per Protocol     Very High  Drug-Drug: cefuroxime and pantoprazolePharmacologic effects and plasma concentrations of cefuroxime may be decreased by Acid Suppressing Medications.  Details  Override reason        Requested Prescriptions   Pending Prescriptions Disp Refills    pantoprazole 40 MG Oral Tab EC 90 tablet 3     Sig: Take 1 tablet (40 mg total) by mouth every morning.       Gastrointestional Medication Protocol Passed - 7/1/2024 10:30 PM        Passed - In person appointment or virtual visit in the past 12 mos or appointment in next 3 mos     Recent Outpatient Visits              1 week ago Subacute maxillary sinusitis    [facility] Darrius Gil MD    Office Visit    2 weeks ago Uncontrolled type 2 diabetes mellitus with hyperglycemia (HCC)    [facility] Anna Rainey APRN    Office Visit    4 months ago Vaginal yeast infection    [facility] Darrius Gil MD    Office Visit    5 months ago [facility]    5 months ago Dyslipidemia    [facility] Darrius Wade MD    Office Visit                               Recent Outpatient Visits              1 week ago Subacute maxillary sinusitis    [facility] Darrius Gil MD    Office Visit    2 weeks ago Uncontrolled type 2 diabetes mellitus with hyperglycemia (HCC)    [facility] Anna Rainey APRN    Office Visit    4 months ago Vaginal yeast infection    [facility] Darrius Gil MD    Office Visit    5 months [facility]    5 months ago Dyslipidemia    [facility] Darrius Gil MD    Office Visit

## 2024-07-06 NOTE — TELEPHONE ENCOUNTER
Per patient message from 12/14/23, PA was submitted at that time for humulin R U500 with unknown result.     I looked up PA on [employer] with the following information:    CaseId:[id number];Status:Approved;Review Type:Prior Auth;Coverage Start Date:11/14/2023;Coverage End Date:12/13/2024;    Therefore PA should still be valid.     Phoebe, I have pended an order with updated dosing instructions per recent OV. Once approved, please route back to staff so that we may call pharmacy to resolve PA issue. It could be that she is requesting refill too soon or simply is due for a new refill. Thanks.

## 2024-07-08 RX ORDER — PANTOPRAZOLE SODIUM 40 MG/1
40 TABLET, DELAYED RELEASE ORAL EVERY MORNING
Qty: 90 TABLET | Refills: 3 | Status: SHIPPED | OUTPATIENT
Start: 2024-07-08

## 2024-07-08 RX ORDER — INSULIN HUMAN 500 [IU]/ML
INJECTION, SOLUTION SUBCUTANEOUS
Qty: 80 ML | Refills: 1 | Status: SHIPPED | OUTPATIENT
Start: 2024-07-08

## 2024-07-09 RX ORDER — PANTOPRAZOLE SODIUM 40 MG/1
40 TABLET, DELAYED RELEASE ORAL EVERY MORNING
Qty: 90 TABLET | Refills: 3 | OUTPATIENT
Start: 2024-07-09

## 2024-07-23 ENCOUNTER — PATIENT MESSAGE (OUTPATIENT)
Dept: INTERNAL MEDICINE CLINIC | Facility: CLINIC | Age: 64
End: 2024-07-23

## 2024-07-23 DIAGNOSIS — G56.01 CARPAL TUNNEL SYNDROME OF RIGHT WRIST: Primary | ICD-10-CM

## 2024-07-24 NOTE — TELEPHONE ENCOUNTER
From: Annamaria Dominguez  To: Darrius Gil  Sent: 7/23/2024 10:25 AM CDT  Subject: Carpal tunnel     Hi doc. I have a question about my right hand. I have alot of pain and I need to see a hand specialist. Do I need a referral? I also need a dr that specializing in orthopedics. Let me know thank you. Annamaria  I had my other hand done at [facility] but I I don’t like the marshall so o want to go to someone else. I don’t remember his name

## 2024-07-26 NOTE — TELEPHONE ENCOUNTER
.Multilayer Compression Wrap   (Not Unna) Below the Knee    NAME:  Aide Rae  YOB: 1967  MEDICAL RECORD NUMBER:  [id number]  DATE:  7/26/2024    Multilayer compression wrap: Removed old Multilayer wrap if indicated and wash leg with mild soap/water.  Applied moisturizing agent to dry skin as needed.   Applied primary and secondary dressing as ordered.  Applied multilayered dressing below the knee to left lower leg.  Instructed patient/caregiver not to remove dressing and to keep it clean and dry.   Instructed patient/caregiver on complications to report to provider, such as pain, numbness in toes, heavy drainage, and slippage of dressing.  Instructed patient on purpose of compression dressing and on activity and exercise recommendations.      Electronically signed by Kiara Ackerman RN on 7/26/2024 at 1:46 PM    LOV 11/25/2019  F/U 02/24/2020

## 2024-07-29 ENCOUNTER — MED REC SCAN ONLY (OUTPATIENT)
Dept: INTERNAL MEDICINE CLINIC | Facility: CLINIC | Age: 64
End: 2024-07-29

## 2024-07-31 ENCOUNTER — PATIENT MESSAGE (OUTPATIENT)
Dept: INTERNAL MEDICINE CLINIC | Facility: CLINIC | Age: 64
End: 2024-07-31

## 2024-07-31 NOTE — TELEPHONE ENCOUNTER
From: Annamaria Dominguez  To: Darrius Gil  Sent: 7/31/2024 11:19 AM CDT  Subject: Referl    I will  referal when finished so I can make apt to get another brace. Let me know thank you

## 2024-08-02 DIAGNOSIS — E11.65 TYPE 2 DIABETES MELLITUS WITH HYPERGLYCEMIA, WITH LONG-TERM CURRENT USE OF INSULIN (HCC): ICD-10-CM

## 2024-08-02 DIAGNOSIS — Z79.4 TYPE 2 DIABETES MELLITUS WITH HYPERGLYCEMIA, WITH LONG-TERM CURRENT USE OF INSULIN (HCC): ICD-10-CM

## 2024-08-02 RX ORDER — BLOOD SUGAR DIAGNOSTIC
STRIP MISCELLANEOUS
Qty: 400 STRIP | Refills: 0 | Status: SHIPPED | OUTPATIENT
Start: 2024-08-02

## 2024-08-02 NOTE — TELEPHONE ENCOUNTER
Endocrine Refill protocol for Glucose testing supplies       Protocol Criteria:pass  Appointment with Endocrinology completed in the last 12 months or scheduled in the next 6 months     Verify appointment has been completed or scheduled in the appropriate timeline. If so can send a 90 day supply with 1 refill.        Last completed office visit: 6/18/2024 Anna Rainey APRN   Next scheduled Follow up: No future appointments.

## 2024-08-06 NOTE — TELEPHONE ENCOUNTER
Submitted PA for BD U-500 insulin syringes on Covermymeds. Will take 24-72 hours to receive a response. Can patient get a flu and pneumonia vaccine at her upcoming appt with you tomorrow? independent

## 2024-08-23 RX ORDER — FLUCONAZOLE 150 MG/1
150 TABLET ORAL ONCE
Qty: 1 TABLET | Refills: 0 | Status: SHIPPED | OUTPATIENT
Start: 2024-08-23 | End: 2024-08-23

## 2024-09-05 RX ORDER — PROCHLORPERAZINE 25 MG/1
SUPPOSITORY RECTAL
Qty: 1 EACH | Refills: 1 | Status: SHIPPED | OUTPATIENT
Start: 2024-09-05

## 2024-09-05 RX ORDER — PROCHLORPERAZINE 25 MG/1
SUPPOSITORY RECTAL
Qty: 9 EACH | Refills: 1 | Status: SHIPPED | OUTPATIENT
Start: 2024-09-05

## 2024-09-05 NOTE — TELEPHONE ENCOUNTER
Endocrine Refill protocol for CGM supplies     Protocol Criteria:  PASSED Reason: N/A MyChart sent to schedule appt.   Appointment with Endocrinology completed in the last 12 months or scheduled in the next 6 months     Verify appointment has been completed or scheduled in the appropriate timeline. If so can send a 90 day supply with 1 refill.     Last completed office visit:6/18/2024 Anna Rainey, MARCELA, RTC 3 months  Next scheduled Follow up: none scheduled.

## 2024-09-14 ENCOUNTER — OFFICE VISIT (OUTPATIENT)
Dept: INTERNAL MEDICINE CLINIC | Facility: CLINIC | Age: 64
End: 2024-09-14

## 2024-09-14 ENCOUNTER — TELEPHONE (OUTPATIENT)
Dept: INTERNAL MEDICINE CLINIC | Facility: CLINIC | Age: 64
End: 2024-09-14

## 2024-09-14 VITALS
BODY MASS INDEX: 22.92 KG/M2 | OXYGEN SATURATION: 98 % | HEART RATE: 69 BPM | RESPIRATION RATE: 20 BRPM | WEIGHT: 109.19 LBS | DIASTOLIC BLOOD PRESSURE: 67 MMHG | SYSTOLIC BLOOD PRESSURE: 132 MMHG | HEIGHT: 58 IN

## 2024-09-14 DIAGNOSIS — F32.A FATIGUE DUE TO DEPRESSION: ICD-10-CM

## 2024-09-14 DIAGNOSIS — L03.115 CELLULITIS OF RIGHT LEG: Primary | ICD-10-CM

## 2024-09-14 DIAGNOSIS — R53.83 FATIGUE DUE TO DEPRESSION: ICD-10-CM

## 2024-09-14 PROCEDURE — 99214 OFFICE O/P EST MOD 30 MIN: CPT | Performed by: INTERNAL MEDICINE

## 2024-09-14 RX ORDER — CEFADROXIL 500 MG/5ML
500 POWDER, FOR SUSPENSION ORAL 2 TIMES DAILY
Qty: 100 ML | Refills: 0 | Status: SHIPPED | OUTPATIENT
Start: 2024-09-14

## 2024-09-14 RX ORDER — VENLAFAXINE HYDROCHLORIDE 150 MG/1
150 CAPSULE, EXTENDED RELEASE ORAL DAILY
Qty: 90 CAPSULE | Refills: 3 | Status: SHIPPED | OUTPATIENT
Start: 2024-09-14 | End: 2025-09-09

## 2024-09-14 NOTE — PROGRESS NOTES
HPI:    Patient ID: Annamaria Dominguez is a 64 year old female.    Leg Pain   Pertinent negatives include no fever or numbness.   patient has braces for her ankles and the right one caused a friction skin change. And then the skin opened and was bleeding, it is red around the abrasion .  No fever no chills no streaks.     She has also had fatigue , poor sleep , already takes xanax at night , she has been on low dose effexor for a long time,     Review of Systems   Constitutional:  Positive for fatigue. Negative for activity change, appetite change, chills and fever.   HENT:  Negative for ear pain, hearing loss, nosebleeds, postnasal drip, rhinorrhea, sneezing and tinnitus.    Eyes:  Negative for pain, discharge, redness, itching and visual disturbance.   Respiratory:  Negative for apnea, cough, chest tightness, shortness of breath and wheezing.    Cardiovascular:  Negative for chest pain, palpitations and leg swelling.   Gastrointestinal:  Negative for abdominal distention, blood in stool, constipation, diarrhea and nausea.   Endocrine: Negative for cold intolerance and heat intolerance.   Genitourinary:  Negative for difficulty urinating, dysuria, flank pain, frequency, genital sores, hematuria and urgency.   Musculoskeletal:  Negative for neck pain and neck stiffness.   Skin:  Positive for wound.   Allergic/Immunologic: Negative for immunocompromised state.   Neurological:  Negative for dizziness, syncope, facial asymmetry, weakness, light-headedness, numbness and headaches.            Current Outpatient Medications   Medication Sig Dispense Refill   • Cefadroxil 500 MG/5ML Oral Recon Susp Take 5 mL (500 mg total) by mouth 2 (two) times daily. 100 mL 0   • venlafaxine ER (EFFEXOR XR) 150 MG Oral Capsule SR 24 Hr Take 1 capsule (150 mg total) by mouth daily. 90 capsule 3   • Continuous Glucose Sensor (DEXCOM G6 SENSOR) Does not apply Misc CHANGE SENSOR EVERY 10 DAYS 9 each 1   • Continuous Glucose Transmitter (DEXCOM  G6 TRANSMITTER) Does not apply Misc Replace transmitter every 3 months 1 each 1   • Glucose Blood (ONETOUCH VERIO) In Vitro Strip USE 3 TO 4 TIMES DAILY AS DIRECTED 400 strip 0   • pantoprazole 40 MG Oral Tab EC Take 1 tablet (40 mg total) by mouth every morning. 90 tablet 3   • HUMULIN R U-500, CONCENTRATED, 500 UNIT/ML Subcutaneous Solution Inject 175 units with breakfast, 100 units with lunch, and 165 units with dinner 80 mL 1   • Insulin Syringe/Needle U-500 (BD INSULIN SYRINGE U-500) 31G X 6MM 0.5 ML Does not apply Misc Inject 1 each into the skin 3 (three) times daily with meals. 100 each 1   • ALPRAZolam 0.5 MG Oral Tab TAKE 1 TABLET BY MOUTH EVERY EVENING 30 tablet 5   • albuterol (VENTOLIN HFA) 108 (90 Base) MCG/ACT Inhalation Aero Soln Inhale 2 puffs into the lungs every 4 (four) hours as needed for Wheezing. 18 g 1   • semaglutide (OZEMPIC, 1 MG/DOSE,) 4 MG/3ML Subcutaneous Solution Pen-injector Inject 1 mg into the skin once a week. 1 each 3   • losartan 50 MG Oral Tab Take 1 tablet (50 mg total) by mouth daily. 30 tablet 3   • JARDIANCE 25 MG Oral Tab TAKE 1 TABLET BY MOUTH DAILY 90 tablet 1   • atorvastatin 20 MG Oral Tab      • Continuous Blood Gluc Transmit (DEXCOM G6 TRANSMITTER) Does not apply Misc 1 Device every 3 (three) months. 1 each 1   • Continuous Blood Gluc Transmit (DEXCOM G6 TRANSMITTER) Does not apply Misc 1 Device every 3 (three) months. 1 each 1   • Continuous Blood Gluc Sensor (DEXCOM G6 SENSOR) Does not apply Misc USE AND CHANGE EVERY 10 DAYS 9 each 1   • Continuous Blood Gluc  (DEXCOM G7 ) Does not apply Device 1 each daily. 1 each 0   • Continuous Blood Gluc Sensor (DEXCOM G6 SENSOR) Does not apply Misc USE AS DIRECTED AND CHANGE EVERY 10 DAYS 9 each 1   • TRUEPLUS PEN NEEDLES 32G X 4 MM Does not apply Misc USE AS DIRECTED 100 each 0   • ONETOUCH DELICA PLUS [id number] Does not apply Misc TEST FOUR TIMES DAILY 400 each 0   • Continuous Blood Gluc  (DEXCOM G6  ) Does not apply Device Use  daily to check blood sugars. 1 each 0   • TRUEPLUS 5-BEVEL PEN NEEDLES 31G X 5 MM Does not apply Misc USE AS DIRECTED 6 TIMES DAILY 200 each 0   • Insulin Pen Needle 31G X 5 MM Does not apply Misc Please use daily as directed 200 each 0   • Insulin Syringe/Needle U-500 31G X 6MM 0.5 ML Does not apply Misc 3 each by Does not apply route 3 (three) times daily with meals. 300 each 0   • Insulin Syringe/Needle U-500 31G X 6MM 0.5 ML Does not apply Misc 1 Syringe by Does not apply route 3 (three) times daily with meals. 300 each 0   • Continuous Blood Gluc  (DEXCOM G6 ) Does not apply Device 1 each by Does not apply route continuous. 1 Device 0   • Blood Glucose Monitoring Suppl (ONETOUCH VERIO) w/Device Does not apply Kit 1 Device by Does not apply route daily. 1 kit 0   • OneTouch UltraSoft Lancets Does not apply Misc USE TO CHECK BLOOD SUGAR 3-4X/ each 1   • Blood Glucose Monitoring Suppl (FREESTYLE LITE) Does not apply Device TEST QID  UTD  0   • fluocinonide 0.05 % External Cream  (Patient not taking: Reported on 11/1/2023)     • fluticasone-salmeterol (ADVAIR DISKUS) 250-50 MCG/ACT Inhalation Aerosol Powder, Breath Activated Inhale 1 puff into the lungs every 12 (twelve) hours. (Patient not taking: Reported on 9/14/2024) 60 each 5     Allergies:  Allergies   Allergen Reactions   • Caviar SWELLING     throat   • Latex RASH   • Bacitracin UNKNOWN      PHYSICAL EXAM:   /67 (BP Location: Right arm, Patient Position: Sitting, Cuff Size: adult)   Pulse 69   Resp 20   Ht 4' 10\" (1.473 m)   Wt 109 lb 3.2 oz (49.5 kg)   SpO2 98%   BMI 22.82 kg/m²      Physical Exam  Constitutional:       Appearance: She is well-developed.   Eyes:      Pupils: Pupils are equal, round, and reactive to light.   Neck:      Thyroid: No thyromegaly.   Cardiovascular:      Rate and Rhythm: Normal rate and regular rhythm.      Heart sounds: Normal heart sounds. No  murmur heard.     No gallop.   Pulmonary:      Effort: Pulmonary effort is normal. No respiratory distress.      Breath sounds: Normal breath sounds. No wheezing or rales.   Chest:      Chest wall: No tenderness.   Abdominal:      General: There is no distension.      Palpations: There is no mass.      Tenderness: There is no abdominal tenderness. There is no guarding or rebound.   Musculoskeletal:         General: No tenderness.      Cervical back: Normal range of motion.        Legs:    Skin:     General: Skin is warm.      Coloration: Skin is not pale.      Findings: No erythema or rash.   Neurological:      Mental Status: She is alert and oriented to person, place, and time.      Motor: No abnormal muscle tone.      Coordination: Coordination normal.   Psychiatric:         Behavior: Behavior normal.         Thought Content: Thought content normal.         Judgment: Judgment normal.              ASSESSMENT/PLAN:   Fatigue due to depression  Increase effexor to 150 mg daily , recheck in 1 month and labs at that time.     Cellulitis of right leg  Duricef 500 mg bid.     No orders of the defined types were placed in this encounter.      Meds This Visit:  Requested Prescriptions     Signed Prescriptions Disp Refills   • Cefadroxil 500 MG/5ML Oral Recon Susp 100 mL 0     Sig: Take 5 mL (500 mg total) by mouth 2 (two) times daily.   • venlafaxine ER (EFFEXOR XR) 150 MG Oral Capsule SR 24 Hr 90 capsule 3     Sig: Take 1 capsule (150 mg total) by mouth daily.       Imaging & Referrals:  None       ID#[id number]

## 2024-09-14 NOTE — TELEPHONE ENCOUNTER
Left message to call back.  Patient called saying she just left the at office with Dr. Gil PCP and prescription is not covered.  Patient called from [phone].

## 2024-09-16 ENCOUNTER — TELEPHONE (OUTPATIENT)
Dept: ENDOCRINOLOGY CLINIC | Facility: CLINIC | Age: 64
End: 2024-09-16

## 2024-09-16 NOTE — TELEPHONE ENCOUNTER
Patient states everything is ok now because she used [facility] which only cost her $18, as opposed to usually not paying at all.  It was for the liquid antibiotic.

## 2024-09-16 NOTE — TELEPHONE ENCOUNTER
Continuous Blood Gluc Sensor (DEXCOM G6 SENSOR) Does not apply Misc, USE AS DIRECTED AND CHANGE EVERY 10 DAYS, Disp: 9 each, Rfl: 1  Plan does not cover med. Call plan [phone] for PA or call pharmacy to change med. Patient ID [id number]

## 2024-09-16 NOTE — TELEPHONE ENCOUNTER
Please call her . Was it the antibiotic? She was given 2 prescriptions that day, the other was effexor.

## 2024-09-19 ENCOUNTER — PATIENT MESSAGE (OUTPATIENT)
Dept: INTERNAL MEDICINE CLINIC | Facility: CLINIC | Age: 64
End: 2024-09-19

## 2024-09-19 RX ORDER — LOSARTAN POTASSIUM 50 MG/1
50 TABLET ORAL DAILY
Qty: 30 TABLET | Refills: 3 | Status: SHIPPED | OUTPATIENT
Start: 2024-09-19

## 2024-09-19 NOTE — TELEPHONE ENCOUNTER
Endocrine Refill protocol for oral antihypertensive medications    Protocol Criteria:  PASSED  Reason: N/A    -Appointment with Endocrinology completed in the last 6 months or scheduled in the next 3 months    -BMP or CMP has been completed in the last 12 months     -GFR is greater than or equal to 50    Verify the above has been completed or scheduled in the appropriate timeline. If so can send a 90 day supply with 1 refill.   Verify BMP or CMP has been completed in last year  Verify last GFR result     Last completed office visit:6/18/2024 Anna Rainey APRN   Next scheduled Follow up:   Future Appointments   Date Time Provider Department Center   10/14/2024 12:30 PM Darrius Gil MD [facility]   10/16/2024  2:00 PM Rober Rivers MD [facility] [place]   10/28/2024 12:30 PM Anna Rainey APRN [facility]      Last BMP or CMP completion date:  Lab Results   Component Value Date    GFRAA 110 07/19/2021    GFRNAA 96 07/19/2021    EGFRCR 70 06/18/2024

## 2024-09-20 RX ORDER — FLUCONAZOLE 150 MG/1
150 TABLET ORAL ONCE
Qty: 1 TABLET | Refills: 0 | Status: SHIPPED | OUTPATIENT
Start: 2024-09-20 | End: 2024-09-20

## 2024-09-20 NOTE — TELEPHONE ENCOUNTER
Spoke to patient, full name and date of birth verified.  Patient was prescribed antibiotic cefadroxil 500 mg on 9/14/24 by Dr. Gil.     Patient states every time she takes an antibiotic, she gets a yeast infection.   Patient has at least 1 week left of treatment.     Requests order for Diflucan.   Patient informed that Dr. Gil is out of the office.     Routing to pod mate for coverage.

## 2024-09-20 NOTE — TELEPHONE ENCOUNTER
Medication PA Requested: DEXCOM G6 SENSOR                                                          CoverMyMeds Used:  Key:  Quantity: 9 each  Day Supply: 90  Sig: CHANGE SENSOR EVERY 10 DAYS   DX Code: E11.65

## 2024-09-20 NOTE — TELEPHONE ENCOUNTER
From: Annamaria Dominguez  To: Darrius Gil  Sent: 9/19/2024 11:32 PM CDT  Subject: This my chart is giving me a problem. I need the yeast pill asap thank you    Asap if possible thank you. Let me know

## 2024-09-20 NOTE — TELEPHONE ENCOUNTER
From: Annamaria Dominguez  To: Darrius Gil  Sent: 9/19/2024 10:54 PM CDT  Subject: Hello Dr. I need the pill for a yeast infection from the antibiotics. Thank you I need a rush on this. Thank you    Annamaria

## 2024-09-24 NOTE — TELEPHONE ENCOUNTER
Medication PA Requested: DEXCOM G6 SENSOR                                                          CoverMyMeds Used: Yes  Key: [id number]  Quantity: 9 each  Day Supply: 90  Sig: CHANGE SENSOR EVERY 10 DAYS   DX Code: E11.65      Cover My Meds submitted, last office visit 6/18 and A1C 6/18  Awaiting determination

## 2024-09-26 ENCOUNTER — PATIENT MESSAGE (OUTPATIENT)
Dept: INTERNAL MEDICINE CLINIC | Facility: CLINIC | Age: 64
End: 2024-09-26

## 2024-09-26 RX ORDER — PROCHLORPERAZINE 25 MG/1
SUPPOSITORY RECTAL
Qty: 1 EACH | Refills: 1 | Status: SHIPPED | OUTPATIENT
Start: 2024-09-26

## 2024-09-26 RX ORDER — FLUCONAZOLE 150 MG/1
150 TABLET ORAL ONCE
Qty: 1 TABLET | Refills: 0 | Status: SHIPPED | OUTPATIENT
Start: 2024-09-26 | End: 2024-09-26

## 2024-09-26 RX ORDER — PROCHLORPERAZINE 25 MG/1
SUPPOSITORY RECTAL
Qty: 9 EACH | Refills: 1 | Status: CANCELLED | OUTPATIENT
Start: 2024-09-26

## 2024-09-26 RX ORDER — PROCHLORPERAZINE 25 MG/1
1 SUPPOSITORY RECTAL
Qty: 1 EACH | Refills: 1 | Status: CANCELLED | OUTPATIENT
Start: 2024-09-26

## 2024-09-26 RX ORDER — PROCHLORPERAZINE 25 MG/1
SUPPOSITORY RECTAL
Qty: 9 EACH | Refills: 1 | Status: SHIPPED | OUTPATIENT
Start: 2024-09-26

## 2024-09-26 RX ORDER — PROCHLORPERAZINE 25 MG/1
1 SUPPOSITORY RECTAL
Qty: 1 EACH | Refills: 1 | Status: SHIPPED | OUTPATIENT
Start: 2024-09-26

## 2024-09-26 RX ORDER — BLOOD-GLUCOSE METER
1 EACH MISCELLANEOUS DAILY
Qty: 1 KIT | Refills: 0 | Status: SHIPPED | OUTPATIENT
Start: 2024-09-26

## 2024-09-26 NOTE — TELEPHONE ENCOUNTER
Endocrine Refill protocol for CGM supplies     Protocol Criteria:  PASSED     If below requirement is met, send a 90-day supply with 1 refill per provider protocol.     Verify appointment with Endocrinology completed in the last 12 months or scheduled in the next 6 months     Last completed office visit:6/18/2024 Anna Rainey APRN   Next scheduled Follow up:   Future Appointments   Date Time Provider Department Center   10/28/2024 12:30 PM Anna Rainey APRN [facility]

## 2024-09-26 NOTE — TELEPHONE ENCOUNTER
Dr. Carver: you gave Diflucan x1 dose on 9/20 (covering for Dr. Gil) per patient itching/burning is still present . Was on recent antibiotic. Pended for review.

## 2024-09-26 NOTE — TELEPHONE ENCOUNTER
From: Annamaria Dominguez  To: Darrius Gil  Sent: 9/26/2024 9:47 AM CDT  Subject: Dr Gil (tuyet)    The itching and burning I WAS USING FLUOCINOONIDE.     THANK YOU

## 2024-09-26 NOTE — TELEPHONE ENCOUNTER
She is on U500 insulin three times daily so CGM should be covered. Appears from forms scanned to chart that she was getting supplies from pharmacy but maybe this needs to go through DME?

## 2024-09-26 NOTE — TELEPHONE ENCOUNTER
From: Annamaria Dominguez  To: Darrius Gil  Sent: 9/26/2024 8:53 AM CDT  Subject: Yeast infection     Hi can you please send me another yeast pill. One did not finish the job. Thank you

## 2024-09-26 NOTE — TELEPHONE ENCOUNTER
Received fax from [employer] dated on 9/25/24 stating the dexom g6 sensor has been denied under medicare part d  due to is being a medical supply it is not directly related to the delivery of insulin to the body. [employer] cannot pay for drugs under medicare part d is they covered under medicare part a or b.    Mem #[id number]

## 2024-09-27 NOTE — TELEPHONE ENCOUNTER
Chart reviewed, Fluconazole sent. But End Date 24, now \"\" order.     RN Called pharmacy. Patient's date of birth and full name both confirmed.   Spoke with Nalini, to inquire if they received Prescription and are able to use it   Nalini says the Prescription has been received, and Medication is ready for  .

## 2024-09-27 NOTE — TELEPHONE ENCOUNTER
Spoke with [facility] Medicare CGM dept ([phone]). Dexcom Rxs have been approved and completed form on file. [facility] pharmacy should be able to fill.

## 2024-10-01 RX ORDER — FLUOCINONIDE 0.5 MG/G
1 CREAM TOPICAL 2 TIMES DAILY
Qty: 30 G | Refills: 2 | Status: SHIPPED | OUTPATIENT
Start: 2024-10-01

## 2024-10-01 NOTE — TELEPHONE ENCOUNTER
Please review. Protocol Failed; No Protocol    Requested Prescriptions   Pending Prescriptions Disp Refills    fluocinonide 0.05 % External Cream  0       There is no refill protocol information for this order            Future Appointments         Provider Department Appt Notes    In 1 week Darrius Gil MD [facility]     In 2 weeks Rober Rivers MD [facility], [address], [place] right carpal tunnel; advised [place] location    In 3 weeks Anna Rainey APRN [facility] Would like flu and Covid shot. As well          Recent Outpatient Visits              2 weeks ago Cellulitis of right leg    [facility] Darrius Gil MD    Office Visit    3 months ago Subacute maxillary sinusitis    [facility] Darrius Gil MD    Office Visit    3 months ago Uncontrolled type 2 diabetes mellitus with hyperglycemia (HCC)    [facility] Anna Rainey APRN    Office Visit    7 months ago Vaginal yeast infection    [facility] Darrius Gil MD    Office Visit    8 months ago Screening for colon cancer    [facility]    Nurse Only

## 2024-10-14 ENCOUNTER — OFFICE VISIT (OUTPATIENT)
Dept: INTERNAL MEDICINE CLINIC | Facility: CLINIC | Age: 64
End: 2024-10-14

## 2024-10-14 VITALS
SYSTOLIC BLOOD PRESSURE: 110 MMHG | DIASTOLIC BLOOD PRESSURE: 60 MMHG | OXYGEN SATURATION: 94 % | HEIGHT: 58 IN | WEIGHT: 110 LBS | BODY MASS INDEX: 23.09 KG/M2 | HEART RATE: 93 BPM | TEMPERATURE: 99 F

## 2024-10-14 DIAGNOSIS — L03.115 CELLULITIS OF RIGHT LEG: ICD-10-CM

## 2024-10-14 DIAGNOSIS — Z79.4 TYPE 2 DIABETES MELLITUS WITH OTHER OPHTHALMIC COMPLICATION, WITH LONG-TERM CURRENT USE OF INSULIN (HCC): ICD-10-CM

## 2024-10-14 DIAGNOSIS — Z79.4 TYPE 2 DIABETES MELLITUS WITH OTHER OPHTHALMIC COMPLICATION, WITH LONG-TERM CURRENT USE OF INSULIN (HCC): Primary | ICD-10-CM

## 2024-10-14 DIAGNOSIS — E78.5 DYSLIPIDEMIA: ICD-10-CM

## 2024-10-14 DIAGNOSIS — E11.39 TYPE 2 DIABETES MELLITUS WITH OTHER OPHTHALMIC COMPLICATION, WITH LONG-TERM CURRENT USE OF INSULIN (HCC): ICD-10-CM

## 2024-10-14 DIAGNOSIS — E11.39 TYPE 2 DIABETES MELLITUS WITH OTHER OPHTHALMIC COMPLICATION, WITH LONG-TERM CURRENT USE OF INSULIN (HCC): Primary | ICD-10-CM

## 2024-10-14 LAB
ALBUMIN SERPL-MCNC: 4.4 G/DL (ref 3.2–4.8)
ALBUMIN/GLOB SERPL: 1.4 {RATIO} (ref 1–2)
ALP LIVER SERPL-CCNC: 84 U/L
ALT SERPL-CCNC: 31 U/L
ANION GAP SERPL CALC-SCNC: 8 MMOL/L (ref 0–18)
AST SERPL-CCNC: 37 U/L (ref ?–34)
BASOPHILS # BLD AUTO: 0.02 X10(3) UL (ref 0–0.2)
BASOPHILS NFR BLD AUTO: 0.3 %
BILIRUB SERPL-MCNC: 0.6 MG/DL (ref 0.2–1.1)
BUN BLD-MCNC: 19 MG/DL (ref 9–23)
BUN/CREAT SERPL: 22.1 (ref 10–20)
CALCIUM BLD-MCNC: 9.9 MG/DL (ref 8.7–10.4)
CHLORIDE SERPL-SCNC: 106 MMOL/L (ref 98–112)
CHOLEST SERPL-MCNC: 103 MG/DL (ref ?–200)
CO2 SERPL-SCNC: 23 MMOL/L (ref 21–32)
CREAT BLD-MCNC: 0.86 MG/DL
CREAT UR-SCNC: 21.2 MG/DL
DEPRECATED RDW RBC AUTO: 53.1 FL (ref 35.1–46.3)
EGFRCR SERPLBLD CKD-EPI 2021: 76 ML/MIN/1.73M2 (ref 60–?)
EOSINOPHIL # BLD AUTO: 0.14 X10(3) UL (ref 0–0.7)
EOSINOPHIL NFR BLD AUTO: 2.2 %
ERYTHROCYTE [DISTWIDTH] IN BLOOD BY AUTOMATED COUNT: 16.1 % (ref 11–15)
EST. AVERAGE GLUCOSE BLD GHB EST-MCNC: 177 MG/DL (ref 68–126)
FASTING PATIENT LIPID ANSWER: NO
FASTING STATUS PATIENT QL REPORTED: NO
GLOBULIN PLAS-MCNC: 3.1 G/DL (ref 2–3.5)
GLUCOSE BLD-MCNC: 345 MG/DL (ref 70–99)
HBA1C MFR BLD: 7.8 % (ref ?–5.7)
HCT VFR BLD AUTO: 35.3 %
HDLC SERPL-MCNC: 44 MG/DL (ref 40–59)
HGB BLD-MCNC: 11 G/DL
IMM GRANULOCYTES # BLD AUTO: 0.02 X10(3) UL (ref 0–1)
IMM GRANULOCYTES NFR BLD: 0.3 %
LDLC SERPL CALC-MCNC: 30 MG/DL (ref ?–100)
LYMPHOCYTES # BLD AUTO: 1.78 X10(3) UL (ref 1–4)
LYMPHOCYTES NFR BLD AUTO: 28.6 %
MCH RBC QN AUTO: 27.8 PG (ref 26–34)
MCHC RBC AUTO-ENTMCNC: 31.2 G/DL (ref 31–37)
MCV RBC AUTO: 89.1 FL
MICROALBUMIN UR-MCNC: <0.3 MG/DL
MONOCYTES # BLD AUTO: 0.58 X10(3) UL (ref 0.1–1)
MONOCYTES NFR BLD AUTO: 9.3 %
NEUTROPHILS # BLD AUTO: 3.69 X10 (3) UL (ref 1.5–7.7)
NEUTROPHILS # BLD AUTO: 3.69 X10(3) UL (ref 1.5–7.7)
NEUTROPHILS NFR BLD AUTO: 59.3 %
NONHDLC SERPL-MCNC: 59 MG/DL (ref ?–130)
OSMOLALITY SERPL CALC.SUM OF ELEC: 300 MOSM/KG (ref 275–295)
PLATELET # BLD AUTO: 144 10(3)UL (ref 150–450)
POTASSIUM SERPL-SCNC: 5.3 MMOL/L (ref 3.5–5.1)
PROT SERPL-MCNC: 7.5 G/DL (ref 5.7–8.2)
RBC # BLD AUTO: 3.96 X10(6)UL
SODIUM SERPL-SCNC: 137 MMOL/L (ref 136–145)
TRIGL SERPL-MCNC: 177 MG/DL (ref 30–149)
VLDLC SERPL CALC-MCNC: 24 MG/DL (ref 0–30)
WBC # BLD AUTO: 6.2 X10(3) UL (ref 4–11)

## 2024-10-14 NOTE — ASSESSMENT & PLAN NOTE
Glucose and hgba1c today .     Has a cardiac murmur sees cardiology , last visit at [facility] March 2024.

## 2024-10-14 NOTE — PROGRESS NOTES
HPI:    Patient ID: Annamaria Dominguez is a 64 year old female.    Bloating  Pertinent negatives include no chest pain, chills, coughing, fatigue, fever, headaches, nausea, neck pain, numbness or weakness.      patient is doing well, anxiety better with increase in Lexapro.    She has a heart murmur , saw cards at [facility] in March .   Cellulitus cleared up of the right leg.   No vaginal discharge after taking Diflucan . No chest pain , no shortness of breath . No swelling.   Has abd. Bloating, uses lactose free products , will try gluten free also .   Review of Systems   Constitutional:  Negative for activity change, appetite change, chills, fatigue and fever.   HENT:  Negative for ear pain, hearing loss, nosebleeds, postnasal drip, rhinorrhea, sneezing and tinnitus.    Eyes:  Negative for pain, discharge, redness, itching and visual disturbance.   Respiratory:  Negative for apnea, cough, chest tightness, shortness of breath and wheezing.    Cardiovascular:  Negative for chest pain, palpitations and leg swelling.   Gastrointestinal:  Positive for bloating. Negative for abdominal distention, blood in stool, constipation, diarrhea and nausea.   Endocrine: Negative for cold intolerance and heat intolerance.   Genitourinary:  Negative for difficulty urinating, dysuria, flank pain, frequency, genital sores, hematuria and urgency.   Musculoskeletal:  Negative for neck pain and neck stiffness.   Skin: Negative.    Allergic/Immunologic: Negative for immunocompromised state.   Neurological:  Negative for dizziness, syncope, facial asymmetry, weakness, light-headedness, numbness and headaches.   Psychiatric/Behavioral: Negative.              Current Outpatient Medications   Medication Sig Dispense Refill    LOSARTAN 50 MG Oral Tab TAKE 1 TABLET(50 MG) BY MOUTH DAILY 30 tablet 3    venlafaxine ER (EFFEXOR XR) 150 MG Oral Capsule SR 24 Hr Take 1 capsule (150 mg total) by mouth daily. 90 capsule 3    pantoprazole 40 MG Oral Tab EC Take  1 tablet (40 mg total) by mouth every morning. 90 tablet 3    HUMULIN R U-500, CONCENTRATED, 500 UNIT/ML Subcutaneous Solution Inject 175 units with breakfast, 100 units with lunch, and 165 units with dinner 80 mL 1    ALPRAZolam 0.5 MG Oral Tab TAKE 1 TABLET BY MOUTH EVERY EVENING 30 tablet 5    semaglutide (OZEMPIC, 1 MG/DOSE,) 4 MG/3ML Subcutaneous Solution Pen-injector Inject 1 mg into the skin once a week. 1 each 3    JARDIANCE 25 MG Oral Tab TAKE 1 TABLET BY MOUTH DAILY 90 tablet 1    atorvastatin 20 MG Oral Tab       fluocinonide 0.05 % External Cream Apply 1 Application topically 2 (two) times daily. 30 g 2    Continuous Glucose Transmitter (DEXCOM G6 TRANSMITTER) Does not apply Misc 1 Device every 3 (three) months. 1 each 1    Continuous Glucose Sensor (DEXCOM G6 SENSOR) Does not apply Misc CHANGE SENSOR EVERY 10 DAYS 9 each 1    Continuous Glucose Transmitter (DEXCOM G6 TRANSMITTER) Does not apply Misc Replace transmitter every 3 months 1 each 1    Blood Glucose Monitoring Suppl (ONETOUCH VERIO) w/Device Does not apply Kit 1 Device daily. 1 kit 0    Cefadroxil 500 MG/5ML Oral Recon Susp Take 5 mL (500 mg total) by mouth 2 (two) times daily. (Patient not taking: Reported on 10/14/2024) 100 mL 0    Glucose Blood (ONETOUCH VERIO) In Vitro Strip USE 3 TO 4 TIMES DAILY AS DIRECTED 400 strip 0    Insulin Syringe/Needle U-500 (BD INSULIN SYRINGE U-500) 31G X 6MM 0.5 ML Does not apply Misc Inject 1 each into the skin 3 (three) times daily with meals. 100 each 1    albuterol (VENTOLIN HFA) 108 (90 Base) MCG/ACT Inhalation Aero Soln Inhale 2 puffs into the lungs every 4 (four) hours as needed for Wheezing. 18 g 1    Continuous Blood Gluc Transmit (DEXCOM G6 TRANSMITTER) Does not apply Misc 1 Device every 3 (three) months. 1 each 1    Continuous Blood Gluc Sensor (DEXCOM G6 SENSOR) Does not apply Misc USE AND CHANGE EVERY 10 DAYS 9 each 1    Continuous Blood Gluc  (DEXCOM G7 ) Does not apply Device 1  each daily. 1 each 0    Continuous Blood Gluc Sensor (DEXCOM G6 SENSOR) Does not apply Misc USE AS DIRECTED AND CHANGE EVERY 10 DAYS 9 each 1    fluticasone-salmeterol (ADVAIR DISKUS) 250-50 MCG/ACT Inhalation Aerosol Powder, Breath Activated Inhale 1 puff into the lungs every 12 (twelve) hours. (Patient not taking: Reported on 10/14/2024) 60 each 5    TRUEPLUS PEN NEEDLES 32G X 4 MM Does not apply Misc USE AS DIRECTED 100 each 0    ONETOUCH DELICA PLUS [id number] Does not apply Misc TEST FOUR TIMES DAILY 400 each 0    Continuous Blood Gluc  (DEXCOM G6 ) Does not apply Device Use  daily to check blood sugars. 1 each 0    TRUEPLUS 5-BEVEL PEN NEEDLES 31G X 5 MM Does not apply Misc USE AS DIRECTED 6 TIMES DAILY 200 each 0    Insulin Pen Needle 31G X 5 MM Does not apply Misc Please use daily as directed 200 each 0    Insulin Syringe/Needle U-500 31G X 6MM 0.5 ML Does not apply Misc 3 each by Does not apply route 3 (three) times daily with meals. 300 each 0    Insulin Syringe/Needle U-500 31G X 6MM 0.5 ML Does not apply Misc 1 Syringe by Does not apply route 3 (three) times daily with meals. 300 each 0    Continuous Blood Gluc  (DEXCOM G6 ) Does not apply Device 1 each by Does not apply route continuous. 1 Device 0    OneTouch UltraSoft Lancets Does not apply Misc USE TO CHECK BLOOD SUGAR 3-4X/ each 1    Blood Glucose Monitoring Suppl (FREESTYLE LITE) Does not apply Device TEST QID  UTD  0     Allergies:Allergies[1]   PHYSICAL EXAM:   /60   Pulse 93   Temp 98.6 °F (37 °C)   Ht 4' 10\" (1.473 m)   Wt 110 lb (49.9 kg)   SpO2 94%   BMI 22.99 kg/m²      Physical Exam  Constitutional:       Appearance: She is well-developed.   HENT:      Head: Normocephalic and atraumatic.   Eyes:      Pupils: Pupils are equal, round, and reactive to light.   Neck:      Thyroid: No thyromegaly.   Cardiovascular:      Rate and Rhythm: Normal rate and regular rhythm.      Heart sounds:  Murmur heard.      No gallop.   Pulmonary:      Effort: Pulmonary effort is normal. No respiratory distress.      Breath sounds: Normal breath sounds. No wheezing or rales.   Chest:      Chest wall: No tenderness.   Abdominal:      General: There is no distension.      Palpations: There is no mass.      Tenderness: There is no abdominal tenderness. There is no guarding or rebound.   Musculoskeletal:         General: No tenderness.      Cervical back: Normal range of motion.   Skin:     General: Skin is warm.      Coloration: Skin is not pale.      Findings: No erythema or rash.   Neurological:      Mental Status: She is alert and oriented to person, place, and time.      Motor: No abnormal muscle tone.      Coordination: Coordination normal.   Psychiatric:         Mood and Affect: Mood normal.         Behavior: Behavior normal.         Thought Content: Thought content normal.         Judgment: Judgment normal.                ASSESSMENT/PLAN:   Type 2 diabetes mellitus with other ophthalmic complication, with long-term current use of insulin (HCC)  Glucose and hgba1c today .     Has a cardiac murmur sees cardiology , last visit at [facility] March 2024.     Dyslipidemia  Labs today .     Cellulitis of right leg  Resolved.       Orders Placed This Encounter   Procedures    CBC With Differential With Platelet    Comp Metabolic Panel (14)    Hemoglobin A1C    Lipid Panel    Microalb/Creat Ratio, Random Urine    Fluzone trivalent vaccine, PF 0.5mL, 6mo+ (87402)       Meds This Visit:  Requested Prescriptions      No prescriptions requested or ordered in this encounter       Imaging & Referrals:  INFLUENZA VACCINE, TRI, PRESERV FREE, 0.5 ML       ID#[id number]       [1]   Allergies  Allergen Reactions    Caviar SWELLING     throat    Latex RASH    Bacitracin UNKNOWN

## 2024-10-14 NOTE — TELEPHONE ENCOUNTER
----- Message from Aria ALVES sent at 10/9/2024 11:25 AM CDT -----  Hi     Can you reach out for a follow-up with aly Due in October but he is booking out    Thanks   LOV:4/4/23    RTC: 3 Months    FU: 7/11/23    3 Month Supply Pending

## 2024-10-15 RX ORDER — SYRINGE,INSUL U-500,NDL,0.5ML 31GX15/64"
SYRINGE, EMPTY DISPOSABLE MISCELLANEOUS
Qty: 100 EACH | Refills: 1 | Status: SHIPPED | OUTPATIENT
Start: 2024-10-15 | End: 2025-01-09

## 2024-10-15 NOTE — TELEPHONE ENCOUNTER
Endocrine Refill protocol for Glucose testing supplies     Protocol Criteria: PASSED Reason: N/A    If below requirement is met, send a 90-day supply with 1 refill per provider protocol.    Verify appointment with Endocrinology completed in the last 6 months or scheduled in the next 3 months.    Last completed office visit: 6/18/2024 Anna Rainey APRN   Next scheduled Follow up:   Future Appointments   Date Time Provider Department Center   10/28/2024 12:30 PM [facility]   12/2/2024  9:45 AM [facility]

## 2024-10-29 RX ORDER — SEMAGLUTIDE 1.34 MG/ML
1 INJECTION, SOLUTION SUBCUTANEOUS WEEKLY
Qty: 1 EACH | Refills: 3 | Status: SHIPPED | OUTPATIENT
Start: 2024-10-29

## 2024-10-29 NOTE — TELEPHONE ENCOUNTER
Endocrine Refill protocol for oral and injectable diabetic medications    Protocol Criteria:  PASSED  Reason: N/A    If all below requirements are met, send a 90-day supply with 1 refill per provider protocol.    Verify appointment with Endocrinology completed in the last 6 months or scheduled in the next 3 months.  Verify A1C has been completed within the last 6 months and is below 8.5%     Last completed office visit: 6/18/2024 Anna Rainey APRN   Next scheduled Follow up:   Future Appointments   Date Time Provider Department Center   12/10/2024 10:00 AM Anna Rainey APRN [facility]      Last A1c result: Last A1c value was 7.8% done 10/14/2024.

## 2024-10-30 RX ORDER — EMPAGLIFLOZIN 25 MG/1
TABLET, FILM COATED ORAL DAILY
Qty: 90 TABLET | Refills: 1 | Status: SHIPPED | OUTPATIENT
Start: 2024-10-30

## 2024-10-30 NOTE — TELEPHONE ENCOUNTER
Endocrine Refill protocol for oral and injectable diabetic medications    Protocol Criteria:  PASSED  Reason: N/A    If all below requirements are met, send a 90-day supply with 1 refill per provider protocol.    Verify appointment with Endocrinology completed in the last 6 months or scheduled in the next 3 months.  Verify A1C has been completed within the last 6 months and is below 8.5%     Last completed office visit: 6/18/2024 Anna Rainey APRN   Next scheduled Follow up:   Future Appointments   Date Time Provider Department Center   12/10/2024 10:00 AM Anna Rainey APRN [facility]      Last A1c result: Last A1c value was 7.8% done 10/14/2024.

## 2024-12-16 RX ORDER — LOSARTAN POTASSIUM 50 MG/1
50 TABLET ORAL DAILY
Qty: 30 TABLET | Refills: 3 | Status: SHIPPED | OUTPATIENT
Start: 2024-12-16

## 2024-12-16 NOTE — TELEPHONE ENCOUNTER
Endocrine refill protocol for basal insulins     Protocol Criteria: PASSED Reason: N/A    If all below requirements are met, send a 90-day supply with 1 refill per provider protocol.       Verify Appointment with Endocrinology completed in the last 6 months or scheduled in the next 3 months.  Verify A1C has been completed within the last 6 months and is below 8.5%     Last completed office visit:6/18/2024 Anna Rainey APRN   Next scheduled Follow up:   Future Appointments   Date Time Provider Department Center   2/10/2025  9:45 AM Anna Rainey APRN [facility]      Last A1c result: Last A1c value was 7.8% done 10/14/2024.

## 2024-12-16 NOTE — TELEPHONE ENCOUNTER
Office visit 9/14/24 dicyclomine marked as discontinued - Dose Adjustment, but no new prescription is marked in chart.  [employer] Message sent to patient to clarify  The original prescription was discontinued on 9/14/2024 by Darrius Gil MD for the following reason: Dose adjustment. Renewing this prescription may not be appropriate.

## 2024-12-17 ENCOUNTER — MED REC SCAN ONLY (OUTPATIENT)
Dept: INTERNAL MEDICINE CLINIC | Facility: CLINIC | Age: 64
End: 2024-12-17

## 2024-12-17 RX ORDER — DICYCLOMINE HYDROCHLORIDE 10 MG/1
10 CAPSULE ORAL 4 TIMES DAILY
Qty: 60 CAPSULE | Refills: 5 | Status: SHIPPED | OUTPATIENT
Start: 2024-12-17

## 2024-12-17 NOTE — TELEPHONE ENCOUNTER
Refill passed per [facility] protocol.          Will file in chart as: DICYCLOMINE 10 MG Oral Cap     The original prescription was discontinued on 9/14/2024 by Darrius Gil MD for the following reason: Dose adjustment. Renewing this prescription may not be appropriate.    Sig: TAKE 1 CAPSULE(10 MG) BY MOUTH FOUR TIMES DAILY     Is refill appropriate?    Please see patients MyChart Message      Annamaria CORONADO Angelica SUMNER [facility] (supporting Maria C Adan CPhT)10 hours ago (8:49 PM)       Yes need a refill. Thank you       Maria C Adan CPhT13 hours ago (5:38 PM)       Requested Prescriptions   Pending Prescriptions Disp Refills    DICYCLOMINE 10 MG Oral Cap [Pharmacy Med Name: DICYCLOMINE 10MG CAPSULES] 60 capsule 5     Sig: TAKE 1 CAPSULE(10 MG) BY MOUTH FOUR TIMES DAILY       Gastrointestional Medication Protocol Passed - 12/17/2024  7:30 AM        Passed - In person appointment or virtual visit in the past 12 mos or appointment in next 3 mos     Recent Outpatient Visits              2 months ago Type 2 diabetes mellitus with other ophthalmic complication, with long-term current use of insulin ([facility])    [facility] Darrius Gil MD    Office Visit    3 months ago Cellulitis of right leg    [facility] Darrius Gil MD    Office Visit    5 months ago Subacute maxillary sinusitis    [facility] Darrius Gil MD    Office Visit    6 months ago Uncontrolled type 2 diabetes mellitus with hyperglycemia ([facility])    [facility] Anna Rainey APRN    Office Visit    9 months ago Vaginal yeast infection    [facility] Darrius Gil MD    Office Visit          Future Appointments         Provider Department Appt Notes    In 1 month Anna Rainey APRN [facility]  [facility] Follow up                       Recent Outpatient Visits              2 months ago Type 2 diabetes mellitus with other ophthalmic complication, with long-term current use of insulin ([facility])    [place] Darrius Gil MD    Office Visit    3 months ago Cellulitis of right leg    [facility] Darrius Gil MD    Office Visit    5 months ago Subacute maxillary sinusitis    [facility] Darrius Gil MD    Office Visit    6 months ago Uncontrolled type 2 diabetes mellitus with hyperglycemia ([facility])    [facility] Anna Rainey APRN    Office Visit    9 months ago Vaginal yeast infection    [facility] Darrius Gil MD    Office Visit          Future Appointments         Provider Department Appt Notes    In 1 month Anna Rainey APRN [facility] Follow up

## 2025-01-08 DIAGNOSIS — Z79.4 TYPE 2 DIABETES MELLITUS WITH OTHER OPHTHALMIC COMPLICATION, WITH LONG-TERM CURRENT USE OF INSULIN (HCC): ICD-10-CM

## 2025-01-08 DIAGNOSIS — E11.39 TYPE 2 DIABETES MELLITUS WITH OTHER OPHTHALMIC COMPLICATION, WITH LONG-TERM CURRENT USE OF INSULIN (HCC): ICD-10-CM

## 2025-01-09 RX ORDER — SYRINGE,INSUL U-500,NDL,0.5ML 31GX15/64"
SYRINGE, EMPTY DISPOSABLE MISCELLANEOUS
Qty: 100 EACH | Refills: 1 | Status: SHIPPED | OUTPATIENT
Start: 2025-01-09

## 2025-01-09 NOTE — TELEPHONE ENCOUNTER
Endocrine Refill protocol for Glucose testing supplies     Protocol Criteria: PASSED Reason: N/A    If below requirement is met, send a 90-day supply with 1 refill per provider protocol.    Verify appointment with Endocrinology completed in the last 6 months or scheduled in the next 3 months.    Last completed office visit: 6/18/2024 Anna Rainey APRN   Next scheduled Follow up:   Future Appointments   Date Time Provider Department Center   2/10/2025  9:45 AM Anna Rainey APRN [facility]

## 2025-01-30 RX ORDER — PROCHLORPERAZINE 25 MG/1
SUPPOSITORY RECTAL
Qty: 9 EACH | Refills: 1 | Status: SHIPPED | OUTPATIENT
Start: 2025-01-30

## 2025-01-30 NOTE — TELEPHONE ENCOUNTER
Endocrine Refill protocol for CGM supplies     Protocol Criteria:  PASSED Reason: N/A    If below requirement is met, send a 90-day supply with 1 refill per provider protocol.     Verify appointment with Endocrinology completed in the last 12 months or scheduled in the next 6 months     Last completed office visit:6/18/2024 Anna Rainey APRN   Next scheduled Follow up:   Future Appointments   Date Time Provider Department Center   2/10/2025  9:45 AM Anna Rainey APRN [facility]

## 2025-01-31 ENCOUNTER — OFFICE VISIT (OUTPATIENT)
Dept: INTERNAL MEDICINE CLINIC | Facility: CLINIC | Age: 65
End: 2025-01-31

## 2025-01-31 VITALS
WEIGHT: 106 LBS | BODY MASS INDEX: 22.25 KG/M2 | SYSTOLIC BLOOD PRESSURE: 110 MMHG | HEART RATE: 103 BPM | HEIGHT: 58 IN | DIASTOLIC BLOOD PRESSURE: 58 MMHG | OXYGEN SATURATION: 97 % | TEMPERATURE: 98 F

## 2025-01-31 DIAGNOSIS — E78.5 DYSLIPIDEMIA: ICD-10-CM

## 2025-01-31 DIAGNOSIS — Z12.31 VISIT FOR SCREENING MAMMOGRAM: ICD-10-CM

## 2025-01-31 DIAGNOSIS — E11.39 TYPE 2 DIABETES MELLITUS WITH OTHER OPHTHALMIC COMPLICATION, WITH LONG-TERM CURRENT USE OF INSULIN (HCC): ICD-10-CM

## 2025-01-31 DIAGNOSIS — Z00.00 ENCOUNTER FOR ANNUAL WELLNESS EXAM IN MEDICARE PATIENT: Primary | ICD-10-CM

## 2025-01-31 DIAGNOSIS — Z79.4 TYPE 2 DIABETES MELLITUS WITH OTHER OPHTHALMIC COMPLICATION, WITH LONG-TERM CURRENT USE OF INSULIN (HCC): ICD-10-CM

## 2025-01-31 DIAGNOSIS — I10 ESSENTIAL HYPERTENSION, BENIGN: ICD-10-CM

## 2025-01-31 PROBLEM — J01.00 SUBACUTE MAXILLARY SINUSITIS: Status: RESOLVED | Noted: 2024-06-24 | Resolved: 2025-01-31

## 2025-01-31 PROBLEM — B37.31 VAGINAL YEAST INFECTION: Status: RESOLVED | Noted: 2024-03-05 | Resolved: 2025-01-31

## 2025-01-31 PROBLEM — F32.A FATIGUE DUE TO DEPRESSION: Status: RESOLVED | Noted: 2024-09-14 | Resolved: 2025-01-31

## 2025-01-31 PROBLEM — Z12.11 COLON CANCER SCREENING: Status: RESOLVED | Noted: 2024-01-09 | Resolved: 2025-01-31

## 2025-01-31 PROBLEM — J45.21 MILD INTERMITTENT ASTHMA WITH EXACERBATION (HCC): Status: RESOLVED | Noted: 2024-06-24 | Resolved: 2025-01-31

## 2025-01-31 PROBLEM — L03.115 CELLULITIS OF RIGHT LEG: Status: RESOLVED | Noted: 2024-09-14 | Resolved: 2025-01-31

## 2025-01-31 PROBLEM — R53.83 FATIGUE DUE TO DEPRESSION: Status: RESOLVED | Noted: 2024-09-14 | Resolved: 2025-01-31

## 2025-01-31 PROCEDURE — G0439 PPPS, SUBSEQ VISIT: HCPCS | Performed by: INTERNAL MEDICINE

## 2025-01-31 RX ORDER — BIMATOPROST 3 UG/ML
1 SOLUTION TOPICAL
COMMUNITY
Start: 2024-10-16 | End: 2025-01-31 | Stop reason: ALTCHOICE

## 2025-01-31 NOTE — H&P
HPI:   Annamaria Dominguez is a 64 year old female who presents for a MA Supervisit.    Patient Active Problem List   Diagnosis    Dyslipidemia    Type 2 diabetes mellitus with other ophthalmic complication, with long-term current use of insulin ([facility])    Encounter for annual wellness exam in Medicare patient    Essential hypertension, benign    Visit for screening mammogram       General Health     In the past six months, have you lost more than 10 pounds without trying?: 2 - No    Has your appetite been poor?: No    Type of Diet: Diabetic    How does the patient maintain a good energy level?: Daily Walks    How would you describe your daily physical activity?: Moderate    How would you describe your current health state?: Good    How do you maintain positive mental well-being?: Visiting Family         Have you had any immunizations at another office such as Influenza, Hepatitis B, Tetanus, or Pneumococcal?: Yes     Functional Ability     Bathing or Showering: Able without help    Toileting: Able without help    Dressing: Able without help    Eating: Able without help    Driving: Able without help    Preparing your meals: Able without help    Managing money/bills: Able without help    Taking medications as prescribed: Able without help    Are you able to afford your medications?: Yes    Hearing Problems?: No     Functional Status     Hearing Problems?: No    Vision Problems? : Yes    Difficulty walking?: Yes    Difficulty dressing or bathing?: No    Problems with daily activities? : No    Memory Problems?: No      Fall/Risk Assessment                                                              Depression Screening (PHQ-2/PHQ-9): Over the LAST 2 WEEKS                      Advance Directives     Do you have a healthcare power of ?: Yes    Do you have a living will?: Yes   Was Medicare Assessment Questionnaire completed by patient and sent to HIM for scanning?Yes    Please go to \"Cognitive Assessment\" under  Medicare Assessment section in Charting, test patient and document.    Then, refresh your progress note to see your input here.  Cognitive Assessment     What day of the week is this?: Correct    What month is it?: Correct    What year is it?: Correct    Recall \"Ball\": Correct    Recall \"Flag\": Incorrect    Recall \"Tree\": Correct      ALLERGIES:   Allergies[1]    CURRENT MEDICATIONS:   Current Outpatient Medications   Medication Sig Dispense Refill    dicyclomine 10 MG Oral Cap Take 1 capsule (10 mg total) by mouth 4 (four) times daily. 60 capsule 5    LOSARTAN 50 MG Oral Tab TAKE 1 TABLET(50 MG) BY MOUTH DAILY 30 tablet 3    JARDIANCE 25 MG Oral Tab TAKE 1 TABLET BY MOUTH DAILY 90 tablet 1    semaglutide (OZEMPIC, 1 MG/DOSE,) 4 MG/3ML Subcutaneous Solution Pen-injector Inject 1 mg into the skin once a week. 1 each 3    venlafaxine ER (EFFEXOR XR) 150 MG Oral Capsule SR 24 Hr Take 1 capsule (150 mg total) by mouth daily. 90 capsule 3    pantoprazole 40 MG Oral Tab EC Take 1 tablet (40 mg total) by mouth every morning. 90 tablet 3    HUMULIN R U-500, CONCENTRATED, 500 UNIT/ML Subcutaneous Solution Inject 175 units with breakfast, 100 units with lunch, and 165 units with dinner 80 mL 1    ALPRAZolam 0.5 MG Oral Tab TAKE 1 TABLET BY MOUTH EVERY EVENING 30 tablet 5    atorvastatin 20 MG Oral Tab       diclofenac 1 % External Gel  (Patient not taking: Reported on 1/31/2025)      Continuous Glucose Sensor (DEXCOM G6 SENSOR) Does not apply Misc CHANGE SENSOR EVERY 10 DAYS 9 each 1    BD INSULIN SYRINGE U-500 31G X 6MM 0.5 ML Does not apply Misc INJECT 1 EACH INTO THE SKIN THREE TIMES DAILY WITH MEALS 100 each 1    fluocinonide 0.05 % External Cream Apply 1 Application topically 2 (two) times daily. 30 g 2    Continuous Glucose Transmitter (DEXCOM G6 TRANSMITTER) Does not apply Misc 1 Device every 3 (three) months. 1 each 1    Continuous Glucose Transmitter (DEXCOM G6 TRANSMITTER) Does not apply Misc Replace transmitter  every 3 months 1 each 1    Blood Glucose Monitoring Suppl (ONETOUCH VERIO) w/Device Does not apply Kit 1 Device daily. 1 kit 0    Cefadroxil 500 MG/5ML Oral Recon Susp Take 5 mL (500 mg total) by mouth 2 (two) times daily. (Patient not taking: Reported on 10/14/2024) 100 mL 0    Glucose Blood (ONETOUCH VERIO) In Vitro Strip USE 3 TO 4 TIMES DAILY AS DIRECTED 400 strip 0    albuterol (VENTOLIN HFA) 108 (90 Base) MCG/ACT Inhalation Aero Soln Inhale 2 puffs into the lungs every 4 (four) hours as needed for Wheezing. 18 g 1    fluticasone-salmeterol (ADVAIR DISKUS) 250-50 MCG/ACT Inhalation Aerosol Powder, Breath Activated Inhale 1 puff into the lungs every 12 (twelve) hours. (Patient not taking: Reported on 9/14/2024) 60 each 5    TRUEPLUS PEN NEEDLES 32G X 4 MM Does not apply Misc USE AS DIRECTED 100 each 0    ONETOUCH DELICA PLUS [id number] Does not apply Misc TEST FOUR TIMES DAILY 400 each 0    TRUEPLUS 5-BEVEL PEN NEEDLES 31G X 5 MM Does not apply Misc USE AS DIRECTED 6 TIMES DAILY 200 each 0    Insulin Pen Needle 31G X 5 MM Does not apply Misc Please use daily as directed 200 each 0    Insulin Syringe/Needle U-500 31G X 6MM 0.5 ML Does not apply Misc 3 each by Does not apply route 3 (three) times daily with meals. 300 each 0    Insulin Syringe/Needle U-500 31G X 6MM 0.5 ML Does not apply Misc 1 Syringe by Does not apply route 3 (three) times daily with meals. 300 each 0    OneTouch UltraSoft Lancets Does not apply Misc USE TO CHECK BLOOD SUGAR 3-4X/ each 1    Blood Glucose Monitoring Suppl (FREESTYLE LITE) Does not apply Device TEST QID  UTD  0      MEDICAL INFORMATION:   Past Medical History:    Anxiety state    Carpal tunnel syndrome, left    Diabetes (HCC)    Type 1    Heart murmur    controlled, cardiologist at [place] Shamarmegan Rosas MD    [place]    Neuropathy    BOTH LEGS AND FEET    Sepsis (HCC)    5 years ago    Visual impairment    eyeglasses      Past  Surgical History:   Procedure Laterality Date    Abdominoplasty      Tummy Tuck with Lipo Suction          x 2    Cataract      Colonoscopy      Colonoscopy      no polyps. has diverticuli. 2016, 10 year follow up    Colonoscopy N/A 2024    Procedure: COLONOSCOPY;  Surgeon: Marce Dozier MD;  Location: [facility] MAIN OR    Hysterectomy      laparoscopic total hysterectomy and \"removed 1 of tubes/ovaries\" for abnormal bleeding    Needle biopsy left Left 2022    Left US CNB    Wrist arthroscop,release xvers lig Left 10/01/2019    Left endoscopic carpal tunnel release      Family History   Problem Relation Age of Onset    Diabetes Father 55        Type 1/Type 2    Heart Disorder Father     Diabetes Brother     Heart Disorder Brother     Hypertension Brother       SOCIAL HISTORY:   Social History     Socioeconomic History    Marital status:    Occupational History    Occupation: Retired   Tobacco Use    Smoking status: Former     Current packs/day: 0.00     Types: Cigarettes     Quit date: 2017     Years since quittin.5    Smokeless tobacco: Never   Vaping Use    Vaping status: Never Used   Substance and Sexual Activity    Alcohol use: No     Alcohol/week: 0.0 standard drinks of alcohol    Drug use: Not Currently     Frequency: 7.0 times per week     Types: Cannabis     Comment: daily at night    Sexual activity: Not Currently     Partners: Male   Other Topics Concern    Blood Transfusions Yes     Comment:  and     Caffeine Concern Yes     Comment: coffee, tea, 32 oz daily    Exercise No   Social History Narrative    Live with      The patient does not use an assistive device..      The patient does live in a home with stairs.     Social Drivers of Health      [facility]    Social Connections    Received from [facility], [facility]  Stability     Occ: retired  : yes      REVIEW OF SYSTEMS:   GENERAL: feels well otherwise  SKIN: denies any unusual skin lesions  EYES: denies blurred vision or double vision  HEENT: denies nasal congestion, sinus pain or ST  LUNGS: denies shortness of breath with exertion  CARDIOVASCULAR: denies chest pain on exertion  GI: denies abdominal pain, denies heartburn  : denies dysuria, vaginal discharge or itching, no complaint of urinary incontinence   MUSCULOSKELETAL: denies back pain  NEURO: denies headaches  PSYCHE: denies depression or anxiety  HEMATOLOGIC: denies hx of anemia  ENDOCRINE: denies thyroid history  ALL/ASTHMA: denies hx of allergy or asthma    EXAM:   /58   Pulse 103   Temp 98.4 °F (36.9 °C)   Ht 4' 10\" (1.473 m)   Wt 106 lb (48.1 kg)   SpO2 97%   BMI 22.15 kg/m²      >   Wt Readings from Last 6 Encounters:   01/31/25 106 lb (48.1 kg)   10/14/24 110 lb (49.9 kg)   09/14/24 109 lb 3.2 oz (49.5 kg)   06/24/24 109 lb (49.4 kg)   06/18/24 110 lb (49.9 kg)   04/09/24 108 lb (49 kg)       >   BP Readings from Last 3 Encounters:   01/31/25 110/58   10/14/24 110/60   09/14/24 132/67       GENERAL: well developed, well nourished, in no apparent distress  SKIN: no rashes, no suspicious lesions  HEENT: atraumatic, normocephalic, ears and throat are clear     EYES: PERRLA, EOMI, conjunctiva are clear  Right Eye Visual Acuity: Corrected Left Eye Visual Acuity: Corrected         NECK: supple, no adenopathy, no bruits  CHEST: no chest tenderness  BREAST:   LUNGS: clear to auscultation  CARDIO: RRR without murmur  GI: good BS's, no masses, HSM or tenderness  : deferred  RECTAL: deferred  MUSCULOSKELETAL: back is not tender, FROM of the back  EXTREMITIES: no cyanosis, clubbing or edema.  Diabetic Foot Exam:  No skin breakdown, acute deformity, history of amputation, dystrophic nails or dry skin.  NEURO: Oriented times three, cranial nerves are intact, motor and sensory are grossly  intact    ASSESSMENT AND OTHER RELEVANT CHRONIC CONDITIONS:   Annamaria Dominguez is a 64 year old female who presents for a Medicare Assessment.     PLAN SUMMARY:   Visit for screening mammogram  Mammo in Feb.     Type 2 diabetes mellitus with other ophthalmic complication, with long-term current use of insulin (HCC)  Sees endo, labs soon ,   Sees ophtho    Essential hypertension, benign  Bp is controlled.     Encounter for annual wellness exam in Medicare patient  Physical today   Labs soon   Sees ophtho  Sees endo  Bone density after August.       Dyslipidemia  Lipids soon .        The patient indicates understanding of these issues and agrees to the plan.  The patient is asked to return in 3 months  for follow up.       PREVENTATIVE SERVICES  INDICATIONS AND SCHEDULE Internal Lab or Procedure External Lab or Procedure   Diabetes Screening      HbgA1C   Annually HEMOGLOBIN A1C (%)   Date Value   06/18/2024 7.3 (A)     HgbA1C (%)   Date Value   10/14/2024 7.8 (H)         No data to display                Fasting Blood Sugar (FSB)Annually No results found for: \"GLUCOSE\"    Cardiovascular Disease Screening     LDL Annually LDL Cholesterol (mg/dL)   Date Value   10/14/2024 30        EKG One Time done    Colorectal Cancer Screening      Colonoscopy Screen every 10 years Health Maintenance   Topic Date Due    Colorectal Cancer Screening  04/17/2034    Update Health Maintenance if applicable    Flex Sigmoidoscopy Screen every 10 years No results found for this or any previous visit.      No data to display                 Fecal Occult Blood Annually No results found for: \"FOB\"      No data to display                Glaucoma Screening      Ophthalmology Visit Annually done    Bone Density Screening      Dexascan Every two years Last Dexa Scan:    XR DEXA BONE DENSITOMETRY (CPT=77080) 08/04/2023        No data to display                Pap and Pelvic      Pap  Annually if high risk No recommendations at this time Update Health  Maintenance if applicable    Pap  Every two years No recommendations at this time Update Health Maintenance if applicable    Chlamydia  Annually if high risk No results found for: \"CHLAMYDIA\"      No data to display                Screening Mammogram      Mammogram  Annually Health Maintenance   Topic Date Due    Mammogram  02/23/2025    Update Health Maintenance if applicable   Immunizations      Influenza No orders found for this or any previous visit. Update Immunization Activity if applicable    Pneumococcal Orders placed or performed in visit on 10/22/19    PNEUMOCOCCAL IMM, 23   Orders placed or performed in visit on 10/10/18    PNEUMOCOCCAL VACC, 13 VIOLA IM    Update Immunization Activity if applicable    Hepatitis B No orders found for this or any previous visit. Update Immunization Activity if applicable    Tetanus Orders placed or performed in visit on 10/28/22    TETANUS, DIPHTHERIA TOXOIDS AND ACELLULAR PERTUSIS VACCINE (TDAP), >7 YEARS, IM USE    Update Immunization Activity if applicable        SPECIFIC DISEASE MONITORING Internal Lab or Procedure External Lab or Procedure   Annual Monitoring of Persistent     Medications (ACE/ARB, digoxin diuretics, anticonvulsants.)    Potassium  Annually Potassium (mmol/L)   Date Value   10/14/2024 5.3 (H)         No data to display                Creatinine  Annually Creatinine (mg/dL)   Date Value   10/14/2024 0.86         No data to display                BUN  Annually BUN (mg/dL)   Date Value   10/14/2024 19         No data to display                 Drug Serum Conc  Annually No results found for: \"DIGOXIN\", \"DIG\", \"VALP\"      No data to display                Diabetes      HgbA1C  Annually HEMOGLOBIN A1C (%)   Date Value   06/18/2024 7.3 (A)     HgbA1C (%)   Date Value   10/14/2024 7.8 (H)         No data to display                Creat/alb ratio  Annually      LDL  Annually LDL Cholesterol (mg/dL)   Date Value   10/14/2024 30         No data to display                  Dilated Eye exam  Annually     3/8/2024     1:00 PM   Data entered on:   Last Dilated Eye Exam 1/25/2024          No data to display                COPD      Spirometry Testing Annually No results found for this or any previous visit.      No data to display                  SCREENING SCHEDULE - FEMALE      SCREEN COVERAGE SCHEDULE  (If Indicated) LAST DONE   Dexa If at Risk q2 years done   Lipids All Patients q5 years LDL Cholesterol (mg/dL)   Date Value   10/14/2024 30      Colonoscopy All Patients q10 years Health Maintenance   Topic Date Due    Colorectal Cancer Screening  04/17/2034      FBS All Patients q1 year No results found for: \"GLUCOSE\"   Glaucoma If at high risk q1 year done   Pap If at high risk q1 year No recommendations at this time   Pap All Patients q2 year if indicated No recommendations at this time   Mammogram Age > 39 q1 year Health Maintenance   Topic Date Due    Mammogram  02/23/2025      EKG All Patients One at initial exam done   Vaccines:      Pneumococcal All Patients Once per lifetime Orders placed or performed in visit on 10/22/19    PNEUMOCOCCAL IMM, 23   Orders placed or performed in visit on 10/10/18    PNEUMOCOCCAL VACC, 13 VIOLA IM      Influenza All Patients Annually No orders found for this or any previous visit.   Hepatitis B If at Risk 3 scheduled doses No orders found for this or any previous visit.         SUGGESTED VACCINATIONS - Influenza, Pneumococcal, Zoster, Tetanus     Immunization History   Administered Date(s) Administered    Covid-19 Vaccine Pfizer 30 mcg/0.3 ml 03/22/2021, 04/12/2021, 10/20/2021    Covid-19 Vaccine Pfizer Bivalent 30mcg/0.3mL 09/28/2022    FLULAVAL 6 months & older 0.5 ml Prefilled syringe (23488) 10/10/2018, 10/22/2019, 10/02/2020    FLUZONE 6 months and older PFS 0.5 ml ([id number]) 12/11/2015, 10/10/2018, 10/20/2021, 10/06/2023    Fluvirin, 3 Years & >, Im 01/09/2014    Influenza 12/01/2013, 10/12/2022    Influenza Vaccine, trivalent (IIV3), PF  0.5mL ([id number]) 10/14/2024    Pfizer Covid-19 Vaccine 30mcg/0.3ml 12yrs+ 10/06/2023    Pneumococcal (Prevnar 13) 10/10/2018    Pneumococcal Conjugate PCV20 10/12/2022    Pneumovax 23 02/19/2014, 10/22/2019    TDAP 10/28/2022    Tb Intradermal Test 10/22/2019    Zoster Vaccine Live (Zostavax) 01/01/2013    Zoster Vaccine Recombinant Adjuvanted (Shingrix) 09/28/2022, 12/03/2022              [1]   Allergies  Allergen Reactions    Caviar SWELLING     throat    Latex RASH    Bacitracin UNKNOWN

## 2025-02-03 DIAGNOSIS — F41.9 ANXIETY: ICD-10-CM

## 2025-02-06 RX ORDER — ALPRAZOLAM 0.5 MG
TABLET ORAL
Qty: 30 TABLET | Refills: 5 | Status: SHIPPED | OUTPATIENT
Start: 2025-02-06

## 2025-02-06 NOTE — TELEPHONE ENCOUNTER
Please review; protocol failed/No Protocol    Recent Fills: 09/20/2024, 10/22/2024, 12/12/2024    Last Rx Written: 06/27/2024    Last Office Visit: 01/31/2025    Requested Prescriptions   Pending Prescriptions Disp Refills    ALPRAZOLAM 0.5 MG Oral Tab [Pharmacy Med Name: ALPRAZOLAM 0.5MG TABLETS] 30 tablet 0     Sig: TAKE 1 TABLET BY MOUTH EVERY EVENING       Controlled Substance Medication Failed - 2/6/2025  3:26 PM        Failed - This medication is a controlled substance - forward to provider to refill        Passed - Medication is active on med list           Future Appointments         Provider Department Appt Notes    In 4 days Anna Rainey APRN [facility] Diabetes f/u          Recent Outpatient Visits              6 days ago Encounter for annual wellness exam in Medicare patient    [facility] Darrius Gil MD    Office Visit    3 months ago Type 2 diabetes mellitus with other ophthalmic complication, with long-term current use of insulin ([facility])    [facility] Darrius Gil MD    Office Visit    4 months ago Cellulitis of right leg    [facility] Darrius Gil MD    Office Visit    7 months ago Subacute maxillary sinusitis    [facility] Darrius Gil MD    Office Visit    7 months ago Uncontrolled type 2 diabetes mellitus with hyperglycemia ([facility])    [facility] Anna Rainey APRN    Office Visit

## 2025-02-10 ENCOUNTER — OFFICE VISIT (OUTPATIENT)
Dept: ENDOCRINOLOGY CLINIC | Facility: CLINIC | Age: 65
End: 2025-02-10

## 2025-02-10 ENCOUNTER — LAB ENCOUNTER (OUTPATIENT)
Dept: LAB | Age: 65
End: 2025-02-10
Attending: INTERNAL MEDICINE
Payer: MEDICARE

## 2025-02-10 VITALS
HEART RATE: 74 BPM | HEIGHT: 58 IN | WEIGHT: 106 LBS | SYSTOLIC BLOOD PRESSURE: 133 MMHG | DIASTOLIC BLOOD PRESSURE: 81 MMHG | BODY MASS INDEX: 22.25 KG/M2

## 2025-02-10 DIAGNOSIS — E11.65 UNCONTROLLED TYPE 2 DIABETES MELLITUS WITH HYPERGLYCEMIA (HCC): Primary | ICD-10-CM

## 2025-02-10 DIAGNOSIS — Z79.4 TYPE 2 DIABETES MELLITUS WITH OTHER OPHTHALMIC COMPLICATION, WITH LONG-TERM CURRENT USE OF INSULIN (HCC): ICD-10-CM

## 2025-02-10 DIAGNOSIS — E11.39 TYPE 2 DIABETES MELLITUS WITH OTHER OPHTHALMIC COMPLICATION, WITH LONG-TERM CURRENT USE OF INSULIN (HCC): ICD-10-CM

## 2025-02-10 DIAGNOSIS — I10 ESSENTIAL HYPERTENSION, BENIGN: ICD-10-CM

## 2025-02-10 DIAGNOSIS — E78.5 DYSLIPIDEMIA: ICD-10-CM

## 2025-02-10 LAB
ALBUMIN SERPL-MCNC: 4.2 G/DL (ref 3.2–4.8)
ALBUMIN/GLOB SERPL: 1.2 {RATIO} (ref 1–2)
ALP LIVER SERPL-CCNC: 76 U/L
ALT SERPL-CCNC: 25 U/L
ANION GAP SERPL CALC-SCNC: 9 MMOL/L (ref 0–18)
AST SERPL-CCNC: 32 U/L (ref ?–34)
BASOPHILS # BLD AUTO: 0.03 X10(3) UL (ref 0–0.2)
BASOPHILS NFR BLD AUTO: 0.4 %
BILIRUB SERPL-MCNC: 0.5 MG/DL (ref 0.2–1.1)
BUN BLD-MCNC: 17 MG/DL (ref 9–23)
BUN/CREAT SERPL: 22.1 (ref 10–20)
CALCIUM BLD-MCNC: 9.3 MG/DL (ref 8.7–10.4)
CHLORIDE SERPL-SCNC: 104 MMOL/L (ref 98–112)
CHOLEST SERPL-MCNC: 105 MG/DL (ref ?–200)
CO2 SERPL-SCNC: 26 MMOL/L (ref 21–32)
CREAT BLD-MCNC: 0.77 MG/DL
CREAT UR-SCNC: 36.8 MG/DL
DEPRECATED RDW RBC AUTO: 52.9 FL (ref 35.1–46.3)
EGFRCR SERPLBLD CKD-EPI 2021: 87 ML/MIN/1.73M2 (ref 60–?)
EOSINOPHIL # BLD AUTO: 0.2 X10(3) UL (ref 0–0.7)
EOSINOPHIL NFR BLD AUTO: 2.8 %
ERYTHROCYTE [DISTWIDTH] IN BLOOD BY AUTOMATED COUNT: 16.6 % (ref 11–15)
EST. AVERAGE GLUCOSE BLD GHB EST-MCNC: 160 MG/DL (ref 68–126)
FASTING PATIENT LIPID ANSWER: YES
FASTING STATUS PATIENT QL REPORTED: YES
GLOBULIN PLAS-MCNC: 3.4 G/DL (ref 2–3.5)
GLUCOSE BLD-MCNC: 148 MG/DL (ref 70–99)
GLUCOSE BLOOD: 171
HBA1C MFR BLD: 7.2 % (ref ?–5.7)
HCT VFR BLD AUTO: 32.7 %
HDLC SERPL-MCNC: 48 MG/DL (ref 40–59)
HEMOGLOBIN A1C: 7.1 % (ref 4.3–5.6)
HGB BLD-MCNC: 9.7 G/DL
IMM GRANULOCYTES # BLD AUTO: 0.02 X10(3) UL (ref 0–1)
IMM GRANULOCYTES NFR BLD: 0.3 %
LDLC SERPL CALC-MCNC: 40 MG/DL (ref ?–100)
LYMPHOCYTES # BLD AUTO: 2.24 X10(3) UL (ref 1–4)
LYMPHOCYTES NFR BLD AUTO: 31.7 %
MCH RBC QN AUTO: 25.8 PG (ref 26–34)
MCHC RBC AUTO-ENTMCNC: 29.7 G/DL (ref 31–37)
MCV RBC AUTO: 87 FL
MICROALBUMIN UR-MCNC: <0.3 MG/DL
MONOCYTES # BLD AUTO: 0.77 X10(3) UL (ref 0.1–1)
MONOCYTES NFR BLD AUTO: 10.9 %
NEUTROPHILS # BLD AUTO: 3.8 X10 (3) UL (ref 1.5–7.7)
NEUTROPHILS # BLD AUTO: 3.8 X10(3) UL (ref 1.5–7.7)
NEUTROPHILS NFR BLD AUTO: 53.9 %
NONHDLC SERPL-MCNC: 57 MG/DL (ref ?–130)
OSMOLALITY SERPL CALC.SUM OF ELEC: 292 MOSM/KG (ref 275–295)
PLATELET # BLD AUTO: 193 10(3)UL (ref 150–450)
POTASSIUM SERPL-SCNC: 4.7 MMOL/L (ref 3.5–5.1)
PROT SERPL-MCNC: 7.6 G/DL (ref 5.7–8.2)
RBC # BLD AUTO: 3.76 X10(6)UL
SODIUM SERPL-SCNC: 139 MMOL/L (ref 136–145)
T4 FREE SERPL-MCNC: 0.8 NG/DL (ref 0.8–1.7)
TEST STRIP LOT #: NORMAL NUMERIC
TRIGL SERPL-MCNC: 84 MG/DL (ref 30–149)
TSI SER-ACNC: 2.82 UIU/ML (ref 0.55–4.78)
VLDLC SERPL CALC-MCNC: 12 MG/DL (ref 0–30)
WBC # BLD AUTO: 7.1 X10(3) UL (ref 4–11)

## 2025-02-10 PROCEDURE — 84439 ASSAY OF FREE THYROXINE: CPT

## 2025-02-10 PROCEDURE — 83036 HEMOGLOBIN GLYCOSYLATED A1C: CPT | Performed by: INTERNAL MEDICINE

## 2025-02-10 PROCEDURE — 83036 HEMOGLOBIN GLYCOSYLATED A1C: CPT

## 2025-02-10 PROCEDURE — 99214 OFFICE O/P EST MOD 30 MIN: CPT

## 2025-02-10 PROCEDURE — 82570 ASSAY OF URINE CREATININE: CPT

## 2025-02-10 PROCEDURE — 36415 COLL VENOUS BLD VENIPUNCTURE: CPT

## 2025-02-10 PROCEDURE — 85025 COMPLETE CBC W/AUTO DIFF WBC: CPT

## 2025-02-10 PROCEDURE — 84443 ASSAY THYROID STIM HORMONE: CPT

## 2025-02-10 PROCEDURE — 80061 LIPID PANEL: CPT

## 2025-02-10 PROCEDURE — 82043 UR ALBUMIN QUANTITATIVE: CPT

## 2025-02-10 PROCEDURE — 82947 ASSAY GLUCOSE BLOOD QUANT: CPT

## 2025-02-10 PROCEDURE — 80053 COMPREHEN METABOLIC PANEL: CPT

## 2025-02-10 RX ORDER — ATORVASTATIN CALCIUM 20 MG/1
20 TABLET, FILM COATED ORAL NIGHTLY
COMMUNITY
Start: 2025-02-10

## 2025-02-10 NOTE — PROGRESS NOTES
Return Office Visit     CHIEF COMPLAINT:    DM  Dyslipidemia     HISTORY OF PRESENT ILLNESS:  Annamaria Dominguez is a 64 year old female who presents for follow up for DM.     DM HISTORY  Diagnosed: Around age 36        HISTORY OF DIABETES COMPLICATIONS: :  History of Retinopathy: No - last eye exam: UTD- seeing optho every 6 months Dr. Tyler - last visit around 6 months ago     History of Neuropathy: Yes, numbness, symptoms stable   History of Nephropathy: No     ASSOCIATED COMPLICATIONS:   HTN: Yes  Hyperlipidemia: Yes  Coronary Artery Disease:  No  Cerebrovascular Disease: No        HOME GLUCOSE READINGS:   She is using Dexcom G7  Reviewed glucose readings for 2 weeks prior to visit date:    55% of glucose readings in target range ()  33% of glucose readings above target range (>180)  8% of glucose readings very high (>250)  4% of glucose readings below target range (<70)      Estimated HgA1c- 7.5%    CURRENT DIABETIC MEDICATIONS INCLUDE:  She is on 3 insulin injections a day    U 500 - 175 units with breakfast (typically cereal), 100 units with lunch- often skips lunch and so skips this dose, and then 150-175 units with dinner.     Ozempic- 1 mg subcutaneous weekly   Jardiance 25 mg daily    T/f MTF due to GI SE    --> Of note, eats cereal initially for breakfast and takes 175 units of U500 but then will typically eat larger brunch meal a few hours later- skips insulin at this time because she is worried it is too soon after breakfast meal leading to significant hyperglycemia following this meal.    MEALS:  Recall: Is trying to watch carbohydrates- breakfast is highest carb meal but sometimes dinner as well    Breakfast- cereal first and then 1 hour later eggs with toast and hashbrowns- fairly consistent meal   Lunch-(1-3pm) sub sandwich   Dinner (7pm) hamburger with fries, or porkchop with corn  Snacks- banana, apple, orange- between dinner bedtime   Beverages-diet soda, water or tea     EXERCISE:   Yes-  most days some walking     CURRENT MEDICATION:    Current Outpatient Medications   Medication Sig Dispense Refill    atorvastatin 20 MG Oral Tab Take 1 tablet (20 mg total) by mouth nightly.      ALPRAZolam 0.5 MG Oral Tab TAKE 1 TABLET BY MOUTH EVERY EVENING 30 tablet 5    Continuous Glucose Sensor (DEXCOM G6 SENSOR) Does not apply Misc CHANGE SENSOR EVERY 10 DAYS 9 each 1    BD INSULIN SYRINGE U-500 31G X 6MM 0.5 ML Does not apply Misc INJECT 1 EACH INTO THE SKIN THREE TIMES DAILY WITH MEALS 100 each 1    dicyclomine 10 MG Oral Cap Take 1 capsule (10 mg total) by mouth 4 (four) times daily. 60 capsule 5    LOSARTAN 50 MG Oral Tab TAKE 1 TABLET(50 MG) BY MOUTH DAILY 30 tablet 3    JARDIANCE 25 MG Oral Tab TAKE 1 TABLET BY MOUTH DAILY 90 tablet 1    semaglutide (OZEMPIC, 1 MG/DOSE,) 4 MG/3ML Subcutaneous Solution Pen-injector Inject 1 mg into the skin once a week. 1 each 3    fluocinonide 0.05 % External Cream Apply 1 Application topically 2 (two) times daily. 30 g 2    Continuous Glucose Transmitter (DEXCOM G6 TRANSMITTER) Does not apply Misc 1 Device every 3 (three) months. 1 each 1    Continuous Glucose Transmitter (DEXCOM G6 TRANSMITTER) Does not apply Misc Replace transmitter every 3 months 1 each 1    Blood Glucose Monitoring Suppl (ONETOUCH VERIO) w/Device Does not apply Kit 1 Device daily. 1 kit 0    venlafaxine ER (EFFEXOR XR) 150 MG Oral Capsule SR 24 Hr Take 1 capsule (150 mg total) by mouth daily. 90 capsule 3    Glucose Blood (ONETOUCH VERIO) In Vitro Strip USE 3 TO 4 TIMES DAILY AS DIRECTED 400 strip 0    pantoprazole 40 MG Oral Tab EC Take 1 tablet (40 mg total) by mouth every morning. 90 tablet 3    HUMULIN R U-500, CONCENTRATED, 500 UNIT/ML Subcutaneous Solution Inject 175 units with breakfast, 100 units with lunch, and 165 units with dinner 80 mL 1    albuterol (VENTOLIN HFA) 108 (90 Base) MCG/ACT Inhalation Aero Soln Inhale 2 puffs into the lungs every 4 (four) hours as needed for Wheezing. 18 g 1     atorvastatin 20 MG Oral Tab       TRUEPLUS PEN NEEDLES 32G X 4 MM Does not apply Misc USE AS DIRECTED 100 each 0    ONETOUCH DELICA PLUS [id number] Does not apply Misc TEST FOUR TIMES DAILY 400 each 0    TRUEPLUS 5-BEVEL PEN NEEDLES 31G X 5 MM Does not apply Misc USE AS DIRECTED 6 TIMES DAILY 200 each 0    Insulin Pen Needle 31G X 5 MM Does not apply Misc Please use daily as directed 200 each 0    Insulin Syringe/Needle U-500 31G X 6MM 0.5 ML Does not apply Misc 3 each by Does not apply route 3 (three) times daily with meals. 300 each 0    Insulin Syringe/Needle U-500 31G X 6MM 0.5 ML Does not apply Misc 1 Syringe by Does not apply route 3 (three) times daily with meals. 300 each 0    OneTouch UltraSoft Lancets Does not apply Misc USE TO CHECK BLOOD SUGAR 3-4X/ each 1    Blood Glucose Monitoring Suppl (FREESTYLE LITE) Does not apply Device TEST QID  UTD  0    diclofenac 1 % External Gel  (Patient not taking: Reported on 2/10/2025)      Cefadroxil 500 MG/5ML Oral Recon Susp Take 5 mL (500 mg total) by mouth 2 (two) times daily. (Patient not taking: Reported on 2/10/2025) 100 mL 0    fluticasone-salmeterol (ADVAIR DISKUS) 250-50 MCG/ACT Inhalation Aerosol Powder, Breath Activated Inhale 1 puff into the lungs every 12 (twelve) hours. (Patient not taking: Reported on 2/10/2025) 60 each 5         ALLERGY:  Allergies   Allergen Reactions    Caviar SWELLING     throat    Latex RASH    Bacitracin UNKNOWN       PAST MEDICAL, SOCIAL AND FAMILY HISTORY:  See past medical history marked as reviewed.  See past surgical history marked as reviewed.  See past family history marked as reviewed.  See past social history marked as reviewed.    ASSESSMENTS:       REVIEW OF SYSTEMS:  Constitutional: Negative for: weight change, fever, fatigue, cold/heat intolerance  Eyes: Negative for:  Visual changes, proptosis, blurring  ENT: Negative for:  dysphagia, neck swelling, dysphonia  Respiratory: Negative for:  dyspnea,  cough  Cardiovascular: Negative for:  chest pain, palpitations, orthopnea  GI: Negative for:  abdominal pain, nausea, vomiting, diarrhea, constipation, bleeding  Neurology: Negative for: headache, numbness, weakness  Genito-Urinary: Negative for: dysuria, frequency  Psychiatric: Negative for:  depression, anxiety  Hematology/Lymphatics: Negative for: bruising, lower extremity edema  Endocrine: Negative for: polyuria, polydypsia  Skin: Negative for: rash, blister, cellulitis,       PHYSICAL EXAM:    Vitals:    02/10/25 0941   BP: 133/81   Pulse: 74           General Appearance:  alert, well developed, in no acute distress  Nutritional:  no extreme weight gain or loss  Head: Atraumatic  Eyes:  normal conjunctivae, sclera., normal sclera and normal pupils  Throat/Neck: normal sound to voice. Normal hearing, normal speech  Respiratory:  Speaking in full sentences, non-labored. no increased work of breathing, no audible wheezing    Skin:  normal moisture and skin texture, no visible lesions  Hair and nails: normal scalp hair  Hematologic:  no excessive bruising  Neuro: motor grossly intact, moving all extremities without difficulty  Psychiatric:  oriented to time, self, and place  Extremities: no obvious extremity swelling, no lesions      DATA:     Pertinent labs reviewed      ASSESSMENT AND PLAN:     1. Type 2 DM: A1c: 6.3% 12/2022; 7.5% 4/2023; 7.3% 8/2023; 6.8% 12/2023; 7.3% 6/2024; 7.1% POC today     Plan:  -Discussed the pathogenesis, natural course of diabetes. Patient understands the importance of glycemic control and the implications of uncontrolled diabetes including Diabetic ketoacidosis and various micro vascular and macrovascular complications.  -Reviewed ABC's of diabetes  -Reviewed importance of SBGM- continue with Dexcom G7  -Reviewed glucose targets-  fasting and <180 post prandially  -Reviewed importance of following low CHO diet- recommend 135 grams of carbohydrate daily/ 45 grams per meal      Medications:  - Adjust Insulin U 500: 180 units with breakfast,  100 units with lunch, and then decrease dose to 150 units with dinner.     - Continue Ozempic to 1 mg subcutaneous weekly. Reviewed side effects and risks vs benefits of medication. - Notes GI side effects with increase to 1mg dose so will hold off increasing further.     -Continue  Jardiance 25 mg daily- reviewed side effects and risks vs benefits of medication. Reviewed importance of staying well hydrated on medication.       SE and CI of all medications have been discussed in detail.     - Continue Dexcom G7    -No nephropathy- last lab 10/2024- on losartan   - Instructed on importance of annual eye exams - UTD with optho   - abnormal foot exam - following with podiatry - saw 12/2024 and again 1/2025     - Hypoglycemia symptoms and treatment of hypoglycemia with rule of 15's discussed      -normotensive     - Lipids 10/2024- LDL- 30, on atorvastatin       RTC in 3 months but call sooner if sugars continue to be <70 or persistently >250  Anna Rainey, APRN  2/10/25  A total of  30 minutes was spent on obtaining history, reviewing pertinent imaging/labs and specialists notes, evaluating patient, providing multiple treatment options, reinforcing diet/exercise and compliance, and completing documentation.

## 2025-02-10 NOTE — PATIENT INSTRUCTIONS
Continue Ozempic 1 mg subcutaneous weekly   Continue Jardiance 25 mg once daily     Change U500 insulin to 180 units subcutaneous daily with breakfast, and decrease to 150 units with dinner.

## 2025-02-10 NOTE — PROGRESS NOTES
-----------------------------  Dexcom Clarity  -----------------------------  Annamaria Dominguez    YOB: 1960    Generated at: Mon, Feb 10, 2025 9:40 AM CST    Reporting period: Sun Jan 12, 2025 - Mon Feb 10, 2025  -----------------------------  Glucose Details    Average glucose: 173 mg/dL    GMI: 7.5%    Standard deviation: 55 mg/dL    Coefficient of Variation: 31.7%  -----------------------------  Time in Range    Very High: 8%    High: 33%    In Range: 55%    Low: 2%    Very Low: 2%    Target Range   mg/dL    -----------------------------  Sensor usage    Days with data: 29/30    Time active: 94%    Avg. calibrations per day: 0.0

## 2025-02-21 RX ORDER — ATORVASTATIN CALCIUM 20 MG/1
20 TABLET, FILM COATED ORAL NIGHTLY
Qty: 90 TABLET | Refills: 3 | Status: SHIPPED | OUTPATIENT
Start: 2025-02-21

## 2025-02-27 ENCOUNTER — OFFICE VISIT (OUTPATIENT)
Dept: INTERNAL MEDICINE CLINIC | Facility: CLINIC | Age: 65
End: 2025-02-27

## 2025-02-27 ENCOUNTER — NURSE TRIAGE (OUTPATIENT)
Dept: INTERNAL MEDICINE CLINIC | Facility: CLINIC | Age: 65
End: 2025-02-27

## 2025-02-27 VITALS
OXYGEN SATURATION: 94 % | HEART RATE: 101 BPM | SYSTOLIC BLOOD PRESSURE: 110 MMHG | DIASTOLIC BLOOD PRESSURE: 50 MMHG | HEIGHT: 58 IN | WEIGHT: 107 LBS | TEMPERATURE: 99 F | BODY MASS INDEX: 22.46 KG/M2

## 2025-02-27 DIAGNOSIS — J45.21 MILD INTERMITTENT ASTHMA WITH EXACERBATION (HCC): ICD-10-CM

## 2025-02-27 DIAGNOSIS — J01.00 SUBACUTE MAXILLARY SINUSITIS: Primary | ICD-10-CM

## 2025-02-27 PROCEDURE — 99214 OFFICE O/P EST MOD 30 MIN: CPT | Performed by: INTERNAL MEDICINE

## 2025-02-27 RX ORDER — FLUCONAZOLE 150 MG/1
150 TABLET ORAL ONCE
Qty: 1 TABLET | Refills: 0 | Status: SHIPPED | OUTPATIENT
Start: 2025-02-27 | End: 2025-02-27

## 2025-02-27 RX ORDER — METHYLPREDNISOLONE 4 MG/1
TABLET ORAL
Qty: 1 EACH | Refills: 0 | Status: SHIPPED | OUTPATIENT
Start: 2025-02-27

## 2025-02-27 RX ORDER — CEFUROXIME AXETIL 250 MG/1
250 TABLET ORAL 2 TIMES DAILY
Qty: 20 TABLET | Refills: 0 | Status: SHIPPED | OUTPATIENT
Start: 2025-02-27

## 2025-02-27 RX ORDER — ALBUTEROL SULFATE 90 UG/1
2 INHALANT RESPIRATORY (INHALATION) EVERY 4 HOURS PRN
Qty: 18 G | Refills: 5 | Status: SHIPPED | OUTPATIENT
Start: 2025-02-27

## 2025-02-27 RX ORDER — FLUTICASONE PROPIONATE AND SALMETEROL 250; 50 UG/1; UG/1
1 POWDER RESPIRATORY (INHALATION) EVERY 12 HOURS SCHEDULED
Qty: 60 EACH | Refills: 5 | Status: SHIPPED | OUTPATIENT
Start: 2025-02-27 | End: 2025-02-28

## 2025-02-27 NOTE — PROGRESS NOTES
HPI:    Patient ID: Annamaria Dominguez is a 64 year old female.    Cough  Associated symptoms include shortness of breath and wheezing. Pertinent negatives include no chest pain, chills, ear pain, eye redness, fever, headaches, postnasal drip or rhinorrhea.   Wheezing   Associated symptoms include coughing and shortness of breath. Pertinent negatives include no chest pain, chills, diarrhea, ear pain, fever, headaches, neck pain or rhinorrhea.   Chest Congestion  Associated symptoms include coughing. Pertinent negatives include no chest pain, chills, fatigue, fever, headaches, nausea, neck pain, numbness or weakness.   patient has been sick for a few days , cough with phlegm, wheezing , shortness of breath .   Using albuterol sometimes.   Grand daughter is ill also . No chest pain , she has had chills, no fever now.   Doesn't have advair or albuterol now.     Review of Systems   Constitutional:  Negative for activity change, appetite change, chills, fatigue and fever.   HENT:  Negative for ear pain, hearing loss, nosebleeds, postnasal drip, rhinorrhea, sneezing and tinnitus.    Eyes:  Negative for pain, discharge, redness, itching and visual disturbance.   Respiratory:  Positive for cough, shortness of breath and wheezing. Negative for apnea and chest tightness.    Cardiovascular:  Negative for chest pain, palpitations and leg swelling.   Gastrointestinal:  Negative for abdominal distention, blood in stool, constipation, diarrhea and nausea.   Endocrine: Negative for cold intolerance and heat intolerance.   Genitourinary:  Negative for difficulty urinating, dysuria, flank pain, frequency, genital sores, hematuria and urgency.   Musculoskeletal:  Negative for neck pain and neck stiffness.   Skin: Negative.    Allergic/Immunologic: Negative for immunocompromised state.   Neurological:  Negative for dizziness, syncope, facial asymmetry, weakness, light-headedness, numbness and headaches.   Psychiatric/Behavioral: Negative.               Current Outpatient Medications   Medication Sig Dispense Refill    cefuroxime 250 MG Oral Tab Take 1 tablet (250 mg total) by mouth 2 (two) times daily. 20 tablet 0    methylPREDNISolone (MEDROL) 4 MG Oral Tablet Therapy Pack As directed. 1 each 0    fluticasone-salmeterol (ADVAIR DISKUS) 250-50 MCG/ACT Inhalation Aerosol Powder, Breath Activated Inhale 1 puff into the lungs every 12 (twelve) hours. 60 each 5    albuterol (VENTOLIN HFA) 108 (90 Base) MCG/ACT Inhalation Aero Soln Inhale 2 puffs into the lungs every 4 (four) hours as needed for Wheezing. 18 g 5    fluconazole (DIFLUCAN) 150 MG Oral Tab Take 1 tablet (150 mg total) by mouth once for 1 dose. 1 tablet 0    atorvastatin 20 MG Oral Tab Take 1 tablet (20 mg total) by mouth nightly. 90 tablet 3    ALPRAZolam 0.5 MG Oral Tab TAKE 1 TABLET BY MOUTH EVERY EVENING 30 tablet 5    dicyclomine 10 MG Oral Cap Take 1 capsule (10 mg total) by mouth 4 (four) times daily. 60 capsule 5    LOSARTAN 50 MG Oral Tab TAKE 1 TABLET(50 MG) BY MOUTH DAILY 30 tablet 3    semaglutide (OZEMPIC, 1 MG/DOSE,) 4 MG/3ML Subcutaneous Solution Pen-injector Inject 1 mg into the skin once a week. 1 each 3    venlafaxine ER (EFFEXOR XR) 150 MG Oral Capsule SR 24 Hr Take 1 capsule (150 mg total) by mouth daily. 90 capsule 3    pantoprazole 40 MG Oral Tab EC Take 1 tablet (40 mg total) by mouth every morning. 90 tablet 3    HUMULIN R U-500, CONCENTRATED, 500 UNIT/ML Subcutaneous Solution Inject 175 units with breakfast, 100 units with lunch, and 165 units with dinner 80 mL 1    diclofenac 1 % External Gel  (Patient not taking: Reported on 2/10/2025)      Continuous Glucose Sensor (DEXCOM G6 SENSOR) Does not apply Misc CHANGE SENSOR EVERY 10 DAYS 9 each 1    BD INSULIN SYRINGE U-500 31G X 6MM 0.5 ML Does not apply Misc INJECT 1 EACH INTO THE SKIN THREE TIMES DAILY WITH MEALS 100 each 1    JARDIANCE 25 MG Oral Tab TAKE 1 TABLET BY MOUTH DAILY (Patient not taking: Reported on  2/27/2025) 90 tablet 1    fluocinonide 0.05 % External Cream Apply 1 Application topically 2 (two) times daily. 30 g 2    Continuous Glucose Transmitter (DEXCOM G6 TRANSMITTER) Does not apply Misc 1 Device every 3 (three) months. 1 each 1    Continuous Glucose Transmitter (DEXCOM G6 TRANSMITTER) Does not apply Misc Replace transmitter every 3 months 1 each 1    Blood Glucose Monitoring Suppl (ONETOUCH VERIO) w/Device Does not apply Kit 1 Device daily. 1 kit 0    Cefadroxil 500 MG/5ML Oral Recon Susp Take 5 mL (500 mg total) by mouth 2 (two) times daily. (Patient not taking: Reported on 10/14/2024) 100 mL 0    Glucose Blood (ONETOUCH VERIO) In Vitro Strip USE 3 TO 4 TIMES DAILY AS DIRECTED 400 strip 0    TRUEPLUS PEN NEEDLES 32G X 4 MM Does not apply Misc USE AS DIRECTED 100 each 0    ONETOUCH DELICA PLUS [id number] Does not apply Misc TEST FOUR TIMES DAILY 400 each 0    TRUEPLUS 5-BEVEL PEN NEEDLES 31G X 5 MM Does not apply Misc USE AS DIRECTED 6 TIMES DAILY 200 each 0    Insulin Pen Needle 31G X 5 MM Does not apply Misc Please use daily as directed 200 each 0    Insulin Syringe/Needle U-500 31G X 6MM 0.5 ML Does not apply Misc 3 each by Does not apply route 3 (three) times daily with meals. 300 each 0    Insulin Syringe/Needle U-500 31G X 6MM 0.5 ML Does not apply Misc 1 Syringe by Does not apply route 3 (three) times daily with meals. 300 each 0    OneTouch UltraSoft Lancets Does not apply Misc USE TO CHECK BLOOD SUGAR 3-4X/ each 1    Blood Glucose Monitoring Suppl (FREESTYLE LITE) Does not apply Device TEST QID  UTD  0     Allergies:Allergies[1]   PHYSICAL EXAM:   /50   Pulse 101   Temp 98.7 °F (37.1 °C)   Ht 4' 10\" (1.473 m)   Wt 107 lb (48.5 kg)   SpO2 94%   BMI 22.36 kg/m²      Physical Exam  Constitutional:       Appearance: She is well-developed.   Eyes:      Pupils: Pupils are equal, round, and reactive to light.   Neck:      Thyroid: No thyromegaly.   Cardiovascular:      Rate and Rhythm:  Normal rate and regular rhythm.      Heart sounds: Murmur heard.      Systolic murmur is present with a grade of 3/6.      No gallop.   Pulmonary:      Effort: Pulmonary effort is normal. No respiratory distress.      Breath sounds: No stridor. Wheezing and rales present. No rhonchi.   Chest:      Chest wall: No tenderness.   Abdominal:      General: There is no distension.      Palpations: There is no mass.      Tenderness: There is no abdominal tenderness. There is no guarding or rebound.   Musculoskeletal:         General: No tenderness.      Cervical back: Normal range of motion.   Skin:     General: Skin is warm.      Coloration: Skin is not pale.      Findings: No erythema or rash.   Neurological:      Mental Status: She is alert and oriented to person, place, and time.      Motor: No abnormal muscle tone.      Coordination: Coordination normal.   Psychiatric:         Mood and Affect: Mood normal.         Behavior: Behavior normal.         Thought Content: Thought content normal.         Judgment: Judgment normal.                ASSESSMENT/PLAN:   Subacute maxillary sinusitis  Ceftin bid       Mild intermittent asthma with exacerbation (HCC)  Medrol  Advair   Ventolin  Diflucan    No orders of the defined types were placed in this encounter.      Meds This Visit:  Requested Prescriptions     Signed Prescriptions Disp Refills    cefuroxime 250 MG Oral Tab 20 tablet 0     Sig: Take 1 tablet (250 mg total) by mouth 2 (two) times daily.    methylPREDNISolone (MEDROL) 4 MG Oral Tablet Therapy Pack 1 each 0     Sig: As directed.    fluticasone-salmeterol (ADVAIR DISKUS) 250-50 MCG/ACT Inhalation Aerosol Powder, Breath Activated 60 each 5     Sig: Inhale 1 puff into the lungs every 12 (twelve) hours.    albuterol (VENTOLIN HFA) 108 (90 Base) MCG/ACT Inhalation Aero Soln 18 g 5     Sig: Inhale 2 puffs into the lungs every 4 (four) hours as needed for Wheezing.    fluconazole (DIFLUCAN) 150 MG Oral Tab 1 tablet 0      Sig: Take 1 tablet (150 mg total) by mouth once for 1 dose.       Imaging & Referrals:  None       ID#[id number]       [1]   Allergies  Allergen Reactions    Caviar SWELLING     throat    Latex RASH    Bacitracin UNKNOWN

## 2025-02-27 NOTE — TELEPHONE ENCOUNTER
Action Requested: Summary for Provider     []  Critical Lab, Recommendations Needed  [] Need Additional Advice  []   FYI    []   Need Orders  [] Need Medications Sent to Pharmacy  []  Other     SUMMARY: Patient reports cough, slight sore throat, some chills, and wheezing, onset 2 days.  Per protocol, scheduled appointment today.  Advised to don face mask upon entry to clinic.    Future Appointments   Date Time Provider Department Center   2025  4:30 PM Darrius Gil MD [facility]   3/24/2025 11:40 AM [address]   2025  9:45 AM Anna Rainey APRN [facility]     Reason for call: Cough with wheeze  Onset: 2 days.    Spoke with patient,  verified.   No audible cough or wheeze noted.  She does have albuterol inhaler, advised to use it.  She is worried she may develop pneumonia.    Reason for Disposition   Wheezing is present    Protocols used: Cough-A-OH

## 2025-02-28 RX ORDER — METHYLPREDNISOLONE 4 MG/1
TABLET ORAL
Qty: 1 EACH | Refills: 0 | Status: CANCELLED | OUTPATIENT
Start: 2025-02-28

## 2025-02-28 RX ORDER — FLUTICASONE FUROATE AND VILANTEROL 200; 25 UG/1; UG/1
1 POWDER RESPIRATORY (INHALATION) DAILY
COMMUNITY
End: 2025-02-28

## 2025-02-28 RX ORDER — FLUTICASONE FUROATE AND VILANTEROL 200; 25 UG/1; UG/1
1 POWDER RESPIRATORY (INHALATION) DAILY
Qty: 28 EACH | Refills: 5 | Status: SHIPPED | OUTPATIENT
Start: 2025-02-28

## 2025-03-03 ENCOUNTER — MED REC SCAN ONLY (OUTPATIENT)
Dept: INTERNAL MEDICINE CLINIC | Facility: CLINIC | Age: 65
End: 2025-03-03

## 2025-03-24 ENCOUNTER — HOSPITAL ENCOUNTER (OUTPATIENT)
Dept: MAMMOGRAPHY | Age: 65
Discharge: HOME | End: 2025-03-24
Attending: INTERNAL MEDICINE
Payer: MEDICARE

## 2025-03-24 DIAGNOSIS — Z12.31 VISIT FOR SCREENING MAMMOGRAM: ICD-10-CM

## 2025-03-24 PROCEDURE — 77063 BREAST TOMOSYNTHESIS BI: CPT | Performed by: INTERNAL MEDICINE

## 2025-03-24 PROCEDURE — 77067 SCR MAMMO BI INCL CAD: CPT | Performed by: INTERNAL MEDICINE

## 2025-03-31 DIAGNOSIS — N64.89 BREAST ASYMMETRY: Primary | ICD-10-CM

## 2025-04-02 ENCOUNTER — HOSPITAL ENCOUNTER (OUTPATIENT)
Dept: ULTRASOUND IMAGING | Facility: HOSPITAL | Age: 65
Discharge: HOME | End: 2025-04-02
Attending: INTERNAL MEDICINE
Payer: MEDICARE

## 2025-04-02 ENCOUNTER — HOSPITAL ENCOUNTER (OUTPATIENT)
Dept: MAMMOGRAPHY | Facility: HOSPITAL | Age: 65
Discharge: HOME | End: 2025-04-02
Attending: INTERNAL MEDICINE
Payer: MEDICARE

## 2025-04-02 DIAGNOSIS — R92.8 ABNORMAL MAMMOGRAM: ICD-10-CM

## 2025-04-02 PROCEDURE — 77061 BREAST TOMOSYNTHESIS UNI: CPT | Performed by: INTERNAL MEDICINE

## 2025-04-02 PROCEDURE — 77065 DX MAMMO INCL CAD UNI: CPT | Performed by: INTERNAL MEDICINE

## 2025-04-02 PROCEDURE — 76642 ULTRASOUND BREAST LIMITED: CPT | Performed by: INTERNAL MEDICINE

## 2025-04-06 ENCOUNTER — PATIENT MESSAGE (OUTPATIENT)
Dept: INTERNAL MEDICINE CLINIC | Facility: CLINIC | Age: 65
End: 2025-04-06

## 2025-04-07 ENCOUNTER — PATIENT MESSAGE (OUTPATIENT)
Dept: INTERNAL MEDICINE CLINIC | Facility: CLINIC | Age: 65
End: 2025-04-07

## 2025-04-07 RX ORDER — SEMAGLUTIDE 1.34 MG/ML
1 INJECTION, SOLUTION SUBCUTANEOUS WEEKLY
Qty: 3 ML | Refills: 0 | Status: SHIPPED | OUTPATIENT
Start: 2025-04-07

## 2025-04-07 RX ORDER — FLUCONAZOLE 150 MG/1
150 TABLET ORAL ONCE
Qty: 1 TABLET | Refills: 0 | Status: SHIPPED | OUTPATIENT
Start: 2025-04-07 | End: 2025-04-07

## 2025-04-07 NOTE — TELEPHONE ENCOUNTER
Please respond directly to the patient if no additional staff support is required.      Clinical staff were the records received?

## 2025-04-07 NOTE — TELEPHONE ENCOUNTER
Endocrine Refill protocol for oral and injectable diabetic medications    Protocol Criteria:  PASSED      If all below requirements are met, send a 90-day supply with 1 refill per provider protocol.    Verify appointment with Endocrinology completed in the last 6 months or scheduled in the next 3 months.  Verify A1C has been completed within the last 6 months and is below 8.5%     Last completed office visit: 2/10/2025 Anna Rainey APRN   Next scheduled Follow up:   Future Appointments   Date Time Provider Department Center   5/12/2025  9:45 AM Anna Rainey APRN [facility]      Last A1c result: Last A1c value was 7.2% done 2/10/2025.

## 2025-04-10 RX ORDER — FLUCONAZOLE 150 MG/1
150 TABLET ORAL ONCE
Qty: 1 TABLET | Refills: 0 | Status: SHIPPED | OUTPATIENT
Start: 2025-04-10 | End: 2025-04-10

## 2025-04-22 ENCOUNTER — OFFICE VISIT (OUTPATIENT)
Dept: INTERNAL MEDICINE CLINIC | Facility: CLINIC | Age: 65
End: 2025-04-22

## 2025-04-22 VITALS
SYSTOLIC BLOOD PRESSURE: 120 MMHG | BODY MASS INDEX: 21.83 KG/M2 | WEIGHT: 104 LBS | DIASTOLIC BLOOD PRESSURE: 50 MMHG | HEIGHT: 58 IN | OXYGEN SATURATION: 99 % | TEMPERATURE: 98 F | HEART RATE: 100 BPM

## 2025-04-22 DIAGNOSIS — I10 ESSENTIAL HYPERTENSION, BENIGN: ICD-10-CM

## 2025-04-22 DIAGNOSIS — I35.0 NONRHEUMATIC AORTIC VALVE STENOSIS: ICD-10-CM

## 2025-04-22 DIAGNOSIS — D50.8 OTHER IRON DEFICIENCY ANEMIA: Primary | ICD-10-CM

## 2025-04-22 PROBLEM — D64.9 ABSOLUTE ANEMIA: Status: ACTIVE | Noted: 2025-04-22

## 2025-04-22 PROCEDURE — 99214 OFFICE O/P EST MOD 30 MIN: CPT | Performed by: INTERNAL MEDICINE

## 2025-04-22 RX ORDER — LOSARTAN POTASSIUM 25 MG/1
TABLET ORAL
COMMUNITY
Start: 2025-03-16

## 2025-04-22 NOTE — PROGRESS NOTES
HPI:    Patient ID: Annamaria Dominguez is a 64 year old female.    HPIpatient was in the hospital with pneumonia , she had an Echo, last was Sept of 2023 . She has aortic valve stenosis.  It has progressed.  She will be seeing CV Surgery for an evaluation , she had an angiogram, no obstructive disease of the coronary arteries .     She has a history of iron def.   She was supposed to be on iron in the past but stopped it .   She had a negative colonoscopy a year ago .  She has never had an EGD   Review of Systems   Constitutional:  Negative for activity change, appetite change, chills, fatigue and fever.   HENT:  Negative for ear pain, hearing loss, nosebleeds, postnasal drip, rhinorrhea, sneezing and tinnitus.    Eyes:  Negative for pain, discharge, redness, itching and visual disturbance.   Respiratory:  Negative for apnea, cough, chest tightness, shortness of breath and wheezing.    Cardiovascular:  Negative for chest pain, palpitations and leg swelling.   Gastrointestinal:  Negative for abdominal distention, blood in stool, constipation, diarrhea and nausea.   Endocrine: Negative for cold intolerance and heat intolerance.   Genitourinary:  Negative for difficulty urinating, dysuria, flank pain, frequency, genital sores, hematuria and urgency.   Musculoskeletal:  Negative for neck pain and neck stiffness.   Skin: Negative.    Allergic/Immunologic: Negative for immunocompromised state.   Neurological:  Negative for dizziness, syncope, facial asymmetry, weakness, light-headedness, numbness and headaches.   Psychiatric/Behavioral: Negative.            Current Medications[1]  Allergies:Allergies[2]   PHYSICAL EXAM:   /50   Pulse 100   Temp 98.4 °F (36.9 °C)   Ht 4' 10\" (1.473 m)   Wt 104 lb (47.2 kg)   SpO2 99%   BMI 21.74 kg/m²      Physical Exam  Constitutional:       Appearance: Normal appearance. She is well-developed.   Eyes:      Pupils: Pupils are equal, round, and reactive to light.   Neck:       Thyroid: No thyromegaly.   Cardiovascular:      Rate and Rhythm: Normal rate and regular rhythm.      Heart sounds: Murmur heard.      Systolic murmur is present with a grade of 4/6.      No gallop.   Pulmonary:      Effort: Pulmonary effort is normal. No respiratory distress.      Breath sounds: Normal breath sounds. No wheezing or rales.   Chest:      Chest wall: No tenderness.   Abdominal:      General: There is no distension.      Palpations: There is no mass.      Tenderness: There is no abdominal tenderness. There is no guarding or rebound.   Musculoskeletal:         General: No tenderness.      Cervical back: Normal range of motion.   Skin:     General: Skin is warm.      Coloration: Skin is not pale.      Findings: No erythema or rash.   Neurological:      Mental Status: She is alert and oriented to person, place, and time.      Motor: No abnormal muscle tone.      Coordination: Coordination normal.   Psychiatric:         Mood and Affect: Mood normal.         Behavior: Behavior normal.         Thought Content: Thought content normal.         Judgment: Judgment normal.                ASSESSMENT/PLAN:   Nonrheumatic aortic valve stenosis  Possible bicuspid aortic valve , seeing CV team at [facility] to evaluate for surgery .     Essential hypertension, benign  Bp is controlled.     Absolute anemia  Patient has had a hgb beter 9 and 11 for 2 years .  C scope a year ago was normal, iron was low in the past, she was placed on iron but stopped it .  She has not had an EGD   repeat labs in a week, start iron 325 mg daily , if hgb still low , GI referral for iron def. Anemia and possible EGD .     Orders Placed This Encounter   Procedures    CBC With Differential With Platelet    Iron And Tibc    Ferritin [E]       Meds This Visit:  Requested Prescriptions      No prescriptions requested or ordered in this encounter       Imaging & Referrals:  None       ID#[id number]       [1]   Current Outpatient Medications   Medication Sig  Dispense Refill    losartan 25 MG Oral Tab       semaglutide (OZEMPIC, 1 MG/DOSE,) 4 MG/3ML Subcutaneous Solution Pen-injector Inject 1 mg into the skin once a week. 3 mL 0    fluticasone furoate-vilanterol (BREO ELLIPTA) 200-25 MCG/ACT Inhalation Aerosol Powder, Breath Activated Inhale 1 puff into the lungs daily. 28 each 5    atorvastatin 20 MG Oral Tab Take 1 tablet (20 mg total) by mouth nightly. 90 tablet 3    ALPRAZolam 0.5 MG Oral Tab TAKE 1 TABLET BY MOUTH EVERY EVENING 30 tablet 5    dicyclomine 10 MG Oral Cap Take 1 capsule (10 mg total) by mouth 4 (four) times daily. 60 capsule 5    JARDIANCE 25 MG Oral Tab TAKE 1 TABLET BY MOUTH DAILY 90 tablet 1    pantoprazole 40 MG Oral Tab EC Take 1 tablet (40 mg total) by mouth every morning. 90 tablet 3    HUMULIN R U-500, CONCENTRATED, 500 UNIT/ML Subcutaneous Solution Inject 175 units with breakfast, 100 units with lunch, and 165 units with dinner 80 mL 1    cefuroxime 250 MG Oral Tab Take 1 tablet (250 mg total) by mouth 2 (two) times daily. (Patient not taking: Reported on 4/22/2025) 20 tablet 0    albuterol (VENTOLIN HFA) 108 (90 Base) MCG/ACT Inhalation Aero Soln Inhale 2 puffs into the lungs every 4 (four) hours as needed for Wheezing. 18 g 5    diclofenac 1 % External Gel  (Patient not taking: Reported on 2/10/2025)      Continuous Glucose Sensor (DEXCOM G6 SENSOR) Does not apply Misc CHANGE SENSOR EVERY 10 DAYS 9 each 1    BD INSULIN SYRINGE U-500 31G X 6MM 0.5 ML Does not apply Misc INJECT 1 EACH INTO THE SKIN THREE TIMES DAILY WITH MEALS 100 each 1    LOSARTAN 50 MG Oral Tab TAKE 1 TABLET(50 MG) BY MOUTH DAILY (Patient not taking: Reported on 4/22/2025) 30 tablet 3    fluocinonide 0.05 % External Cream Apply 1 Application topically 2 (two) times daily. 30 g 2    Continuous Glucose Transmitter (DEXCOM G6 TRANSMITTER) Does not apply Misc 1 Device every 3 (three) months. 1 each 1    Continuous Glucose Transmitter (DEXCOM G6 TRANSMITTER) Does not apply Misc  Replace transmitter every 3 months 1 each 1    Blood Glucose Monitoring Suppl (ONETOUCH VERIO) w/Device Does not apply Kit 1 Device daily. 1 kit 0    Cefadroxil 500 MG/5ML Oral Recon Susp Take 5 mL (500 mg total) by mouth 2 (two) times daily. (Patient not taking: Reported on 4/22/2025) 100 mL 0    venlafaxine ER (EFFEXOR XR) 150 MG Oral Capsule SR 24 Hr Take 1 capsule (150 mg total) by mouth daily. (Patient not taking: Reported on 4/22/2025) 90 capsule 3    Glucose Blood (ONETOUCH VERIO) In Vitro Strip USE 3 TO 4 TIMES DAILY AS DIRECTED 400 strip 0    TRUEPLUS PEN NEEDLES 32G X 4 MM Does not apply Misc USE AS DIRECTED 100 each 0    ONETOUCH DELICA PLUS [id number] Does not apply Misc TEST FOUR TIMES DAILY 400 each 0    TRUEPLUS 5-BEVEL PEN NEEDLES 31G X 5 MM Does not apply Misc USE AS DIRECTED 6 TIMES DAILY 200 each 0    Insulin Pen Needle 31G X 5 MM Does not apply Misc Please use daily as directed 200 each 0    Insulin Syringe/Needle U-500 31G X 6MM 0.5 ML Does not apply Misc 3 each by Does not apply route 3 (three) times daily with meals. 300 each 0    Insulin Syringe/Needle U-500 31G X 6MM 0.5 ML Does not apply Misc 1 Syringe by Does not apply route 3 (three) times daily with meals. 300 each 0    OneTouch UltraSoft Lancets Does not apply Misc USE TO CHECK BLOOD SUGAR 3-4X/ each 1    Blood Glucose Monitoring Suppl (FREESTYLE LITE) Does not apply Device TEST QID  UTD  0   [2]   Allergies  Allergen Reactions    Caviar SWELLING     throat    Latex RASH    Bacitracin UNKNOWN

## 2025-04-22 NOTE — ASSESSMENT & PLAN NOTE
Patient has had a hgb beter 9 and 11 for 2 years .  C scope a year ago was normal, iron was low in the past, she was placed on iron but stopped it .  She has not had an EGD   repeat labs in a week, start iron 325 mg daily , if hgb still low , GI referral for iron def. Anemia and possible EGD .

## 2025-04-23 DIAGNOSIS — E11.39 TYPE 2 DIABETES MELLITUS WITH OTHER OPHTHALMIC COMPLICATION, WITH LONG-TERM CURRENT USE OF INSULIN (HCC): ICD-10-CM

## 2025-04-23 DIAGNOSIS — Z79.4 TYPE 2 DIABETES MELLITUS WITH OTHER OPHTHALMIC COMPLICATION, WITH LONG-TERM CURRENT USE OF INSULIN (HCC): ICD-10-CM

## 2025-04-24 RX ORDER — SYRINGE,INSUL U-500,NDL,0.5ML 31GX15/64"
SYRINGE, EMPTY DISPOSABLE MISCELLANEOUS
Qty: 100 EACH | Refills: 1 | Status: SHIPPED | OUTPATIENT
Start: 2025-04-24

## 2025-04-24 NOTE — TELEPHONE ENCOUNTER
Endocrine Refill protocol for Glucose testing supplies     Protocol Criteria: PASSED     If below requirement is met, send a 90-day supply with 1 refill per provider protocol.    Verify appointment with Endocrinology completed in the last 6 months or scheduled in the next 3 months.    Last completed office visit: 2/10/2025 Anna Rainey APRN   Next scheduled Follow up:   Future Appointments   Date Time Provider Department Center   5/12/2025  9:45 AM Anna Rainey APRN [facility]

## 2025-04-26 DIAGNOSIS — Z79.4 TYPE 2 DIABETES MELLITUS WITH OTHER OPHTHALMIC COMPLICATION, WITH LONG-TERM CURRENT USE OF INSULIN (HCC): ICD-10-CM

## 2025-04-26 DIAGNOSIS — E11.39 TYPE 2 DIABETES MELLITUS WITH OTHER OPHTHALMIC COMPLICATION, WITH LONG-TERM CURRENT USE OF INSULIN (HCC): ICD-10-CM

## 2025-04-28 RX ORDER — INSULIN HUMAN 500 [IU]/ML
INJECTION, SOLUTION SUBCUTANEOUS
Qty: 120 ML | Refills: 0 | Status: SHIPPED | OUTPATIENT
Start: 2025-04-28

## 2025-04-28 NOTE — TELEPHONE ENCOUNTER
Endocrine refill protocol for rapid acting, regular, intermediate, and mixed insulin:    Protocol Criteria:  PASSED Reason: N/A    If all below requirements are met, send a 90-day supply with 1 refill per provider protocol.    Verify appointment with Endocrinology completed in the last 6 months or scheduled in the next 3 months.  Verify A1C has been completed within the last 6 months and is below 8.5%    -May substitute prescriptions for Novolog and Humalog unless documented allergy (pens and vials) at the same dose and concentration per insurance preference and provider protocol.   -May substitute prescriptions for Novolin R and Humulin R unless documented allergy (pens and vials) at the same dose and concentration per insurance preference and provider protocol.   -May substitute prescriptions for Novolin N and Humulin N unless documented allergy (pens and vials) at the same dose and concentration per insurance preference and provider protocol.   -May substitute prescriptions for Humulin and Novolin 70/30 insulin unless documented allergy at the same dose and concentration per insurance preference and provider protocol.    Last completed office visit: 2/10/2025 Anna Rainey APRN   Next scheduled Follow up:   Future Appointments   Date Time Provider Department Center   5/12/2025  9:45 AM Anna Rainey APRN [facility]      Last A1C result: 7.2% done 2/10/2025.

## 2025-04-29 RX ORDER — EMPAGLIFLOZIN 25 MG/1
TABLET, FILM COATED ORAL DAILY
Qty: 90 TABLET | Refills: 1 | Status: SHIPPED | OUTPATIENT
Start: 2025-04-29

## 2025-04-29 NOTE — TELEPHONE ENCOUNTER
Endocrine Refill protocol for oral and injectable diabetic medications    Protocol Criteria:  PASSED  Reason: N/A    If all below requirements are met, send a 90-day supply with 1 refill per provider protocol.    Verify appointment with Endocrinology completed in the last 6 months or scheduled in the next 3 months.  Verify A1C has been completed within the last 6 months and is below 8.5%     Last completed office visit: 2/10/2025 Anna Rainey APRN   Next scheduled Follow up:   Future Appointments   Date Time Provider Department Center   5/12/2025  9:45 AM Anna Rainey APRN [facility]      Last A1c result: Last A1c value was 7.2% done 2/10/2025.

## 2025-05-01 ENCOUNTER — MED REC SCAN ONLY (OUTPATIENT)
Dept: INTERNAL MEDICINE CLINIC | Facility: CLINIC | Age: 65
End: 2025-05-01

## 2025-05-12 ENCOUNTER — LAB ENCOUNTER (OUTPATIENT)
Dept: LAB | Age: 65
End: 2025-05-12
Attending: INTERNAL MEDICINE
Payer: MEDICARE

## 2025-05-12 DIAGNOSIS — D50.8 OTHER IRON DEFICIENCY ANEMIA: ICD-10-CM

## 2025-05-12 LAB
BASOPHILS # BLD AUTO: 0.03 X10(3) UL (ref 0–0.2)
BASOPHILS NFR BLD AUTO: 0.4 %
DEPRECATED HBV CORE AB SER IA-ACNC: 45 NG/ML (ref 50–306)
DEPRECATED RDW RBC AUTO: 76.9 FL (ref 35.1–46.3)
EOSINOPHIL # BLD AUTO: 0.1 X10(3) UL (ref 0–0.7)
EOSINOPHIL NFR BLD AUTO: 1.2 %
ERYTHROCYTE [DISTWIDTH] IN BLOOD BY AUTOMATED COUNT: 25.4 % (ref 11–15)
HCT VFR BLD AUTO: 36.4 % (ref 35–48)
HGB BLD-MCNC: 10.3 G/DL (ref 12–16)
IMM GRANULOCYTES # BLD AUTO: 0.02 X10(3) UL (ref 0–1)
IMM GRANULOCYTES NFR BLD: 0.2 %
IRON SATN MFR SERPL: 11 % (ref 15–50)
IRON SERPL-MCNC: 47 UG/DL (ref 50–170)
LYMPHOCYTES # BLD AUTO: 2.57 X10(3) UL (ref 1–4)
LYMPHOCYTES NFR BLD AUTO: 30.5 %
MCH RBC QN AUTO: 24.1 PG (ref 26–34)
MCHC RBC AUTO-ENTMCNC: 28.3 G/DL (ref 31–37)
MCV RBC AUTO: 85 FL (ref 80–100)
MONOCYTES # BLD AUTO: 0.51 X10(3) UL (ref 0.1–1)
MONOCYTES NFR BLD AUTO: 6.1 %
NEUTROPHILS # BLD AUTO: 5.19 X10 (3) UL (ref 1.5–7.7)
NEUTROPHILS # BLD AUTO: 5.19 X10(3) UL (ref 1.5–7.7)
NEUTROPHILS NFR BLD AUTO: 61.6 %
PLATELET # BLD AUTO: 242 10(3)UL (ref 150–450)
PLATELET MORPHOLOGY: NORMAL
RBC # BLD AUTO: 4.28 X10(6)UL (ref 3.8–5.3)
TOTAL IRON BINDING CAPACITY: 442 UG/DL (ref 250–425)
TRANSFERRIN SERPL-MCNC: 369 MG/DL (ref 250–380)
WBC # BLD AUTO: 8.4 X10(3) UL (ref 4–11)

## 2025-05-12 PROCEDURE — 83540 ASSAY OF IRON: CPT

## 2025-05-12 PROCEDURE — 82728 ASSAY OF FERRITIN: CPT

## 2025-05-12 PROCEDURE — 85025 COMPLETE CBC W/AUTO DIFF WBC: CPT

## 2025-05-12 PROCEDURE — 36415 COLL VENOUS BLD VENIPUNCTURE: CPT

## 2025-05-12 PROCEDURE — 84466 ASSAY OF TRANSFERRIN: CPT

## 2025-05-14 ENCOUNTER — TELEPHONE (OUTPATIENT)
Dept: ENDOCRINOLOGY CLINIC | Facility: CLINIC | Age: 65
End: 2025-05-14

## 2025-05-20 ENCOUNTER — OFFICE VISIT (OUTPATIENT)
Dept: INTERNAL MEDICINE CLINIC | Facility: CLINIC | Age: 65
End: 2025-05-20

## 2025-05-20 VITALS
TEMPERATURE: 98 F | SYSTOLIC BLOOD PRESSURE: 120 MMHG | BODY MASS INDEX: 21.83 KG/M2 | HEIGHT: 58 IN | WEIGHT: 104 LBS | OXYGEN SATURATION: 99 % | HEART RATE: 92 BPM | DIASTOLIC BLOOD PRESSURE: 50 MMHG

## 2025-05-20 DIAGNOSIS — M54.41 ACUTE RIGHT-SIDED LOW BACK PAIN WITH RIGHT-SIDED SCIATICA: ICD-10-CM

## 2025-05-20 DIAGNOSIS — I38 VALVULAR HEART DISEASE: ICD-10-CM

## 2025-05-20 DIAGNOSIS — I10 ESSENTIAL HYPERTENSION, BENIGN: ICD-10-CM

## 2025-05-20 DIAGNOSIS — D50.8 OTHER IRON DEFICIENCY ANEMIA: Primary | ICD-10-CM

## 2025-05-20 PROCEDURE — 99214 OFFICE O/P EST MOD 30 MIN: CPT | Performed by: INTERNAL MEDICINE

## 2025-05-20 NOTE — ASSESSMENT & PLAN NOTE
On Iron , repeat labs one month , see GI , continue iron , leg cramps are better since taking Iron .

## 2025-05-20 NOTE — PROGRESS NOTES
HPI:    Patient ID: Annamaria Dominguez is a 64 year old female.    Back Pain  Associated symptoms include leg pain. Pertinent negatives include no chest pain, dysuria, fever, headaches, numbness or weakness.   Leg Pain   Pertinent negatives include no fever or numbness.   patient is here to discuss her iron levels and low hgb.   She has not called to get the GI consult, She has started Iron , leg cramps are better since on iron . She has had a colonoscopy this year.    She is due to have both a mitral and aortic valve replacement soon at [facility] .    She will need a carotid US and dental clearance, and repeat iron  level .   She has right sided low back pain with pain radiating down the right leg , since being in [facility] for pneumonia .    She last had a MRI of the Lumbar spine in 2018 , and had PT back then also . She doesn't remember any of that nor did she remember having previous echocardiograms .       Review of Systems   Constitutional:  Negative for activity change, appetite change, chills, fatigue and fever.   HENT:  Negative for ear pain, hearing loss, nosebleeds, postnasal drip, rhinorrhea, sneezing and tinnitus.    Eyes:  Negative for pain, discharge, redness, itching and visual disturbance.   Respiratory:  Negative for apnea, cough, chest tightness, shortness of breath and wheezing.    Cardiovascular:  Negative for chest pain, palpitations and leg swelling.   Gastrointestinal:  Negative for abdominal distention, blood in stool, constipation, diarrhea and nausea.   Endocrine: Negative for cold intolerance and heat intolerance.   Genitourinary:  Negative for difficulty urinating, dysuria, flank pain, frequency, genital sores, hematuria and urgency.   Musculoskeletal:  Positive for back pain and myalgias. Negative for neck pain and neck stiffness.   Skin: Negative.    Allergic/Immunologic: Negative for immunocompromised state.   Neurological:  Negative for dizziness, syncope, facial asymmetry, weakness,  light-headedness, numbness and headaches.   Psychiatric/Behavioral: Negative.            Current Medications[1]  Allergies:Allergies[2]   PHYSICAL EXAM:   /50   Pulse 92   Temp 97.5 °F (36.4 °C)   Ht 4' 10\" (1.473 m)   Wt 104 lb (47.2 kg)   SpO2 99%   BMI 21.74 kg/m²      Physical Exam  Constitutional:       Appearance: She is well-developed.   Eyes:      Pupils: Pupils are equal, round, and reactive to light.   Neck:      Thyroid: No thyromegaly.   Cardiovascular:      Rate and Rhythm: Normal rate and regular rhythm.      Heart sounds: Murmur heard.      Systolic murmur is present with a grade of 4/6.      No gallop.   Pulmonary:      Effort: Pulmonary effort is normal. No respiratory distress.      Breath sounds: Normal breath sounds. No wheezing or rales.   Chest:      Chest wall: No tenderness.   Abdominal:      General: There is no distension.      Palpations: There is no mass.      Tenderness: There is no abdominal tenderness. There is no guarding or rebound.   Musculoskeletal:         General: No tenderness.      Cervical back: Normal range of motion.      Comments: Positive right leg raise.    Skin:     General: Skin is warm.      Coloration: Skin is not pale.      Findings: No erythema or rash.   Neurological:      Mental Status: She is alert and oriented to person, place, and time.      Motor: No abnormal muscle tone.      Coordination: Coordination normal.   Psychiatric:         Behavior: Behavior normal.         Thought Content: Thought content normal.         Judgment: Judgment normal.                ASSESSMENT/PLAN:   Valvular heart disease  Patient will need both mitral and aortic valve replacements , awaiting carotid US and dentist eval.   Dr Gamaliel Gauthier . [place]    Essential hypertension, benign  Bp is controlled.     Acute right-sided low back pain with right-sided sciatica  Carline has had pt and a MRI 7 years ago ,   MRI without significant findings then .   PT orders written .      Absolute anemia  On Iron , repeat labs one month , see GI , continue iron , leg cramps are better since taking Iron .     Orders Placed This Encounter   Procedures    CBC With Differential With Platelet    Iron And Tibc       Meds This Visit:  Requested Prescriptions      No prescriptions requested or ordered in this encounter       Imaging & Referrals:  PHYSICAL THERAPY - INTERNAL       ID#[id number]       [1]   Current Outpatient Medications   Medication Sig Dispense Refill    JARDIANCE 25 MG Oral Tab TAKE 1 TABLET BY MOUTH DAILY 90 tablet 1    HUMULIN R U-500, CONCENTRATED, 500 UNIT/ML Subcutaneous Solution ADMINISTER 180-195 UNITS UNDER THE SKIN THREE TIMES DAILY BEFORE MEALS 120 mL 0    losartan 25 MG Oral Tab       fluticasone furoate-vilanterol (BREO ELLIPTA) 200-25 MCG/ACT Inhalation Aerosol Powder, Breath Activated Inhale 1 puff into the lungs daily. 28 each 5    atorvastatin 20 MG Oral Tab Take 1 tablet (20 mg total) by mouth nightly. 90 tablet 3    ALPRAZolam 0.5 MG Oral Tab TAKE 1 TABLET BY MOUTH EVERY EVENING 30 tablet 5    dicyclomine 10 MG Oral Cap Take 1 capsule (10 mg total) by mouth 4 (four) times daily. 60 capsule 5    pantoprazole 40 MG Oral Tab EC Take 1 tablet (40 mg total) by mouth every morning. 90 tablet 3    BD INSULIN SYRINGE U-500 31G X 6MM 0.5 ML Does not apply Misc INJECT 1 EACH INTO THE SKIN THREE TIMES DAILY WITH MEALS 100 each 1    albuterol (VENTOLIN HFA) 108 (90 Base) MCG/ACT Inhalation Aero Soln Inhale 2 puffs into the lungs every 4 (four) hours as needed for Wheezing. 18 g 5    Continuous Glucose Sensor (DEXCOM G6 SENSOR) Does not apply Misc CHANGE SENSOR EVERY 10 DAYS 9 each 1    fluocinonide 0.05 % External Cream Apply 1 Application topically 2 (two) times daily. 30 g 2    Continuous Glucose Transmitter (DEXCOM G6 TRANSMITTER) Does not apply Misc 1 Device every 3 (three) months. 1 each 1    Continuous Glucose Transmitter (DEXCOM G6 TRANSMITTER) Does not apply Misc Replace  transmitter every 3 months 1 each 1    Blood Glucose Monitoring Suppl (ONETOUCH VERIO) w/Device Does not apply Kit 1 Device daily. 1 kit 0    Glucose Blood (ONETOUCH VERIO) In Vitro Strip USE 3 TO 4 TIMES DAILY AS DIRECTED 400 strip 0    TRUEPLUS PEN NEEDLES 32G X 4 MM Does not apply Misc USE AS DIRECTED 100 each 0    ONETOUCH DELICA PLUS [id number] Does not apply Misc TEST FOUR TIMES DAILY 400 each 0    TRUEPLUS 5-BEVEL PEN NEEDLES 31G X 5 MM Does not apply Misc USE AS DIRECTED 6 TIMES DAILY 200 each 0    Insulin Pen Needle 31G X 5 MM Does not apply Misc Please use daily as directed 200 each 0    Insulin Syringe/Needle U-500 31G X 6MM 0.5 ML Does not apply Misc 3 each by Does not apply route 3 (three) times daily with meals. 300 each 0    Insulin Syringe/Needle U-500 31G X 6MM 0.5 ML Does not apply Misc 1 Syringe by Does not apply route 3 (three) times daily with meals. 300 each 0    OneTouch UltraSoft Lancets Does not apply Misc USE TO CHECK BLOOD SUGAR 3-4X/ each 1    Blood Glucose Monitoring Suppl (FREESTYLE LITE) Does not apply Device TEST QID  UTD  0   [2]   Allergies  Allergen Reactions    Caviar SWELLING     throat    Latex RASH    Bacitracin UNKNOWN

## 2025-05-20 NOTE — ASSESSMENT & PLAN NOTE
Carline has had pt and a MRI 7 years ago ,   MRI without significant findings then .   PT orders written .

## 2025-05-20 NOTE — ASSESSMENT & PLAN NOTE
Patient will need both mitral and aortic valve replacements , awaiting carotid US and dentist eval.   Dr Gamaliel Gauthier . [place]

## 2025-05-21 ENCOUNTER — OFFICE VISIT (OUTPATIENT)
Dept: ENDOCRINOLOGY CLINIC | Facility: CLINIC | Age: 65
End: 2025-05-21

## 2025-05-21 VITALS
HEART RATE: 77 BPM | DIASTOLIC BLOOD PRESSURE: 47 MMHG | SYSTOLIC BLOOD PRESSURE: 118 MMHG | BODY MASS INDEX: 22 KG/M2 | WEIGHT: 104 LBS

## 2025-05-21 DIAGNOSIS — Z79.4 TYPE 2 DIABETES MELLITUS WITH OTHER OPHTHALMIC COMPLICATION, WITH LONG-TERM CURRENT USE OF INSULIN (HCC): Primary | ICD-10-CM

## 2025-05-21 DIAGNOSIS — E11.39 TYPE 2 DIABETES MELLITUS WITH OTHER OPHTHALMIC COMPLICATION, WITH LONG-TERM CURRENT USE OF INSULIN (HCC): Primary | ICD-10-CM

## 2025-05-21 LAB
GLUCOSE BLOOD: 169
GLUCOSE BLOOD: 60
GLUCOSE BLOOD: 84
HEMOGLOBIN A1C: 6 % (ref 4.3–5.6)
TEST STRIP LOT #: NORMAL NUMERIC

## 2025-05-21 PROCEDURE — 82947 ASSAY GLUCOSE BLOOD QUANT: CPT

## 2025-05-21 PROCEDURE — 99214 OFFICE O/P EST MOD 30 MIN: CPT

## 2025-05-21 PROCEDURE — 83036 HEMOGLOBIN GLYCOSYLATED A1C: CPT

## 2025-05-21 NOTE — PROGRESS NOTES
-----------------------------  Dexcom Clarity  -----------------------------  Annamaria Dominguez    YOB: 1960    Generated at: Wed, May 21, 2025 11:44 AM CDT    Reporting period: Tue Apr 22, 2025 - Wed May 21, 2025  -----------------------------  Glucose Details    Average glucose: 179 mg/dL    GMI: 7.6%    Standard deviation: 56 mg/dL    Coefficient of Variation: 31.3%  -----------------------------  Time in Range    Very High: 12%    High: 32%    In Range: 56%    Low: 0%    Very Low: 0%    Target Range   mg/dL    -----------------------------  Sensor usage    Days with data: 29/30    Time active: 94%    Avg. calibrations per day: 0.0

## 2025-05-21 NOTE — PROGRESS NOTES
Return Office Visit     CHIEF COMPLAINT:    DM  Dyslipidemia     HISTORY OF PRESENT ILLNESS:  Annamaria Dominguez is a 64 year old female who presents for follow up for DM.     She was hospitalized  3/2025 with pneumonia and NSTEMI. She is following closely with cardiology and will need valve replacement surgery for mitral and aortic valve stenosis. She did stop Ozempic since hospitalization- was having more GI symptoms and did lose weight so felt appetite was overly suppressed. Is having more hypoglycemia since discharge from the hospital.     DM HISTORY     Diagnosed: Around age 36        HISTORY OF DIABETES COMPLICATIONS: :  History of Retinopathy: No - last eye exam: UTD- seeing optho every 6 months Dr. Tyler - last visit around 6 months ago     History of Neuropathy: Yes, numbness, symptoms stable   History of Nephropathy: No     ASSOCIATED COMPLICATIONS:   HTN: Yes  Hyperlipidemia: Yes  Coronary Artery Disease:  Yes- CAD, STEMI 3/2025, mitral and aortic valve stenosis.   Cerebrovascular Disease: No        HOME GLUCOSE READINGS:   She is using Dexcom G7  Reviewed glucose readings for 2 weeks prior to visit date:    56% of glucose readings in target range ()  32% of glucose readings above target range (>180)  12% of glucose readings very high (>250)  4% of glucose readings below target range (<70)    Estimated HgA1c- 7.7%    CURRENT DIABETIC MEDICATIONS INCLUDE:  She is on 3 insulin injections a day    U 500 - 150 units with breakfast (typically cereal), 100 units with lunch- often skips lunch and so skips this dose, and then 150 units with dinner.     --> She decreased doses from 175 to 150 due to lows but still occurring.     Off ozempic for the last 4 weeks     Jardiance 25 mg daily    T/f MTF due to GI SE      MEALS:  Recall: Is trying to watch carbohydrates- breakfast is highest carb meal but sometimes dinner as well    Breakfast- cereal first and then 1 hour later eggs with toast and hashbrowns- fairly  consistent meal   Lunch-(1-3pm) sub sandwich   Dinner (7pm) hamburger with fries, or porkchop with corn  Snacks- banana, apple, orange- between dinner bedtime   Beverages-diet soda, water or tea     EXERCISE:   Yes- most days some walking     CURRENT MEDICATION:    Current Outpatient Medications   Medication Sig Dispense Refill    JARDIANCE 25 MG Oral Tab TAKE 1 TABLET BY MOUTH DAILY 90 tablet 1    HUMULIN R U-500, CONCENTRATED, 500 UNIT/ML Subcutaneous Solution ADMINISTER 180-195 UNITS UNDER THE SKIN THREE TIMES DAILY BEFORE MEALS 120 mL 0    BD INSULIN SYRINGE U-500 31G X 6MM 0.5 ML Does not apply Misc INJECT 1 EACH INTO THE SKIN THREE TIMES DAILY WITH MEALS 100 each 1    losartan 25 MG Oral Tab       fluticasone furoate-vilanterol (BREO ELLIPTA) 200-25 MCG/ACT Inhalation Aerosol Powder, Breath Activated Inhale 1 puff into the lungs daily. 28 each 5    albuterol (VENTOLIN HFA) 108 (90 Base) MCG/ACT Inhalation Aero Soln Inhale 2 puffs into the lungs every 4 (four) hours as needed for Wheezing. 18 g 5    atorvastatin 20 MG Oral Tab Take 1 tablet (20 mg total) by mouth nightly. 90 tablet 3    ALPRAZolam 0.5 MG Oral Tab TAKE 1 TABLET BY MOUTH EVERY EVENING 30 tablet 5    Continuous Glucose Sensor (DEXCOM G6 SENSOR) Does not apply Misc CHANGE SENSOR EVERY 10 DAYS 9 each 1    dicyclomine 10 MG Oral Cap Take 1 capsule (10 mg total) by mouth 4 (four) times daily. 60 capsule 5    fluocinonide 0.05 % External Cream Apply 1 Application topically 2 (two) times daily. 30 g 2    Continuous Glucose Transmitter (DEXCOM G6 TRANSMITTER) Does not apply Misc 1 Device every 3 (three) months. 1 each 1    Continuous Glucose Transmitter (DEXCOM G6 TRANSMITTER) Does not apply Misc Replace transmitter every 3 months 1 each 1    Blood Glucose Monitoring Suppl (ONETOUCH VERIO) w/Device Does not apply Kit 1 Device daily. 1 kit 0    Glucose Blood (ONETOUCH VERIO) In Vitro Strip USE 3 TO 4 TIMES DAILY AS DIRECTED 400 strip 0    pantoprazole 40  MG Oral Tab EC Take 1 tablet (40 mg total) by mouth every morning. 90 tablet 3    TRUEPLUS PEN NEEDLES 32G X 4 MM Does not apply Misc USE AS DIRECTED 100 each 0    ONETOUCH DELICA PLUS [id number] Does not apply Misc TEST FOUR TIMES DAILY 400 each 0    TRUEPLUS 5-BEVEL PEN NEEDLES 31G X 5 MM Does not apply Misc USE AS DIRECTED 6 TIMES DAILY 200 each 0    Insulin Pen Needle 31G X 5 MM Does not apply Misc Please use daily as directed 200 each 0    Insulin Syringe/Needle U-500 31G X 6MM 0.5 ML Does not apply Misc 3 each by Does not apply route 3 (three) times daily with meals. 300 each 0    Insulin Syringe/Needle U-500 31G X 6MM 0.5 ML Does not apply Misc 1 Syringe by Does not apply route 3 (three) times daily with meals. 300 each 0    OneTouch UltraSoft Lancets Does not apply Misc USE TO CHECK BLOOD SUGAR 3-4X/ each 1    Blood Glucose Monitoring Suppl (FREESTYLE LITE) Does not apply Device TEST QID  UTD  0         ALLERGY:  Allergies   Allergen Reactions    Caviar SWELLING     throat    Latex RASH    Bacitracin UNKNOWN       PAST MEDICAL, SOCIAL AND FAMILY HISTORY:  See past medical history marked as reviewed.  See past surgical history marked as reviewed.  See past family history marked as reviewed.  See past social history marked as reviewed.    ASSESSMENTS:       REVIEW OF SYSTEMS:  Constitutional: Negative for: weight change, fever, fatigue, cold/heat intolerance  Eyes: Negative for:  Visual changes, proptosis, blurring  ENT: Negative for:  dysphagia, neck swelling, dysphonia  Respiratory: Negative for:  dyspnea, cough  Cardiovascular: Negative for:  chest pain, palpitations, orthopnea  GI: Negative for:  abdominal pain, nausea, vomiting, diarrhea, constipation, bleeding  Neurology: Negative for: headache, numbness, weakness  Genito-Urinary: Negative for: dysuria, frequency  Psychiatric: Negative for:  depression, anxiety  Hematology/Lymphatics: Negative for: bruising, lower extremity edema  Endocrine:  Negative for: polyuria, polydypsia  Skin: Negative for: rash, blister, cellulitis,       PHYSICAL EXAM:    Vitals:    05/21/25 1141   BP: 118/47   Pulse: 77         General Appearance:  alert, well developed, in no acute distress  Nutritional:  no extreme weight gain or loss  Head: Atraumatic  Eyes:  normal conjunctivae, sclera., normal sclera and normal pupils  Throat/Neck: normal sound to voice. Normal hearing, normal speech  Respiratory:  Speaking in full sentences, non-labored. no increased work of breathing, no audible wheezing    Skin:  normal moisture and skin texture, no visible lesions  Hair and nails: normal scalp hair  Hematologic:  no excessive bruising  Neuro: motor grossly intact, moving all extremities without difficulty  Psychiatric:  oriented to time, self, and place  Extremities: no obvious extremity swelling, no lesions      DATA:     Pertinent labs reviewed      ASSESSMENT AND PLAN:     1. Type 2 DM: A1c: 6.3% 12/2022; 7.5% 4/2023; 7.3% 8/2023; 6.8% 12/2023; 7.3% 6/2024; 7.1% 2/2025; 6.0% POC today     Plan:  -Discussed the pathogenesis, natural course of diabetes. Patient understands the importance of glycemic control and the implications of uncontrolled diabetes including Diabetic ketoacidosis and various micro vascular and macrovascular complications.  -Reviewed ABC's of diabetes  -Reviewed importance of SBGM- continue with Dexcom G7  -Reviewed glucose targets-  fasting and <180 post prandially  -Reviewed importance of following low CHO diet- recommend 135 grams of carbohydrate daily/ 45 grams per meal     Medications:  - Adjust Insulin U 500: 130 units with breakfast,  0 units with lunch, and then decrease dose to 120 units with dinner.     - Continue to hold Ozempic given weight loss and GI side effects     -Continue  Jardiance 25 mg daily- reviewed side effects and risks vs benefits of medication. Reviewed importance of staying well hydrated on medication.       SE and CI of all  medications have been discussed in detail.     - Continue Dexcom G7    -No nephropathy- last lab 10/2024- on losartan   - Instructed on importance of annual eye exams - UTD with optho   - abnormal foot exam - following with podiatry - saw 12/2024 and again 1/2025   - Hypoglycemia symptoms and treatment of hypoglycemia with rule of 15's discussed      -normotensive     - Lipids 10/2024- LDL- 30, on atorvastatin       RTC in 3 weeks with AKANKSHA and 2-3 months with neyda Rainey, APRN  5/21/25  RTC in 3 months   A total of  30 minutes was spent on obtaining history, reviewing pertinent imaging/labs and specialists notes, evaluating patient, providing multiple treatment options, reinforcing diet/exercise and compliance, and completing documentation.

## 2025-05-21 NOTE — PATIENT INSTRUCTIONS
Decrease insulin to 130 units daily with breakfast and 120 units with dinner.     Continue Jardiance 25mg daily     Continue to hold Ozempic       Make 2 follow up appointments    3-4 weeks with Randi- diabetes educator     3 months with me.       Call sooner if you continue to have low sugars at home or if sugars are persistently >300.

## 2025-05-27 ENCOUNTER — PATIENT MESSAGE (OUTPATIENT)
Dept: INTERNAL MEDICINE CLINIC | Facility: CLINIC | Age: 65
End: 2025-05-27

## 2025-05-27 DIAGNOSIS — M54.41 ACUTE RIGHT-SIDED LOW BACK PAIN WITH RIGHT-SIDED SCIATICA: Primary | ICD-10-CM

## 2025-05-28 NOTE — TELEPHONE ENCOUNTER
Dr Gil, please advise    Last Office visit 25    Patient (name and  verified) is calling for a Rx for Tramadol for her ongoing back and leg pain. Patient is currently taking Advil and or Tylenol and it is not working. Patient states that her pain is getting worse in the morning.     Patient has not started physical therapy, states that she is to busy at this time with doctors appts and testing for her upcoming heart valve surgery in the next 3 weeks.     Pharmacy and allergies verified.

## 2025-05-29 RX ORDER — TRAMADOL HYDROCHLORIDE 50 MG/1
50 TABLET ORAL EVERY 6 HOURS PRN
Qty: 28 TABLET | Refills: 0 | Status: SHIPPED | OUTPATIENT
Start: 2025-05-29

## 2025-06-01 NOTE — H&P
[facility] - Gastroenterology                                                                                                               Reason for consult: eval    Requesting physician or provider: Darrius Gil MD    Chief Complaint   Patient presents with    Anemia       HPI:   Annamaria Dominguez is a 64 year old year-old female with history of dm, anxiety, neuropathy, sepsis:    she is here today for evaluation humaira    Chronic anemia on chart review dating back to 2019  Iron def on testing 2022, 2025  Had drop in hgb to 8.1 (3/2/25) during admission 3/2025  Was having sob  Went to ed  trops elevated 1800,  and EKG showed changes consistent with ST depression in anterolateral leads.she received lasix 40 mg and ASA and cardiology took her to cath lab, LHC showed non obstructive disease and elevated LVEDP and she was admitted to ICU on Bipap, which was weaned to NC at 5L   Trans-Thoracic Echocardiogram done   Trans-Esophageal Echocardiogram done, with significant AS and MS  Started oral iron and plan to repeat labs in 1 mos w/ pcp    due to have both a mitral and aortic valve replacement   awaiting carotid US and dentist eval.     No sob, dizziness, fatigue     she moves her bowels daily and without recent change. she denies straining and/or incomplete evacuation.  she denies brbpr and/or melena.  Stools looser w/ iron supplement.   has c.diff    she denies acid reflux and/or heartburn while on pantoprazole. Has been on therapy x years. Has never tried stopping.  she denies dysphagia, odynophagia and/or globus. she denies abdominal pain. she denies nausea and/or vomiting.  Recent hospitalization for pneumonia - appetite less with illness.    NSAIDS: ibuprofen every other day for back pain  Tobacco:  Alcohol:  Marijuana:  Illicit drugs:    No FH GI malignancy    No history of adverse reaction to sedation  No  JOSE  No anticoagulants  No pacemaker/defibrillator  No pain medications and/or sleep aides    Known to Dr. Bal 2013  Celiac serology neg  H/o multiple abd surgeries and concern c-scope may not be feasible    Had c-scope w/ Dr. GRANADOS 4/17/24    Findings:   1. No polyps noted.     2. Diverticulosis: none.     3. Ileocecal valve: the visualized mucosa appeared normal.     4. The colonic mucosa throughout the colon showed normal vascular pattern, without evidence of angioectasias or inflammation.      5. Unable to perform retroflexion due to small rectal vault and poor rectal tone with poor insufflation. No rectal abnormalities noted with careful forward withdrawal.      6. KAYLEE: somewhat poor rectal tone, no masses palpated.      Impression:   The colon was normal with glistening mucosa and intact vascular pattern throughout.     Recommend:  Repeat colonoscopy in 10 years for screening. If new signs or symptoms develop, colonoscopy may need to be repeated sooner.   High fiber diet.  Monitor for blood in the stool. If having more than just tinge of blood, call office or go to the ER.      Last EGD: no    Wt Readings from Last 6 Encounters:   06/04/25 107 lb (48.5 kg)   05/21/25 104 lb (47.2 kg)   05/20/25 104 lb (47.2 kg)   04/22/25 104 lb (47.2 kg)   02/27/25 107 lb (48.5 kg)   02/10/25 106 lb (48.1 kg)        History, Medications, Allergies, ROS:      Past Medical History[1]   Past Surgical History[2]   Family Hx: Family History[3]   Social History: Short Social Hx on File[4]     Medications (Active prior to today's visit):  Current Medications[5]    Allergies:  Allergies[6]    ROS:   CONSTITUTIONAL: negative for fevers, chills, sweats and weight loss  EYES Negative for red eyes, yellow eyes, changes in vision  HEENT: Negative for dysphagia and hoarseness  RESPIRATORY: Negative for cough and shortness of breath  CARDIOVASCULAR: Negative for chest pain, palpitations  GASTROINTESTINAL: See HPI  GENITOURINARY: Negative  for dysuria and frequency  MUSCULOSKELETAL: Negative for arthralgias and myalgias  NEUROLOGICAL: Negative for dizziness and headaches  BEHAVIOR/PSYCH: Negative for anxiety and poor appetite    PHYSICAL EXAM:   Blood pressure 117/63, pulse 97, height 4' 10\" (1.473 m), weight 107 lb (48.5 kg), not currently breastfeeding.    GEN: WD/WN, NAD  HEENT: Supple symmetrical, trachea midline  CV: RRR, the extremities are warm and well perfused   LUNGS: No increased work of breathing  ABDOMEN: No scars, normal bowel sounds, soft, non-tender, non-distended no rebound or guarding, no masses, no hepatomegaly  MSK: No redness, no warmth, no swelling of joints  SKIN: No jaundice, no erythema, no rashes  HEMATOLOGIC: No bleeding, no bruising  NEURO: Alert and interactive, normal gait    Labs/Imaging/Procedures:     Patient's pertinent labs and imaging were reviewed and discussed with patient today.        .  ASSESSMENT/PLAN:   Annamaria Dominguez is a 64 year old year-old female with history of dm, anxiety, neuropathy, sepsis:    #lety  #gerd  #crc screening  Chronic LETY. Drop in hgb during admission (3/202). Cbc (5/12/25) hgb 10.3 and stable from comparison. Is on oral iron replacement w/ side effect of loose stools.  has c.diff and will get stool testing to exclude this. She denies brbpr, melena.  Reflux controlled on pantoprazole. No fhx gi malignancy.  C-scope 2024 w/o sig finding. 10 y crc screening interval advised. She has not had egd.  She is planning to have both a mitral and aortic valve replacement at [facility] in June.  She will be starting plavix following the procedure.  She has started oral iron supplement and plan for repeat labs in 1 mos.  We discussed risks/benefits of endoscopy.  Plan to discuss timing of egd with cardiology and continue to monitor hemoglobin.    -stool testing (c.diff and hpylori) 15 % false neg rate while on ppi discussed with pt  -reflux diet modification  -avoid/limit nsaids  -continue  pantoprazole  -continue iron every other day to minimize side effect of diarrhea  -repeat labs   -c-scope  unless otherwise indicated  -plan to discuss timing of egd with cards (Dr. Bob)  T. [phone] (Work)          Orders This Visit:  Orders Placed This Encounter   Procedures    H. Pylori Stool Ag, EIA    C. diff toxigenic PCR (OPT)       Meds This Visit:  Requested Prescriptions      No prescriptions requested or ordered in this encounter       Imaging & Referrals:  None    ENDOSCOPIC RISK BENEFIT DISCUSSION: I described the procedure in great detail with the patient. I discussed the risks and benefits, including but not limited to: bleeding, perforation, infection, anesthesia complications, and even death. Patient will be NPO after midnight and will have a person physically present at time of pick-up to drive patient home. Patient verbalized understanding and agrees to proceed with procedure as planned.      Manuela Davidson, APRN   2025        This note was partially prepared using Dragon Medical voice recognition dictation software. As a result, errors may occur. When identified, these errors have been corrected. While every attempt is made to correct errors during dictation, discrepancies may still exist.          [1]   Past Medical History:   Anxiety state    Carpal tunnel syndrome, left    Diabetes (HCC)    Type 1    Heart murmur    controlled, cardiologist at [facility] N[facility] Rosas [facility]    [place] of blood transfusion    [place]    Neuropathy    BOTH LEGS AND FEET    Sepsis (HCC)    5 years ago    Visual impairment    eyeglasses   [2]   Past Surgical History:  Procedure Laterality Date    Abdominoplasty      Tummy Tuck with Lipo Suction          x 2    Cataract      Colonoscopy      Colonoscopy      no polyps. has diverticuli. 2016, 10 year follow up    Colonoscopy N/A 2024    Procedure: COLONOSCOPY;  Surgeon: aMrce Dozier MD;  Location: [facility]  MAIN OR    Hysterectomy      laparoscopic total hysterectomy and \"removed 1 of tubes/ovaries\" for abnormal bleeding    Needle biopsy left Left 2022    Left US CNB    Wrist arthroscop,release xvers lig Left 10/01/2019    Left endoscopic carpal tunnel release   [3]   Family History  Problem Relation Age of Onset    Diabetes Father 55        Type 1/Type 2    Heart Disorder Father     Diabetes Brother     Heart Disorder Brother     Hypertension Brother     Breast Cancer Neg     Ovarian Cancer Neg     Pancreatic Cancer Neg    [4]   Social History  Socioeconomic History    Marital status:    Occupational History    Occupation: Retired   Tobacco Use    Smoking status: Former     Current packs/day: 0.00     Types: Cigarettes     Quit date: 2017     Years since quittin.8    Smokeless tobacco: Never   Vaping Use    Vaping status: Never Used   Substance and Sexual Activity    Alcohol use: No     Alcohol/week: 0.0 standard drinks of alcohol    Drug use: Not Currently     Frequency: 7.0 times per week     Types: Cannabis     Comment: daily at night    Sexual activity: Not Currently     Partners: Male   Other Topics Concern    Blood Transfusions Yes     Comment:  and     Caffeine Concern Yes     Comment: coffee, tea, 32 oz daily    Exercise No   Social History Narrative    Live with      The patient does not use an assistive device..      The patient does live in a home with stairs.     Social Drivers of Health      [facility]    Housing Stability   [5]   Current Outpatient Medications   Medication Sig Dispense Refill    Ascorbic Acid (VITAMIN C) 1000 MG Oral Tab Take 1 tablet (1,000 mg total) by mouth daily.      ferrous sulfate 325 (65 FE) MG Oral Tab EC Take 1 tablet (325 mg total) by mouth daily with breakfast.      traMADol 50 MG Oral Tab Take 1 tablet (50 mg total) by mouth every 6 (six) hours as needed for Pain. 28 tablet 0    JARDIANCE 25 MG Oral  Tab TAKE 1 TABLET BY MOUTH DAILY 90 tablet 1    HUMULIN R U-500, CONCENTRATED, 500 UNIT/ML Subcutaneous Solution ADMINISTER 180-195 UNITS UNDER THE SKIN THREE TIMES DAILY BEFORE MEALS 120 mL 0    BD INSULIN SYRINGE U-500 31G X 6MM 0.5 ML Does not apply Misc INJECT 1 EACH INTO THE SKIN THREE TIMES DAILY WITH MEALS 100 each 1    losartan 25 MG Oral Tab       fluticasone furoate-vilanterol (BREO ELLIPTA) 200-25 MCG/ACT Inhalation Aerosol Powder, Breath Activated Inhale 1 puff into the lungs daily. 28 each 5    albuterol (VENTOLIN HFA) 108 (90 Base) MCG/ACT Inhalation Aero Soln Inhale 2 puffs into the lungs every 4 (four) hours as needed for Wheezing. 18 g 5    atorvastatin 20 MG Oral Tab Take 1 tablet (20 mg total) by mouth nightly. 90 tablet 3    ALPRAZolam 0.5 MG Oral Tab TAKE 1 TABLET BY MOUTH EVERY EVENING 30 tablet 5    Continuous Glucose Sensor (DEXCOM G6 SENSOR) Does not apply Misc CHANGE SENSOR EVERY 10 DAYS 9 each 1    dicyclomine 10 MG Oral Cap Take 1 capsule (10 mg total) by mouth 4 (four) times daily. 60 capsule 5    fluocinonide 0.05 % External Cream Apply 1 Application topically 2 (two) times daily. 30 g 2    Continuous Glucose Transmitter (DEXCOM G6 TRANSMITTER) Does not apply Misc 1 Device every 3 (three) months. 1 each 1    Continuous Glucose Transmitter (DEXCOM G6 TRANSMITTER) Does not apply Misc Replace transmitter every 3 months 1 each 1    Blood Glucose Monitoring Suppl (ONETOUCH VERIO) w/Device Does not apply Kit 1 Device daily. 1 kit 0    Glucose Blood (ONETOUCH VERIO) In Vitro Strip USE 3 TO 4 TIMES DAILY AS DIRECTED 400 strip 0    pantoprazole 40 MG Oral Tab EC Take 1 tablet (40 mg total) by mouth every morning. 90 tablet 3    TRUEPLUS PEN NEEDLES 32G X 4 MM Does not apply Misc USE AS DIRECTED 100 each 0    ONETOUCH DELICA PLUS [id number] Does not apply Misc TEST FOUR TIMES DAILY 400 each 0    TRUEPLUS 5-BEVEL PEN NEEDLES 31G X 5 MM Does not apply Misc USE AS DIRECTED 6 TIMES DAILY 200 each 0     Insulin Pen Needle 31G X 5 MM Does not apply Misc Please use daily as directed 200 each 0    Insulin Syringe/Needle U-500 31G X 6MM 0.5 ML Does not apply Misc 3 each by Does not apply route 3 (three) times daily with meals. 300 each 0    Insulin Syringe/Needle U-500 31G X 6MM 0.5 ML Does not apply Misc 1 Syringe by Does not apply route 3 (three) times daily with meals. 300 each 0    OneTouch UltraSoft Lancets Does not apply Misc USE TO CHECK BLOOD SUGAR 3-4X/ each 1    Blood Glucose Monitoring Suppl (FREESTYLE LITE) Does not apply Device TEST QID  UTD  0   [6]   Allergies  Allergen Reactions    Caviar SWELLING     throat    Latex RASH    Bacitracin UNKNOWN

## 2025-06-04 ENCOUNTER — OFFICE VISIT (OUTPATIENT)
Dept: GASTROENTEROLOGY | Facility: CLINIC | Age: 65
End: 2025-06-04

## 2025-06-04 VITALS
SYSTOLIC BLOOD PRESSURE: 117 MMHG | WEIGHT: 107 LBS | HEART RATE: 97 BPM | BODY MASS INDEX: 22.46 KG/M2 | HEIGHT: 58 IN | DIASTOLIC BLOOD PRESSURE: 63 MMHG

## 2025-06-04 DIAGNOSIS — D50.9 IRON DEFICIENCY ANEMIA, UNSPECIFIED IRON DEFICIENCY ANEMIA TYPE: Primary | ICD-10-CM

## 2025-06-04 DIAGNOSIS — K21.9 GASTROESOPHAGEAL REFLUX DISEASE, UNSPECIFIED WHETHER ESOPHAGITIS PRESENT: ICD-10-CM

## 2025-06-04 DIAGNOSIS — Z12.11 COLON CANCER SCREENING: ICD-10-CM

## 2025-06-04 PROCEDURE — 99215 OFFICE O/P EST HI 40 MIN: CPT | Performed by: NURSE PRACTITIONER

## 2025-06-04 RX ORDER — FERROUS SULFATE 325(65) MG
325 TABLET, DELAYED RELEASE (ENTERIC COATED) ORAL
COMMUNITY

## 2025-06-04 RX ORDER — MULTIVIT WITH MINERALS/LUTEIN
1000 TABLET ORAL DAILY
COMMUNITY

## 2025-06-04 NOTE — PATIENT INSTRUCTIONS
-stool testing for c.diff and hpylori (on ppi) risk of false neg discussed w/ pt  -reflux diet modification  -avoid/limit nsaids  -continue pantoprazole  -continue iron every other day to minimize side effect of diarrhea  -repeat labs 6/12    -c-scope 2033 unless otherwise indicated  -plan to discuss timing of egd with cards (Janett Bob)    Tips to Control Acid Reflux    To control acid reflux, you’ll need to make some basic diet and lifestyle changes. The simple steps outlined below may be all you’ll need to ease discomfort.   Watch what you eat  Don't have fatty foods or spicy foods.  Eat fewer acidic foods, such as citrus and tomato-based foods. These can increase symptoms.  Limit drinking alcohol, caffeine, and fizzy beverages. All increase acid reflux.  Try limiting chocolate, peppermint, and spearmint. These can make acid reflux worse in some people.     Watch when you eat  Don't lie down for 3 hours after eating.  Don't snack before going to bed.     Raise your head  Raising your head and upper body by 4 to 6 inches helps limit reflux when you’re lying down. Put blocks under the head of your bed frame or a wedge under your mattress to raise it.   Other changes  Lose weight, if you need to  Don’t exercise near bedtime  Don't wear tight-fitting clothes  Limit aspirin and ibuprofen  Stop smoking     Janis last reviewed this educational content on 6/1/2019 © 2000-2020 [employer]. [address] [place] [address], [place] [address]. All rights reserved. This information is not intended as a substitute for professional medical care. Always follow your healthcare professional's instructions.

## 2025-06-06 DIAGNOSIS — Z79.4 TYPE 2 DIABETES MELLITUS WITH OTHER OPHTHALMIC COMPLICATION, WITH LONG-TERM CURRENT USE OF INSULIN (HCC): ICD-10-CM

## 2025-06-06 DIAGNOSIS — E11.39 TYPE 2 DIABETES MELLITUS WITH OTHER OPHTHALMIC COMPLICATION, WITH LONG-TERM CURRENT USE OF INSULIN (HCC): ICD-10-CM

## 2025-06-09 ENCOUNTER — TELEPHONE (OUTPATIENT)
Facility: CLINIC | Age: 65
End: 2025-06-09

## 2025-06-09 RX ORDER — INSULIN HUMAN 500 [IU]/ML
INJECTION, SOLUTION SUBCUTANEOUS
Qty: 100 ML | Refills: 0 | Status: SHIPPED | OUTPATIENT
Start: 2025-06-09

## 2025-06-09 NOTE — TELEPHONE ENCOUNTER
Laura AMOR spoke to Denice John    Patient recently diagnosed with mitral valve stenosis and aortic valve stenosis. Patient to be scheduled for aortic valve replacement and mitral valve replacement within the first 2 weeks of July 2025    Per Denice, clearance for EGD can not be given at this time    MARCELA also mentioned that clearance would need to be given by patient's cardiologist Dr. Mariano in [place] in the future    Thank you

## 2025-06-09 NOTE — TELEPHONE ENCOUNTER
Nursing:  Tuyet. I contacted Dr. Bob' office for cards clearance prior to egd. If clearance cannot be given prior to egd, I requested when she would be cleared to hold plavix 5 days prior to procedure.    Thanks,  Manuela

## 2025-06-09 NOTE — TELEPHONE ENCOUNTER
Endocrine Refill protocol for oral and injectable diabetic medications    Protocol Criteria:  PASSED      If all below requirements are met, send a 90-day supply with 1 refill per provider protocol.    Verify appointment with Endocrinology completed in the last 6 months or scheduled in the next 3 months.  Verify A1C has been completed within the last 6 months and is below 8.5%     Last completed office visit: 5/21/2025 Anna Rainey APRN   Next scheduled Follow up:   Future Appointments   Date Time Provider Department Center   8/19/2025  9:15 AM Anna Rainey APRN [facility]      Last A1c result: Last A1c value was 6% done 5/21/2025.

## 2025-06-09 NOTE — TELEPHONE ENCOUNTER
I contacted Denice Bob office would not advise egd prior to procedure - pt has low bmi, sob and would be high risk.  Pt will not be on plavix  May need warfarin - could be interrupted following valve replacement, but would likely need lovenox.     Denice rbown would defer final decision to Dr. Mariano.    Nursing:  Do recommend getting statement from Dr. Mariano's office.  If they agree egd high risk, would consider procedure/timing of procedure following valve repair.    Thanks,  Manuela

## 2025-06-10 RX ORDER — LOSARTAN POTASSIUM 50 MG/1
50 TABLET ORAL DAILY
Qty: 30 TABLET | Refills: 3 | Status: SHIPPED | OUTPATIENT
Start: 2025-06-10

## 2025-06-10 RX ORDER — MULTIVIT WITH MINERALS/LUTEIN
1000 TABLET ORAL DAILY
Qty: 90 TABLET | Refills: 1 | Status: SHIPPED | OUTPATIENT
Start: 2025-06-10

## 2025-06-10 RX ORDER — FERROUS SULFATE 325(65) MG
325 TABLET, DELAYED RELEASE (ENTERIC COATED) ORAL
Qty: 90 TABLET | Refills: 1 | Status: SHIPPED | OUTPATIENT
Start: 2025-06-10

## 2025-06-10 NOTE — TELEPHONE ENCOUNTER
Endocrine Refill protocol for Glucose testing supplies     Protocol Criteria: PASSED Reason: N/A    If below requirement is met, send a 90-day supply with 1 refill per provider protocol.    Verify appointment with Endocrinology completed in the last 6 months or scheduled in the next 3 months.    Last completed office visit:5/21/2025 Anna Rainey APRN   Last completed telemed visit: Visit date not found  Next scheduled Follow up:   Future Appointments   Date Time Provider Department Center   8/19/2025  9:15 AM Anna Rainey APRN [facility]

## 2025-06-10 NOTE — TELEPHONE ENCOUNTER
Cardiac clearance form faxed to Dr. Mariano's office at [phone]    Confirmation fax received

## 2025-06-10 NOTE — TELEPHONE ENCOUNTER
Endocrine Refill protocol for oral antihypertensive medications    Protocol Criteria:  PASSED  Reason: N/A     If all below requirements are met, send a 90-day supply with 1 refill per provider protocol.    Verify appointment with Endocrinology completed in the last 6 months or scheduled in the next 3 months.  Verify BMP or CMP completed in the last 12 months   Verify last GFR result is greater than or equal to 50     Last completed office visit:5/21/2025 Anna Rainey APRN   Last completed telemed visit: Visit date not found  Next scheduled Follow up:   Future Appointments   Date Time Provider Department Center   8/19/2025  9:15 AM Anna Rainey APRN [facility]      Last BMP or CMP completion date:  Lab Results   Component Value Date    GFRAA 110 07/19/2021    GFRNAA 96 07/19/2021    EGFRCR 87 02/10/2025

## 2025-06-10 NOTE — TELEPHONE ENCOUNTER
Please review: medication fails/has no protocol attached.    This medication is listed as patient reported. Please advise if appropriate to provide a prescription.    No future appointments with primary care medicine   Last office visit: 5/20/25

## 2025-06-11 ENCOUNTER — TELEPHONE (OUTPATIENT)
Dept: INTERNAL MEDICINE CLINIC | Facility: CLINIC | Age: 65
End: 2025-06-11

## 2025-06-11 ENCOUNTER — PATIENT MESSAGE (OUTPATIENT)
Dept: INTERNAL MEDICINE CLINIC | Facility: CLINIC | Age: 65
End: 2025-06-11

## 2025-06-11 DIAGNOSIS — D50.8 OTHER IRON DEFICIENCY ANEMIA: Primary | ICD-10-CM

## 2025-06-11 NOTE — TELEPHONE ENCOUNTER
The patient calling to ask hours for lab hours at the [facility] which was given.    Please advise if the CBC needs to be repeated.  It was done on 6/6/25 at [facility]?    Informed Dr. Gil is not in the office today.  She stated she can wait until tomorrow.

## 2025-06-12 ENCOUNTER — LAB ENCOUNTER (OUTPATIENT)
Dept: LAB | Age: 65
End: 2025-06-12
Attending: INTERNAL MEDICINE
Payer: MEDICARE

## 2025-06-12 DIAGNOSIS — D50.8 OTHER IRON DEFICIENCY ANEMIA: ICD-10-CM

## 2025-06-12 LAB
BASOPHILS # BLD AUTO: 0.03 X10(3) UL (ref 0–0.2)
BASOPHILS NFR BLD AUTO: 0.4 %
DEPRECATED RDW RBC AUTO: 80.5 FL (ref 35.1–46.3)
EOSINOPHIL # BLD AUTO: 0.11 X10(3) UL (ref 0–0.7)
EOSINOPHIL NFR BLD AUTO: 1.5 %
ERYTHROCYTE [DISTWIDTH] IN BLOOD BY AUTOMATED COUNT: 23.9 % (ref 11–15)
HCT VFR BLD AUTO: 33.2 % (ref 35–48)
HGB BLD-MCNC: 9.8 G/DL (ref 12–16)
IMM GRANULOCYTES # BLD AUTO: 0.05 X10(3) UL (ref 0–1)
IMM GRANULOCYTES NFR BLD: 0.7 %
IRON SATN MFR SERPL: 28 % (ref 15–50)
IRON SERPL-MCNC: 116 UG/DL (ref 50–170)
LYMPHOCYTES # BLD AUTO: 1.42 X10(3) UL (ref 1–4)
LYMPHOCYTES NFR BLD AUTO: 19.3 %
MCH RBC QN AUTO: 27.7 PG (ref 26–34)
MCHC RBC AUTO-ENTMCNC: 29.5 G/DL (ref 31–37)
MCV RBC AUTO: 93.8 FL (ref 80–100)
MONOCYTES # BLD AUTO: 0.63 X10(3) UL (ref 0.1–1)
MONOCYTES NFR BLD AUTO: 8.6 %
NEUTROPHILS # BLD AUTO: 5.11 X10 (3) UL (ref 1.5–7.7)
NEUTROPHILS # BLD AUTO: 5.11 X10(3) UL (ref 1.5–7.7)
NEUTROPHILS NFR BLD AUTO: 69.5 %
PLATELET # BLD AUTO: 168 10(3)UL (ref 150–450)
PLATELET MORPHOLOGY: NORMAL
RBC # BLD AUTO: 3.54 X10(6)UL (ref 3.8–5.3)
TOTAL IRON BINDING CAPACITY: 410 UG/DL (ref 250–425)
TRANSFERRIN SERPL-MCNC: 326 MG/DL (ref 250–380)
WBC # BLD AUTO: 7.4 X10(3) UL (ref 4–11)

## 2025-06-12 PROCEDURE — 36415 COLL VENOUS BLD VENIPUNCTURE: CPT

## 2025-06-12 PROCEDURE — 83540 ASSAY OF IRON: CPT

## 2025-06-12 PROCEDURE — 84466 ASSAY OF TRANSFERRIN: CPT

## 2025-06-12 PROCEDURE — 85025 COMPLETE CBC W/AUTO DIFF WBC: CPT

## 2025-06-12 RX ORDER — ACYCLOVIR 400 MG/1
1 TABLET ORAL CONTINUOUS
Qty: 1 EACH | Refills: 0 | Status: SHIPPED | OUTPATIENT
Start: 2025-06-12

## 2025-06-12 RX ORDER — ACYCLOVIR 400 MG/1
1 TABLET ORAL
Qty: 3 EACH | Refills: 11 | Status: SHIPPED | OUTPATIENT
Start: 2025-06-12

## 2025-06-13 ENCOUNTER — MED REC SCAN ONLY (OUTPATIENT)
Dept: INTERNAL MEDICINE CLINIC | Facility: CLINIC | Age: 65
End: 2025-06-13

## 2025-06-13 NOTE — TELEPHONE ENCOUNTER
Endocrine Refill protocol for Glucose testing supplies     Protocol Criteria: PASSED Reason: N/A    If below requirement is met, send a 90-day supply with 1 refill per provider protocol.    Verify appointment with Endocrinology completed in the last 6 months or scheduled in the next 3 months.    Last completed office visit:5/21/2025 Anna Rainey APRN   Last completed telemed visit: Visit date not found  Next scheduled Follow up:   Future Appointments   Date Time Provider Department Center   8/19/2025  9:15 AM Anna Rainey APRN [facility]

## 2025-06-16 ENCOUNTER — LAB ENCOUNTER (OUTPATIENT)
Dept: LAB | Age: 65
End: 2025-06-16
Attending: NURSE PRACTITIONER
Payer: MEDICARE

## 2025-06-16 ENCOUNTER — OFFICE VISIT (OUTPATIENT)
Dept: INTERNAL MEDICINE CLINIC | Facility: CLINIC | Age: 65
End: 2025-06-16
Payer: MEDICARE

## 2025-06-16 ENCOUNTER — PATIENT MESSAGE (OUTPATIENT)
Dept: ENDOCRINOLOGY CLINIC | Facility: CLINIC | Age: 65
End: 2025-06-16

## 2025-06-16 VITALS
HEART RATE: 92 BPM | SYSTOLIC BLOOD PRESSURE: 130 MMHG | TEMPERATURE: 99 F | WEIGHT: 104 LBS | BODY MASS INDEX: 21.83 KG/M2 | OXYGEN SATURATION: 95 % | HEIGHT: 58 IN | DIASTOLIC BLOOD PRESSURE: 50 MMHG

## 2025-06-16 DIAGNOSIS — Z12.11 COLON CANCER SCREENING: ICD-10-CM

## 2025-06-16 DIAGNOSIS — J01.00 SUBACUTE MAXILLARY SINUSITIS: ICD-10-CM

## 2025-06-16 DIAGNOSIS — D50.9 IRON DEFICIENCY ANEMIA, UNSPECIFIED IRON DEFICIENCY ANEMIA TYPE: ICD-10-CM

## 2025-06-16 DIAGNOSIS — I10 ESSENTIAL HYPERTENSION, BENIGN: Primary | ICD-10-CM

## 2025-06-16 DIAGNOSIS — J40 BRONCHITIS: ICD-10-CM

## 2025-06-16 DIAGNOSIS — K21.9 GASTROESOPHAGEAL REFLUX DISEASE, UNSPECIFIED WHETHER ESOPHAGITIS PRESENT: ICD-10-CM

## 2025-06-16 PROCEDURE — 99214 OFFICE O/P EST MOD 30 MIN: CPT | Performed by: INTERNAL MEDICINE

## 2025-06-16 PROCEDURE — 87493 C DIFF AMPLIFIED PROBE: CPT

## 2025-06-16 PROCEDURE — 87338 HPYLORI STOOL AG IA: CPT

## 2025-06-16 RX ORDER — FLUCONAZOLE 150 MG/1
TABLET ORAL
Qty: 2 TABLET | Refills: 0 | Status: SHIPPED | OUTPATIENT
Start: 2025-06-16

## 2025-06-16 RX ORDER — PANTOPRAZOLE SODIUM 40 MG/1
40 TABLET, DELAYED RELEASE ORAL EVERY MORNING
Qty: 90 TABLET | Refills: 3 | Status: SHIPPED | OUTPATIENT
Start: 2025-06-16

## 2025-06-16 RX ORDER — CEFUROXIME AXETIL 250 MG/1
250 TABLET ORAL 2 TIMES DAILY
Qty: 20 TABLET | Refills: 0 | Status: SHIPPED | OUTPATIENT
Start: 2025-06-16

## 2025-06-16 RX ORDER — VENLAFAXINE HYDROCHLORIDE 150 MG/1
CAPSULE, EXTENDED RELEASE ORAL
COMMUNITY
Start: 2025-06-15

## 2025-06-16 NOTE — PROGRESS NOTES
HPI:    Patient ID: Annamaria Dominguez is a 64 year old female.    Wheezing   Associated symptoms include shortness of breath. Pertinent negatives include no chest pain, chills, coughing, diarrhea, ear pain, fever, headaches, neck pain or rhinorrhea.     Patient has a cough productive of brown sputum , nasal drainage, and shortness of breath .  She has been wheezing ,  she is using her inhalers.  She had a chest xray 3 days ago which showed Bronchitis , No treatment given .   Review of Systems   Constitutional:  Negative for activity change, appetite change, chills, fatigue and fever.   HENT:  Positive for congestion. Negative for ear pain, hearing loss, nosebleeds, postnasal drip, rhinorrhea, sneezing and tinnitus.    Eyes:  Negative for pain, discharge, redness, itching and visual disturbance.   Respiratory:  Positive for shortness of breath and wheezing. Negative for apnea, cough and chest tightness.    Cardiovascular:  Negative for chest pain, palpitations and leg swelling.   Gastrointestinal:  Negative for abdominal distention, blood in stool, constipation, diarrhea and nausea.   Endocrine: Negative for cold intolerance and heat intolerance.   Genitourinary:  Negative for difficulty urinating, dysuria, flank pain, frequency, genital sores, hematuria and urgency.   Musculoskeletal:  Negative for neck pain and neck stiffness.   Skin: Negative.    Allergic/Immunologic: Negative for immunocompromised state.   Neurological:  Negative for dizziness, syncope, facial asymmetry, weakness, light-headedness, numbness and headaches.   Psychiatric/Behavioral: Negative.            Current Medications[1]  Allergies:Allergies[2]   PHYSICAL EXAM:   /50   Pulse 92   Temp 98.8 °F (37.1 °C)   Ht 4' 10\" (1.473 m)   Wt 104 lb (47.2 kg)   SpO2 95%   BMI 21.74 kg/m²      Physical Exam  Constitutional:       Appearance: She is well-developed.   Eyes:      Pupils: Pupils are equal, round, and reactive to light.   Neck:       Thyroid: No thyromegaly.   Cardiovascular:      Rate and Rhythm: Normal rate and regular rhythm.      Heart sounds: Murmur heard.      Systolic murmur is present with a grade of 3/6.      No gallop.   Pulmonary:      Effort: Pulmonary effort is normal. No respiratory distress.      Breath sounds: Normal breath sounds. No wheezing or rales.      Comments: Increased expiratory phase.   Chest:      Chest wall: No tenderness.   Abdominal:      General: There is no distension.      Palpations: There is no mass.      Tenderness: There is no abdominal tenderness. There is no guarding or rebound.   Musculoskeletal:         General: No tenderness.      Cervical back: Normal range of motion.   Skin:     General: Skin is warm.      Coloration: Skin is not pale.      Findings: No erythema or rash.   Neurological:      Mental Status: She is alert and oriented to person, place, and time.      Motor: No abnormal muscle tone.      Coordination: Coordination normal.   Psychiatric:         Mood and Affect: Mood normal.         Behavior: Behavior normal.         Thought Content: Thought content normal.         Judgment: Judgment normal.                ASSESSMENT/PLAN:   Subacute maxillary sinusitis  Ceftin given .     Essential hypertension, benign  Bp is controlled, she will have aortic valve surgery soon , at [facility] .     Bronchitis  Ceftin . Albuterol , Breo Ellipta .     No orders of the defined types were placed in this encounter.      Meds This Visit:  Requested Prescriptions     Signed Prescriptions Disp Refills    cefuroxime 250 MG Oral Tab 20 tablet 0     Sig: Take 1 tablet (250 mg total) by mouth 2 (two) times daily.    fluconazole (DIFLUCAN) 150 MG Oral Tab 2 tablet 0     Sig: One PO midway through antibiotic course then one at the end of the antibiotic course.       Imaging & Referrals:  None       ID#[id number]       [1]   Current Outpatient Medications   Medication Sig Dispense Refill    pantoprazole 40 MG Oral Tab EC Take 1  tablet (40 mg total) by mouth every morning. 90 tablet 3    cefuroxime 250 MG Oral Tab Take 1 tablet (250 mg total) by mouth 2 (two) times daily. 20 tablet 0    fluconazole (DIFLUCAN) 150 MG Oral Tab One PO midway through antibiotic course then one at the end of the antibiotic course. 2 tablet 0    Ascorbic Acid (VITAMIN C) 1000 MG Oral Tab Take 1 tablet (1,000 mg total) by mouth daily. 90 tablet 1    ferrous sulfate 325 (65 FE) MG Oral Tab EC Take 1 tablet (325 mg total) by mouth daily with breakfast. 90 tablet 1    HUMULIN R U-500, CONCENTRATED, 500 UNIT/ML Subcutaneous Solution ADMINISTER 130 UNITS UNDER THE SKIN WITH BREAKFAST  UNITS WITH DINNER 100 mL 0    traMADol 50 MG Oral Tab Take 1 tablet (50 mg total) by mouth every 6 (six) hours as needed for Pain. 28 tablet 0    JARDIANCE 25 MG Oral Tab TAKE 1 TABLET BY MOUTH DAILY 90 tablet 1    losartan 25 MG Oral Tab       fluticasone furoate-vilanterol (BREO ELLIPTA) 200-25 MCG/ACT Inhalation Aerosol Powder, Breath Activated Inhale 1 puff into the lungs daily. 28 each 5    albuterol (VENTOLIN HFA) 108 (90 Base) MCG/ACT Inhalation Aero Soln Inhale 2 puffs into the lungs every 4 (four) hours as needed for Wheezing. 18 g 5    atorvastatin 20 MG Oral Tab Take 1 tablet (20 mg total) by mouth nightly. 90 tablet 3    ALPRAZolam 0.5 MG Oral Tab TAKE 1 TABLET BY MOUTH EVERY EVENING 30 tablet 5    dicyclomine 10 MG Oral Cap Take 1 capsule (10 mg total) by mouth 4 (four) times daily. 60 capsule 5    venlafaxine  MG Oral Capsule SR 24 Hr  (Patient not taking: Reported on 6/16/2025)      Insulin Syringe/Needle U-500 31G X 6MM 0.5 ML Does not apply Misc 3 each 3 (three) times daily with meals. 300 each 0    Continuous Glucose  (DEXCOM G7 ) Does not apply Device 1 each continuous. 1 each 0    Continuous Glucose Sensor (DEXCOM G7 SENSOR) Does not apply Misc 1 each Every 10 days. Use as directed every 10 days 3 each 11    LOSARTAN 50 MG Oral Tab TAKE 1  TABLET(50 MG) BY MOUTH DAILY (Patient not taking: Reported on 6/16/2025) 30 tablet 3    BD INSULIN SYRINGE U-500 31G X 6MM 0.5 ML Does not apply Misc INJECT 1 EACH INTO THE SKIN THREE TIMES DAILY WITH MEALS 100 each 1    Continuous Glucose Sensor (DEXCOM G6 SENSOR) Does not apply Misc CHANGE SENSOR EVERY 10 DAYS 9 each 1    fluocinonide 0.05 % External Cream Apply 1 Application topically 2 (two) times daily. 30 g 2    Continuous Glucose Transmitter (DEXCOM G6 TRANSMITTER) Does not apply Misc 1 Device every 3 (three) months. 1 each 1    Continuous Glucose Transmitter (DEXCOM G6 TRANSMITTER) Does not apply Misc Replace transmitter every 3 months 1 each 1    Blood Glucose Monitoring Suppl (ONETOUCH VERIO) w/Device Does not apply Kit 1 Device daily. 1 kit 0    Glucose Blood (ONETOUCH VERIO) In Vitro Strip USE 3 TO 4 TIMES DAILY AS DIRECTED 400 strip 0    TRUEPLUS PEN NEEDLES 32G X 4 MM Does not apply Misc USE AS DIRECTED 100 each 0    ONETOUCH DELICA PLUS DDLEIU32C Does not apply Misc TEST FOUR TIMES DAILY 400 each 0    TRUEPLUS 5-BEVEL PEN NEEDLES 31G X 5 MM Does not apply Misc USE AS DIRECTED 6 TIMES DAILY 200 each 0    Insulin Pen Needle 31G X 5 MM Does not apply Misc Please use daily as directed 200 each 0    Insulin Syringe/Needle U-500 31G X 6MM 0.5 ML Does not apply Misc 1 Syringe by Does not apply route 3 (three) times daily with meals. 300 each 0    OneTouch UltraSoft Lancets Does not apply Misc USE TO CHECK BLOOD SUGAR 3-4X/ each 1    Blood Glucose Monitoring Suppl (FREESTYLE LITE) Does not apply Device TEST QID  UTD  0   [2]   Allergies  Allergen Reactions    Caviar SWELLING     throat    Latex RASH    Bacitracin UNKNOWN

## 2025-06-17 LAB
C DIFF TOX B STL QL: NEGATIVE
H PYLORI AG STL QL IA: NEGATIVE

## 2025-06-23 ENCOUNTER — PATIENT MESSAGE (OUTPATIENT)
Dept: ENDOCRINOLOGY CLINIC | Facility: CLINIC | Age: 65
End: 2025-06-23

## 2025-06-24 NOTE — TELEPHONE ENCOUNTER
Received fax from [employer] they need last ov note to for CGM requirement, faxed over to [phone].    Last OV 5/21/2025 faxed, awaiting fax confirmation.    Fax confirmation received, request placed in scan bin.

## 2025-06-30 NOTE — TELEPHONE ENCOUNTER
I called [phone] and spoke to representative Carol    I informed representative that I was calling to check on the status of cardiac clearance that was faxed over on 6/10/2025    Per Carol, she does not see any notes regarding cardiac clearance in patient's chart, she will send a high priority message to clinical staff to follow up and call GI office back (GI office number provider)    I was informed of a 24-48 turn around period    Awaiting call back

## 2025-07-16 NOTE — TELEPHONE ENCOUNTER
Manuela- patient is currently admitted to [facility] on 06/27/25 had open heart aortic valve and mitral valve replacement. After chart review appears patient completed EGD at [facility] during admission on 7/1/25

## 2025-07-21 NOTE — TELEPHONE ENCOUNTER
Unfortunately, on chart review -   Care withdrawn per family request on 2025, patient  at 12:39 PM.

## (undated) DIAGNOSIS — Z79.4 TYPE 2 DIABETES MELLITUS WITH OTHER OPHTHALMIC COMPLICATION, WITH LONG-TERM CURRENT USE OF INSULIN (HCC): ICD-10-CM

## (undated) DIAGNOSIS — E11.39 TYPE 2 DIABETES MELLITUS WITH OTHER OPHTHALMIC COMPLICATION, WITH LONG-TERM CURRENT USE OF INSULIN (HCC): ICD-10-CM

## (undated) DEVICE — SOLUTION  .9 1000ML BTL

## (undated) DEVICE — CLIP MED INTNL HMCLP TNTLM

## (undated) DEVICE — ECTRA II PROCEDURE KIT,                                    SINGLE-USE, DISPOSABLE. CONTENTS                                    PROBE KNIFE, RETROGRADE KNIFE,                                    TRIANGLE KNIFE, HAND PAD, SWABS: Brand: ECTRA

## (undated) DEVICE — BANDAGE ROLL,100% COTTON, 6 PLY, SMALL: Brand: KERLIX

## (undated) DEVICE — 6 ML SYRINGE LUER-LOCK TIP: Brand: MONOJECT

## (undated) DEVICE — DRAPE TAPE: Brand: CONVERTORS

## (undated) DEVICE — PLASTIC HAND: Brand: MEDLINE INDUSTRIES, INC.

## (undated) DEVICE — SUT SILK 2-0 FS 685G

## (undated) DEVICE — Device: Brand: JELCO

## (undated) DEVICE — GAMMEX® PI HYBRID SIZE 7, STERILE POWDER-FREE SURGICAL GLOVE, POLYISOPRENE AND NEOPRENE BLEND: Brand: GAMMEX

## (undated) DEVICE — MINOR GENERAL: Brand: MEDLINE INDUSTRIES, INC.

## (undated) DEVICE — SPNG GZ W4XL4IN COT 12 PLY TYP

## (undated) DEVICE — ZIMMER® STERILE DISPOSABLE TOURNIQUET CUFF WITH PLC, DUAL PORT, SINGLE BLADDER, 18 IN. (46 CM)

## (undated) DEVICE — GAMMEX® PI HYBRID SIZE 6.5, STERILE POWDER-FREE SURGICAL GLOVE, POLYISOPRENE AND NEOPRENE BLEND: Brand: GAMMEX

## (undated) DEVICE — FLEXIBLE YANKAUER,MEDIUM TIP, NO VACUUM CONTROL: Brand: ARGYLE

## (undated) DEVICE — SOL  .9 1000ML BTL

## (undated) DEVICE — ABDOMINAL PAD: Brand: CURITY

## (undated) DEVICE — 3M™ STERI-STRIP™ REINFORCED ADHESIVE SKIN CLOSURES, R1547, 1/2 IN X 4 IN (12 MM X 100 MM), 6 STRIPS/ENVELOPE: Brand: 3M™ STERI-STRIP™

## (undated) DEVICE — 12 ML SYRINGE LUER-LOCK TIP: Brand: MONOJECT

## (undated) DEVICE — DRAPE PACK CHEST & U BAR

## (undated) DEVICE — SUT VICRYL 3-0 SH J416H

## (undated) DEVICE — SUTURE ETHILON 4-0 699G

## (undated) DEVICE — CLIP SM INTNL HMCLP TNTLM ESCP

## (undated) DEVICE — ADHESIVE MASTISOL 2/3ML

## (undated) DEVICE — SUT MONOCRYL 4-0 PS-2 Y496G

## (undated) DEVICE — CONTAINER GENT-L-KARE 4OZ GRAY

## (undated) NOTE — Clinical Note
Is there anyone who can work on this prior to Mon? The fax will be sent to our office with the prior authorization information .

## (undated) NOTE — LETTER
8/30/2019              [address]        [place] [address]         Dear Carol Sanchez,    [facility] records indicate that the repeat blood tests ordered for you by Florence Sidhu MD  have not been done.   If you have, in fact, already comple

## (undated) NOTE — LETTER
Patient Name: Doria Simmonds  : 1960  MRN: [id number]  Patient Address: [address] [place] [address]      Coronavirus Disease 2019 (COVID-19)     [facility] is committed to the safety and well-being of our patients, members, employe your symptoms get worse, call your healthcare provider immediately. 3. Get rest and stay hydrated.    4. If you have a medical appointment, call the healthcare provider ahead of time and tell them that you have or may have COVID-19.  5. For medical emergen fever-reducing medications; and  · Improvement in respiratory symptoms (e.g., cough, shortness of breath); and  · At least 10 days have passed since symptoms first appeared OR if asymptomatic patient or date of symptom onset is unclear then use 10 days pos donors must:    · Have had a confirmed diagnosis of COVID-19  · Be symptom-free for at least 14 days*    *Some people will be required to have a repeat COVID-19 test in order to be eligible to donate.  If you’re instructed by Ernie that a repeat test is r random. Researchers are trying to identify similarities between people with a Post-COVID condition to better understand if there are risk factors. How do I prevent a Post-COVID condition?   The best way to prevent the long-term symptoms of COVID-19 is

## (undated) NOTE — LETTER
[facility] [address], [facility]   Date:   8/17/2023     Name:   Jeimy Conn    YOB: 1960   MRN:   [id number]       [facility]? Percy the areas on your body where you feel the described sensations. Use the appropriate symbol. Zaire Hung the areas of radiation. Include all affected areas. Just to complete the picture, please draw in the face. ACHE:  ^ ^ ^   NUMBNESS:  0000   PINS & NEEDLES:  = = = =                              ^ ^ ^                       0000              = = = =                                    ^ ^ ^                       0000            = = = =      BURNING:  XXXX   STABBING: ////                  XXXX                ////                         XXXX          ////     Please percy the line below indicating your degree of pain right now  with 0 being no pain 10 being the worst pain possible.                                          0             1             2              3             4              5              6              7             8             9             10         Patient Signature:

## (undated) NOTE — LETTER
10/21/19      Patient: Ranjeet Sapp  : 1960 Visit date: 10/21/2019    Dear Elsi Douglass,      I examined your patient in follow-up today. She is 20 days post left endoscopic carpal tunnel release.   Her preoperative symptoms are gone,

## (undated) NOTE — LETTER
October 25, 2019      [address] [place] [address],[address] [id number]      Dear Rebecca Ruelas: Your TB test today was negative.     Date given: 10/22/2019  Date read: October 25, 2019        Please call if you have further questions,

## (undated) NOTE — LETTER
2020      To: [facility]  Fax: [phone]  Re: Iain Sampson  : 1960   Dexcom G6 sensor denial            To Whom It May Concern,    I am writing on behalf of the patient to appeal the denial

## (undated) NOTE — LETTER
[address], [place]  Authorization for Invasive Procedures  1. I hereby authorize Dr. Thad Latham  , my physician and whomever may be designated as the doctor's assistant, to perform the following operation and/or procedure:  Ultrasound guided left breast biopsy with clip placement on Carlos Alberto [place] at [facility].    2. My physician has explained to me the nature and purpose of the operation or other procedure, possible alternative methods of treatment, the risks involved and the possibility of complications to me. I understand the probable consequences of declining the recommended procedure and the alternative methods of treatment. I acknowledge that no guarantee has been made as to the result that may be obtained. 3. I recognize that during the course of this operation or other procedure, unforeseen conditions may necessitate additional or different procedures than those listed above. I, therefore, further authorize and request that the above-named physician, his/her physician assistants, or designees perform such procedures as are, in his/her professional opinion, necessary and desirable. If I have a Do Not Attempt Resuscitation (DNAR) order in place, that status will be suspended while in the operating room, procedural suite, and during the recovery period unless otherwise explicitly stated by me (or a person authorized to consent on my behalf). The surgeon or my attending physician will determine when the applicable recovery period ends for purposes of reinstating the DNAR order. 4. Should the need arise during my operation or immediate post-operative period; I also consent to the administration of blood and/or blood products.  Further, I understand that despite careful testing and screening of blood and blood products, I may still be subject to ill effects as a result of recieving a blood transfusion an/or blood producst. The following are some, but not all, of the potential risks that can occur: fever and allergic reactions, hemolytic reactions, transmission of disease such as hepatitis, AIDS, cytomegalovirus (CMV), and flluid overload. In the event that I wish to have autologous transfusions of my own blood, or a directed donor transfusion, I will discuss this with my physician. 5. I consent to the photographing of the operations or procedures to be performed for the purposes of advancing medicine, science, and/or education, provided my identity is not revealed. If the procedure has been videotaped, the physician/surgeon will obtain the original videotape. [facility] will not be responsible for storage or maintenance of this tape. 6. I consent to the presence of a  or observer as deemed necessary by my physician or his designee. 7. Any tissues or organs removed in the operation or other procedure may be disposed of by and at the discretion of [facility].    8. I understand that the physician and his/her physician assistants may not be employees or agents of [facility], [facility], nor [facility], but are independent medical practitioners who have been permitted to use its facilities for the care and treatment of their patients. 9. Patients having a sterilization procedure: I understand that if the procedure is successful the results will be permanent and it will therefore be impossible for me to inseminate, conceive or bear children. I also understand that the procedure is intended to result in sterility, although the result has not been guaranteed. 10. I CERTIFY THAT I HAVE READ AND FULLY UNDERSTAND THE ABOVE CONSENT TO OPERATION and/or OTHER PROCEDURE. 11. I acknowledge that my physician has explained sedation/analgesia administration to me including the risks and benefits.  I consent to the administration of sedation/analgesia as may be necessary or desirable in the judgment of my physician. Signature of Patient:  ________________________________________________ Date: _________Time: _________    Responsible person in case of minor or unconscious: _____________________________Relationship: ____________     Witness Signature: ____________________________________________ Date: __________ Time: ___________    Statement of Physician  My signature below affirms that prior to the time of the procedure, I have explained to the patient and/or her legal representative, the risks and benefits involved in the proposed treatment and any reasonable alternative to the proposed treatment. I have also explained the risks and benefits involved in the refusal of the proposed treatment and have answered the patient's questions. If I have a significant financial interest in this procedure/surgery, I have disclosed this and had a discussion with my patient.     Signature of Physician:   ________________________________________Date: _________Time:_______ Patient Name: Mayte Merritt  : 1960   Printed: 2022    Medical Record #: [id number]

## (undated) NOTE — LETTER
19          Kehinde Mitchell  :  1960      To Whom It May Concern: This patient was seen in our office on 19. Work status:   May return to work full-time with restrictions: She should be allowed to use a chair to sit as needed, approxi

## (undated) NOTE — LETTER
[address]  [address] [id number]  Dept: [phone]      August 26, 2017    Patient: Murali Aviles   Date of Visit: 8/26/2017       To Whom It May Concern:    [facility]

## (undated) NOTE — LETTER
Date & Time: 5/9/2019, 9:08 PM  Patient: Cristina Sellers  Encounter Provider(s):    So Snow MD       To Whom It May Concern:    Aston Krishnamurthy was seen and treated in our department on 5/9/2019. She should not return to work until 5/13/19.     If

## (undated) NOTE — LETTER
[address]   Date:   2/18/2021     Name:   Paula Becerra    YOB: 1960   MRN:   [id number]       WHERE IS YOUR PAIN NOW? Riccardo the areas on your body where you feel the described sensations.   Use the appropriate symbo

## (undated) NOTE — LETTER
4/16/2019              [address]        [place] [address]         Dear Maulik Russ,    [facility] records indicate that the annual blood tests ordered for you by Teddy Rossi MD  have not been done.   If you have, in fact, already comp

## (undated) NOTE — LETTER
2/17/2020              [address] [address]         Dear Purvi Sneed,    Our records indicate that the tests ordered for you by Abdelrahman Toussaint MD  have not been done.   If you have, in fact, already completed the emanuel

## (undated) NOTE — LETTER
19      Patient: Jose Kaba  : 1960 Visit date: 2019    Dear Kraig Duffy,      I examined your patient in consultation today.     She has a severe left carpal tunnel syndrome with constant numbness of the left middle finger,

## (undated) NOTE — LETTER
[facility] SURGERY SCHEDULING FORM   [address] [facility]   [phone] (scheduling phone) [phone] (scheduling fax)     PATIENT INFORMATION   Last Name:      Sterling Gould      First Name:    Raya Giron

## (undated) NOTE — Clinical Note
Thank you for the consult. I saw Ms. Javi Whitlock in the endocrine/diabetes clinic today. Please see attached my note. Please feel free to contact me with any questions. Thanks!

## (undated) NOTE — ED AVS SNAPSHOT
Ranjeet Sapp   MRN: [id number]    Department:  [facility] Emergency Department   Date of Visit:  5/9/2019           Disclosure     Insurance plans vary and the physician(s) referred by the ER may not be covered by your plan.  Please contact yo CARE PHYSICIAN AT ONCE OR RETURN IMMEDIATELY TO THE EMERGENCY DEPARTMENT. If you have been prescribed any medication(s), please fill your prescription right away and begin taking the medication(s) as directed.   If you believe that any of the medications

## (undated) NOTE — LETTER
[address]  Authorization for Surgical Operation and Procedure                                                                                           1. I hereby authorize Juan Nunez MD, my physician and his/her assistants (if applicable), which may include medical students, residents, and/or fellows, to perform the following surgical operation/ procedure and administer such anesthesia as may be determined necessary by my physician: Operation/Procedure name (s)  Left breast excisional biopsy, left breast terminal duct excision on [place]   2. I recognize that during the surgical operation/procedure, unforeseen conditions may necessitate additional or different procedures than those listed above. I, therefore, further authorize and request that the above-named surgeon, assistants, or designees perform such procedures as are, in their judgment, necessary and desirable. 3.   My surgeon/physician has discussed prior to my surgery the potential benefits, risks and side effects of this procedure; the likelihood of achieving goals; and potential problems that might occur during recuperation. They also discussed reasonable alternatives to the procedure, including risks, benefits, and side effects related to the alternatives and risks related to not receiving this procedure. I have had all my questions answered and I acknowledge that no guarantee has been made as to the result that may be obtained. 4.   Should the need arise during my operation/procedure, which includes change of level of care prior to discharge, I also consent to the administration of blood and/or blood products. Further, I understand that despite careful testing and screening of blood or blood products by collecting agencies, I may still be subject to ill effects as a result of receiving a blood transfusion and/or blood products.   The following are some, but not all, of the potential risks that can occur: fever and allergic reactions, hemolytic reactions, transmission of diseases such as Hepatitis, AIDS and Cytomegalovirus (CMV) and fluid overload. In the event that I wish to have an autologous transfusion of my own blood, or a directed donor transfusion, I will discuss this with my physician. Check only if Refusing Blood or Blood Products  I understand refusal of blood or blood products as deemed necessary by my physician may have serious consequences to my condition to include possible death. I hereby assume responsibility for my refusal and release the hospital, its personnel, and my physicians from any responsibility for the consequences of my refusal.    o  Refuse   5. I authorize the use of any specimen, organs, tissues, body parts or foreign objects that may be removed from my body during the operation/procedure for diagnosis, research or teaching purposes and their subsequent disposal by hospital authorities. I also authorize the release of specimen test results and/or written reports to my treating physician on the hospital medical staff or other referring or consulting physicians involved in my care, at the discretion of the Pathologist or my treating physician. 6.   I consent to the photographing or videotaping of the operations or procedures to be performed, including appropriate portions of my body for medical, scientific, or educational purposes, provided my identity is not revealed by the pictures or by descriptive texts accompanying them. If the procedure has been photographed/videotaped, the surgeon will obtain the original picture, image, videotape or CD. The hospital will not be responsible for storage, release or maintenance of the picture, image, tape or CD.    7.   I consent to the presence of a  or observers in the operating room as deemed necessary by my physician or their designees.     8.   I recognize that in the event my procedure results in extended X-Ray/fluoroscopy time, I may develop a skin reaction. 9. If I have a Do Not Attempt Resuscitation (DNAR) order in place, that status will be suspended while in the operating room, procedural suite, and during the recovery period unless otherwise explicitly stated by me (or a person authorized to consent on my behalf). The surgeon or my attending physician will determine when the applicable recovery period ends for purposes of reinstating the DNAR order. 10. Patients having a sterilization procedure: I understand that if the procedure is successful the results will be permanent and it will therefore be impossible for me to inseminate, conceive, or bear children. I also understand that the procedure is intended to result in sterility, although the result has not been guaranteed. 11. I acknowledge that my physician has explained sedation/analgesia administration to me including the risk and benefits I consent to the administration of sedation/analgesia as may be necessary or desirable in the judgment of my physician. I CERTIFY THAT I HAVE READ AND FULLY UNDERSTAND THE ABOVE CONSENT TO OPERATION and/or OTHER PROCEDURE.     _________________________________________ _________________________________     ___________________________________  Signature of Patient     Signature of Responsible Person                   Printed Name of Responsible Person                              _________________________________________ ______________________________        ___________________________________  Signature of Witness         Date  Time         Relationship to Patient    STATEMENT OF PHYSICIAN My signature below affirms that prior to the time of the procedure; I have explained to the patient and/or his/her legal representative, the risks and benefits involved in the proposed treatment and any reasonable alternative to the proposed treatment.  I have also explained the risks and benefits involved in refusal of the proposed treatment and alternatives to the proposed treatment and have answered the patient's questions.  If I have a significant financial interest in a co-management agreement or a significant financial interest in any product or implant, or other significant relationship used in this procedure/surgery, I have disclosed this and had a discussion with my patient.     _______________________________________________________________ _____________________________  (Signature of Physician)                                                                                         (Date)                                   (Time)  Patient Name: Prema Zheng    : 1960   Printed: 2022      Medical Record #: [id number]                                              Page 1 of 1

## (undated) NOTE — MR AVS SNAPSHOT
[address] [address]  [phone]               Thank you for choosing us for your health care visit with Lyly Mccollum MD.  We are glad to serve you and happy to provide you with this summar Rehab potential: Good    Number of visits: 10      Referral Orders      Normal Orders This Visit    PHYSICAL THERAPY - INTERNAL [[id number] CUSTOM]  Order #:  [id number]         Note to Managed Care Patients:  This is the physician order form only and not an 112/70 mmHg 96 97.4 °F (36.3 °C) (Oral) 4' 11\" (1.499 m) 95 lb 6.4 oz (43.273 kg) 19.26 kg/m2         Current Medications          This list is accurate as of: 5/19/17  9:56 AM.  Always use your most recent med list.                * BD [employer] information, go to [email or web address] and click on the Sign Up Now link in the [employer] box. Enter your [employer] Activation Code exactly as it appears below along with your Zip Code and Date of Birth to complete the sign-up process.  If you do

## (undated) NOTE — LETTER
[address]  Authorization for Surgical Operation and Procedure                                                                                           1. I hereby authorize                                                                    , MD, my physician and his/her assistants (if applicable), which may include medical students, residents, and/or fellows, to perform the following surgical operation/ procedure and administer such anesthesia as may be determined necessary by my physician: Operation/Procedure name (s)  Left breast wire localization on [place]   2. I recognize that during the surgical operation/procedure, unforeseen conditions may necessitate additional or different procedures than those listed above. I, therefore, further authorize and request that the above-named surgeon, assistants, or designees perform such procedures as are, in their judgment, necessary and desirable. 3.   My surgeon/physician has discussed prior to my surgery the potential benefits, risks and side effects of this procedure; the likelihood of achieving goals; and potential problems that might occur during recuperation. They also discussed reasonable alternatives to the procedure, including risks, benefits, and side effects related to the alternatives and risks related to not receiving this procedure. I have had all my questions answered and I acknowledge that no guarantee has been made as to the result that may be obtained. 4.   Should the need arise during my operation/procedure, which includes change of level of care prior to discharge, I also consent to the administration of blood and/or blood products. Further, I understand that despite careful testing and screening of blood or blood products by collecting agencies, I may still be subject to ill effects as a result of receiving a blood transfusion and/or blood products.   The following are some, but not all, of the potential risks that can occur: fever and allergic reactions, hemolytic reactions, transmission of diseases such as Hepatitis, AIDS and Cytomegalovirus (CMV) and fluid overload. In the event that I wish to have an autologous transfusion of my own blood, or a directed donor transfusion, I will discuss this with my physician. Check only if Refusing Blood or Blood Products  I understand refusal of blood or blood products as deemed necessary by my physician may have serious consequences to my condition to include possible death. I hereby assume responsibility for my refusal and release the hospital, its personnel, and my physicians from any responsibility for the consequences of my refusal.    o  Refuse   5. I authorize the use of any specimen, organs, tissues, body parts or foreign objects that may be removed from my body during the operation/procedure for diagnosis, research or teaching purposes and their subsequent disposal by hospital authorities. I also authorize the release of specimen test results and/or written reports to my treating physician on the hospital medical staff or other referring or consulting physicians involved in my care, at the discretion of the Pathologist or my treating physician. 6.   I consent to the photographing or videotaping of the operations or procedures to be performed, including appropriate portions of my body for medical, scientific, or educational purposes, provided my identity is not revealed by the pictures or by descriptive texts accompanying them. If the procedure has been photographed/videotaped, the surgeon will obtain the original picture, image, videotape or CD. The hospital will not be responsible for storage, release or maintenance of the picture, image, tape or CD.    7.   I consent to the presence of a  or observers in the operating room as deemed necessary by my physician or their designees.     8.   I recognize that in the event my procedure results in extended X-Ray/fluoroscopy time, I may develop a skin reaction. 9. If I have a Do Not Attempt Resuscitation (DNAR) order in place, that status will be suspended while in the operating room, procedural suite, and during the recovery period unless otherwise explicitly stated by me (or a person authorized to consent on my behalf). The surgeon or my attending physician will determine when the applicable recovery period ends for purposes of reinstating the DNAR order. 10. Patients having a sterilization procedure: I understand that if the procedure is successful the results will be permanent and it will therefore be impossible for me to inseminate, conceive, or bear children. I also understand that the procedure is intended to result in sterility, although the result has not been guaranteed. 11. I acknowledge that my physician has explained sedation/analgesia administration to me including the risk and benefits I consent to the administration of sedation/analgesia as may be necessary or desirable in the judgment of my physician. I CERTIFY THAT I HAVE READ AND FULLY UNDERSTAND THE ABOVE CONSENT TO OPERATION and/or OTHER PROCEDURE.     _________________________________________ _________________________________     ___________________________________  Signature of Patient     Signature of Responsible Person                   Printed Name of Responsible Person                              _________________________________________ ______________________________        ___________________________________  Signature of Witness         Date  Time         Relationship to Patient    STATEMENT OF PHYSICIAN My signature below affirms that prior to the time of the procedure; I have explained to the patient and/or his/her legal representative, the risks and benefits involved in the proposed treatment and any reasonable alternative to the proposed treatment.  I have also explained the risks and benefits involved in refusal of the proposed treatment and alternatives to the proposed treatment and have answered the patient's questions.  If I have a significant financial interest in a co-management agreement or a significant financial interest in any product or implant, or other significant relationship used in this procedure/surgery, I have disclosed this and had a discussion with my patient.     _______________________________________________________________ _____________________________  (Signature of Physician)                                                                                         (Date)                                   (Time)  Patient Name: Brady Pinto    : 1960   Printed: 2022      Medical Record #: [id number]                                              Page 1 of 1